# Patient Record
Sex: FEMALE | Race: BLACK OR AFRICAN AMERICAN | Employment: OTHER | URBAN - METROPOLITAN AREA
[De-identification: names, ages, dates, MRNs, and addresses within clinical notes are randomized per-mention and may not be internally consistent; named-entity substitution may affect disease eponyms.]

---

## 2017-02-28 RX ORDER — RIVAROXABAN 20 MG/1
TABLET, FILM COATED ORAL
Qty: 30 TAB | Refills: 4 | Status: SHIPPED | OUTPATIENT
Start: 2017-02-28 | End: 2017-03-30 | Stop reason: SDUPTHER

## 2017-03-30 ENCOUNTER — OFFICE VISIT (OUTPATIENT)
Dept: INTERNAL MEDICINE CLINIC | Age: 82
End: 2017-03-30

## 2017-03-30 VITALS
TEMPERATURE: 97.3 F | SYSTOLIC BLOOD PRESSURE: 115 MMHG | HEART RATE: 59 BPM | RESPIRATION RATE: 20 BRPM | DIASTOLIC BLOOD PRESSURE: 60 MMHG | HEIGHT: 64 IN | OXYGEN SATURATION: 99 %

## 2017-03-30 DIAGNOSIS — R41.3 MEMORY DEFICIT: ICD-10-CM

## 2017-03-30 DIAGNOSIS — H93.13 TINNITUS, BILATERAL: ICD-10-CM

## 2017-03-30 DIAGNOSIS — E03.9 HYPOTHYROIDISM, UNSPECIFIED TYPE: ICD-10-CM

## 2017-03-30 DIAGNOSIS — Z13.5 GLAUCOMA SCREENING: ICD-10-CM

## 2017-03-30 DIAGNOSIS — Z00.00 ROUTINE GENERAL MEDICAL EXAMINATION AT A HEALTH CARE FACILITY: ICD-10-CM

## 2017-03-30 DIAGNOSIS — I48.20 CHRONIC ATRIAL FIBRILLATION (HCC): ICD-10-CM

## 2017-03-30 DIAGNOSIS — Z00.00 MEDICARE ANNUAL WELLNESS VISIT, SUBSEQUENT: ICD-10-CM

## 2017-03-30 DIAGNOSIS — I10 ESSENTIAL HYPERTENSION: Primary | ICD-10-CM

## 2017-03-30 DIAGNOSIS — Z23 ENCOUNTER FOR IMMUNIZATION: ICD-10-CM

## 2017-03-30 DIAGNOSIS — R05.3 CHRONIC COUGH: ICD-10-CM

## 2017-03-30 PROBLEM — Z71.89 ACP (ADVANCE CARE PLANNING): Status: ACTIVE | Noted: 2017-03-30

## 2017-03-30 RX ORDER — PROMETHAZINE HYDROCHLORIDE AND CODEINE PHOSPHATE 6.25; 1 MG/5ML; MG/5ML
1 SOLUTION ORAL
Qty: 120 ML | Refills: 0 | Status: SHIPPED | OUTPATIENT
Start: 2017-03-30 | End: 2017-12-13

## 2017-03-30 NOTE — PROGRESS NOTES
This is a Subsequent Medicare Annual Wellness Visit providing Personalized Prevention Plan Services (PPPS) (Performed 12 months after initial AWV and PPPS )    I have reviewed the patient's medical history in detail and updated the computerized patient record. History     Past Medical History:   Diagnosis Date    Atrial fibrillation ([address] 2007    Hypercholesterolemia     Hypertension     Osteoarthritis 2010    Thromboembolus [facility])     Thyroid disease       Past Surgical History:   Procedure Laterality Date    HX HEENT      cataract sx    HX HIP FRACTURE [place]  2013    right hip 1989 left hip 2013    HX HIP REPLACEMENT  1989    HX PACEMAKER  2008     Current Outpatient Prescriptions   Medication Sig Dispense Refill    promethazine-codeine (PHENERGAN WITH CODEINE) 6.25-10 mg/5 mL syrup Take 5 mL by mouth nightly as needed for Cough. 120 mL 0    losartan (COZAAR) 50 mg tablet TAKE 1 TABLET BY MOUTH DAILY 90 Tab 2    pravastatin (PRAVACHOL) 20 mg tablet TAKE 1 TABLET BY MOUTH ONCE NIGHTLY 90 Tab 2    hydrochlorothiazide (HYDRODIURIL) 25 mg tablet TAKE 1/2 TABLET BY MOUTH DAILY 45 Tab 2    XARELTO 20 mg tab tablet TAKE 1 TABLET BY MOUTH ONCE DAILY WITH BREAKFAST 30 Tab 4    levothyroxine (SYNTHROID) 25 mcg tablet TAKE 1 TABLET BY MOUTH DAILY BEFORE BREAKFAST 90 Tab 3    magnesium oxide (MAG-OX) 400 mg tablet Take 1 Tab by mouth daily. 30 Tab 5    PARoxetine (PAXIL) 20 mg tablet Take 1 Tab by mouth daily. 90 Tab 4    multivitamin (ONE A DAY) tablet Take 1 Tab by mouth daily.  zinc oxide-cod liver oil (DESITIN) 40 % ointment Apply  to affected area as needed for Skin Irritation.  docusate sodium (COLACE) 100 mg capsule Take 100 mg by mouth two (2) times a day.  cyanocobalamin 1,000 mcg tablet Take 1,000 mcg by mouth daily. Indications: 5000mcg      alendronate (FOSAMAX) 35 mg tablet TAKE 1 TABLET BY MOUTH EVERY SEVEN (7) DAYS.  12 Tab 3    trospium (SANCTURA) 20 mg tablet Take 1 Tab by mouth Before breakfast, lunch, and dinner. 180 Tab 1    fluticasone (FLONASE) 50 mcg/actuation nasal spray 2 Sprays by Both Nostrils route daily. 3 Bottle 0    loratadine (CLARITIN) 10 mg tablet TAKE 1 TABLET BY MOUTH ONCE DAILY 30 Tab 0    calcium-cholecalciferol, D3, (CALTRATE 600+D) tablet Take 1 Tab by mouth daily.  sotalol (BETAPACE) 80 mg tablet Take 80 mg by mouth two (2) times a day. No Known Allergies  Family History   Problem Relation Age of Onset    Hypertension Mother      Social History   Substance Use Topics    Smoking status: Never Smoker    Smokeless tobacco: Never Used    Alcohol use No     Patient Active Problem List   Diagnosis Code    PE (pulmonary embolism) I26.99    DVT (deep venous thrombosis) ([facility]) I82.409    Mixed hyperlipidemia E78.2    Atrial fibrillation ([facility]) I48.91    HTN (hypertension) I10    DJD (degenerative joint disease) M19.90   [employer] Hypothyroid E03.9    TIA (transient ischemic attack) G45.9    ACP (advance care planning) Z71.89       Depression Risk Factor Screening:     PHQ 2 / 9, over the last two weeks 3/30/2017   Little interest or pleasure in doing things Not at all   Feeling down, depressed or hopeless Not at all   Total Score PHQ 2 0     Alcohol Risk Factor Screening: On any occasion during the past 3 months, have you had more than 3 drinks containing alcohol? No    Do you average more than 7 drinks per week? No      Functional Ability and Level of Safety:     Hearing Loss   mild    Activities of Daily Living   Partial care  Requires assistance with: bathing and hygiene and no ADLs    Fall Risk     Fall Risk Assessment, last 12 mths 3/30/2017   Able to walk? Yes   Fall in past 12 months? Yes   Fall with injury?  No   Number of falls in past 12 months 1   Fall Risk Score 1     Abuse Screen   Patient is not abused    Review of Systems   Not required  annual medicare wellness exam    Physical Examination     Evaluation of Cognitive Function:  Mood/affect:  happy  Appearance: age appropriate, casually dressed and well dressed  Family member/caregiver input: present and supportive    No exam performed today, annual medicare wellness exam.    Patient Care Team:  Karl Dukes MD as PCP - General (Internal Medicine)  Fly Hardy MD (Orthopedic Surgery)  Best Brown LPN as Nurse Navigator  Mo Mccurdy RN as Nurse Navigator (Internal Medicine)  Claudia Brooks MD (Cardiology)    Advice/Referrals/Counseling   Education and counseling provided:  Are appropriate based on today's review and evaluation  Influenza Vaccine  zoster      Assessment/Plan       ICD-10-CM ICD-9-CM    1. Essential hypertension I10 401.9    2. Hypothyroidism, unspecified type E03.9 244.9    3. Chronic atrial fibrillation (HCC) I48.2 427.31    4. Encounter for immunization Z23 V03.89 ZOSTER (SHINGLES) VACCINE, LIVE, SC INJECTION      INFLUENZA VIRUS VACCINE, FLUZONE IM VIAL   5. Glaucoma screening Z13.5 V80.1 REFERRAL TO OPHTHALMOLOGY   6. Medicare annual wellness visit, subsequent Z00.00 V70.0    7. Memory deficit R41.3 780.93    8. Chronic cough R05 786.2 promethazine-codeine (PHENERGAN WITH CODEINE) 6.25-10 mg/5 mL syrup   9. Routine general medical examination at a health care facility Z00.00 V70.0    .    1. Patient has AMD at home and will have daughter POA bring in to scan into her chart. 2. Discussed the following wellness screenings: Flu vaccination provided in office today, zoster vaccination ordered. Up to date on other wellness age appropriate screenings. 3. AVS and preventative screenings table printed, reviewed, and handed to patient. Patient verbalized understanding to all information.

## 2017-03-30 NOTE — PROGRESS NOTES
HISTORY OF PRESENT ILLNESS  Etienne Berry is a 80 y.o. female here for follow up.accompanied by her family member. Has memory issue. active,readign books and solving  puzzle  Has HTn,on med. Has A fib,will have battery change in pacemaker. Need eye check up. Need vaccine and cough syrup refill.still having chronic cough and post nasal drip keep her up at night. Follow-up   Pertinent negatives include no chest pain and no shortness of breath. Dementia    Her past medical history is significant for hypertension. Referral Request   Pertinent negatives include no chest pain and no shortness of breath. Ringing in Ear    Associated symptoms include hearing loss. Neurologic Problem   Primary symptoms include memory loss. Pertinent negatives include no shortness of breath and no chest pain. Hypertension    Associated symptoms include tinnitus. Pertinent negatives include no chest pain, no blurred vision and no shortness of breath. Palpitations   Pertinent negatives include no fever, no chest pain and no shortness of breath. Her past medical history is significant for hypertension. Review of Systems   Constitutional: Negative. Negative for chills and fever. HENT: Positive for hearing loss and tinnitus. Eyes: Negative. Negative for blurred vision and double vision. Respiratory: Negative. Negative for shortness of breath and wheezing. Cardiovascular: Negative. Negative for chest pain. Gastrointestinal: Negative. Genitourinary: Negative. Musculoskeletal: Negative. Negative for falls. Skin: Negative. Neurological: Negative. Psychiatric/Behavioral: Positive for memory loss. Physical Exam   Constitutional: She appears well-developed and well-nourished. No distress. HENT:   Head: Normocephalic and atraumatic. Right Ear: External ear normal.   Left Ear: External ear normal.   Nose: Nose normal.   Mouth/Throat: Oropharynx is clear and moist. No oropharyngeal exudate. Neck: Normal range of motion. Neck supple. No JVD present. No thyromegaly present. Cardiovascular: Normal rate, regular rhythm, normal heart sounds and intact distal pulses. Pulmonary/Chest: Effort normal and breath sounds normal. No respiratory distress. She has no wheezes. Musculoskeletal: She exhibits no edema or tenderness. Psychiatric: She has a normal mood and affect. Her behavior is normal.   MMSE 28.mild cognitive deficit       ASSESSMENT and Karen Bradshaw was seen today for follow-up, dementia, referral request, ringing in ear and annual wellness visit. Diagnoses and all orders for this visit:    Essential hypertension    Stable. on med. Hypothyroidism, unspecified type    On synthroid. Chronic atrial fibrillation ([address]    Has pacemaker. On xarelto and betapace. Encounter for immunization    Will do,  -     ZOSTER (SHINGLES) VACCINE, LIVE, SC INJECTION  -     INFLUENZA VIRUS VACCINE, FLUZONE IM VIAL    Glaucoma screening    Will refer,  -     REFERRAL TO OPHTHALMOLOGY      Memory deficit    not too bad. MMSE 28. Chronic cough  -     promethazine-codeine (PHENERGAN WITH CODEINE) 6.25-10 mg/5 mL syrup; Take 5 mL by mouth nightly as needed for Cough. Ringing    Will refer to ENT. Discussed expected course/resolution/complications of diagnosis in detail with patient. Medication risks/benefits/costs/interactions/alternatives discussed with patient. Pt was given an after visit summary which includes diagnoses, current medications & vitals. Pt expressed understanding with the diagnosis and plan.

## 2017-03-30 NOTE — PATIENT INSTRUCTIONS
Medicare Part B Preventive Services Limitations Recommendation Scheduled   Glaucoma Screening  Covered for patients with diagnosis of diabetes or family history of glaucoma; -Americans age 48 and older;  -Americans age 72 and older Completed  Recommended every 2 years Due anytime   Colorectal Cancer Screening    -Fecal occult blood test every year OR  -Flexible sigmoidoscopy every 5 yrs OR  -Colonoscopy every 10 years Covered age 48 and older Completed     Recommended every 10 years age 54-65 Please let your provider know if you have any concerns    Bone Mass Measurement (Dexascan)     Covered age 72 and older     Completed           Recommended every 2 years Due        Screening Mammography  Covered annually age 36 and older Completed      Recommended every 2 years age 54-69 Please let your provider know if you have any concerns    Cardiovascular Screening Blood Tests   (Cholesterol panel) Covered every 5 years for all ages Completed 6/17/2015      Recommended every 5 years Due ordered today   Diabetes Screening Tests    -Fasting blood sugar (FBS)  Hemoglobin A1C every 6 months for   pre-diabetic patients Completed 12/1/2016      Recommended every 3 years Due 6/1/2017   Seasonal Influenza Vaccination  Completed 3/4/2016    Recommended Annually Due fall 2017   Pneumococcal Vaccination  Completed 3/10/2013    Recommended once over age 72 complete   Prevnar   (pediatric Pneumococcal vaccine) Covered by Medicare Completed  3/10/2016  Recommended  complete   Tetanus Vaccine -Only covered by Medicare Part D through the pharmacy    -Requires a prescription from your primary care provider   Completed     Recommended every 10 years  Please take prescription to the pharmacy for administration   Zoster Vaccine (Shingles) -Only covered by Medicare Part D through the pharmacy     Completed     Recommended once age 61 and older    Family Practice Management 2011    Advanced Medical Directive  If you have completed an Advanced Medical Directive please bring a copy to the office at your convenience to be scanned into your record.

## 2017-03-30 NOTE — PROGRESS NOTES
Health Maintenance Due   Topic Date Due    DTaP/Tdap/Td series (1 - Tdap) 08/29/1945    ZOSTER VACCINE AGE 60>  08/29/1984    GLAUCOMA SCREENING Q2Y  08/29/1989    INFLUENZA AGE 9 TO ADULT  08/01/2016    MEDICARE YEARLY EXAM  03/11/2017       Chief Complaint   Patient presents with    Follow-up    Dementia     need eval    Referral Request     need referral for hearing       1. Have you been to the ER, urgent care clinic since your last visit? Hospitalized since your last visit? No    2. Have you seen or consulted any other health care providers outside of the [address] [place] [address] since your last visit? Include any pap smears or colon screening. No    3) Do you have an Advance Directive on file? no    4) Are you interested in receiving information on Advance Directives? NO      Patient is accompanied by self I have received verbal consent from Ivon Be to discuss any/all medical information while they are present in the room. Ivon Be is a 80 y.o. female  who presents for routine immunizations. She denies any symptoms , reactions or allergies that would exclude them from being immunized today. Risks and adverse reactions were discussed and the VIS was given to them. All questions were addressed. She was observed for 10 min post injection. There were no reactions observed.     Tiffani Franco LPN

## 2017-03-30 NOTE — MR AVS SNAPSHOT
Visit Information Date & Time Provider Department Dept. Phone Encounter #  
 3/30/2017 10:30 AM Sarah Quezada, [facility] Internal Medicine [phone] [id number] Upcoming Health Maintenance Date Due DTaP/Tdap/Td series (1 - Tdap) 8/29/1945 ZOSTER VACCINE AGE 60> 8/29/1984 GLAUCOMA SCREENING Q2Y 8/29/1989 INFLUENZA AGE 9 TO ADULT 8/1/2016 MEDICARE YEARLY EXAM 3/11/2017 Allergies as of 3/30/2017  Review Complete On: 3/30/2017 By: Sarah Quezada MD  
 No Known Allergies Current Immunizations  Reviewed on 3/30/2017 Name Date Influenza Vaccine  Incomplete, 10/10/2014 Pneumococcal Conjugate (PCV-13) 3/10/2016 Pneumococcal Polysaccharide (PPSV-23) 3/10/2013 Zoster Vaccine, Live  Incomplete Reviewed by Sarah Quezada MD on 3/30/2017 at 11:48 AM  
You Were Diagnosed With   
  
 Codes Comments Essential hypertension    -  Primary ICD-10-CM: I10 
ICD-9-CM: 401.9 Hypothyroidism, unspecified type     ICD-10-CM: E03.9 ICD-9-CM: 658. 9 Chronic atrial fibrillation (HCC)     ICD-10-CM: C66.2 ICD-9-CM: 427.31 Encounter for immunization     ICD-10-CM: W81 ICD-9-CM: V03.89 Glaucoma screening     ICD-10-CM: Z13.5 ICD-9-CM: V80.1 Medicare annual wellness visit, subsequent     ICD-10-CM: Z00.00 ICD-9-CM: V70.0 Memory deficit     ICD-10-CM: R41.3 ICD-9-CM: 780.93 Chronic cough     ICD-10-CM: R05 ICD-9-CM: 537. 2 Vitals BP Pulse Temp Resp Height(growth percentile) SpO2  
 115/60 (BP 1 Location: Left arm, BP Patient Position: Sitting) (!) 59 97.3 °F (36.3 °C) (Oral) 20 5' 4\" (1.626 m) 99% OB Status Smoking Status Menopause Never Smoker Vitals History Preferred Pharmacy Pharmacy Name Phone [id number], [address] [place] [address] [phone] Your Updated Medication List  
  
   
This list is accurate as of: 3/30/17 11:52 AM.  Always use your most recent med list.  
  
  
  
  
 alendronate 35 mg tablet Commonly known as:  FOSAMAX TAKE 1 TABLET BY MOUTH EVERY SEVEN (7) DAYS. calcium-cholecalciferol (D3) tablet Commonly known as:  CALTRATE 600+D Take 1 Tab by mouth daily. cyanocobalamin 1,000 mcg tablet Take 1,000 mcg by mouth daily. Indications: 5000mcg  
  
 docusate sodium 100 mg capsule Commonly known as:  Dorthey Matsu Take 100 mg by mouth two (2) times a day. fluticasone 50 mcg/actuation nasal spray Commonly known as:  Caffie Euler 2 Sprays by Both Nostrils route daily. hydroCHLOROthiazide 25 mg tablet Commonly known as:  HYDRODIURIL  
TAKE 1/2 TABLET BY MOUTH DAILY  
  
 levothyroxine 25 mcg tablet Commonly known as:  SYNTHROID  
TAKE 1 TABLET BY MOUTH DAILY BEFORE BREAKFAST  
  
 loratadine 10 mg tablet Commonly known as:  CLARITIN  
TAKE 1 TABLET BY MOUTH ONCE DAILY losartan 50 mg tablet Commonly known as:  COZAAR  
TAKE 1 TABLET BY MOUTH DAILY  
  
 magnesium oxide 400 mg tablet Commonly known as:  MAG-OX Take 1 Tab by mouth daily. multivitamin tablet Commonly known as:  ONE A DAY Take 1 Tab by mouth daily. PARoxetine 20 mg tablet Commonly known as:  PAXIL Take 1 Tab by mouth daily. pravastatin 20 mg tablet Commonly known as:  PRAVACHOL  
TAKE 1 TABLET BY MOUTH ONCE NIGHTLY  
  
 promethazine-codeine 6.25-10 mg/5 mL syrup Commonly known as:  PHENERGAN with CODEINE Take 5 mL by mouth nightly as needed for Cough. sotalol 80 mg tablet Commonly known as:  Dian Hail Take 80 mg by mouth two (2) times a day. trospium 20 mg tablet Commonly known as:  Bailon Turner Take 1 Tab by mouth Before breakfast, lunch, and dinner. XARELTO 20 mg Tab tablet Generic drug:  rivaroxaban TAKE 1 TABLET BY MOUTH ONCE DAILY WITH BREAKFAST  
  
 zinc oxide-cod liver oil 40 % ointment Commonly known as:  [place] Cornelio Apply  to affected area as needed for Skin Irritation. Prescriptions Printed Refills  
 promethazine-codeine (PHENERGAN WITH CODEINE) 6.25-10 mg/5 mL syrup 0 Sig: Take 5 mL by mouth nightly as needed for Cough. Class: Print Route: Oral  
  
We Performed the Following INFLUENZA VIRUS VACCINE, FLUZONE IM VIAL [id number] [facility]] REFERRAL TO OPHTHALMOLOGY [REF57 Custom] Comments:  
 Please evaluate patient for eye check up ZOSTER (SHINGLES) VACCINE, LIVE, SC INJECTION [id number] CPT(R)] Referral Information Referral ID Referred By Referred To  
  
 [id number] GARY [facility] [address] [place], [address] Visits Status Start Date End Date 1 New Request 3/30/17 3/30/18 If your referral has a status of pending review or denied, additional information will be sent to support the outcome of this decision. Patient Instructions Medicare Part B Preventive Services Limitations Recommendation Scheduled Glaucoma Screening  Covered for patients with diagnosis of diabetes or family history of glaucoma; -Americans age 48 and older; -Americans age 72 and older Completed Recommended every 2 years Due anytime Colorectal Cancer Screening 
 
-Fecal occult blood test every year OR 
-Flexible sigmoidoscopy every 5 yrs OR 
-Colonoscopy every 10 years Covered age 48 and older Completed Recommended every 10 years age 54-65 Please let your provider know if you have any concerns Bone Mass Measurement (Dexascan) Covered age 72 and older Completed Recommended every 2 years Due  
 
  
Screening Mammography  Covered annually age 36 and older Completed Recommended every 2 years age 54-69 Please let your provider know if you have any concerns Cardiovascular Screening Blood Tests  
(Cholesterol panel) Covered every 5 years for all ages Completed 6/17/2015 Recommended every 5 years Due ordered today Diabetes Screening Tests -Fasting blood sugar (FBS)  Hemoglobin A1C every 6 months for  
pre-diabetic patients Completed 12/1/2016 Recommended every 3 years Due 6/1/2017 Seasonal Influenza Vaccination  Completed 3/4/2016 Recommended Annually Due fall 2017 Pneumococcal Vaccination  Completed 3/10/2013 Recommended once over age 72 complete Prevnar  
(pediatric Pneumococcal vaccine) Covered by Medicare Completed 3/10/2016 Recommended  complete Tetanus Vaccine -Only covered by Medicare Part D through the pharmacy -Requires a prescription from your primary care provider Completed Recommended every 10 years  Please take prescription to the pharmacy for administration Zoster Vaccine (Shingles) -Only covered by Medicare Part D through the pharmacy Completed Recommended once age 61 and older Family Practice Management 2011 Advanced Medical Directive If you have completed an Advanced Medical Directive please bring a copy to the office at your convenience to be scanned into your record. Introducing \[facility]\"" & HEALTH SERVICES! Dear Sarita Nicholson: 
Thank you for requesting a [employer] account. Our records indicate that you have previously registered for a [employer] account but its currently inactive. Please call our [employer] support line at [phone]. Additional Information If you have questions, please visit the Frequently Asked Questions section of the [employer] website at [email or web address] Remember, EyesBot is NOT to be used for urgent needs. For medical emergencies, dial 911. Now available from your iPhone and Android! Please provide this summary of care documentation to your next provider. Your primary care clinician is listed as Apolinar Kurtz. If you have any questions after today's visit, please call [phone].

## 2017-04-05 DIAGNOSIS — E83.42 HYPOMAGNESEMIA: ICD-10-CM

## 2017-04-05 RX ORDER — LANOLIN ALCOHOL/MO/W.PET/CERES
CREAM (GRAM) TOPICAL
Qty: 30 TAB | Refills: 5 | Status: SHIPPED | OUTPATIENT
Start: 2017-04-05 | End: 2017-11-02 | Stop reason: SDUPTHER

## 2017-04-05 RX ORDER — ALENDRONATE SODIUM 35 MG/1
TABLET ORAL
Qty: 12 TAB | Refills: 3 | Status: SHIPPED | OUTPATIENT
Start: 2017-04-05 | End: 2017-11-02 | Stop reason: SDUPTHER

## 2017-05-20 DIAGNOSIS — F41.9 ANXIETY: ICD-10-CM

## 2017-05-22 RX ORDER — PAROXETINE HYDROCHLORIDE 20 MG/1
TABLET, FILM COATED ORAL
Qty: 90 TAB | Refills: 3 | Status: SHIPPED | OUTPATIENT
Start: 2017-05-22 | End: 2018-05-31 | Stop reason: SDUPTHER

## 2017-05-31 ENCOUNTER — OFFICE VISIT (OUTPATIENT)
Dept: INTERNAL MEDICINE CLINIC | Age: 82
End: 2017-05-31

## 2017-05-31 VITALS
HEIGHT: 64 IN | SYSTOLIC BLOOD PRESSURE: 123 MMHG | WEIGHT: 192.8 LBS | OXYGEN SATURATION: 99 % | TEMPERATURE: 99 F | DIASTOLIC BLOOD PRESSURE: 54 MMHG | RESPIRATION RATE: 20 BRPM | BODY MASS INDEX: 32.91 KG/M2 | HEART RATE: 65 BPM

## 2017-05-31 DIAGNOSIS — E03.9 HYPOTHYROIDISM, UNSPECIFIED TYPE: ICD-10-CM

## 2017-05-31 DIAGNOSIS — I10 ESSENTIAL HYPERTENSION: Primary | ICD-10-CM

## 2017-05-31 DIAGNOSIS — M81.0 OSTEOPOROSIS, UNSPECIFIED OSTEOPOROSIS TYPE, UNSPECIFIED PATHOLOGICAL FRACTURE PRESENCE: ICD-10-CM

## 2017-05-31 DIAGNOSIS — E78.2 MIXED HYPERLIPIDEMIA: ICD-10-CM

## 2017-05-31 DIAGNOSIS — I48.20 CHRONIC ATRIAL FIBRILLATION (HCC): ICD-10-CM

## 2017-05-31 NOTE — MR AVS SNAPSHOT
Visit Information Date & Time Provider Department Dept. Phone Encounter #  
 5/31/2017  1:45 PM Trisha Hicks, [address] [facility] Internal Medicine [phone] [id number] Upcoming Health Maintenance Date Due DTaP/Tdap/Td series (1 - Tdap) 8/29/1945 ZOSTER VACCINE AGE 60> 8/29/1984 GLAUCOMA SCREENING Q2Y 8/29/1989 INFLUENZA AGE 9 TO ADULT 8/1/2017 MEDICARE YEARLY EXAM 3/31/2018 Allergies as of 5/31/2017  Review Complete On: 5/31/2017 By: Trisha Hicks MD  
 No Known Allergies Current Immunizations  Reviewed on 3/30/2017 Name Date Influenza Vaccine 3/30/2017, 10/10/2014 Pneumococcal Conjugate (PCV-13) 3/10/2016 Pneumococcal Polysaccharide (PPSV-23) 3/10/2013 Not reviewed this visit You Were Diagnosed With   
  
 Codes Comments Essential hypertension    -  Primary ICD-10-CM: I10 
ICD-9-CM: 401.9 Chronic atrial fibrillation (HCC)     ICD-10-CM: C33.0 ICD-9-CM: 427.31 Hypothyroidism, unspecified type     ICD-10-CM: E03.9 ICD-9-CM: 244.9 Mixed hyperlipidemia     ICD-10-CM: E78.2 ICD-9-CM: 272.2 Vitals BP Pulse Temp Resp Height(growth percentile) Weight(growth percentile) 123/54 (BP 1 Location: Left arm, BP Patient Position: Sitting) 65 99 °F (37.2 °C) (Oral) 20 5' 4\" (1.626 m) 192 lb 12.8 oz (87.5 kg) SpO2 BMI OB Status Smoking Status 99% 33.09 kg/m2 Menopause Never Smoker Vitals History BMI and BSA Data Body Mass Index Body Surface Area 33.09 kg/m 2 1.99 m 2 Preferred Pharmacy Pharmacy Name Phone [id number] [place] [address] [place] [address] [address] [phone] Your Updated Medication List  
  
   
This list is accurate as of: 5/31/17  3:12 PM.  Always use your most recent med list.  
  
  
  
  
 alendronate 35 mg tablet Commonly known as:  FOSAMAX TAKE 1 TABLET BY MOUTH EVERY SEVEN (7) DAYS. calcium-cholecalciferol (D3) tablet Commonly known as:  CALTRATE 600+D Take 1 Tab by mouth daily. cyanocobalamin 1,000 mcg tablet Take 1,000 mcg by mouth daily. Indications: 5000mcg  
  
 docusate sodium 100 mg capsule Commonly known as:  Johnsie Rhys Take 100 mg by mouth two (2) times a day. fluticasone 50 mcg/actuation nasal spray Commonly known as:  Saran Bumpers 2 Sprays by Both Nostrils route daily. hydroCHLOROthiazide 25 mg tablet Commonly known as:  HYDRODIURIL  
TAKE 1/2 TABLET BY MOUTH DAILY  
  
 levothyroxine 25 mcg tablet Commonly known as:  SYNTHROID  
TAKE 1 TABLET BY MOUTH DAILY BEFORE BREAKFAST  
  
 loratadine 10 mg tablet Commonly known as:  CLARITIN  
TAKE 1 TABLET BY MOUTH ONCE DAILY losartan 50 mg tablet Commonly known as:  COZAAR  
TAKE 1 TABLET BY MOUTH DAILY  
  
 magnesium oxide 400 mg tablet Commonly known as:  MAG-OX  
TAKE 1 TAB BY MOUTH DAILY. multivitamin tablet Commonly known as:  ONE A DAY Take 1 Tab by mouth daily. PARoxetine 20 mg tablet Commonly known as:  PAXIL TAKE 1 TAB BY MOUTH DAILY. pravastatin 20 mg tablet Commonly known as:  PRAVACHOL  
TAKE 1 TABLET BY MOUTH ONCE NIGHTLY  
  
 promethazine-codeine 6.25-10 mg/5 mL syrup Commonly known as:  PHENERGAN with CODEINE Take 5 mL by mouth nightly as needed for Cough. sotalol 80 mg tablet Commonly known as:  Emil Burmese Take 80 mg by mouth two (2) times a day. trospium 20 mg tablet Commonly known as:  Bailon Camargo Take 1 Tab by mouth Before breakfast, lunch, and dinner. XARELTO 20 mg Tab tablet Generic drug:  rivaroxaban TAKE 1 TABLET BY MOUTH ONCE DAILY WITH BREAKFAST  
  
 zinc oxide-cod liver oil 40 % ointment Commonly known as:  Emogene [place] Apply  to affected area as needed for Skin Irritation. We Performed the Following CBC W/O DIFF [69242 CPT(R)] LIPID PANEL [81111 CPT(R)] METABOLIC PANEL, COMPREHENSIVE [03517 CPT(R)] TSH 3RD GENERATION [07001 CPT(R)] Introducing [facility] SERVICES! Dear Channing Rodriguez: 
Thank you for requesting a [employer] account. Our records indicate that you have previously registered for a [employer] account but its currently inactive. Please call our [employer] support line at [phone]. Additional Information If you have questions, please visit the Frequently Asked Questions section of the [employer] website at [email or web address] Remember, [employer] is NOT to be used for urgent needs. For medical emergencies, dial 911. Now available from your iPhone and Android! Please provide this summary of care documentation to your next provider. Your primary care clinician is listed as Paz Jara. If you have any questions after today's visit, please call [phone].

## 2017-05-31 NOTE — PROGRESS NOTES
HISTORY OF PRESENT ILLNESS  Victoria Echeverria is a 80 y.o. female here for follow up.accompanied by her family member. She has noticed to have mild gum bleeding while brushing. She is on xarelto. Wandering if she should stop xarelto. Has HTn,on med. doing well. Has A fib,will have battery change in pacemaker. Has OP.need dexa scan. Hypertension    Pertinent negatives include no chest pain, no blurred vision and no shortness of breath. Gum Problem        Follow-up   Pertinent negatives include no chest pain and no shortness of breath. Dementia    Her past medical history is significant for hypertension. Referral Request   Pertinent negatives include no chest pain and no shortness of breath. Ringing in Ear      Neurologic Problem   Primary symptoms include memory loss. Pertinent negatives include no shortness of breath and no chest pain. Palpitations   Pertinent negatives include no fever, no chest pain and no shortness of breath. Her past medical history is significant for hypertension. Review of Systems   Constitutional: Negative. Negative for chills and fever. HENT: Negative. Eyes: Negative. Negative for blurred vision and double vision. Respiratory: Negative. Negative for shortness of breath and wheezing. Cardiovascular: Negative. Negative for chest pain. Gastrointestinal: Negative. Genitourinary: Negative. Musculoskeletal: Negative. Negative for falls. Skin: Negative. Neurological: Negative. Psychiatric/Behavioral: Positive for memory loss. Physical Exam   Constitutional: She appears well-developed and well-nourished. No distress. HENT:        Neck: Normal range of motion. Neck supple. No JVD present. No thyromegaly present. Cardiovascular: Normal rate, regular rhythm, normal heart sounds and intact distal pulses. Pulmonary/Chest: Effort normal and breath sounds normal. No respiratory distress. She has no wheezes.    Musculoskeletal: She exhibits no edema or tenderness. Psychiatric: She has a normal mood and affect. Her behavior is normal.       ASSESSMENT and Isai Jordan was seen today for hypertension, blood clot, hypothyroidism and gum problem. Diagnoses and all orders for this visit:    Essential hypertension    Stable. on meds. Will do,  -     METABOLIC PANEL, COMPREHENSIVE  -     CBC W/O DIFF    Chronic atrial fibrillation (HCC)    On sotalol and xarelto. -     METABOLIC PANEL, COMPREHENSIVE    Hypothyroidism, unspecified type  -     TSH 3RD GENERATION    Mixed hyperlipidemia  -     LIPID PANEL    Osteoporosis, unspecified osteoporosis type, unspecified pathological fracture presence  -     DEXA BONE DENSITY STUDY AXIAL; Future    Gum bleeding  From xarelto. will check CBC. will continue for now. no bruise in skin. Discussed riska nd benefit of not havign xarelto. Discussed expected course/resolution/complications of diagnosis in detail with patient. Medication risks/benefits/costs/interactions/alternatives discussed with patient. Pt was given an after visit summary which includes diagnoses, current medications & vitals. Pt expressed understanding with the diagnosis and plan.

## 2017-05-31 NOTE — PROGRESS NOTES
Health Maintenance Due   Topic Date Due    DTaP/Tdap/Td series (1 - Tdap) [birth year]/29/1945    ZOSTER VACCINE AGE 60>  08/29/1984    GLAUCOMA SCREENING Q2Y  08/29/1989       Chief Complaint   Patient presents with    Hypertension    Blood Clot     hx of PE and DVT    Hypothyroidism    Gum Problem     bleeding when pt brushes her teeth and mwants to discuss prior to dentist       1. Have you been to the ER, urgent care clinic since your last visit? Hospitalized since your last visit? No    2. Have you seen or consulted any other health care providers outside of the [address] [place] [address] since your last visit? Include any pap smears or colon screening. No    3) Do you have an Advance Directive on file? no    4) Are you interested in receiving information on Advance Directives? NO      Patient is accompanied by daughter I have received verbal consent from Winnie Toussaint to discuss any/all medical information while they are present in the room.

## 2017-05-31 NOTE — LETTER
7/29/2017 10:42 AM 
 
Ms. Nury Phelps [address] [id number] Dear Nury Phelps: Please find your most recent results below. Resulted Orders METABOLIC PANEL, COMPREHENSIVE Result Value Ref Range Glucose 105 (H) 65 - 99 mg/dL BUN 29 10 - 36 mg/dL Creatinine 1.00 0.57 - 1.00 mg/dL GFR est non-AA 49 (L) >59 mL/min/1.73 GFR est AA 57 (L) >59 mL/min/1.73  
 BUN/Creatinine ratio 29 (H) 12 - 28 Sodium 146 (H) 134 - 144 mmol/L Potassium 3.9 3.5 - 5.2 mmol/L Chloride 105 96 - 106 mmol/L  
 CO2 27 18 - 29 mmol/L Calcium 8.6 (L) 8.7 - 10.3 mg/dL Protein, total 6.8 6.0 - 8.5 g/dL Albumin 3.7 3.2 - 4.6 g/dL GLOBULIN, TOTAL 3.1 1.5 - 4.5 g/dL A-G Ratio 1.2 1.2 - 2.2 Bilirubin, total 0.3 0.0 - 1.2 mg/dL Alk. phosphatase 76 39 - 117 IU/L  
 AST (SGOT) 20 0 - 40 IU/L  
 ALT (SGPT) 9 0 - 32 IU/L Narrative Performed at:  [address]  [id number] : Ольга Dumont MD, Phone:  [phone] TSH 3RD GENERATION Result Value Ref Range TSH 1.250 0.450 - 4.500 uIU/mL Narrative Performed at:  [address]  [id number] : Ольга Dumont MD, Phone:  [phone] CBC W/O DIFF Result Value Ref Range WBC 4.5 3.4 - 10.8 x10E3/uL  
 RBC 3.95 3.77 - 5.28 x10E6/uL HGB 11.2 11.1 - 15.9 g/dL HCT 34.6 34.0 - 46.6 % MCV 88 79 - 97 fL  
 MCH 28.4 26.6 - 33.0 pg  
 MCHC 32.4 31.5 - 35.7 g/dL  
 RDW 16.3 (H) 12.3 - 15.4 % PLATELET 184 808 - 463 x10E3/uL Narrative Performed at:  [address]  [id number] : Ольга Dumont MD, Phone:  [phone] LIPID PANEL Result Value Ref Range Cholesterol, total 190 100 - 199 mg/dL Triglyceride 86 0 - 149 mg/dL HDL Cholesterol 67 >39 mg/dL VLDL, calculated 17 5 - 40 mg/dL LDL, calculated 106 (H) 0 - 99 mg/dL Narrative Performed at:  [address]  [id number] : Alex Larios MD, Phone:  [phone] CVD REPORT Result Value Ref Range INTERPRETATION NTAP   
 PDF IMAGE Not applicable Narrative Performed at:  [address] [place] [address]  [id number] : Triston Cleaning PhD, Phone:  [phone] CKD REPORT Result Value Ref Range Interpretation Note Comment:  
   Supplement report is available. Narrative Performed at:  [address] [place] [address]  [id number] : Triston Cleaning PhD, Phone:  [phone] RECOMMENDATIONS: 
As my nurse mentioned over the phone: Low calcium. Have more calcium supplement. All other labs are stable. Please call me if you have any questions: [phone] Sincerely, Deric Melvin MD

## 2017-07-14 ENCOUNTER — HOSPITAL ENCOUNTER (OUTPATIENT)
Dept: LAB | Age: 82
Discharge: HOME | End: 2017-07-14
Payer: MEDICARE

## 2017-07-14 PROCEDURE — 80053 COMPREHEN METABOLIC PANEL: CPT

## 2017-07-14 PROCEDURE — 84443 ASSAY THYROID STIM HORMONE: CPT

## 2017-07-14 PROCEDURE — 80061 LIPID PANEL: CPT

## 2017-07-14 PROCEDURE — 36415 COLL VENOUS BLD VENIPUNCTURE: CPT

## 2017-07-14 PROCEDURE — 85027 COMPLETE CBC AUTOMATED: CPT

## 2017-07-16 LAB
ALBUMIN SERPL-MCNC: 3.7 G/DL (ref 3.2–4.6)
ALBUMIN/GLOB SERPL: 1.2 {RATIO} (ref 1.2–2.2)
ALP SERPL-CCNC: 76 IU/L (ref 39–117)
ALT SERPL-CCNC: 9 IU/L (ref 0–32)
AST SERPL-CCNC: 20 IU/L (ref 0–40)
BILIRUB SERPL-MCNC: 0.3 MG/DL (ref 0–1.2)
BUN SERPL-MCNC: 29 MG/DL (ref 10–36)
BUN/CREAT SERPL: 29 (ref 12–28)
CALCIUM SERPL-MCNC: 8.6 MG/DL (ref 8.7–10.3)
CHLORIDE SERPL-SCNC: 105 MMOL/L (ref 96–106)
CHOLEST SERPL-MCNC: 190 MG/DL (ref 100–199)
CO2 SERPL-SCNC: 27 MMOL/L (ref 18–29)
CREAT SERPL-MCNC: 1 MG/DL (ref 0.57–1)
ERYTHROCYTE [DISTWIDTH] IN BLOOD BY AUTOMATED COUNT: 16.3 % (ref 12.3–15.4)
GLOBULIN SER CALC-MCNC: 3.1 G/DL (ref 1.5–4.5)
GLUCOSE SERPL-MCNC: 105 MG/DL (ref 65–99)
HCT VFR BLD AUTO: 34.6 % (ref 34–46.6)
HDLC SERPL-MCNC: 67 MG/DL
HGB BLD-MCNC: 11.2 G/DL (ref 11.1–15.9)
INTERPRETATION, 910389: NORMAL
INTERPRETATION: NORMAL
LDLC SERPL CALC-MCNC: 106 MG/DL (ref 0–99)
MCH RBC QN AUTO: 28.4 PG (ref 26.6–33)
MCHC RBC AUTO-ENTMCNC: 32.4 G/DL (ref 31.5–35.7)
MCV RBC AUTO: 88 FL (ref 79–97)
PDF IMAGE, 910387: NORMAL
PLATELET # BLD AUTO: 230 X10E3/UL (ref 150–379)
POTASSIUM SERPL-SCNC: 3.9 MMOL/L (ref 3.5–5.2)
PROT SERPL-MCNC: 6.8 G/DL (ref 6–8.5)
RBC # BLD AUTO: 3.95 X10E6/UL (ref 3.77–5.28)
SODIUM SERPL-SCNC: 146 MMOL/L (ref 134–144)
TRIGL SERPL-MCNC: 86 MG/DL (ref 0–149)
TSH SERPL DL<=0.005 MIU/L-ACNC: 1.25 UIU/ML (ref 0.45–4.5)
VLDLC SERPL CALC-MCNC: 17 MG/DL (ref 5–40)
WBC # BLD AUTO: 4.5 X10E3/UL (ref 3.4–10.8)

## 2017-07-29 NOTE — PROGRESS NOTES
Spoke to patient's daughter, Regulo Mir (on Hippa form) notified of lab results and reviewed Dr. Frederick Simon recommendations. Letter mailed to patient with lab results and recommendations from provider.

## 2017-08-10 ENCOUNTER — PATIENT OUTREACH (OUTPATIENT)
Dept: INTERNAL MEDICINE CLINIC | Age: 82
End: 2017-08-10

## 2017-08-10 NOTE — PROGRESS NOTES
Received notification of patient's discharge from [address] 2017 dx Chest Pain via [employer] list. Per patient's EMR patient past medical history includes A-fib, HTN, hypothyroidism and pacemaker    Outgoing call placed spoke patient's daughter Danial Narayanan, patient identified utilizing name and  HIIPA verified per policy. Reintroduced self and reason for the call. Louisa verbalized understanding. [facility] reports patient is doing well no episodes of chest discomfort. [facility] state she will contact Dr. Zakiya Patel (cardiologist ) for follow up. Informed patient's daughter if unable to schedule an appointment with Dr. Zakiya Patel please contact this office to schedule a follow up appointment. Patient's daughter verbalized agreement.

## 2017-08-24 RX ORDER — HYDROCHLOROTHIAZIDE 25 MG/1
TABLET ORAL
Qty: 45 TAB | Refills: 2 | Status: SHIPPED | OUTPATIENT
Start: 2017-08-24 | End: 2018-04-06 | Stop reason: ALTCHOICE

## 2017-09-07 RX ORDER — LOSARTAN POTASSIUM 50 MG/1
TABLET ORAL
Qty: 90 TAB | Refills: 2 | Status: SHIPPED | OUTPATIENT
Start: 2017-09-07 | End: 2018-08-14 | Stop reason: SINTOL

## 2017-09-08 RX ORDER — LEVOTHYROXINE SODIUM 25 UG/1
TABLET ORAL
Qty: 90 TAB | Refills: 3 | Status: SHIPPED | OUTPATIENT
Start: 2017-09-08 | End: 2018-10-08 | Stop reason: SDUPTHER

## 2017-10-23 RX ORDER — PRAVASTATIN SODIUM 20 MG/1
TABLET ORAL
Qty: 90 TAB | Refills: 2 | Status: SHIPPED | OUTPATIENT
Start: 2017-10-23 | End: 2018-07-15 | Stop reason: SDUPTHER

## 2017-11-02 DIAGNOSIS — E83.42 HYPOMAGNESEMIA: ICD-10-CM

## 2017-11-03 RX ORDER — ALENDRONATE SODIUM 35 MG/1
TABLET ORAL
Qty: 12 TAB | Refills: 3 | Status: SHIPPED | OUTPATIENT
Start: 2017-11-03 | End: 2019-01-13 | Stop reason: SDUPTHER

## 2017-11-03 RX ORDER — LANOLIN ALCOHOL/MO/W.PET/CERES
CREAM (GRAM) TOPICAL
Qty: 30 TAB | Refills: 5 | Status: SHIPPED | OUTPATIENT
Start: 2017-11-03 | End: 2017-11-24 | Stop reason: SDUPTHER

## 2017-11-07 RX ORDER — TROSPIUM CHLORIDE 20 MG/1
TABLET, FILM COATED ORAL
Qty: 180 TAB | Refills: 2 | Status: SHIPPED | OUTPATIENT
Start: 2017-11-07 | End: 2018-08-05 | Stop reason: SDUPTHER

## 2017-11-24 DIAGNOSIS — E83.42 HYPOMAGNESEMIA: ICD-10-CM

## 2017-11-27 RX ORDER — LANOLIN ALCOHOL/MO/W.PET/CERES
CREAM (GRAM) TOPICAL
Qty: 90 TAB | Refills: 2 | Status: SHIPPED | OUTPATIENT
Start: 2017-11-27 | End: 2018-07-05 | Stop reason: SDUPTHER

## 2017-12-12 ENCOUNTER — HOSPITAL ENCOUNTER (OUTPATIENT)
Dept: LAB | Age: 82
Discharge: HOME | End: 2017-12-12
Payer: MEDICARE

## 2017-12-12 PROCEDURE — 84443 ASSAY THYROID STIM HORMONE: CPT

## 2017-12-12 PROCEDURE — 85027 COMPLETE CBC AUTOMATED: CPT

## 2017-12-12 PROCEDURE — 80053 COMPREHEN METABOLIC PANEL: CPT

## 2017-12-12 PROCEDURE — 82306 VITAMIN D 25 HYDROXY: CPT

## 2017-12-13 ENCOUNTER — HOSPITAL ENCOUNTER (OUTPATIENT)
Dept: GENERAL RADIOLOGY | Age: 82
Discharge: HOME | End: 2017-12-13
Payer: MEDICARE

## 2017-12-13 ENCOUNTER — OFFICE VISIT (OUTPATIENT)
Dept: INTERNAL MEDICINE CLINIC | Age: 82
End: 2017-12-13

## 2017-12-13 VITALS
BODY MASS INDEX: 32.78 KG/M2 | WEIGHT: 192 LBS | HEIGHT: 64 IN | DIASTOLIC BLOOD PRESSURE: 59 MMHG | SYSTOLIC BLOOD PRESSURE: 152 MMHG | OXYGEN SATURATION: 96 % | RESPIRATION RATE: 20 BRPM | HEART RATE: 65 BPM | TEMPERATURE: 97.7 F

## 2017-12-13 DIAGNOSIS — J30.1 ALLERGIC RHINITIS DUE TO POLLEN, UNSPECIFIED CHRONICITY, UNSPECIFIED SEASONALITY: ICD-10-CM

## 2017-12-13 DIAGNOSIS — G45.9 TRANSIENT CEREBRAL ISCHEMIA, UNSPECIFIED TYPE: ICD-10-CM

## 2017-12-13 DIAGNOSIS — R05.3 CHRONIC COUGH: ICD-10-CM

## 2017-12-13 DIAGNOSIS — M54.2 NECK PAIN: ICD-10-CM

## 2017-12-13 DIAGNOSIS — H91.90 HEARING LOSS, UNSPECIFIED HEARING LOSS TYPE, UNSPECIFIED LATERALITY: ICD-10-CM

## 2017-12-13 DIAGNOSIS — I10 ESSENTIAL HYPERTENSION: ICD-10-CM

## 2017-12-13 DIAGNOSIS — E03.9 HYPOTHYROIDISM, UNSPECIFIED TYPE: ICD-10-CM

## 2017-12-13 DIAGNOSIS — E55.9 VITAMIN D DEFICIENCY: ICD-10-CM

## 2017-12-13 DIAGNOSIS — R53.83 FATIGUE, UNSPECIFIED TYPE: Primary | ICD-10-CM

## 2017-12-13 PROCEDURE — 72050 X-RAY EXAM NECK SPINE 4/5VWS: CPT

## 2017-12-13 RX ORDER — PROMETHAZINE HYDROCHLORIDE AND CODEINE PHOSPHATE 6.25; 1 MG/5ML; MG/5ML
1 SOLUTION ORAL
Qty: 120 ML | Refills: 0 | Status: SHIPPED | OUTPATIENT
Start: 2017-12-13 | End: 2018-06-07 | Stop reason: ALTCHOICE

## 2017-12-13 NOTE — LETTER
12/20/2017 3:35 PM 
 
Ms. Jose Luis Aguayo [address] 
[address] [facility] Dear Jose Luis Aguayo: Please find your most recent results below. Resulted Orders XR SPINE CERV 4 OR 5 V Narrative INDICATION:  neck pain Exam: 5 views of the cervical spine. No comparisons. FINDINGS: Alignment is normal. Bone mineral density is decreased. There is disc 
height loss at C3-4 and C4-5. There is disc height loss at C6-7 and C7-T1 with 
multilevel facet degenerative change. There is bony foraminal narrowing on the 
right at C4-5, C6-C7 and C7-T1. There is bony foraminal narrowing on the left at C4-5 and C5-6 and C6-7. Left-sided oblique films are under rotated. . There is no 
prevertebral soft tissue swelling. No fracture or bone destruction. Despite 
multiple attempts open mouth odontoid view does not fully demonstrate the 
odontoid tip. Impression IMPRESSION: 
1. Significant multilevel degenerative change RECOMMENDATIONS: 
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 I have enclosed a list of physical therapy locations and their respective phone numbers, please call location of your choice and schedule an appointment Please call me if you have any questions: [phone] Sincerely, [employer] [facility]

## 2017-12-13 NOTE — PROGRESS NOTES
Chief Complaint   Patient presents with    Fatigue     1. Have you been to the ER, urgent care clinic since your last visit? Hospitalized since your last visit? No    2. Have you seen or consulted any other health care providers outside of the [address] [place] [address] since your last visit? Include any pap smears or colon screening.  No

## 2017-12-13 NOTE — LETTER
12/20/2017 3:34 PM 
 
Ms. Desire Reeves [address] 
[address] [place] [address] Dear Desire Reeves: Please find your most recent results below. Resulted Orders CBC W/O DIFF Result Value Ref Range WBC 5.4 3.4 - 10.8 x10E3/uL  
 RBC 3.87 3.77 - 5.28 x10E6/uL HGB 11.0 (L) 11.1 - 15.9 g/dL Comment: **Please note reference interval change** HCT 33.3 (L) 34.0 - 46.6 % MCV 86 79 - 97 fL  
 MCH 28.4 26.6 - 33.0 pg  
 MCHC 33.0 31.5 - 35.7 g/dL  
 RDW 16.3 (H) 12.3 - 15.4 % PLATELET 618 625 - 765 x10E3/uL Narrative Performed at:  [address]  [id number] : Neptali Valverde MD, Phone:  [phone] METABOLIC PANEL, COMPREHENSIVE Result Value Ref Range Glucose 91 65 - 99 mg/dL BUN 26 10 - 36 mg/dL Creatinine 1.15 (H) 0.57 - 1.00 mg/dL GFR est non-AA 41 (L) >59 mL/min/1.73 GFR est AA 47 (L) >59 mL/min/1.73  
 BUN/Creatinine ratio 23 12 - 28 Sodium 144 134 - 144 mmol/L Potassium 4.2 3.5 - 5.2 mmol/L Chloride 103 96 - 106 mmol/L  
 CO2 26 18 - 29 mmol/L Calcium 8.6 (L) 8.7 - 10.3 mg/dL Protein, total 6.5 6.0 - 8.5 g/dL Albumin 3.7 3.2 - 4.6 g/dL GLOBULIN, TOTAL 2.8 1.5 - 4.5 g/dL A-G Ratio 1.3 1.2 - 2.2 Bilirubin, total 0.4 0.0 - 1.2 mg/dL Alk. phosphatase 76 39 - 117 IU/L  
 AST (SGOT) 14 0 - 40 IU/L  
 ALT (SGPT) 6 0 - 32 IU/L Narrative Performed at:  [address]  [id number] : Neptali Valverde MD, Phone:  [phone] TSH 3RD GENERATION Result Value Ref Range TSH 2.360 0.450 - 4.500 uIU/mL Narrative Performed at:  [address]  [id number] : Neptali Valverde MD, Phone:  [phone] VITAMIN D, 25 HYDROXY Result Value Ref Range VITAMIN D, 25-HYDROXY 46.6 30.0 - 100.0 ng/mL Comment: Vitamin D deficiency has been defined by the [address] [facility] practice guideline as a 
level of serum 25-OH vitamin D less than 20 ng/mL (1,2). The Endocrine Society went on to further define vitamin D 
insufficiency as a level between 21 and 29 ng/mL (2). 1. IOM ([place] of [employer]). 2010. Dietary reference 
   intakes for calcium and D. [facility]: The 
   [employer]. 2. Caitlyn MF, Josephine NC, Nava ALMAGUER, et al. 
   Evaluation, treatment, and prevention of vitamin D 
   deficiency: an Endocrine Society clinical practice 
   guideline. JCEM. 2011 Jul; 96(7):1911-30. Narrative Performed at:  [address]  [id number] : Kady Lang MD, Phone:  [phone] CKD REPORT Result Value Ref Range Interpretation Note Comment:  
   Supplemental report is available. Narrative Performed at:  [address] [place] [address]  [id number] : Leon Brandt PhD, Phone:  [phone] RECOMMENDATIONS: 
Ivon Be your labs indicate that your kidney functions are slightly decreased, please increase your fluid intake. All other labs are stable Please call me if you have any questions: [phone] Sincerely, Rosie Parson MD

## 2017-12-13 NOTE — PROGRESS NOTES
HISTORY OF PRESENT ILLNESS  Natalia Villafana is a 80 y.o. female here for follow up.accompanied by her granddaughter. Reports fatigue nowadays. She is not taking water multivitamins. She has neck pain mostly on the right side radiating to nape. No weakness in the arms. Has chronic cough from allergy. Has moderate to severe postnasal drip. On allergy medicine. Has HTn,on med. doing well. Has A fib, on medicine. Doing well. Need lab. Has OP.need dexa scan. She is active, able to ambulate by herself. Fatigue   Pertinent negatives include no chest pain and no shortness of breath. Neck Pain   Pertinent negatives include no chest pain and no shortness of breath. Hypertension    Associated symptoms include malaise/fatigue and neck pain. Pertinent negatives include no chest pain, no blurred vision and no shortness of breath. Follow-up   Pertinent negatives include no chest pain and no shortness of breath. Dementia    Her past medical history is significant for hypertension. Review of Systems   Constitutional: Positive for fatigue and malaise/fatigue. Negative for chills and fever. HENT: Negative. Eyes: Negative. Negative for blurred vision and double vision. Respiratory: Negative. Negative for shortness of breath and wheezing. Cardiovascular: Negative. Negative for chest pain. Gastrointestinal: Negative. Genitourinary: Negative. Musculoskeletal: Positive for neck pain. Negative for falls. Skin: Negative. Neurological: Negative. Psychiatric/Behavioral: Positive for memory loss. Physical Exam   Constitutional: She appears well-developed and well-nourished. No distress. HENT:        Neck: Normal range of motion. Neck supple. No JVD present. No thyromegaly present. Cardiovascular: Normal rate, regular rhythm, normal heart sounds and intact distal pulses. Pulmonary/Chest: Effort normal and breath sounds normal. No respiratory distress. She has no wheezes. Musculoskeletal: She exhibits no edema or tenderness. Psychiatric: She has a normal mood and affect. Her behavior is normal.       ASSESSMENT and PLAN    Diagnoses and all orders for this visit:    1. Fatigue, unspecified type  Need to take multivitamin every day. Will check,    -     CBC W/O DIFF  -     METABOLIC PANEL, COMPREHENSIVE  -     TSH 3RD GENERATION    2. Essential hypertension    Stable with current regimen. Will check,  -     CBC W/O DIFF  -     METABOLIC PANEL, COMPREHENSIVE    3. Transient cerebral ischemia, unspecified type  On Xarelto. Stable. 4. Hypothyroidism, unspecified type  On Synthroid. Will check,    -     TSH 3RD GENERATION    5. Allergic rhinitis due to pollen, unspecified chronicity, unspecified seasonality  On allergy medicine. 6. Chronic cough  Probable DJD. Will check,    -     promethazine-codeine (PHENERGAN WITH CODEINE) 6.25-10 mg/5 mL syrup; Take 5 mL by mouth nightly as needed for Cough. 7. Vitamin D deficiency  -     VITAMIN D, 25 HYDROXY    8. Neck pain    Probable DJD. Will check,  -     XR SPINE CERV 4 OR 5 V; Future  -     REFERRAL TO PHYSICAL THERAPY    9. Hearing loss, unspecified hearing loss type, unspecified laterality  -     REFERRAL TO ENT-OTOLARYNGOLOGY          Discussed expected course/resolution/complications of diagnosis in detail with patient. Medication risks/benefits/costs/interactions/alternatives discussed with patient. Pt was given an after visit summary which includes diagnoses, current medications & vitals. Pt expressed understanding with the diagnosis and plan.

## 2017-12-14 LAB
25(OH)D3+25(OH)D2 SERPL-MCNC: 46.6 NG/ML (ref 30–100)
ALBUMIN SERPL-MCNC: 3.7 G/DL (ref 3.2–4.6)
ALBUMIN/GLOB SERPL: 1.3 {RATIO} (ref 1.2–2.2)
ALP SERPL-CCNC: 76 IU/L (ref 39–117)
ALT SERPL-CCNC: 6 IU/L (ref 0–32)
AST SERPL-CCNC: 14 IU/L (ref 0–40)
BILIRUB SERPL-MCNC: 0.4 MG/DL (ref 0–1.2)
BUN SERPL-MCNC: 26 MG/DL (ref 10–36)
BUN/CREAT SERPL: 23 (ref 12–28)
CALCIUM SERPL-MCNC: 8.6 MG/DL (ref 8.7–10.3)
CHLORIDE SERPL-SCNC: 103 MMOL/L (ref 96–106)
CO2 SERPL-SCNC: 26 MMOL/L (ref 18–29)
CREAT SERPL-MCNC: 1.15 MG/DL (ref 0.57–1)
ERYTHROCYTE [DISTWIDTH] IN BLOOD BY AUTOMATED COUNT: 16.3 % (ref 12.3–15.4)
GFR SERPLBLD CREATININE-BSD FMLA CKD-EPI: 41 ML/MIN/1.73
GFR SERPLBLD CREATININE-BSD FMLA CKD-EPI: 47 ML/MIN/1.73
GLOBULIN SER CALC-MCNC: 2.8 G/DL (ref 1.5–4.5)
GLUCOSE SERPL-MCNC: 91 MG/DL (ref 65–99)
HCT VFR BLD AUTO: 33.3 % (ref 34–46.6)
HGB BLD-MCNC: 11 G/DL (ref 11.1–15.9)
INTERPRETATION: NORMAL
MCH RBC QN AUTO: 28.4 PG (ref 26.6–33)
MCHC RBC AUTO-ENTMCNC: 33 G/DL (ref 31.5–35.7)
MCV RBC AUTO: 86 FL (ref 79–97)
PLATELET # BLD AUTO: 255 X10E3/UL (ref 150–379)
POTASSIUM SERPL-SCNC: 4.2 MMOL/L (ref 3.5–5.2)
PROT SERPL-MCNC: 6.5 G/DL (ref 6–8.5)
RBC # BLD AUTO: 3.87 X10E6/UL (ref 3.77–5.28)
SODIUM SERPL-SCNC: 144 MMOL/L (ref 134–144)
TSH SERPL DL<=0.005 MIU/L-ACNC: 2.36 UIU/ML (ref 0.45–4.5)
WBC # BLD AUTO: 5.4 X10E3/UL (ref 3.4–10.8)

## 2018-01-09 ENCOUNTER — APPOINTMENT (OUTPATIENT)
Dept: CT IMAGING | Age: 83
End: 2018-01-09
Attending: EMERGENCY MEDICINE
Payer: MEDICARE

## 2018-01-09 ENCOUNTER — HOSPITAL ENCOUNTER (EMERGENCY)
Age: 83
Discharge: HOME | End: 2018-01-09
Attending: EMERGENCY MEDICINE
Payer: MEDICARE

## 2018-01-09 VITALS
OXYGEN SATURATION: 94 % | RESPIRATION RATE: 18 BRPM | TEMPERATURE: 98.2 F | WEIGHT: 190 LBS | HEIGHT: 64 IN | SYSTOLIC BLOOD PRESSURE: 140 MMHG | HEART RATE: 67 BPM | DIASTOLIC BLOOD PRESSURE: 72 MMHG | BODY MASS INDEX: 32.44 KG/M2

## 2018-01-09 DIAGNOSIS — S16.1XXA STRAIN OF NECK MUSCLE, INITIAL ENCOUNTER: ICD-10-CM
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
## 2018-01-09 DIAGNOSIS — S09.90XA INJURY OF HEAD, INITIAL ENCOUNTER: Primary | ICD-10-CM

## 2018-01-09 LAB
ALBUMIN SERPL-MCNC: 3 G/DL (ref 3.5–5)
ALBUMIN/GLOB SERPL: 0.8 {RATIO} (ref 1.1–2.2)
ALP SERPL-CCNC: 75 U/L (ref 45–117)
ALT SERPL-CCNC: 9 U/L (ref 12–78)
ANION GAP SERPL CALC-SCNC: 7 MMOL/L (ref 5–15)
APPEARANCE UR: CLEAR
AST SERPL-CCNC: 16 U/L (ref 15–37)
ATRIAL RATE: 69 BPM
BACTERIA URNS QL MICRO: NEGATIVE /HPF
BASOPHILS # BLD: 0 K/UL (ref 0–0.1)
BASOPHILS NFR BLD: 0 % (ref 0–1)
BILIRUB SERPL-MCNC: 0.5 MG/DL (ref 0.2–1)
BILIRUB UR QL: NEGATIVE
BUN SERPL-MCNC: 23 MG/DL (ref 6–20)
BUN/CREAT SERPL: 20 (ref 12–20)
CALCIUM SERPL-MCNC: 8.7 MG/DL (ref 8.5–10.1)
CALCULATED P AXIS, ECG09: -161 DEGREES
CALCULATED R AXIS, ECG10: -11 DEGREES
CALCULATED T AXIS, ECG11: 13 DEGREES
CHLORIDE SERPL-SCNC: 104 MMOL/L (ref 97–108)
CO2 SERPL-SCNC: 29 MMOL/L (ref 21–32)
COLOR UR: NORMAL
CREAT SERPL-MCNC: 1.13 MG/DL (ref 0.55–1.02)
DIAGNOSIS, 93000: NORMAL
EOSINOPHIL # BLD: 0.1 K/UL (ref 0–0.4)
EOSINOPHIL NFR BLD: 2 % (ref 0–7)
EPITH CASTS URNS QL MICRO: NORMAL /LPF
ERYTHROCYTE [DISTWIDTH] IN BLOOD BY AUTOMATED COUNT: 15 % (ref 11.5–14.5)
GLOBULIN SER CALC-MCNC: 3.9 G/DL (ref 2–4)
GLUCOSE SERPL-MCNC: 110 MG/DL (ref 65–100)
GLUCOSE UR STRIP.AUTO-MCNC: NEGATIVE MG/DL
HCT VFR BLD AUTO: 32 % (ref 35–47)
HGB BLD-MCNC: 10.1 G/DL (ref 11.5–16)
HGB UR QL STRIP: NEGATIVE
HYALINE CASTS URNS QL MICRO: NORMAL /LPF (ref 0–5)
KETONES UR QL STRIP.AUTO: NEGATIVE MG/DL
LEUKOCYTE ESTERASE UR QL STRIP.AUTO: NEGATIVE
LYMPHOCYTES # BLD: 1 K/UL (ref 0.8–3.5)
LYMPHOCYTES NFR BLD: 21 % (ref 12–49)
MCH RBC QN AUTO: 27.3 PG (ref 26–34)
MCHC RBC AUTO-ENTMCNC: 31.6 G/DL (ref 30–36.5)
MCV RBC AUTO: 86.5 FL (ref 80–99)
MONOCYTES # BLD: 0.3 K/UL (ref 0–1)
MONOCYTES NFR BLD: 6 % (ref 5–13)
NEUTS SEG # BLD: 3.4 K/UL (ref 1.8–8)
NEUTS SEG NFR BLD: 71 % (ref 32–75)
NITRITE UR QL STRIP.AUTO: NEGATIVE
P-R INTERVAL, ECG05: 240 MS
PH UR STRIP: 6.5 [PH] (ref 5–8)
PLATELET # BLD AUTO: 202 K/UL (ref 150–400)
POTASSIUM SERPL-SCNC: 3.6 MMOL/L (ref 3.5–5.1)
PROT SERPL-MCNC: 6.9 G/DL (ref 6.4–8.2)
PROT UR STRIP-MCNC: NEGATIVE MG/DL
Q-T INTERVAL, ECG07: 424 MS
QRS DURATION, ECG06: 92 MS
QTC CALCULATION (BEZET), ECG08: 440 MS
RBC # BLD AUTO: 3.7 M/UL (ref 3.8–5.2)
RBC #/AREA URNS HPF: NORMAL /HPF (ref 0–5)
SODIUM SERPL-SCNC: 140 MMOL/L (ref 136–145)
SP GR UR REFRACTOMETRY: 1.02 (ref 1–1.03)
TROPONIN I SERPL-MCNC: <0.04 NG/ML
UA: UC IF INDICATED,UAUC: NORMAL
UROBILINOGEN UR QL STRIP.AUTO: 1 EU/DL (ref 0.2–1)
VENTRICULAR RATE, ECG03: 65 BPM
WBC # BLD AUTO: 4.9 K/UL (ref 3.6–11)
WBC URNS QL MICRO: NORMAL /HPF (ref 0–4)

## 2018-01-09 PROCEDURE — 84484 ASSAY OF TROPONIN QUANT: CPT | Performed by: EMERGENCY MEDICINE

## 2018-01-09 PROCEDURE — 70450 CT HEAD/BRAIN W/O DYE: CPT

## 2018-01-09 PROCEDURE — 97116 GAIT TRAINING THERAPY: CPT

## 2018-01-09 PROCEDURE — 97161 PT EVAL LOW COMPLEX 20 MIN: CPT

## 2018-01-09 PROCEDURE — G8979 MOBILITY GOAL STATUS: HCPCS

## 2018-01-09 PROCEDURE — 72125 CT NECK SPINE W/O DYE: CPT

## 2018-01-09 PROCEDURE — G8980 MOBILITY D/C STATUS: HCPCS

## 2018-01-09 PROCEDURE — 81001 URINALYSIS AUTO W/SCOPE: CPT | Performed by: EMERGENCY MEDICINE

## 2018-01-09 PROCEDURE — 85025 COMPLETE CBC W/AUTO DIFF WBC: CPT | Performed by: EMERGENCY MEDICINE

## 2018-01-09 PROCEDURE — 80053 COMPREHEN METABOLIC PANEL: CPT | Performed by: EMERGENCY MEDICINE

## 2018-01-09 PROCEDURE — 93005 ELECTROCARDIOGRAM TRACING: CPT

## 2018-01-09 PROCEDURE — 36415 COLL VENOUS BLD VENIPUNCTURE: CPT | Performed by: EMERGENCY MEDICINE

## 2018-01-09 PROCEDURE — G8978 MOBILITY CURRENT STATUS: HCPCS

## 2018-01-09 PROCEDURE — 99285 EMERGENCY DEPT VISIT HI MDM: CPT

## 2018-01-09 RX ORDER — SODIUM CHLORIDE 0.9 % (FLUSH) 0.9 %
5-10 SYRINGE (ML) INJECTION AS NEEDED
Status: DISCONTINUED | OUTPATIENT
Start: 2018-01-09 | End: 2018-01-09 | Stop reason: HOSPADM

## 2018-01-09 RX ORDER — SODIUM CHLORIDE 0.9 % (FLUSH) 0.9 %
5-10 SYRINGE (ML) INJECTION EVERY 8 HOURS
Status: DISCONTINUED | OUTPATIENT
Start: 2018-01-09 | End: 2018-01-09 | Stop reason: HOSPADM

## 2018-01-09 NOTE — PROGRESS NOTES
physical Therapy Emergency Department EVALUATION/DISCHARGE with CMS G codes  Patient: Milli Mei (10 y.o. female)  Date: 1/9/2018  Primary Diagnosis: There are no admission diagnoses documented for this encounter. Precautions:      ASSESSMENT :  Based on the objective data described below, the patient presents with neck pain and head injury s/p fall going the the bathroom this am. Asked by Dr. Mustapha Burdick for eval.  Pt lives with her son and dtr in law. She is usually able to dress herself and bath herself sitting on a shower chair. She uses a rollator or a walker for amb but did not use when going to the bathroom this am.  Pt currently c/o some min \"wooziness\". She is able to complete bed mobility on the ED stretcher with min A. She transfers with S. She amb with rollator with S with no new c/o. She does report minimal soreness of L knee but states that this is intermittently chronic. Pt returned to stretcher in care of nurse. Reviewed home safety issues and family and pt voiced understanding. Encouraged use of rollator or walker even when going short distance to bathroom. Dtr states pt has been given OPPT referral from PCP and plans to follow up; would concur this would be beneficial for pt for fall prevention. Pt should be safe with d/c home with family when medically stable. Further acute physical therapy in the ED is not indicated at this time.      PLAN :  Discharge Recommendations:     [x]   Home with family  []   Skilled nursing facility  []   Admission to hospital with rehab likely needed  []   Inpatient rehab referral  []   Outpatient physical therapy referral  [x]   Other:has script for OPPT    Further Equipment Recommendations for Discharge:   [x]   Rolling walker with 5\" wheels or rollator; has both  []   Crutches   []   Vijay Ditch   []   Wheelchair   []   Other:     COMMUNICATION/EDUCATION:   Communication/Collaboration:  [x]   Fall prevention education was provided and the patient/caregiver indicated understanding. [x]   Patient/family have participated as able and agree with findings and recommendations. []   Patient is unable to participate in plan of care at this time. Findings and recommendations were discussed with: MD physician and care manager       SUBJECTIVE:   Patient stated I just feel a little woozy.     OBJECTIVE DATA SUMMARY:     Past Medical History:   Diagnosis Date    Atrial fibrillation ([facility] [address] 2007    Hypercholesterolemia     Hypertension     Osteoarthritis 2010    Thromboembolus ([facility] [address]     Thyroid disease      Past Surgical History:   Procedure Laterality Date    HX HEENT      cataract sx    HX HIP FRACTURE [place]  2013    right hip 1989 left hip 2013    HX HIP REPLACEMENT  1989    HX PACEMAKER  2008     Prior Level of Function/Home Situation: Pt lives with her son and dtr in law. She is usually able to dress herself and bath herself sitting on a shower chair.  She uses a rollator or a walker for amb but did not use when going to the bathroom this am.    Home Situation  Home Environment: Private residence  # Steps to Enter: 1  Wheelchair Ramp: Yes  One/Two Story Residence: Other (Comment) (stays on second mostly; has stair lift chair)  Lift Chair Available: Yes  Living Alone: No  Support Systems: Child(joan), Family member(s)  Patient Expects to be Discharged to[de-identified] Private residence  Current DME Used/Available at Home: Bebe Blair, straight, Shower chair, Walker, rollator, Walker, rolling, Wheelchair  Critical Behavior:  Neurologic State: Alert  Orientation Level: Oriented X4  Cognition: Follows commands       Strength:    Strength: Generally decreased, functional                    Tone & Sensation:   Tone: Normal              Sensation: Intact               Range Of Motion:  AROM: Within functional limits           PROM: Within functional limits           Coordination:  Coordination: Within functional limits    Functional Mobility:  Bed Mobility:  Rolling: Independent  Supine to Sit: Minimum assistance  Sit to Supine: Minimum assistance     Transfers:  Sit to Stand: Supervision  Stand to Sit: Supervision                       Balance:   Sitting: Intact  Standing: Impaired  Standing - Static: Fair  Standing - Dynamic : Fair  Ambulation/Gait Training:  Distance (ft): 120 Feet (ft)  Assistive Device: Walker, rollator  Ambulation - Level of Assistance: Supervision        Gait Abnormalities: Decreased step clearance              Speed/Sheryl: Slow                Functional Measure:  Barthel Index:    Bathin  Bladder: 10  Bowels: 10  Groomin  Dressing: 10  Feeding: 10  Mobility: 10  Stairs: 5  Toilet Use: 10  Transfer (Bed to Chair and Back): 15  Total: 85       Barthel and G-code impairment scale:  Percentage of impairment CH  0% CI  1-19% CJ  20-39% CK  40-59% CL  60-79% CM  80-99% CN  100%   Barthel Score 0-100 100 99-80 79-60 59-40 20-39 1-19   0   Barthel Score 0-20 20 17-19 13-16 9-12 5-8 1-4 0      The Barthel ADL Index: Guidelines  1. The index should be used as a record of what a patient does, not as a record of what a patient could do. 2. The main aim is to establish degree of independence from any help, physical or verbal, however minor and for whatever reason. 3. The need for supervision renders the patient not independent. 4. A patient's performance should be established using the best available evidence. Asking the patient, friends/relatives and nurses are the usual sources, but direct observation and common sense are also important. However direct testing is not needed. 5. Usually the patient's performance over the preceding 24-48 hours is important, but occasionally longer periods will be relevant. 6. Middle categories imply that the patient supplies over 50 per cent of the effort. 7. Use of aids to be independent is allowed. Arnaldo Schilder., Barthel, D.W. (3521). Functional evaluation: the Barthel Index. [facility] (14)2.   RACHEL Akers, Robinson Ball., Gene Dong. (1999). Measuring the change indisability after inpatient rehabilitation; comparison of the responsiveness of the Barthel Index and Functional [place] Measure. Journal of Neurology, Neurosurgery, and Psychiatry, 66(4), 214-119. JULIETA Gottlieb, CYNTHIA Torres, & Mindi Castañeda M.A. (2004.) Assessment of post-stroke quality of life in cost-effectiveness studies: The usefulness of the Barthel Index and the EuroQoL-5D. Quality of Life Research, 13, 427-43       In compliance with CMSs Claims Based Outcome Reporting, the following G-code set was chosen for this patient based on their primary functional limitation being treated: The outcome measure chosen to determine the severity of the functional limitation was the barthel with a score of 85/100 which was correlated with the impairment scale. ? Mobility - Walking and Moving Around:     - CURRENT STATUS: CI - 1%-19% impaired, limited or restricted    - GOAL STATUS: CI - 1%-19% impaired, limited or restricted    - D/C STATUS:  CI - 1%-19% impaired, limited or restricted              Activity Tolerance:   Good for session; see above narrative    Please refer to the flowsheet for vital signs taken during this treatment.   After treatment:   []         Patient left in no apparent distress sitting up in chair  [x]         Patient left in no apparent distress in bed  [x]         Call bell left within reach  [x]         Nursing notified  [x]         Caregiver present  []         Bed alarm activated        Thank you for this referral.  Flora Jones, PT   Time Calculation: 22 mins

## 2018-01-09 NOTE — DISCHARGE INSTRUCTIONS
Neck Strain: Care Instructions  Your Care Instructions    You have strained the muscles and ligaments in your neck. A sudden, awkward movement can strain the neck. This often occurs with falls or car accidents or during certain sports. Everyday activities like working on a computer or sleeping can also cause neck strain if they force you to hold your neck in an awkward position for a long time. It is common for neck pain to get worse for a day or two after an injury, but it should start to feel better after that. You may have more pain and stiffness for several days before it gets better. This is expected. It may take a few weeks or longer for it to heal completely. Good home treatment can help you get better faster and avoid future neck problems. Follow-up care is a key part of your treatment and safety. Be sure to make and go to all appointments, and call your doctor if you are having problems. It's also a good idea to know your test results and keep a list of the medicines you take. How can you care for yourself at home? · If you were given a neck brace (cervical collar) to limit neck motion, wear it as instructed for as many days as your doctor tells you to. Do not wear it longer than you were told to. Wearing a brace for too long can make neck stiffness worse and weaken the neck muscles. · You can try using heat or ice to see if it helps. ¨ Try using a heating pad on a low or medium setting for 15 to 20 minutes every 2 to 3 hours. Try a warm shower in place of one session with the heating pad. You can also buy single-use heat wraps that last up to 8 hours. ¨ You can also try an ice pack for 10 to 15 minutes every 2 to 3 hours. · Take pain medicines exactly as directed. ¨ If the doctor gave you a prescription medicine for pain, take it as prescribed. ¨ If you are not taking a prescription pain medicine, ask your doctor if you can take an over-the-counter medicine.   · Gently rub the area to relieve pain and help with blood flow. Do not massage the area if it hurts to do so. · Do not do anything that makes the pain worse. Take it easy for a couple of days. You can do your usual activities if they do not hurt your neck or put it at risk for more stress or injury. · Try sleeping on a special neck pillow. Place it under your neck, not under your head. Placing a tightly rolled-up towel under your neck while you sleep will also work. If you use a neck pillow or rolled towel, do not use your regular pillow at the same time. · To prevent future neck pain, do exercises to stretch and strengthen your neck and back. Learn how to use good posture, safe lifting techniques, and proper body mechanics. When should you call for help? Call 911 anytime you think you may need emergency care. For example, call if:  ? · You are unable to move an arm or a leg at all. ?Call your doctor now or seek immediate medical care if:  ? · You have new or worse symptoms in your arms, legs, chest, belly, or buttocks. Symptoms may include:  ¨ Numbness or tingling. ¨ Weakness. ¨ Pain. ? · You lose bladder or bowel control. ? Watch closely for changes in your health, and be sure to contact your doctor if:  ? · You are not getting better as expected. Where can you learn more? Go to http://francisca-abigail.info/. Enter M253 in the search box to learn more about \"Neck Strain: Care Instructions. \"  Current as of: March 21, 2017  Content Version: 11.4  © 0412-9404 [employer]. Care instructions adapted under license by [employer] (which disclaims liability or warranty for this information). If you have questions about a medical condition or this instruction, always ask your healthcare professional. [place] [address] any warranty or liability for your use of this information.          Preventing Falls: Care Instructions  Your Care Instructions    Getting around your home safely can be a challenge if you have injuries or health problems that make it easy for you to fall. Loose rugs and furniture in walkways are among the dangers for many older people who have problems walking or who have poor eyesight. People who have conditions such as arthritis, osteoporosis, or dementia also have to be careful not to fall. You can make your home safer with a few simple measures. Follow-up care is a key part of your treatment and safety. Be sure to make and go to all appointments, and call your doctor if you are having problems. It's also a good idea to know your test results and keep a list of the medicines you take. How can you care for yourself at home? Taking care of yourself  · You may get dizzy if you do not drink enough water. To prevent dehydration, drink plenty of fluids, enough so that your urine is light yellow or clear like water. Choose water and other caffeine-free clear liquids. If you have kidney, heart, or liver disease and have to limit fluids, talk with your doctor before you increase the amount of fluids you drink. · Exercise regularly to improve your strength, muscle tone, and balance. Walk if you can. Swimming may be a good choice if you cannot walk easily. · Have your vision and hearing checked each year or any time you notice a change. If you have trouble seeing and hearing, you might not be able to avoid objects and could lose your balance. · Know the side effects of the medicines you take. Ask your doctor or pharmacist whether the medicines you take can affect your balance. Sleeping pills or sedatives can affect your balance. · Limit the amount of alcohol you drink. Alcohol can impair your balance and other senses. · Ask your doctor whether calluses or corns on your feet need to be removed. If you wear loose-fitting shoes because of calluses or corns, you can lose your balance and fall. · Talk to your doctor if you have numbness in your feet.   Preventing falls at home  · Remove raised doorway thresholds, throw rugs, and clutter. Repair loose carpet or raised areas in the floor. · Move furniture and electrical cords to keep them out of walking paths. · Use nonskid floor wax, and wipe up spills right away, especially on ceramic tile floors. · If you use a walker or cane, put rubber tips on it. If you use crutches, clean the bottoms of them regularly with an abrasive pad, such as steel wool. · Keep your house well lit, especially Elaine Jack, and outside walkways. Use night-lights in areas such as hallways and bathrooms. Add extra light switches or use remote switches (such as switches that go on or off when you clap your hands) to make it easier to turn lights on if you have to get up during the night. · Install sturdy handrails on stairways. · Move items in your cabinets so that the things you use a lot are on the lower shelves (about waist level). · Keep a cordless phone and a flashlight with new batteries by your bed. If possible, put a phone in each of the main rooms of your house, or carry a cell phone in case you fall and cannot reach a phone. Or, you can wear a device around your neck or wrist. You push a button that sends a signal for help. · Wear low-heeled shoes that fit well and give your feet good support. Use footwear with nonskid soles. Check the heels and soles of your shoes for wear. Repair or replace worn heels or soles. · Do not wear socks without shoes on wood floors. · Walk on the grass when the sidewalks are slippery. If you live in an area that gets snow and ice in the winter, sprinkle salt on slippery steps and sidewalks. Preventing falls in the bath  · Install grab bars and nonskid mats inside and outside your shower or tub and near the toilet and sinks. · Use shower chairs and bath benches. · Use a hand-held shower head that will allow you to sit while showering.   · Get into a tub or shower by putting the weaker leg in first. Get out of a tub or shower with your strong side first.  · Repair loose toilet seats and consider installing a raised toilet seat to make getting on and off the toilet easier. · Keep your bathroom door unlocked while you are in the shower. Where can you learn more? Go to http://francisca-abigail.info/. Enter 0476 [id number] in the search box to learn more about \"Preventing Falls: Care Instructions. \"  Current as of: May 12, 2017  Content Version: 11.4  © 9288-5525 [employer]. Care instructions adapted under license by [employer] (which disclaims liability or warranty for this information). If you have questions about a medical condition or this instruction, always ask your healthcare professional. [address] any warranty or liability for your use of this information. Learning About a Closed Head Injury  What is a closed head injury? A closed head injury happens when your head gets hit hard. The strong force of the blow causes your brain to shake in your skull. This movement can cause the brain to bruise, swell, or tear. Sometimes nerves or blood vessels also get damaged. This can cause bleeding in or around the brain. A concussion is a type of closed head injury. What are the symptoms? If you have a mild concussion, you may have a mild headache or feel \"not quite right. \" These symptoms are common. They usually go away over a few days to 4 weeks. But sometimes after a concussion, you feel like you can't function as well as before the injury. And you have new symptoms. This is called postconcussive syndrome. You may:  · Find it harder to solve problems, think, concentrate, or remember. · Have headaches. · Have changes in your sleep patterns, such as not being able to sleep or sleeping all the time. · Have changes in your personality. · Not be interested in your usual activities. · Feel angry or anxious without a clear reason.   · Lose your sense of taste or smell. · Be dizzy, lightheaded, or unsteady. It may be hard to stand or walk. How is a closed head injury treated? Any person who may have a concussion needs to see a doctor. Some people have to stay in the hospital to be watched. Others can go home safely. If you go home, follow your doctor's instructions. He or she will tell you if you need someone to watch you closely for the next 24 hours or longer. Rest is the best treatment. Get plenty of sleep at night. And try to rest during the day. · Avoid activities that are physically or mentally demanding. These include housework, exercise, and schoolwork. And don't play video games, send text messages, or use the computer. You may need to change your school or work schedule to be able to avoid these activities. · Ask your doctor when it's okay to drive, ride a bike, or operate machinery. · Take an over-the-counter pain medicine, such as acetaminophen (Tylenol), ibuprofen (Advil, Motrin), or naproxen (Aleve). Be safe with medicines. Read and follow all instructions on the label. · Check with your doctor before you use any other medicines for pain. · Do not drink alcohol or use illegal drugs. They can slow recovery. They can also increase your risk of getting a second head injury. Follow-up care is a key part of your treatment and safety. Be sure to make and go to all appointments, and call your doctor if you are having problems. It's also a good idea to know your test results and keep a list of the medicines you take. Where can you learn more? Go to http://francisca-abigail.info/. Enter E235 in the search box to learn more about \"Learning About a Closed Head Injury. \"  Current as of: October 14, 2016  Content Version: 11.4  © 5636-1016 [employer], [employer]. Care instructions adapted under license by [employer] (which disclaims liability or warranty for this information).  If you have questions about a medical condition or this instruction, always ask your healthcare professional. [place] [address] any warranty or liability for your use of this information. [employer] Activation    Thank you for requesting access to [facility]. Please follow the instructions below to securely access and download your online medical record. [facility] allows you to send messages to your doctor, view your test results, renew your prescriptions, schedule appointments, and more. How Do I Sign Up? 1. In your internet browser, go to [email or web address]  2. Click on the First Time User? Click Here link in the Sign In box. You will be redirect to the New Member Sign Up page. 3. Enter your [facility] Access Code exactly as it appears below. You will not need to use this code after youve completed the sign-up process. If you do not sign up before the expiration date, you must request a new code. [facility] Access Code: Activation code not generated  Current [facility] Status: Patient Declined (This is the date your [facility] access code will )    4. Enter the last four digits of your Social Security Number (xxxx) and Date of Birth (mm/dd/yyyy) as indicated and click Submit. You will be taken to the next sign-up page. 5. Create a [facility] ID. This will be your [facility] login ID and cannot be changed, so think of one that is secure and easy to remember. 6. Create a [facility] password. You can change your password at any time. 7. Enter your Password Reset Question and Answer. This can be used at a later time if you forget your password. 8. Enter your e-mail address. You will receive e-mail notification when new information is available in [address] 9. Click Sign Up. You can now view and download portions of your medical record. 10. Click the Download Summary menu link to download a portable copy of your medical information.     Additional Information    If you have questions, please visit the Frequently Asked Questions section of the [facility] website at [email or web address]. [email or web address]/. Remember, [facility] is NOT to be used for urgent needs. For medical emergencies, dial 911.

## 2018-01-09 NOTE — ED PROVIDER NOTES
EMERGENCY DEPARTMENT HISTORY AND PHYSICAL EXAM      Date: 1/9/2018  Patient Name: Nathalie Mclean    History of Presenting Illness     Chief Complaint   Patient presents with    Fall     patient states she got up in the middle of the night to use the bathroom, was not using her walker, tripped and fell. C/o neck pain    Neck Pain       History Provided By: Patient and Patient's Daughter    HPI: Nathalie Mclean, 80 y.o. female with PMHx significant for HTN , A-fib, PE on Xarelto presents via EMS to the ED with cc with head and neck pain s/p GLF in the middle of the night last night. Pt states she awakened in the middle of the night to go to bathroom, did not use walker, lost her balance, and fell, hitting her head. She denies LOC, but states she was dizzy afterwards. Pt's son came to her immediately and helped her off of the ground. Pt denies any preceding CP, SOB, ABD pain, or dizziness. PCP: Laura Green MD    There are no other complaints, changes, or physical findings at this time. Current Facility-Administered Medications   Medication Dose Route Frequency Provider Last Rate Last Dose    sodium chloride (NS) flush 5-10 mL  5-10 mL IntraVENous Q8H Evan Gonzalez MD        sodium chloride (NS) flush 5-10 mL  5-10 mL IntraVENous PRTENNILLE Louise MD         Current Outpatient Prescriptions   Medication Sig Dispense Refill    promethazine-codeine (PHENERGAN WITH CODEINE) 6.25-10 mg/5 mL syrup Take 5 mL by mouth nightly as needed for Cough. 120 mL 0    magnesium oxide (MAG-OX) 400 mg tablet TAKE 1 TAB BY MOUTH DAILY. 90 Tab 2    trospium (SANCTURA) 20 mg tablet TAKE 1 TAB BY MOUTH TWO (2) TIMES A DAY. 180 Tab 2    rivaroxaban (XARELTO) 20 mg tab tablet TAKE 1 TABLET BY MOUTH ONCE DAILY WITH BREAKFAST 30 Tab 3    alendronate (FOSAMAX) 35 mg tablet TAKE 1 TABLET BY MOUTH EVERY SEVEN (7) DAYS.  12 Tab 3    pravastatin (PRAVACHOL) 20 mg tablet TAKE 1 TABLET BY MOUTH ONCE NIGHTLY 90 Tab 2    levothyroxine (SYNTHROID) 25 mcg tablet TAKE 1 TABLET BY MOUTH DAILY BEFORE BREAKFAST 90 Tab 3    losartan (COZAAR) 50 mg tablet TAKE 1 TABLET BY MOUTH DAILY 90 Tab 2    hydroCHLOROthiazide (HYDRODIURIL) 25 mg tablet TAKE 1/2 TABLET BY MOUTH DAILY 45 Tab 2    PARoxetine (PAXIL) 20 mg tablet TAKE 1 TAB BY MOUTH DAILY. 90 Tab 3    multivitamin (ONE A DAY) tablet Take 1 Tab by mouth daily.  zinc oxide-cod liver oil (DESITIN) 40 % ointment Apply  to affected area as needed for Skin Irritation.  docusate sodium (COLACE) 100 mg capsule Take 100 mg by mouth two (2) times a day.  cyanocobalamin 1,000 mcg tablet Take 1,000 mcg by mouth daily. Indications: 5000mcg      fluticasone (FLONASE) 50 mcg/actuation nasal spray 2 Sprays by Both Nostrils route daily. 3 Bottle 0    loratadine (CLARITIN) 10 mg tablet TAKE 1 TABLET BY MOUTH ONCE DAILY 30 Tab 0    calcium-cholecalciferol, D3, (CALTRATE 600+D) tablet Take 1 Tab by mouth daily.  sotalol (BETAPACE) 80 mg tablet Take 80 mg by mouth two (2) times a day. Past History     Past Medical History:  Past Medical History:   Diagnosis Date    Atrial fibrillation ([facility] 2007    Hypercholesterolemia     Hypertension     Osteoarthritis 2010    Thromboembolus ([facility]     Thyroid disease        Past Surgical History:  Past Surgical History:   Procedure Laterality Date    HX HEENT      cataract sx    HX HIP FRACTURE TX  2013    right hip 1989 left hip 2013    HX HIP REPLACEMENT  1989    HX PACEMAKER  2008       Family History:  Family History   Problem Relation Age of Onset    Hypertension Mother        Social History:  Social History   Substance Use Topics    Smoking status: Never Smoker    Smokeless tobacco: Never Used    Alcohol use No       Allergies:  No Known Allergies      Review of Systems   Review of Systems   Constitutional: Negative. Negative for chills and fever. HENT: Negative. Negative for congestion and rhinorrhea. Respiratory: Negative. Negative for cough, chest tightness and wheezing. Cardiovascular: Negative. Negative for chest pain and palpitations. Gastrointestinal: Negative. Negative for abdominal pain, constipation, nausea and vomiting. Endocrine: Negative. Genitourinary: Negative. Negative for decreased urine volume, flank pain, hematuria and pelvic pain. Musculoskeletal: Positive for neck pain. Negative for back pain. +head pain   Skin: Negative. Negative for color change, pallor and rash. Neurological: Positive for dizziness. Negative for seizures, syncope, weakness, numbness and headaches. Hematological: Negative. Negative for adenopathy. Psychiatric/Behavioral: Negative. All other systems reviewed and are negative. Physical Exam   Physical Exam   Constitutional: She is oriented to person, place, and time. She appears well-developed and well-nourished. No distress. HENT:   Head: Normocephalic. Head is with contusion. Head is without raccoon's eyes and without Cavazos's sign. Right Ear: Tympanic membrane normal. No hemotympanum. Left Ear: Tympanic membrane normal. No hemotympanum. Mouth/Throat: No oropharyngeal exudate. Eyes: Conjunctivae are normal. Pupils are equal, round, and reactive to light. Right eye exhibits no discharge. Left eye exhibits no discharge. No scleral icterus. Neck: Normal range of motion. Neck supple. No JVD present. Muscular tenderness present. No spinous process tenderness present. Normal range of motion present. No Brudzinski's sign and no Kernig's sign noted. Cardiovascular: Normal rate, regular rhythm, normal heart sounds and intact distal pulses. Exam reveals no gallop and no friction rub. No murmur heard. Pulmonary/Chest: Effort normal and breath sounds normal. No stridor. No respiratory distress. She has no wheezes. She has no rales. She exhibits no tenderness. Abdominal: Soft.  Bowel sounds are normal. She exhibits no distension and no mass. There is no tenderness. There is no rebound and no guarding. Neurological: She is alert and oriented to person, place, and time. She has normal strength. She is not disoriented. She displays normal reflexes. No cranial nerve deficit or sensory deficit. She exhibits normal muscle tone. GCS eye subscore is 4. GCS verbal subscore is 5. GCS motor subscore is 6. Skin: Skin is warm. No rash noted. She is not diaphoretic. No pallor. Vitals reviewed. Diagnostic Study Results     Labs -     Recent Results (from the past 12 hour(s))   CBC WITH AUTOMATED DIFF    Collection Time: 01/09/18 10:53 AM   Result Value Ref Range    WBC 4.9 3.6 - 11.0 K/uL    RBC 3.70 (L) 3.80 - 5.20 M/uL    HGB 10.1 (L) 11.5 - 16.0 g/dL    HCT 32.0 (L) 35.0 - 47.0 %    MCV 86.5 80.0 - 99.0 FL    MCH 27.3 26.0 - 34.0 PG    MCHC 31.6 30.0 - 36.5 g/dL    RDW 15.0 (H) 11.5 - 14.5 %    PLATELET 989 021 - 598 K/uL    NEUTROPHILS 71 32 - 75 %    LYMPHOCYTES 21 12 - 49 %    MONOCYTES 6 5 - 13 %    EOSINOPHILS 2 0 - 7 %    BASOPHILS 0 0 - 1 %    ABS. NEUTROPHILS 3.4 1.8 - 8.0 K/UL    ABS. LYMPHOCYTES 1.0 0.8 - 3.5 K/UL    ABS. MONOCYTES 0.3 0.0 - 1.0 K/UL    ABS. EOSINOPHILS 0.1 0.0 - 0.4 K/UL    ABS. BASOPHILS 0.0 0.0 - 0.1 K/UL   METABOLIC PANEL, COMPREHENSIVE    Collection Time: 01/09/18 10:53 AM   Result Value Ref Range    Sodium 140 136 - 145 mmol/L    Potassium 3.6 3.5 - 5.1 mmol/L    Chloride 104 97 - 108 mmol/L    CO2 29 21 - 32 mmol/L    Anion gap 7 5 - 15 mmol/L    Glucose 110 (H) 65 - 100 mg/dL    BUN 23 (H) 6 - 20 MG/DL    Creatinine 1.13 (H) 0.55 - 1.02 MG/DL    BUN/Creatinine ratio 20 12 - 20      GFR est AA 54 (L) >60 ml/min/1.73m2    GFR est non-AA 45 (L) >60 ml/min/1.73m2    Calcium 8.7 8.5 - 10.1 MG/DL    Bilirubin, total 0.5 0.2 - 1.0 MG/DL    ALT (SGPT) 9 (L) 12 - 78 U/L    AST (SGOT) 16 15 - 37 U/L    Alk.  phosphatase 75 45 - 117 U/L    Protein, total 6.9 6.4 - 8.2 g/dL    Albumin 3.0 (L) 3.5 - 5.0 g/dL Globulin 3.9 2.0 - 4.0 g/dL    A-G Ratio 0.8 (L) 1.1 - 2.2     TROPONIN I    Collection Time: 01/09/18 10:53 AM   Result Value Ref Range    Troponin-I, Qt. <0.04 <0.05 ng/mL   EKG, 12 LEAD, INITIAL    Collection Time: 01/09/18 11:30 AM   Result Value Ref Range    Ventricular Rate 65 BPM    Atrial Rate 69 BPM    P-R Interval 240 ms    QRS Duration 92 ms    Q-T Interval 424 ms    QTC Calculation (Bezet) 440 ms    Calculated P Axis -161 degrees    Calculated R Axis -11 degrees    Calculated T Axis 13 degrees    Diagnosis       Atrial-paced rhythm with prolonged AV conduction  Minimal voltage criteria for LVH, may be normal variant  Cannot rule out Anterior infarct , age undetermined  Abnormal ECG  When compared with ECG of 12-SEP-2003 14:44,  Electronic atrial pacemaker has replaced Sinus rhythm  Nonspecific T wave abnormality no longer evident in Anterior leads     URINALYSIS W/ REFLEX CULTURE    Collection Time: 01/09/18 12:09 PM   Result Value Ref Range    Color YELLOW/STRAW      Appearance CLEAR CLEAR      Specific gravity 1.016 1.003 - 1.030      pH (UA) 6.5 5.0 - 8.0      Protein NEGATIVE  NEG mg/dL    Glucose NEGATIVE  NEG mg/dL    Ketone NEGATIVE  NEG mg/dL    Bilirubin NEGATIVE  NEG      Blood NEGATIVE  NEG      Urobilinogen 1.0 0.2 - 1.0 EU/dL    Nitrites NEGATIVE  NEG      Leukocyte Esterase NEGATIVE  NEG      WBC 0-4 0 - 4 /hpf    RBC 0-5 0 - 5 /hpf    Epithelial cells FEW FEW /lpf    Bacteria NEGATIVE  NEG /hpf    UA:UC IF INDICATED CULTURE NOT INDICATED BY UA RESULT CNI      Hyaline cast 0-2 0 - 5 /lpf       Radiologic Studies -   CT Results  (Last 48 hours)               01/09/18 1039  CT HEAD WO CONT Final result    Impression:  IMPRESSION: No acute intracranial disease. Narrative:  EXAM: Head CT without Contrast:    CT dose reduction was achieved through use of a standardized protocol tailored   for this examination and automatic exposure control for dose modulation.        INDICATION: head injury on Xarelto        Unenhanced Head CT shows no mass, bleed, shift, hydrocephalus or extra-axial   fluid collection. No acute infarct is apparent. Cerebral white matter   hypodensity is noted, favoring chronic microangiopathy. Bone windows are   unremarkable. 01/09/18 1039  CT SPINE CERV WO CONT Final result    Impression:  IMPRESSION: No fracture. Narrative:  INDICATION: fall neck pain        EXAM: Axial unenhanced CT of the cervical spine is performed with 2D coronal and   sagittal reformatted images provided. CT dose reduction was achieved through use   of a standardized protocol tailored for this examination and automatic exposure   control for dose modulation. There is no fracture or significant subluxation. There is multilevel   degenerative disc disease. There is no prevertebral soft tissue swelling. Medical Decision Making   I am the first provider for this patient. I reviewed the vital signs, available nursing notes, past medical history, past surgical history, family history and social history. Vital Signs-Reviewed the patient's vital signs. Patient Vitals for the past 12 hrs:   Temp Pulse Resp BP SpO2   01/09/18 0955 98.2 °F (36.8 °C) 65 16 165/76 97 %     Pulse Oximetry Analysis - 97% on RA    Cardiac Monitor:   Rate: 65 bpm  Rhythm: Paced     EKG interpretation: 11:30  Rhythm: atrial paced with prolonged AV conduction. Rate (approx.): 65; Axis: normal; HI interval: normal; QRS interval: normal ; ST/T wave: normal; Other findings: minimal voltage criteria for left ventricular hypertrophy. Records Reviewed: Nursing Notes and Old Medical Records    Provider Notes (Medical Decision Making):     DDx: mechanical fall, ICH, cervical fx, dehydration, electrolyte abnormality, UTI    Impression/Plan: 81 yo Female, very healthy and alert for her age, supposed to walk with an assistive device.  Last night, going to the bathroom, did not use her walker and had a mechanical fall. There was no LOC, but she did hit her head, and did have some dizziness afterwards. In ER, she is at baseline mental status, and ambulates well with a walker. Head CT, CT C-spine, labs all WNL. Follow up PCP as needed. ED Course:   Initial assessment performed. The patients presenting problems have been discussed, and they are in agreement with the care plan formulated and outlined with them. I have encouraged them to ask questions as they arise throughout their visit. Disposition:  DISCHARGE NOTE:  1:03 PM  The patient is ready for discharge. The patients signs, symptoms, diagnosis, and instructions for discharge have been discussed and the pt has conveyed their understanding. The patient is to follow up as recommended or return to the ER should their symptoms worsen. Plan has been discussed and patient has conveyed their agreement. PLAN:  1. Follow-up Information     Follow up With Details Comments Contact Info    Jose F Monreal MD Schedule an appointment as soon as possible for a visit in 1 day  [address]  [address] [address] [facility]  [phone]      [facility] EMERGENCY DEPT  If symptoms worsen [address] [place] [address]  [phone]        Return to ED if worse     Diagnosis     Clinical Impression:   1. Injury of head, initial encounter    2. Strain of neck muscle, initial encounter    3. Fall, initial encounter        Attestations: This note is prepared by Marii Conte, acting as Scribe for Scott Eden MD.    Scott Eden MD: The scribe's documentation has been prepared under my direction and personally reviewed by me in its entirety. I confirm that the note above accurately reflects all work, treatment, procedures, and medical decision making performed by me.

## 2018-01-09 NOTE — ED NOTES
Patient arrives via EMS. Patient had GLF over night while walking to the bathroom. Patient states she normally uses a walker, but did not use her walker and lost balance and fell. Patient denies LOC. Patient c/o neck pain x 1 month that has gotten worse.   Patient states she had an xray of her neck a month ago when the pain started

## 2018-01-09 NOTE — ED NOTES
Discharge instructions reviewed with the patient by the MD.  Patient wheeled out and discharged home with family.

## 2018-02-01 ENCOUNTER — TELEPHONE (OUTPATIENT)
Dept: INTERNAL MEDICINE CLINIC | Age: 83
End: 2018-02-01

## 2018-02-01 NOTE — TELEPHONE ENCOUNTER
Call placed to pt kirsten and she wanted to get in at an earlier time if possible regarding above concerns, also states she thinks her mother is grieving the lose of her  who she was  to for 72+ years, scheduled pt for 2/5/2018 at 10:45am with Dr Nazanin Escuderoite, kirsten voiced understanding and appreciation

## 2018-02-01 NOTE — TELEPHONE ENCOUNTER
Scheduled mother for appointment with [facility] on Feb 6. States mother is having headaches, weakness, unsteady. No numbness. Eloisa Be two weeks ago- ct doesn't show anything and Blood pressure has been fine. Please contact patient if you feel she needs a sooner appointment.

## 2018-04-03 RX ORDER — RIVAROXABAN 20 MG/1
TABLET, FILM COATED ORAL
Qty: 30 TAB | Refills: 3 | Status: SHIPPED | OUTPATIENT
Start: 2018-04-03 | End: 2018-08-05 | Stop reason: SDUPTHER

## 2018-04-06 ENCOUNTER — OFFICE VISIT (OUTPATIENT)
Dept: INTERNAL MEDICINE CLINIC | Age: 83
End: 2018-04-06

## 2018-04-06 VITALS
SYSTOLIC BLOOD PRESSURE: 160 MMHG | WEIGHT: 194 LBS | HEIGHT: 64 IN | DIASTOLIC BLOOD PRESSURE: 100 MMHG | TEMPERATURE: 97.4 F | BODY MASS INDEX: 33.12 KG/M2 | HEART RATE: 102 BPM | RESPIRATION RATE: 24 BRPM | OXYGEN SATURATION: 97 %

## 2018-04-06 DIAGNOSIS — I48.20 CHRONIC ATRIAL FIBRILLATION (HCC): ICD-10-CM

## 2018-04-06 DIAGNOSIS — R26.81 GAIT INSTABILITY: ICD-10-CM

## 2018-04-06 DIAGNOSIS — M19.90 OSTEOARTHRITIS, UNSPECIFIED OSTEOARTHRITIS TYPE, UNSPECIFIED SITE: ICD-10-CM

## 2018-04-06 DIAGNOSIS — E03.9 HYPOTHYROIDISM, UNSPECIFIED TYPE: ICD-10-CM

## 2018-04-06 DIAGNOSIS — I10 ESSENTIAL HYPERTENSION: Primary | ICD-10-CM

## 2018-04-06 RX ORDER — TRIAMTERENE AND HYDROCHLOROTHIAZIDE 37.5; 25 MG/1; MG/1
1 CAPSULE ORAL DAILY
Qty: 30 CAP | Refills: 1 | Status: SHIPPED | OUTPATIENT
Start: 2018-04-06 | End: 2018-04-23 | Stop reason: DRUGHIGH

## 2018-04-06 NOTE — MR AVS SNAPSHOT
[address] [facility] [address] [place] [address] 
[phone] Patient: Ryan Hurst MRN: [id number] Visit Information Date & Time Provider Department Dept. Phone Encounter #  
 2018  3:30 PM Feliz Nyhan [facility] Internal Medicine [phone] [id number] Follow-up Instructions Return in about 2 weeks (around 2018). Your Appointments 2018 11:30 AM  
ROUTINE CARE with Candace Shrestha MD  
[facility] Internal Medicine [address] [facility]) Appt Note: 4 mo f/u  
 [address] [facility] [address] [place] [address] [address]  
[phone]  
  
   
 [address] [place] [address] [address] Upcoming Health Maintenance Date Due DTaP/Tdap/Td series (1 - Tdap) 1945 ZOSTER VACCINE AGE 60> 1984 GLAUCOMA SCREENING Q2Y 1989 Influenza Age 5 to Adult 2017 MEDICARE YEARLY EXAM 3/31/2018 Allergies as of 2018  Review Complete On: 2018 By: Jesús Kuhn,  No Known Allergies Current Immunizations  Reviewed on 2017 Name Date Influenza Vaccine 3/30/2017, 10/10/2014 Pneumococcal Conjugate (PCV-13) 3/10/2016 Pneumococcal Polysaccharide (PPSV-23) 3/10/2013 Not reviewed this visit You Were Diagnosed With   
  
 Codes Comments Essential hypertension    -  Primary ICD-10-CM: I10 
ICD-9-CM: 401.9 Chronic atrial fibrillation (HCC)     ICD-10-CM: T40.4 ICD-9-CM: 427.31 Hypothyroidism, unspecified type     ICD-10-CM: E03.9 ICD-9-CM: 244.9 Osteoarthritis, unspecified osteoarthritis type, unspecified site     ICD-10-CM: M19.90 ICD-9-CM: 715.90 Gait instability     ICD-10-CM: R26.81 
ICD-9-CM: 491. 2 Vitals BP Pulse Temp Resp Height(growth percentile) Weight(growth percentile) (!) 160/100 (BP 1 Location: Left arm, BP Patient Position: Sitting) (!) 102 97.4 °F (36.3 °C) (Oral) 24 5' 4\" (1.626 m) 194 lb (88 kg) SpO2 BMI OB Status Smoking Status 97% 33.3 kg/m2 Menopause Never Smoker Vitals History BMI and BSA Data Body Mass Index Body Surface Area  
 33.3 kg/m 2 1.99 m 2 Preferred Pharmacy Pharmacy Name Phone [id number] [address] [address] [address] [phone] Your Updated Medication List  
  
   
This list is accurate as of 4/6/18  4:11 PM.  Always use your most recent med list.  
  
  
  
  
 alendronate 35 mg tablet Commonly known as:  FOSAMAX TAKE 1 TABLET BY MOUTH EVERY SEVEN (7) DAYS. calcium-cholecalciferol (D3) tablet Commonly known as:  CALTRATE 600+D Take 1 Tab by mouth daily. cyanocobalamin 1,000 mcg tablet Take 1,000 mcg by mouth daily. Indications: 5000mcg  
  
 docusate sodium 100 mg capsule Commonly known as:  Gwendel Laser Take 100 mg by mouth two (2) times a day. fluticasone 50 mcg/actuation nasal spray Commonly known as:  Anna Coffee 2 Sprays by Both Nostrils route daily. levothyroxine 25 mcg tablet Commonly known as:  SYNTHROID  
TAKE 1 TABLET BY MOUTH DAILY BEFORE BREAKFAST  
  
 loratadine 10 mg tablet Commonly known as:  CLARITIN  
TAKE 1 TABLET BY MOUTH ONCE DAILY losartan 50 mg tablet Commonly known as:  COZAAR  
TAKE 1 TABLET BY MOUTH DAILY  
  
 magnesium oxide 400 mg tablet Commonly known as:  MAG-OX  
TAKE 1 TAB BY MOUTH DAILY. multivitamin tablet Commonly known as:  ONE A DAY Take 1 Tab by mouth daily. PARoxetine 20 mg tablet Commonly known as:  PAXIL TAKE 1 TAB BY MOUTH DAILY. pravastatin 20 mg tablet Commonly known as:  PRAVACHOL  
TAKE 1 TABLET BY MOUTH ONCE NIGHTLY  
  
 promethazine-codeine 6.25-10 mg/5 mL syrup Commonly known as:  PHENERGAN with CODEINE Take 5 mL by mouth nightly as needed for Cough. sotalol 80 mg tablet Commonly known as:  Florentin Silas Take 80 mg by mouth two (2) times a day. triamterene-hydroCHLOROthiazide 37.5-25 mg per capsule Commonly known as:  Chilo Osborne Take 1 Cap by mouth daily. trospium 20 mg tablet Commonly known as:  Adamaris Gonzalez TAKE 1 TAB BY MOUTH TWO (2) TIMES A DAY. XARELTO 20 mg Tab tablet Generic drug:  rivaroxaban TAKE 1 TABLET BY MOUTH ONCE DAILY WITH BREAKFAST  
  
 zinc oxide-cod liver oil 40 % ointment Commonly known as:  Reeves [place] Apply  to affected area as needed for Skin Irritation. Prescriptions Sent to Pharmacy Refills  
 triamterene-hydroCHLOROthiazide (DYAZIDE) 37.5-25 mg per capsule 1 Sig: Take 1 Cap by mouth daily. Class: Normal  
 Pharmacy: [address] [place] [address] , [address] [place] [address] Ph #: [phone] Route: Oral  
  
Follow-up Instructions Return in about 2 weeks (around 4/20/2018). Please provide this summary of care documentation to your next provider. Your primary care clinician is listed as Dio Mckeon. If you have any questions after today's visit, please call [phone].

## 2018-04-06 NOTE — PROGRESS NOTES
HISTORY OF PRESENT ILLNESS  Robby Aguayo is a 80 y.o. female. Pt. comes in for f/u. Has multiple medical problems. Reports having dizziness. No other related symptoms. No falls. Her BP is high despite taking medications. Has chronic A. fib. She is on Xarelto. Mukund Ke for urinary incontinence. Reports compliance with medications and diet. Med list and most recent labs/studies reviewed with pt. Her most recent potassium level was borderline low. Reports no other new c/o. Hypertension    Associated symptoms include dizziness. Pertinent negatives include no chest pain, no headaches and no shortness of breath. Dizziness    Associated symptoms include dizziness. Pertinent negatives include no chest pain, no headaches and no focal weakness. Review of Systems   Constitutional: Negative. HENT: Negative. Eyes: Negative. Respiratory: Negative for shortness of breath. Cardiovascular: Negative. Negative for chest pain and leg swelling. Gastrointestinal: Negative. Genitourinary: Negative. Musculoskeletal: Positive for joint pain. Negative for falls. Skin: Negative. Neurological: Positive for dizziness. Negative for sensory change, focal weakness and headaches. Psychiatric/Behavioral: Positive for memory loss. Physical Exam   Constitutional: She is oriented to person, place, and time. She appears well-developed and well-nourished. No distress. Pleasant elderly lady in wheelchair   HENT:   Head: Normocephalic and atraumatic. Mouth/Throat: Oropharynx is clear and moist.   Eyes: Conjunctivae are normal.   Neck: Normal range of motion. Neck supple. No JVD present. No thyromegaly present. Cardiovascular: Normal rate, regular rhythm, normal heart sounds and intact distal pulses. No murmur heard. Pulmonary/Chest: Effort normal and breath sounds normal. No respiratory distress. She has no wheezes. She has no rales. Abdominal: Soft.  Bowel sounds are normal. She exhibits no distension. Musculoskeletal: She exhibits edema (Bilateral trace ankle). She exhibits no tenderness. Neurological: She is alert and oriented to person, place, and time. Coordination abnormal.   DJD   Skin: Skin is warm and dry. No rash noted. Psychiatric: She has a normal mood and affect. Her behavior is normal.   Nursing note and vitals reviewed. ASSESSMENT and PLAN  Diagnoses and all orders for this visit:    1. Essential hypertension    2. Chronic atrial fibrillation (HCC)    3. Hypothyroidism, unspecified type    4. Osteoarthritis, unspecified osteoarthritis type, unspecified site    5. Gait instability    Other orders  -     triamterene-hydroCHLOROthiazide (DYAZIDE) 37.5-25 mg per capsule; Take 1 Cap by mouth daily. Stop HCTZ      Follow-up Disposition:  Return in about 2 weeks (around 4/20/2018).    lab results and schedule of future lab studies reviewed with patient  reviewed diet, exercise and weight control  reviewed medications and side effects in detail  Advised patient to decrease salt in sodium intake  Advised patient to walk at least half an hour daily as tolerated  Advised patient to monitor BP at home with goal of 140-160/80-90

## 2018-04-06 NOTE — PROGRESS NOTES
Health Maintenance Due   Topic Date Due    DTaP/Tdap/Td series (1 - Tdap) 08/29/1945    ZOSTER VACCINE AGE 60>  06/29/1984    GLAUCOMA SCREENING Q2Y  08/29/1989    Influenza Age 5 to Adult  08/01/2017    MEDICARE YEARLY EXAM  03/31/2018       Chief Complaint   Patient presents with    Hypertension       1. Have you been to the ER, urgent care clinic since your last visit? Hospitalized since your last visit?yes. [facility] for [facility]    2. Have you seen or consulted any other health care providers outside of the [address] [place] [address] since your last visit? Include any pap smears or colon screening. Yes. 3) Do you have an Advance Directive on file? No     4) Are you interested in receiving information on Advance Directives? no      Patient is accompanied by son. I have received verbal consent from Jose Luis Garcia to discuss any/all medical information while they are present in the room.

## 2018-04-19 ENCOUNTER — OFFICE VISIT (OUTPATIENT)
Dept: INTERNAL MEDICINE CLINIC | Age: 83
End: 2018-04-19

## 2018-04-19 ENCOUNTER — HOSPITAL ENCOUNTER (OUTPATIENT)
Dept: LAB | Age: 83
Discharge: HOME | End: 2018-04-19
Payer: MEDICARE

## 2018-04-19 VITALS
HEIGHT: 64 IN | DIASTOLIC BLOOD PRESSURE: 58 MMHG | HEART RATE: 65 BPM | RESPIRATION RATE: 18 BRPM | BODY MASS INDEX: 32.37 KG/M2 | WEIGHT: 189.6 LBS | OXYGEN SATURATION: 97 % | SYSTOLIC BLOOD PRESSURE: 146 MMHG | TEMPERATURE: 97.9 F

## 2018-04-19 DIAGNOSIS — G45.9 TRANSIENT CEREBRAL ISCHEMIA, UNSPECIFIED TYPE: ICD-10-CM

## 2018-04-19 DIAGNOSIS — K59.00 CONSTIPATION, UNSPECIFIED CONSTIPATION TYPE: ICD-10-CM

## 2018-04-19 DIAGNOSIS — E78.2 MIXED HYPERLIPIDEMIA: ICD-10-CM

## 2018-04-19 DIAGNOSIS — J30.1 ALLERGIC RHINITIS DUE TO POLLEN, UNSPECIFIED SEASONALITY: ICD-10-CM

## 2018-04-19 DIAGNOSIS — I10 ESSENTIAL HYPERTENSION: Primary | ICD-10-CM

## 2018-04-19 DIAGNOSIS — I48.20 CHRONIC ATRIAL FIBRILLATION (HCC): ICD-10-CM

## 2018-04-19 DIAGNOSIS — E03.9 ACQUIRED HYPOTHYROIDISM: ICD-10-CM

## 2018-04-19 PROCEDURE — 36415 COLL VENOUS BLD VENIPUNCTURE: CPT

## 2018-04-19 PROCEDURE — 80048 BASIC METABOLIC PNL TOTAL CA: CPT

## 2018-04-19 PROCEDURE — 84443 ASSAY THYROID STIM HORMONE: CPT

## 2018-04-19 RX ORDER — POLYETHYLENE GLYCOL 3350 17 G/17G
17 POWDER, FOR SOLUTION ORAL DAILY
Qty: 510 G | Refills: 4 | Status: SHIPPED | OUTPATIENT
Start: 2018-04-19 | End: 2019-03-19 | Stop reason: SDUPTHER

## 2018-04-19 RX ORDER — FLUTICASONE PROPIONATE 50 MCG
2 SPRAY, SUSPENSION (ML) NASAL DAILY
Qty: 3 BOTTLE | Refills: 0 | Status: SHIPPED | OUTPATIENT
Start: 2018-04-19 | End: 2018-07-15 | Stop reason: SDUPTHER

## 2018-04-19 NOTE — PROGRESS NOTES
HISTORY OF PRESENT ILLNESS  Ángel Bonilla is a 80 y.o. female here for follow up. She is accompanied by her son. She came back here a few weeks back and see my Associate for elevated blood pressure. Sotalol was discontinued, losartan was restarted. Her hydrochlorothiazide was discontinued and she was placed on triamterene hydrochlorothiazide. Her blood pressure pulse seems okay today. Needs refill on Flonase. Has chronic cough from allergy. Has moderate to severe postnasal drip. On allergy medicine. Has A fib, on medicine. Doing well. On Xarelto. Has OP.need dexa scan. She is active, able to ambulate by herself. Report constipation on and off. Not drinking enough fluid. Hypertension    Pertinent negatives include no chest pain, no blurred vision and no shortness of breath. Allergic Rhinitis   Pertinent negatives include no chest pain and no shortness of breath. Follow-up   Pertinent negatives include no chest pain and no shortness of breath. Review of Systems   Constitutional: Positive for fatigue. Negative for chills and fever. HENT: Negative. Eyes: Negative. Negative for blurred vision and double vision. Respiratory: Negative. Negative for shortness of breath and wheezing. Cardiovascular: Negative. Negative for chest pain. Gastrointestinal: Negative. Genitourinary: Negative. Musculoskeletal: Negative. Negative for falls. Skin: Negative. Neurological: Negative. Psychiatric/Behavioral: Negative. Physical Exam   Constitutional: She appears well-developed and well-nourished. No distress. HENT:   Head: Normocephalic and atraumatic. Right Ear: External ear normal.   Left Ear: External ear normal.   Mouth/Throat: Oropharynx is clear and moist. No oropharyngeal exudate. Nasal turbinates:red inflamed,NT    Cobble stoning present. Neck: Normal range of motion. Neck supple. No JVD present. No thyromegaly present.    Cardiovascular: Normal rate, regular rhythm, normal heart sounds and intact distal pulses. Pulmonary/Chest: Effort normal and breath sounds normal. No respiratory distress. She has no wheezes. Musculoskeletal: She exhibits no edema or tenderness. Psychiatric: She has a normal mood and affect. Her behavior is normal.       ASSESSMENT and PLAN    Diagnoses and all orders for this visit:    1. Essential hypertension    Stable for now. On losartan and Dyazide. Will check,      -     METABOLIC PANEL, BASIC    2. Transient cerebral ischemia, unspecified type    On Xarelto stable    3. Mixed hyperlipidemia    Lipid panel is stable. 4. Chronic atrial fibrillation (HCC)    Heart rate was too low. Sotalol was discontinued. Will monitor heart rate. On Xarelto only. 5. Allergic rhinitis due to pollen, unspecified seasonality    On allergy medicine. Will order,  -     fluticasone (FLONASE) 50 mcg/actuation nasal spray; 2 Sprays by Both Nostrils route daily. 6. Constipation, unspecified constipation type    7. Acquired hypothyroidism  -     TSH 3RD GENERATION    8. Constipation     high-fiber diet. Will call in,    -     polyethylene glycol (MIRALAX) 17 gram/dose powder; Take 17 g by mouth daily. Discussed expected course/resolution/complications of diagnosis in detail with patient. Medication risks/benefits/costs/interactions/alternatives discussed with patient. Pt was given an after visit summary which includes diagnoses, current medications & vitals. Pt expressed understanding with the diagnosis and plan.

## 2018-04-19 NOTE — PROGRESS NOTES
Chief Complaint   Patient presents with    Hypertension     1. Have you been to the ER, urgent care clinic since your last visit? Hospitalized since your last visit? No    2. Have you seen or consulted any other health care providers outside of the [address] [place] [address] since your last visit? Include any pap smears or colon screening.  No   Visit Vitals    /58 (BP 1 Location: Left arm, BP Patient Position: Sitting)    Pulse 65    Temp 97.9 °F (36.6 °C) (Oral)    Resp 18    Ht 5' 4\" (1.626 m)    Wt 189 lb 9.6 oz (86 kg)    SpO2 97%    BMI 32.54 kg/m2

## 2018-04-19 NOTE — MR AVS SNAPSHOT
[address] [facility] [address] 1400 [address] [place] [address] 
[phone] Patient: Virginie Feliciano MRN: [id number] Visit Information Date & Time Provider Department Dept. Phone Encounter #  
 2018  3:15 PM Aniyah Fisher, [address] [facility] Internal Medicine [id number] Upcoming Health Maintenance Date Due DTaP/Tdap/Td series (1 - Tdap) 1945 ZOSTER VACCINE AGE 60> 1984 GLAUCOMA SCREENING Q2Y 1989 Influenza Age 5 to Adult 2017 MEDICARE YEARLY EXAM 3/31/2018 Allergies as of 2018  Review Complete On: 2018 By: Aniyah Fisher MD  
 No Known Allergies Current Immunizations  Reviewed on 2017 Name Date Influenza Vaccine 3/30/2017, 10/10/2014 Pneumococcal Conjugate (PCV-13) 3/10/2016 Pneumococcal Polysaccharide (PPSV-23) 3/10/2013 Not reviewed this visit You Were Diagnosed With   
  
 Codes Comments Essential hypertension    -  Primary ICD-10-CM: I10 
ICD-9-CM: 401.9 Transient cerebral ischemia, unspecified type     ICD-10-CM: G45.9 ICD-9-CM: 435.9 Mixed hyperlipidemia     ICD-10-CM: E78.2 ICD-9-CM: 272.2 Chronic atrial fibrillation (HCC)     ICD-10-CM: P71.0 ICD-9-CM: 427.31 Allergic rhinitis due to pollen, unspecified seasonality     ICD-10-CM: J30.1 ICD-9-CM: 477.0 Constipation, unspecified constipation type     ICD-10-CM: K59.00 ICD-9-CM: 564.00 Acquired hypothyroidism     ICD-10-CM: E03.9 ICD-9-CM: 724. 9 Vitals BP Pulse Temp Resp Height(growth percentile) Weight(growth percentile) 146/58 (BP 1 Location: Left arm, BP Patient Position: Sitting) 65 97.9 °F (36.6 °C) (Oral) 18 5' 4\" (1.626 m) 189 lb 9.6 oz (86 kg) SpO2 BMI OB Status Smoking Status 97% 32.54 kg/m2 Menopause Never Smoker Vitals History BMI and BSA Data Body Mass Index Body Surface Area  32.54 kg/m 2 1.97 m 2  
  
  
 Preferred Pharmacy Pharmacy Name Phone [facility]/PHARMACY #[id number] [address] [place] [address] [address] [address] [phone] Your Updated Medication List  
  
   
This list is accurate as of 4/19/18  3:58 PM.  Always use your most recent med list.  
  
  
  
  
 alendronate 35 mg tablet Commonly known as:  FOSAMAX TAKE 1 TABLET BY MOUTH EVERY SEVEN (7) DAYS. calcium-cholecalciferol (D3) tablet Commonly known as:  CALTRATE 600+D Take 1 Tab by mouth daily. cyanocobalamin 1,000 mcg tablet Take 1,000 mcg by mouth daily. Indications: 5000mcg  
  
 docusate sodium 100 mg capsule Commonly known as:  Devon Sammieach Take 100 mg by mouth two (2) times a day. fluticasone 50 mcg/actuation nasal spray Commonly known as:  Castleberry Galla 2 Sprays by Both Nostrils route daily. levothyroxine 25 mcg tablet Commonly known as:  SYNTHROID  
TAKE 1 TABLET BY MOUTH DAILY BEFORE BREAKFAST  
  
 loratadine 10 mg tablet Commonly known as:  CLARITIN  
TAKE 1 TABLET BY MOUTH ONCE DAILY losartan 50 mg tablet Commonly known as:  COZAAR  
TAKE 1 TABLET BY MOUTH DAILY  
  
 magnesium oxide 400 mg tablet Commonly known as:  MAG-OX  
TAKE 1 TAB BY MOUTH DAILY. multivitamin tablet Commonly known as:  ONE A DAY Take 1 Tab by mouth daily. PARoxetine 20 mg tablet Commonly known as:  PAXIL TAKE 1 TAB BY MOUTH DAILY. polyethylene glycol 17 gram/dose powder Commonly known as:  Loren Mule Take 17 g by mouth daily. pravastatin 20 mg tablet Commonly known as:  PRAVACHOL  
TAKE 1 TABLET BY MOUTH ONCE NIGHTLY  
  
 promethazine-codeine 6.25-10 mg/5 mL syrup Commonly known as:  PHENERGAN with CODEINE Take 5 mL by mouth nightly as needed for Cough. triamterene-hydroCHLOROthiazide 37.5-25 mg per capsule Commonly known as:  Barbara Bateseling Take 1 Cap by mouth daily. trospium 20 mg tablet Commonly known as:  Adamaris Gonzalez TAKE 1 TAB BY MOUTH TWO (2) TIMES A DAY. XARELTO 20 mg Tab tablet Generic drug:  rivaroxaban TAKE 1 TABLET BY MOUTH ONCE DAILY WITH BREAKFAST  
  
 zinc oxide-cod liver oil 40 % ointment Commonly known as:  Euell Siren Apply  to affected area as needed for Skin Irritation. Prescriptions Sent to Pharmacy Refills  
 fluticasone (FLONASE) 50 mcg/actuation nasal spray 0 Si Sprays by Both Nostrils route daily. Class: Normal  
 Pharmacy: [address] [place] [address] , [address] [place] [address] Ph #: [phone] Route: Both Nostrils  
 polyethylene glycol (MIRALAX) 17 gram/dose powder 4 Sig: Take 17 g by mouth daily. Class: Normal  
 Pharmacy: [address] [place] [address] , [address] [place] [address] Ph #: [phone] Route: Oral  
  
We Performed the Following METABOLIC PANEL, BASIC [65785 CPT(R)] TSH 3RD GENERATION [91191 CPT(R)] Please provide this summary of care documentation to your next provider. Your primary care clinician is listed as Lisa Jean. If you have any questions after today's visit, please call [phone].

## 2018-04-19 NOTE — LETTER
4/25/2018 10:57 AM 
 
Ms. Hina Whitmore [address] 
[address] [facility] Dear Hina Whitmore: Please find your most recent results below. Resulted Orders METABOLIC PANEL, BASIC Result Value Ref Range Glucose 106 (H) 65 - 99 mg/dL BUN 46 (H) 10 - 36 mg/dL Creatinine 1.43 (H) 0.57 - 1.00 mg/dL GFR est non-AA 32 (L) >59 mL/min/1.73 GFR est AA 36 (L) >59 mL/min/1.73  
 BUN/Creatinine ratio 32 (H) 12 - 28 Sodium 141 134 - 144 mmol/L Potassium 4.3 3.5 - 5.2 mmol/L Chloride 104 96 - 106 mmol/L  
 CO2 23 18 - 29 mmol/L Calcium 8.3 (L) 8.7 - 10.3 mg/dL Narrative Performed at:  [address]  [id number] : Alireza Nguyễn MD, Phone:  [phone] TSH 3RD GENERATION Result Value Ref Range TSH 1.350 0.450 - 4.500 uIU/mL Narrative Performed at:  [address]  [id number] : Alireza Nguyễn MD, Phone:  [phone] CKD REPORT Result Value Ref Range Interpretation Note Comment:  
   Supplemental report is available. Narrative Performed at:  [address] [place] [address]  [id number] : Kenisha Galdamez PhD, Phone:  [phone] RECOMMENDATIONS: 
Hina Whitmore your labs indicate that your kidneys are slightly worse, please stop Dyazide (triamterene-HCTZ) and I have called in a new prescription for hydrochlorothiazide 25mg take 1 tablet every day. Please increase your fluid intake and we will recheck that lab work in 1 month. I have enclosed that lab slip for your convenience Please call me if you have any questions: [phone] Sincerely, Eileen Laughlin MD

## 2018-04-20 LAB
BUN SERPL-MCNC: 46 MG/DL (ref 10–36)
BUN/CREAT SERPL: 32 (ref 12–28)
CALCIUM SERPL-MCNC: 8.3 MG/DL (ref 8.7–10.3)
CHLORIDE SERPL-SCNC: 104 MMOL/L (ref 96–106)
CO2 SERPL-SCNC: 23 MMOL/L (ref 18–29)
CREAT SERPL-MCNC: 1.43 MG/DL (ref 0.57–1)
GFR SERPLBLD CREATININE-BSD FMLA CKD-EPI: 32 ML/MIN/1.73
GFR SERPLBLD CREATININE-BSD FMLA CKD-EPI: 36 ML/MIN/1.73
GLUCOSE SERPL-MCNC: 106 MG/DL (ref 65–99)
INTERPRETATION: NORMAL
POTASSIUM SERPL-SCNC: 4.3 MMOL/L (ref 3.5–5.2)
SODIUM SERPL-SCNC: 141 MMOL/L (ref 134–144)
TSH SERPL DL<=0.005 MIU/L-ACNC: 1.35 UIU/ML (ref 0.45–4.5)

## 2018-04-23 DIAGNOSIS — N28.9 RENAL INSUFFICIENCY: ICD-10-CM

## 2018-04-23 DIAGNOSIS — I10 ESSENTIAL HYPERTENSION: Primary | ICD-10-CM

## 2018-04-23 RX ORDER — HYDROCHLOROTHIAZIDE 25 MG/1
25 TABLET ORAL DAILY
Qty: 30 TAB | Refills: 2 | Status: SHIPPED | OUTPATIENT
Start: 2018-04-23 | End: 2018-07-29 | Stop reason: SDUPTHER

## 2018-04-23 NOTE — PROGRESS NOTES
Kidney function test is worse since patient is on Dyazide. Will DC Dyazide. Will start hydrochlorothiazide 25 mg once a day. Repeat BMP in 1 month. Advised her to drink more fluid.

## 2018-05-23 DIAGNOSIS — N28.9 RENAL INSUFFICIENCY: ICD-10-CM

## 2018-05-23 DIAGNOSIS — I10 ESSENTIAL HYPERTENSION: ICD-10-CM

## 2018-05-24 DIAGNOSIS — R05.3 CHRONIC COUGH: ICD-10-CM

## 2018-05-24 RX ORDER — PROMETHAZINE HYDROCHLORIDE AND CODEINE PHOSPHATE 6.25; 1 MG/5ML; MG/5ML
1 SOLUTION ORAL
Qty: 120 ML | Refills: 0 | Status: CANCELLED | OUTPATIENT
Start: 2018-05-24

## 2018-05-24 NOTE — TELEPHONE ENCOUNTER
----- Message from Hamlet Tan sent at 5/24/2018  3:19 PM EDT -----  Regarding: FW: Dr. Charlie Sheridan / refill      ----- Message -----     [facility]      Sent: 5/24/2018   3:13 PM       To: [facility] [employer]  Subject: Dr. Charlie Sheridan / refill                              Citlaly Hoffman (daughter) called on behalf of patient to request refill Rx\"cough medicine for allergies\". Stated patient is completely out of medication. Best contact [phone]    Per Lise Tamez LPN, for Dr. Roz Rodas -- Dr. Roz Rodas requires patient make appt if requesting the cough syrup.

## 2018-05-24 NOTE — TELEPHONE ENCOUNTER
----- Message from Hamlet Tan sent at 5/24/2018  3:19 PM EDT -----  Regarding: FW: Dr. Charlie Sheridan / refill      ----- Message -----     From: Berna Lau     Sent: 5/24/2018   3:13 PM       To: [facility] [employer]  Subject: Dr. Charlie Sheridan / refill                              Citlaly Hoffman (daughter) called on behalf of patient to request refill Rx\"cough medicine for allergies\". Stated patient is completely out of medication. Best contact [phone]. Spoke to patient's daughter, Derek Vallejo, she reports patient is coughing at night and has rurnny nose. She is requesting \"the codeine cough syrup. \"

## 2018-05-31 DIAGNOSIS — F41.9 ANXIETY: ICD-10-CM

## 2018-05-31 RX ORDER — PAROXETINE HYDROCHLORIDE 20 MG/1
TABLET, FILM COATED ORAL
Qty: 90 TAB | Refills: 3 | Status: SHIPPED | OUTPATIENT
Start: 2018-05-31 | End: 2019-03-19 | Stop reason: SDUPTHER

## 2018-06-07 ENCOUNTER — OFFICE VISIT (OUTPATIENT)
Dept: INTERNAL MEDICINE CLINIC | Age: 83
End: 2018-06-07

## 2018-06-07 VITALS
HEART RATE: 84 BPM | RESPIRATION RATE: 20 BRPM | SYSTOLIC BLOOD PRESSURE: 121 MMHG | DIASTOLIC BLOOD PRESSURE: 83 MMHG | BODY MASS INDEX: 33.29 KG/M2 | HEIGHT: 64 IN | TEMPERATURE: 95.6 F | WEIGHT: 195 LBS

## 2018-06-07 DIAGNOSIS — G45.9 TRANSIENT CEREBRAL ISCHEMIA, UNSPECIFIED TYPE: ICD-10-CM

## 2018-06-07 DIAGNOSIS — J30.1 ALLERGIC RHINITIS DUE TO POLLEN, UNSPECIFIED SEASONALITY: Primary | ICD-10-CM

## 2018-06-07 DIAGNOSIS — N28.9 RENAL INSUFFICIENCY: ICD-10-CM

## 2018-06-07 DIAGNOSIS — Z71.89 ADVANCE CARE PLANNING: ICD-10-CM

## 2018-06-07 DIAGNOSIS — E78.2 MIXED HYPERLIPIDEMIA: ICD-10-CM

## 2018-06-07 DIAGNOSIS — I10 ESSENTIAL HYPERTENSION: ICD-10-CM

## 2018-06-07 DIAGNOSIS — I48.20 CHRONIC ATRIAL FIBRILLATION (HCC): ICD-10-CM

## 2018-06-07 DIAGNOSIS — M81.0 OSTEOPOROSIS, UNSPECIFIED OSTEOPOROSIS TYPE, UNSPECIFIED PATHOLOGICAL FRACTURE PRESENCE: ICD-10-CM

## 2018-06-07 DIAGNOSIS — Z00.00 MEDICARE ANNUAL WELLNESS VISIT, SUBSEQUENT: ICD-10-CM

## 2018-06-07 RX ORDER — PREDNISONE 5 MG/1
5 TABLET ORAL DAILY
Qty: 10 TAB | Refills: 0 | Status: SHIPPED | OUTPATIENT
Start: 2018-06-07 | End: 2018-08-14 | Stop reason: SDUPTHER

## 2018-06-07 RX ORDER — METOPROLOL TARTRATE 50 MG/1
TABLET ORAL 2 TIMES DAILY
COMMUNITY
End: 2019-01-23 | Stop reason: SDUPTHER

## 2018-06-07 NOTE — PATIENT INSTRUCTIONS
Schedule of Personalized Health Plan  (Provide Copy to Patient)  The best way to stay healthy is to live a healthy lifestyle. A healthy lifestyle includes regular exercise, eating a well-balanced diet, keeping a healthy weight and not smoking. Regular physical exams and screening tests are another important way to take care of yourself. Preventive exams provided by health care providers can find health problems early when treatment works best and can keep you from getting certain diseases or illnesses. Preventive services include exams, lab tests, screenings, shots, monitoring and information to help you take care of your own health. All people over 65 should have a pneumonia shot. Pneumonia shots are usually only needed once in a lifetime unless your doctor decides differently. Up to date    All people over 72 should have a yearly flu shot. People over 65 are at medium to high risk for Hepatitis B. Three shots are needed for complete protection. In addition to your physical exam, some screening tests are recommended:    Bone mass measurement (dexa scan) is recommended every two years. ordered. Diabetes Mellitus screening is recommended every year. up to date    Glaucoma is an eye disease caused by high pressure in the eye. An eye exam is recommended every year. Up to date    Cardiovascular screening tests that check your cholesterol and other blood fat (lipid) levels are recommended every five years. Up to date    Colorectal Cancer screening tests help to find pre-cancerous polyps (growths in the colon) so they can be removed before they turn into cancer. Tests ordered for screening depend on your personal and family history risk factors. N/A    Screening for Breast Cancer is recommended yearly with a mammogram.N/A    Screening for Cervical Cancer is recommended every two years (annually for certain risk factors, such as previous history of STD or abnormal PAP in past 7 years), with a Pelvic Exam with PAP.N/A    Here is a list of your current Health Maintenance items with a due date:  Health Maintenance   Topic Date Due    DTaP/Tdap/Td series (1 - Tdap) 08/29/1945    ZOSTER VACCINE AGE 60>  06/29/1984    GLAUCOMA SCREENING Q2Y  08/29/1989    Influenza Age 9 to Adult  08/01/2018    MEDICARE YEARLY EXAM  06/08/2019    Bone Densitometry (Dexa) Screening  Completed    Pneumococcal 65+ Low/Medium Risk  Completed

## 2018-06-07 NOTE — PROGRESS NOTES
HISTORY OF PRESENT ILLNESS  Montez Gagnon is a 80 y.o. female here for follow up. She is accompanied by her granddaughter. She has been coughing for last several days. [facility], chest x-ray was done was negative for pneumonia. She was placed on Tessalon Perles and albuterol inhaler. Still coughing, has postnasal drip. On allergy medicine. Had renal insufficiency from Dyazide. I have restarted back her on hydrochlorothiazide. Blood pressure seems okay. Need lab work. Has A fib, sotalol was discontinued, she is placed on metoprolol by cardiologist.  Doing well. On Xarelto. Has OP.need dexa scan. She is active, able to ambulate by herself. Here for Medicare wellness visit. Has a living will. Hypertension    Pertinent negatives include no chest pain, no blurred vision and no shortness of breath. Irregular Heart Beat    Associated symptoms include cough. Pertinent negatives include no fever, no chest pain and no shortness of breath. Her past medical history is significant for hypertension. Cold Symptoms   Pertinent negatives include no chest pain, no chills, no shortness of breath and no wheezing. Allergic Rhinitis   Pertinent negatives include no chest pain and no shortness of breath. Follow-up   Pertinent negatives include no chest pain and no shortness of breath. Review of Systems   Constitutional: Negative for chills and fever. HENT: Positive for congestion. Eyes: Negative. Negative for blurred vision and double vision. Respiratory: Positive for cough. Negative for shortness of breath and wheezing. Cardiovascular: Negative for chest pain. Gastrointestinal: Negative. Genitourinary: Negative. Musculoskeletal: Negative. Negative for falls. Skin: Negative. Neurological: Negative. Psychiatric/Behavioral: Negative. Physical Exam   Constitutional: She appears well-developed and well-nourished. No distress.    HENT:   Head: Normocephalic and atraumatic. Right Ear: External ear normal.   Left Ear: External ear normal.   Mouth/Throat: Oropharynx is clear and moist. No oropharyngeal exudate. Nasal turbinates:red inflamed,NT    Cobble stoning present. Neck: Normal range of motion. Neck supple. No JVD present. No thyromegaly present. Cardiovascular: Normal rate, regular rhythm, normal heart sounds and intact distal pulses. Pulmonary/Chest: Effort normal and breath sounds normal. No respiratory distress. She has no wheezes. Musculoskeletal: She exhibits no edema or tenderness. Psychiatric: She has a normal mood and affect. Her behavior is normal.       ASSESSMENT and PLAN    Diagnoses and all orders for this visit:    1. Allergic rhinitis due to pollen, unspecified seasonality    On flonase and zyrtec. Will add,  -     predniSONE (DELTASONE) 5 mg tablet; Take 1 Tab by mouth daily. 2. Essential hypertension    On HCTZ. off dyazide. BP stable. Will check,    -     METABOLIC PANEL, BASIC    3. Chronic atrial fibrillation (HCC)    Rate and rhythm controlled. Has seen cardiologist Dr. Ryan Randall, sotalol was discontinued. Patient is placed on metoprolol 50 mg twice a day along with Xarelto. 4. Transient cerebral ischemia, unspecified type    Stable. On Xarelto. 5. Mixed hyperlipidemia  On Pravachol. Lipid panel stable. 6. Renal insufficiency    Creatinine was elevated. Dyazide was discontinued. We will repeat,  -     METABOLIC PANEL, BASIC    7. Osteoporosis, unspecified osteoporosis type, unspecified pathological fracture presence    Has been taking Fosamax 35 mg for long time. Did not have a bone density for last 5 years. We will repeat,  -     DEXA BONE DENSITY STUDY AXIAL; Future    8. Advance care planning  Has living will, she will bring copy. 9. Medicare annual wellness visit, subsequent            Discussed expected course/resolution/complications of diagnosis in detail with patient.    Medication risks/benefits/costs/interactions/alternatives discussed with patient. Pt was given an after visit summary which includes diagnoses, current medications & vitals. Pt expressed understanding with the diagnosis and plan.

## 2018-06-07 NOTE — PROGRESS NOTES
Kathleen Snider is a 80 y.o. female and presents for annual Medicare Wellness Visit. Problem List: Reviewed with patient and discussed risk factors. Patient Active Problem List   Diagnosis Code    PE (pulmonary embolism) I26.99    DVT (deep venous thrombosis) (HCC) I82.409    Mixed hyperlipidemia E78.2    Atrial fibrillation (HCC) I48.91    HTN (hypertension) I10    DJD (degenerative joint disease) M19.90   Jackie Imam Hypothyroid E03.9    TIA (transient ischemic attack) G45.9    ACP (advance care planning) Z71.89    Gait instability R26.81       Current medical providers:  Patient Care Team:  Juanjose Amaya MD as PCP - General (Internal Medicine)  Roselia Covarrubias MD (Orthopedic Surgery)  Glenn Breaux LPN as Ambulatory Care Navigator (Internal Medicine)  Pari Wong RN as Nurse Navigator (Internal Medicine)  Susan Calabrese MD (Cardiology)    [facility] [place]: Reviewed with patient  Past Surgical History:   Procedure Laterality Date    HX HEENT      cataract sx    HX HIP FRACTURE TX  2013    right hip 1989 left hip 2013    HX HIP REPLACEMENT  1989    HX PACEMAKER  2008        SH: Reviewed with patient  Social History   Substance Use Topics    Smoking status: Never Smoker    Smokeless tobacco: Never Used    Alcohol use No       FH: Reviewed with patient  Family History   Problem Relation Age of Onset    Hypertension Mother        Medications/Allergies: Reviewed with patient  Current Outpatient Prescriptions on File Prior to Visit   Medication Sig Dispense Refill    PARoxetine (PAXIL) 20 mg tablet TAKE 1 TAB BY MOUTH DAILY. 90 Tab 3    hydroCHLOROthiazide (HYDRODIURIL) 25 mg tablet Take 1 Tab by mouth daily. D/C dyazide 30 Tab 2    fluticasone (FLONASE) 50 mcg/actuation nasal spray 2 Sprays by Both Nostrils route daily. 3 Bottle 0    polyethylene glycol (MIRALAX) 17 gram/dose powder Take 17 g by mouth daily.  510 g 4    XARELTO 20 mg tab tablet TAKE 1 TABLET BY MOUTH ONCE DAILY WITH BREAKFAST 30 Tab 3  magnesium oxide (MAG-OX) 400 mg tablet TAKE 1 TAB BY MOUTH DAILY. 90 Tab 2    alendronate (FOSAMAX) 35 mg tablet TAKE 1 TABLET BY MOUTH EVERY SEVEN (7) DAYS. 12 Tab 3    pravastatin (PRAVACHOL) 20 mg tablet TAKE 1 TABLET BY MOUTH ONCE NIGHTLY 90 Tab 2    levothyroxine (SYNTHROID) 25 mcg tablet TAKE 1 TABLET BY MOUTH DAILY BEFORE BREAKFAST 90 Tab 3    losartan (COZAAR) 50 mg tablet TAKE 1 TABLET BY MOUTH DAILY 90 Tab 2    multivitamin (ONE A DAY) tablet Take 1 Tab by mouth daily.  zinc oxide-cod liver oil (DESITIN) 40 % ointment Apply  to affected area as needed for Skin Irritation.  docusate sodium (COLACE) 100 mg capsule Take 100 mg by mouth two (2) times a day.  cyanocobalamin 1,000 mcg tablet Take 1,000 mcg by mouth daily. Indications: 5000mcg      loratadine (CLARITIN) 10 mg tablet TAKE 1 TABLET BY MOUTH ONCE DAILY 30 Tab 0    calcium-cholecalciferol, D3, (CALTRATE 600+D) tablet Take 1 Tab by mouth daily.  trospium (SANCTURA) 20 mg tablet TAKE 1 TAB BY MOUTH TWO (2) TIMES A DAY. 180 Tab 2     No current facility-administered medications on file prior to visit. No Known Allergies    Objective:  Visit Vitals    /83    Pulse 84    Temp 95.6 °F (35.3 °C) (Oral)    Resp 20    Ht 5' 4\" (1.626 m)    Wt 195 lb (88.5 kg)    BMI 33.47 kg/m2    Body mass index is 33.47 kg/(m^2). Assessment of cognitive impairment: Alert and oriented x 3    Depression Screen:   PHQ over the last two weeks 6/7/2018   PHQ Not Done -   Little interest or pleasure in doing things Not at all   Feeling down, depressed or hopeless Not at all   Total Score PHQ 2 0       Fall Risk Assessment:    Fall Risk Assessment, last 12 mths 6/7/2018   Able to walk? Yes   Fall in past 12 months? No   Fall with injury? -   Number of falls in past 12 months -   Fall Risk Score -       Functional Ability:   Does the patient exhibit a steady gait?   yes   How long did it take the patient to get up and walk from a sitting position? 15 sec   Is the patient self reliant?  (ie can do own laundry, meals, household chores)  no     Does the patient handle his/her own medications?  no     Does the patient handle his/her own money? no     Is the patients home safe (ie good lighting, handrails on stairs and bath, etc.)? yes     Did you notice or did patient express any hearing difficulties? no     Did you notice or did patient express any vision difficulties?   no     Were distance and reading eye charts used? no       Advance Care Planning:   Patient was offered the opportunity to discuss advance care planning:  yes     Does patient have an Advance Directive:  yes   If no, did you provide information on Caring Connections? yes       Plan:      Orders Placed This Encounter    DEXA BONE DENSITY STUDY AXIAL    metoprolol tartrate (LOPRESSOR) 50 mg tablet    predniSONE (DELTASONE) 5 mg tablet       Health Maintenance   Topic Date Due    DTaP/Tdap/Td series (1 - Tdap) 08/29/1945    ZOSTER VACCINE AGE 60>  06/29/1984    GLAUCOMA SCREENING Q2Y  08/29/1989    Influenza Age 9 to Adult  08/01/2018    MEDICARE YEARLY EXAM  06/08/2019    Bone Densitometry (Dexa) Screening  Completed    Pneumococcal 65+ Low/Medium Risk  Completed       *Patient verbalized understanding and agreement with the plan. A copy of the After Visit Summary with personalized health plan was given to the patient today.

## 2018-06-07 NOTE — LETTER
6/14/2018 3:36 PM 
 
Ms. Neal Brought [address] 
2005 [facility] Dear Ángel Brought: Please find your most recent results below. Resulted Orders METABOLIC PANEL, BASIC Result Value Ref Range Glucose 112 (H) 65 - 99 mg/dL BUN 27 10 - 36 mg/dL Creatinine 1.21 (H) 0.57 - 1.00 mg/dL GFR est non-AA 39 (L) >59 mL/min/1.73 GFR est AA 45 (L) >59 mL/min/1.73  
 BUN/Creatinine ratio 22 12 - 28 Sodium 144 134 - 144 mmol/L Potassium 3.8 3.5 - 5.2 mmol/L Chloride 103 96 - 106 mmol/L  
 CO2 27 18 - 29 mmol/L Comment: **Effective June 11, 2018 Carbon Dioxide, Total** 
  reference interval will be changing to: Age                  Male          Female 
     0 days   - 30 days         16 - 34        16 - 34 
    31 days   -  1 year         15 - 22        15 - 25 
     2 years  -  5 years        16 - 34        14 - 32 
     6 years  - 15 years        23 - 32        22 - 32 
               >12 years        21 - 32        18 - 34 Calcium 8.9 8.7 - 10.3 mg/dL Narrative Performed at:  [address]  [id number] : Arthur Diane MD, Phone:  [phone] CKD REPORT Result Value Ref Range Interpretation Note Comment:  
   Supplemental report is available. Narrative Performed at:  [address] [place] [address]  [id number] : Hayden Chamberlain MD, Phone:  [phone] RECOMMENDATIONS: 
None. Keep up the good work! Please call me if you have any questions: [phone] Sincerely, Shon Burns MD

## 2018-06-07 NOTE — MR AVS SNAPSHOT
[address] [facility] [address] [facility] 
[phone] Patient: Aden Bonds MRN: [id number] Visit Information Date & Time Provider Department Dept. Phone Encounter #  
 2018 10:45 AM Avinash Kaplan MD [facility] Internal Medicine [phone] Your Appointments 2018  3:15 PM  
ROUTINE CARE with Avinash Kaplan MD  
[facility] Internal Medicine [facility] Appt Note: 4 month f/u  
 [address] [place] [address] [facility] [address] [address]  
[phone]  
  
   
 [address] [facility] [address] Upcoming Health Maintenance Date Due DTaP/Tdap/Td series (1 - Tdap) 1945 ZOSTER VACCINE AGE 60> 1984 GLAUCOMA SCREENING Q2Y 1989 Influenza Age 5 to Adult 2018 MEDICARE YEARLY EXAM 2019 Allergies as of 2018  Review Complete On: 2018 By: Avinash Kaplan MD  
 No Known Allergies Current Immunizations  Reviewed on 2018 Name Date Influenza Vaccine 3/30/2017, 10/10/2014 Pneumococcal Conjugate (PCV-13) 3/10/2016 Pneumococcal Polysaccharide (PPSV-23) 3/10/2013 Reviewed by Avinash Kaplan MD on 2018 at 11:23 AM  
You Were Diagnosed With   
  
 Codes Comments Allergic rhinitis due to pollen, unspecified seasonality    -  Primary ICD-10-CM: J30.1 ICD-9-CM: 477.0 Essential hypertension     ICD-10-CM: I10 
ICD-9-CM: 401.9 Chronic atrial fibrillation (HCC)     ICD-10-CM: N29.4 ICD-9-CM: 427.31 Transient cerebral ischemia, unspecified type     ICD-10-CM: G45.9 ICD-9-CM: 435.9 Mixed hyperlipidemia     ICD-10-CM: E78.2 ICD-9-CM: 272.2 Renal insufficiency     ICD-10-CM: N28.9 ICD-9-CM: 593.9 Osteoporosis, unspecified osteoporosis type, unspecified pathological fracture presence     ICD-10-CM: M81.0 ICD-9-CM: 733.00 Advance care planning     ICD-10-CM: Z71.89 ICD-9-CM: V65.49 Medicare annual wellness visit, subsequent     ICD-10-CM: Z00.00 ICD-9-CM: V70.0 Vitals BP Pulse Temp Resp Height(growth percentile) Weight(growth percentile) 121/83 84 95.6 °F (35.3 °C) (Oral) 20 5' 4\" (1.626 m) 195 lb (88.5 kg) BMI OB Status Smoking Status 33.47 kg/m2 Menopause Never Smoker Vitals History BMI and BSA Data Body Mass Index Body Surface Area  
 33.47 kg/m 2 2 m 2 Preferred Pharmacy Pharmacy Name Phone [id number] [place] [address] [place] [address] [phone] Your Updated Medication List  
  
   
This list is accurate as of 6/7/18 11:27 AM.  Always use your most recent med list.  
  
  
  
  
 alendronate 35 mg tablet Commonly known as:  FOSAMAX TAKE 1 TABLET BY MOUTH EVERY SEVEN (7) DAYS. calcium-cholecalciferol (D3) tablet Commonly known as:  CALTRATE 600+D Take 1 Tab by mouth daily. cyanocobalamin 1,000 mcg tablet Take 1,000 mcg by mouth daily. Indications: 5000mcg  
  
 docusate sodium 100 mg capsule Commonly known as:  Laruth Preston Take 100 mg by mouth two (2) times a day. fluticasone 50 mcg/actuation nasal spray Commonly known as:  Angel Friedheim 2 Sprays by Both Nostrils route daily. hydroCHLOROthiazide 25 mg tablet Commonly known as:  HYDRODIURIL Take 1 Tab by mouth daily. D/C dyazide  
  
 levothyroxine 25 mcg tablet Commonly known as:  SYNTHROID  
TAKE 1 TABLET BY MOUTH DAILY BEFORE BREAKFAST  
  
 loratadine 10 mg tablet Commonly known as:  CLARITIN  
TAKE 1 TABLET BY MOUTH ONCE DAILY losartan 50 mg tablet Commonly known as:  COZAAR  
TAKE 1 TABLET BY MOUTH DAILY  
  
 magnesium oxide 400 mg tablet Commonly known as:  MAG-OX  
TAKE 1 TAB BY MOUTH DAILY. metoprolol tartrate 50 mg tablet Commonly known as:  LOPRESSOR Take  by mouth two (2) times a day. multivitamin tablet Commonly known as:  ONE A DAY  
 Take 1 Tab by mouth daily. PARoxetine 20 mg tablet Commonly known as:  PAXIL TAKE 1 TAB BY MOUTH DAILY. polyethylene glycol 17 gram/dose powder Commonly known as:  Vintontressa Pricee Take 17 g by mouth daily. pravastatin 20 mg tablet Commonly known as:  PRAVACHOL  
TAKE 1 TABLET BY MOUTH ONCE NIGHTLY  
  
 predniSONE 5 mg tablet Commonly known as:  Alisia Reels Take 1 Tab by mouth daily. trospium 20 mg tablet Commonly known as:  Adamaris [place] TAKE 1 TAB BY MOUTH TWO (2) TIMES A DAY. XARELTO 20 mg Tab tablet Generic drug:  rivaroxaban TAKE 1 TABLET BY MOUTH ONCE DAILY WITH BREAKFAST  
  
 zinc oxide-cod liver oil 40 % ointment Commonly known as:  Eston Husky Apply  to affected area as needed for Skin Irritation. Prescriptions Sent to Pharmacy Refills  
 predniSONE (DELTASONE) 5 mg tablet 0 Sig: Take 1 Tab by mouth daily. Class: Normal  
 Pharmacy: [address] [place] [address] , [address] [place] [address] Ph #: [phone] Route: Oral  
  
We Performed the Following METABOLIC PANEL, BASIC [65143 CPT(R)] To-Do List   
 09/07/2018 Imaging:  DEXA BONE DENSITY STUDY AXIAL Patient Instructions Schedule of Personalized Health Plan (Provide Copy to Patient) The best way to stay healthy is to live a healthy lifestyle. A healthy lifestyle includes regular exercise, eating a well-balanced diet, keeping a healthy weight and not smoking. Regular physical exams and screening tests are another important way to take care of yourself. Preventive exams provided by health care providers can find health problems early when treatment works best and can keep you from getting certain diseases or illnesses. Preventive services include exams, lab tests, screenings, shots, monitoring and information to help you take care of your own health. All people over 65 should have a pneumonia shot.  Pneumonia shots are usually only needed once in a lifetime unless your doctor decides differently. Up to date All people over 65 should have a yearly flu shot. People over 65 are at medium to high risk for Hepatitis B. Three shots are needed for complete protection. In addition to your physical exam, some screening tests are recommended: 
 
Bone mass measurement (dexa scan) is recommended every two years. ordered. Diabetes Mellitus screening is recommended every year. up to date Glaucoma is an eye disease caused by high pressure in the eye. An eye exam is recommended every year. Up to date Cardiovascular screening tests that check your cholesterol and other blood fat (lipid) levels are recommended every five years. Up to date Colorectal Cancer screening tests help to find pre-cancerous polyps (growths in the colon) so they can be removed before they turn into cancer. Tests ordered for screening depend on your personal and family history risk factors. N/A Screening for Breast Cancer is recommended yearly with a mammogram.N/A Screening for Cervical Cancer is recommended every two years (annually for certain risk factors, such as previous history of STD or abnormal PAP in past 7 years), with a Pelvic Exam with PAP. N/A Here is a list of your current Health Maintenance items with a due date: 
Health Maintenance Topic Date Due  
 DTaP/Tdap/Td series (1 - Tdap) 08/29/1945  ZOSTER VACCINE AGE 60>  06/29/1984  GLAUCOMA SCREENING Q2Y  08/29/1989  Influenza Age 5 to Adult  08/01/2018  MEDICARE YEARLY EXAM  06/08/2019  Bone Densitometry (Dexa) Screening  Completed  Pneumococcal 65+ Low/Medium Risk  Completed Please provide this summary of care documentation to your next provider. Your primary care clinician is listed as Mya Padilla. If you have any questions after today's visit, please call [phone].

## 2018-06-08 LAB
BUN SERPL-MCNC: 27 MG/DL (ref 10–36)
BUN/CREAT SERPL: 22 (ref 12–28)
CALCIUM SERPL-MCNC: 8.9 MG/DL (ref 8.7–10.3)
CHLORIDE SERPL-SCNC: 103 MMOL/L (ref 96–106)
CO2 SERPL-SCNC: 27 MMOL/L (ref 18–29)
CREAT SERPL-MCNC: 1.21 MG/DL (ref 0.57–1)
GFR SERPLBLD CREATININE-BSD FMLA CKD-EPI: 39 ML/MIN/1.73
GFR SERPLBLD CREATININE-BSD FMLA CKD-EPI: 45 ML/MIN/1.73
GLUCOSE SERPL-MCNC: 112 MG/DL (ref 65–99)
INTERPRETATION: NORMAL
POTASSIUM SERPL-SCNC: 3.8 MMOL/L (ref 3.5–5.2)
SODIUM SERPL-SCNC: 144 MMOL/L (ref 134–144)

## 2018-06-11 ENCOUNTER — TELEPHONE (OUTPATIENT)
Dept: INTERNAL MEDICINE CLINIC | Age: 83
End: 2018-06-11

## 2018-06-12 NOTE — TELEPHONE ENCOUNTER
Call placed to dgter and advised that labs were in MD box to review and that call was probably for scheduling bone scan and she stated it was but appreciated a call back

## 2018-06-20 ENCOUNTER — HOSPITAL ENCOUNTER (OUTPATIENT)
Dept: MAMMOGRAPHY | Age: 83
Discharge: HOME | End: 2018-06-20
Attending: INTERNAL MEDICINE
Payer: MEDICARE

## 2018-06-20 DIAGNOSIS — M81.0 OSTEOPOROSIS, UNSPECIFIED OSTEOPOROSIS TYPE, UNSPECIFIED PATHOLOGICAL FRACTURE PRESENCE: ICD-10-CM

## 2018-06-20 PROCEDURE — 77080 DXA BONE DENSITY AXIAL: CPT

## 2018-06-20 NOTE — LETTER
6/21/2018 11:47 AM 
 
Ms. Do Notice [address] 
2005 [facility] Dear Ernestina Notice: Please find your most recent results below. Resulted Orders DEXA BONE DENSITY STUDY AXIAL Narrative Bone Mineral Density Indication:  Screening for osteoporosis Age: 80 Sex: Female. Menopause status: postmenopausal. 
Hormone replacement therapy: None Number of falls in the past year:   1 Risk factors for osteoporosis:  Diuretic use Current medication for osteoporosis: Calcium and vitamin D. Fosamax Comparison: None. Technique: Imaging was performed on the [employer] Excluded sites: Hips for replacement Findings: 
  
Fractures identified on Lateral scanogram:  0 Lumbar Spine:  L1-4 Bone mineral density (gm/cm2):  1.567 
% of peak bone mass:  131 
% for age matched controls:  140 T-score:  3 
Z-score:  3.6 
 
33% Radius Left: 
Bone mineral density (gm/cm2):  0.805 
% of peak bone mass:  92 
% for age matched controls:  92 
T-score:  -0.8 Z-score:  2.4 
 
33% Radius Right: 
Bone mineral density (gm/cm2):  0.767 
% of peak bone mass:  87 
% for age matched controls:  130 T-score:  -1.3 Z-score:  2 Impression Impression: This patient is osteopenic using the World Health Organization criteria Recommendations: 
Therapy recommendations need to be tailored to each individual patient. Please reconsider testing based on risk factors. Currently, Medicare will only 
reimburse for a central DXA examination every two years, unless the patient is 
on chronic glucocorticoid therapy. Note: Please note that reliable, valid comparisons cannot be made between 
studies which have been performed on machines from different manufacturers. If 
clinically warranted, a follow up study performed at this site, on the same 
unit, would allow the most sensitive assessment of change in bone mineral 
density.   
 
 
RECOMMENDATIONS: 
 Joann Cisneros  your recent Dexa Bone Density test showed Osteopenia (thinning of the bones). I recommend for you to take Caltrate 600 mg one tablet twice per day. Caltrate is sold over the counter and you may ask the pharmacist if they have a generic of the Caltrate. Please call me if you have any questions: [phone] Sincerely, [facility] DEXA 1

## 2018-07-03 ENCOUNTER — TELEPHONE (OUTPATIENT)
Dept: INTERNAL MEDICINE CLINIC | Age: 83
End: 2018-07-03

## 2018-07-03 NOTE — TELEPHONE ENCOUNTER
Patient's daughter, Zackery Kyle called back after I left her 2 messages. Reviewed recommendations per Jose F Navarro NP, see note-- for her to use Muccinex, allergy med, and Tessalon pearls. Daughter called the on-call provider last night R/T patient's cough. Today asking for her Mother to get Codeine cough syrup --after I reviewed recommendations by the NP. Patient will have to be seen.

## 2018-07-05 DIAGNOSIS — E83.42 HYPOMAGNESEMIA: ICD-10-CM

## 2018-07-05 RX ORDER — LANOLIN ALCOHOL/MO/W.PET/CERES
CREAM (GRAM) TOPICAL
Qty: 90 TAB | Refills: 2 | Status: SHIPPED | OUTPATIENT
Start: 2018-07-05 | End: 2018-07-06 | Stop reason: SDUPTHER

## 2018-07-06 DIAGNOSIS — E83.42 HYPOMAGNESEMIA: ICD-10-CM

## 2018-07-09 RX ORDER — LANOLIN ALCOHOL/MO/W.PET/CERES
CREAM (GRAM) TOPICAL
Qty: 90 TAB | Refills: 2 | Status: SHIPPED | OUTPATIENT
Start: 2018-07-09 | End: 2019-03-19 | Stop reason: SDUPTHER

## 2018-07-15 DIAGNOSIS — J30.1 ALLERGIC RHINITIS DUE TO POLLEN, UNSPECIFIED SEASONALITY: ICD-10-CM

## 2018-07-16 RX ORDER — PRAVASTATIN SODIUM 20 MG/1
TABLET ORAL
Qty: 90 TAB | Refills: 2 | Status: SHIPPED | OUTPATIENT
Start: 2018-07-16 | End: 2019-03-19 | Stop reason: SDUPTHER

## 2018-07-16 RX ORDER — FLUTICASONE PROPIONATE 50 MCG
SPRAY, SUSPENSION (ML) NASAL
Qty: 1 BOTTLE | Refills: 0 | Status: SHIPPED | OUTPATIENT
Start: 2018-07-16 | End: 2018-08-22 | Stop reason: SDUPTHER

## 2018-07-29 DIAGNOSIS — I10 ESSENTIAL HYPERTENSION: ICD-10-CM

## 2018-07-30 RX ORDER — HYDROCHLOROTHIAZIDE 25 MG/1
TABLET ORAL
Qty: 30 TAB | Refills: 2 | Status: SHIPPED | OUTPATIENT
Start: 2018-07-30 | End: 2018-11-03 | Stop reason: SDUPTHER

## 2018-08-06 RX ORDER — RIVAROXABAN 20 MG/1
TABLET, FILM COATED ORAL
Qty: 30 TAB | Refills: 3 | Status: SHIPPED | OUTPATIENT
Start: 2018-08-06 | End: 2018-11-08 | Stop reason: SDUPTHER

## 2018-08-08 RX ORDER — TROSPIUM CHLORIDE 20 MG/1
TABLET, FILM COATED ORAL
Qty: 180 TAB | Refills: 2 | Status: SHIPPED | OUTPATIENT
Start: 2018-08-08 | End: 2019-03-19 | Stop reason: SDUPTHER

## 2018-08-14 ENCOUNTER — OFFICE VISIT (OUTPATIENT)
Dept: INTERNAL MEDICINE CLINIC | Age: 83
End: 2018-08-14

## 2018-08-14 VITALS
BODY MASS INDEX: 32.78 KG/M2 | DIASTOLIC BLOOD PRESSURE: 69 MMHG | SYSTOLIC BLOOD PRESSURE: 139 MMHG | TEMPERATURE: 97.2 F | HEIGHT: 64 IN | WEIGHT: 192 LBS | RESPIRATION RATE: 16 BRPM | HEART RATE: 85 BPM | OXYGEN SATURATION: 98 %

## 2018-08-14 DIAGNOSIS — I48.20 CHRONIC ATRIAL FIBRILLATION (HCC): ICD-10-CM

## 2018-08-14 DIAGNOSIS — I10 ESSENTIAL HYPERTENSION: ICD-10-CM

## 2018-08-14 DIAGNOSIS — R05.3 CHRONIC COUGH: Primary | ICD-10-CM

## 2018-08-14 DIAGNOSIS — J30.1 ALLERGIC RHINITIS DUE TO POLLEN, UNSPECIFIED SEASONALITY: ICD-10-CM

## 2018-08-14 DIAGNOSIS — Z86.711 PERSONAL HISTORY OF PULMONARY EMBOLISM: ICD-10-CM

## 2018-08-14 RX ORDER — CODEINE PHOSPHATE AND GUAIFENESIN 10; 100 MG/5ML; MG/5ML
5 SOLUTION ORAL
Qty: 118 ML | Refills: 0 | Status: SHIPPED | OUTPATIENT
Start: 2018-08-14 | End: 2019-01-03 | Stop reason: ALTCHOICE

## 2018-08-14 RX ORDER — PREDNISONE 10 MG/1
5 TABLET ORAL DAILY
Qty: 7 TAB | Refills: 0 | Status: SHIPPED | OUTPATIENT
Start: 2018-08-14 | End: 2018-08-21

## 2018-08-14 RX ORDER — ALBUTEROL SULFATE 90 UG/1
AEROSOL, METERED RESPIRATORY (INHALATION)
COMMUNITY
End: 2018-08-14 | Stop reason: SDUPTHER

## 2018-08-14 RX ORDER — AMLODIPINE BESYLATE 5 MG/1
5 TABLET ORAL DAILY
Qty: 30 TAB | Refills: 5 | Status: SHIPPED | OUTPATIENT
Start: 2018-08-14 | End: 2018-11-08 | Stop reason: SDUPTHER

## 2018-08-14 RX ORDER — ALBUTEROL SULFATE 90 UG/1
2 AEROSOL, METERED RESPIRATORY (INHALATION)
Qty: 1 INHALER | Refills: 2 | Status: SHIPPED | OUTPATIENT
Start: 2018-08-14

## 2018-08-14 NOTE — PROGRESS NOTES
Patient identified with 2 ID's, Name and Damien Lucero is a 80 y.o. female  Chief Complaint   Patient presents with    Cough    Medication Refill     patient would like refill prednisone, mucinex and pro air inhaler       1. Have you been to the ER, urgent care clinic since your last visit? Hospitalized since your last visit? No     2. Have you seen or consulted any other health care providers outside of the [address] [place] [address] since your last visit? Include any pap smears or colon screening. Xray of chest done by home health about a month       In the event something were to happen to you and you were unable to speak on your behalf, do you have an Advance Directive/ Living Will in place stating your wishes? yes     If yes, do we have a copy on file? No patient will bring in for scanning to chart            Visit Vitals    /69 (BP 1 Location: Left arm, BP Patient Position: Sitting)    Pulse 85    Temp 97.2 °F (36.2 °C) (Oral)    Resp 16    Ht 5' 4\" (1.626 m)    Wt 192 lb (87.1 kg)    SpO2 98%    BMI 32.96 kg/m2         Medication Reconciliation reviewed with patient on this date      Fall Risk Assessment, last 12 mths 6/7/2018   Able to walk? Yes   Fall in past 12 months? No   Fall with injury? -   Number of falls in past 12 months -   Fall Risk Score -       PHQ over the last two weeks 6/7/2018   PHQ Not Done -   Little interest or pleasure in doing things Not at all   Feeling down, depressed, irritable, or hopeless Not at all   Total Score PHQ 2 0       Learning Assessment 6/18/2014   PRIMARY LEARNER Patient   PRIMARY LANGUAGE ENGLISH   LEARNER PREFERENCE PRIMARY READING   ANSWERED BY patient   RELATIONSHIP SELF       Abuse Screening Questionnaire 6/7/2018   Do you ever feel afraid of your partner? N   Are you in a relationship with someone who physically or mentally threatens you? N   Is it safe for you to go home?  Carmen Nielsen

## 2018-08-14 NOTE — MR AVS SNAPSHOT
[address] [facility] [address] 
[phone] Patient: Ivon Be MRN: [id number] Visit Information Date & Time Provider Department Dept. Phone Encounter #  
 2018  1:00 PM Alirio Burdick, Janae [facility] Internal Medicine [phone] [id number] Follow-up Instructions Return in about 2 weeks (around 2018). Your Appointments 2018  3:15 PM  
ROUTINE CARE with Rosie Parson MD  
[facility] Internal Medicine [facility]) Appt Note: 4 month f/u  
 [address] [facility] [address] [facility] [address] [address]  
[phone]  
  
   
 [address] [facility] [address] Upcoming Health Maintenance Date Due DTaP/Tdap/Td series (1 - Tdap) 1945 ZOSTER VACCINE AGE 60> 1984 GLAUCOMA SCREENING Q2Y 1989 Influenza Age 5 to Adult 2018 MEDICARE YEARLY EXAM 2019 Allergies as of 2018  Review Complete On: 2018 By: Alirio Burdick, DO No Known Allergies Current Immunizations  Reviewed on 2018 Name Date Influenza Vaccine 3/30/2017, 10/10/2014 Pneumococcal Conjugate (PCV-13) 3/10/2016 Pneumococcal Polysaccharide (PPSV-23) 3/10/2013 Not reviewed this visit You Were Diagnosed With   
  
 Codes Comments Chronic cough    -  Primary ICD-10-CM: J23 ICD-9-CM: 786.2 Allergic rhinitis due to pollen, unspecified seasonality     ICD-10-CM: J30.1 ICD-9-CM: 477.0 Essential hypertension     ICD-10-CM: I10 
ICD-9-CM: 401.9 Chronic atrial fibrillation (HCC)     ICD-10-CM: O80.8 ICD-9-CM: 427.31 Personal history of pulmonary embolism     ICD-10-CM: V62.853 ICD-9-CM: V12.55 Vitals BP Pulse Temp Resp Height(growth percentile) Weight(growth percentile) 139/69 (BP 1 Location: Left arm, BP Patient Position: Sitting) 85 97.2 °F (36.2 °C) (Oral) 16 5' 4\" (1.626 m) 192 lb (87.1 kg) SpO2 BMI OB Status Smoking Status 98% 32.96 kg/m2 Menopause Never Smoker Vitals History BMI and BSA Data Body Mass Index Body Surface Area  
 32.96 kg/m 2 1.98 m 2 Preferred Pharmacy Pharmacy Name Phone [id number] Owusu [address] [address] [phone] Your Updated Medication List  
  
   
This list is accurate as of 8/14/18  1:46 PM.  Always use your most recent med list.  
  
  
  
  
 albuterol 90 mcg/actuation inhaler Commonly known as:  PROAIR HFA Take 2 Puffs by inhalation every six (6) hours as needed for Wheezing. alendronate 35 mg tablet Commonly known as:  FOSAMAX TAKE 1 TABLET BY MOUTH EVERY SEVEN (7) DAYS. amLODIPine 5 mg tablet Commonly known as:  Delmy Spruce Take 1 Tab by mouth daily. calcium-cholecalciferol (D3) tablet Commonly known as:  CALTRATE 600+D Take 1 Tab by mouth daily. cyanocobalamin 1,000 mcg tablet Take 1,000 mcg by mouth daily. Indications: 5000mcg  
  
 docusate sodium 100 mg capsule Commonly known as:  Hildegarde Berth Take 100 mg by mouth two (2) times a day. fluticasone 50 mcg/actuation nasal spray Commonly known as:  Zachary Redo INSTILL 2 SPRAYS BY BOTH NOSTRILS ROUTE DAILY. guaiFENesin-codeine 100-10 mg/5 mL solution Commonly known as:  ROBITUSSIN AC Take 5 mL by mouth three (3) times daily as needed for Cough. Max Daily Amount: 15 mL.  
  
 hydroCHLOROthiazide 25 mg tablet Commonly known as:  HYDRODIURIL  
TAKE 1 TABLET BY MOUTH DAILY. D/C DYAZIDE  
  
 levothyroxine 25 mcg tablet Commonly known as:  SYNTHROID  
TAKE 1 TABLET BY MOUTH DAILY BEFORE BREAKFAST  
  
 loratadine 10 mg tablet Commonly known as:  CLARITIN  
TAKE 1 TABLET BY MOUTH ONCE DAILY  
  
 magnesium oxide 400 mg tablet Commonly known as:  MAG-OX  
TAKE 1 TAB BY MOUTH DAILY. metoprolol tartrate 50 mg tablet Commonly known as:  LOPRESSOR  
 Take  by mouth two (2) times a day. multivitamin tablet Commonly known as:  ONE A DAY Take 1 Tab by mouth daily. PARoxetine 20 mg tablet Commonly known as:  PAXIL TAKE 1 TAB BY MOUTH DAILY. polyethylene glycol 17 gram/dose powder Commonly known as:  Elvi Floor Take 17 g by mouth daily. pravastatin 20 mg tablet Commonly known as:  PRAVACHOL  
TAKE 1 TABLET BY MOUTH ONCE NIGHTLY  
  
 predniSONE 10 mg tablet Commonly known as:  Veronica Beam Take 0.5 Tabs by mouth daily for 7 days. trospium 20 mg tablet Commonly known as:  Bailon [place] TAKE 1 TAB BY MOUTH TWO (2) TIMES A DAY. XARELTO 20 mg Tab tablet Generic drug:  rivaroxaban TAKE 1 TABLET BY MOUTH ONCE DAILY WITH BREAKFAST  
  
 zinc oxide-cod liver oil 40 % ointment Commonly known as:  Kian Morgan Apply  to affected area as needed for Skin Irritation. Prescriptions Printed Refills  
 guaiFENesin-codeine (ROBITUSSIN AC) 100-10 mg/5 mL solution 0 Sig: Take 5 mL by mouth three (3) times daily as needed for Cough. Max Daily Amount: 15 mL. Class: Print Route: Oral  
  
Prescriptions Sent to Pharmacy Refills  
 predniSONE (DELTASONE) 10 mg tablet 0 Sig: Take 0.5 Tabs by mouth daily for 7 days. Class: Normal  
 Pharmacy: [address] [place] [address] [address] [place] [address] Ph #: [phone] Route: Oral  
 albuterol (PROAIR HFA) 90 mcg/actuation inhaler 2 Sig: Take 2 Puffs by inhalation every six (6) hours as needed for Wheezing. Class: Normal  
 Pharmacy: [address] [place] [address] Dr [address] [place] [address] Ph #: [phone] Route: Inhalation  
 amLODIPine (NORVASC) 5 mg tablet 5 Sig: Take 1 Tab by mouth daily. Class: Normal  
 Pharmacy: [address] [place] [address] Dr [address] [place] [address] Ph #: [phone] Route: Oral  
  
Follow-up Instructions Return in about 2 weeks (around 8/28/2018). Please provide this summary of care documentation to your next provider. Your primary care clinician is listed as Carmine Meneses. If you have any questions after today's visit, please call [phone].

## 2018-08-22 ENCOUNTER — OFFICE VISIT (OUTPATIENT)
Dept: INTERNAL MEDICINE CLINIC | Age: 83
End: 2018-08-22

## 2018-08-22 VITALS
DIASTOLIC BLOOD PRESSURE: 69 MMHG | WEIGHT: 191 LBS | HEART RATE: 78 BPM | SYSTOLIC BLOOD PRESSURE: 109 MMHG | HEIGHT: 64 IN | TEMPERATURE: 97.9 F | RESPIRATION RATE: 18 BRPM | BODY MASS INDEX: 32.61 KG/M2 | OXYGEN SATURATION: 98 %

## 2018-08-22 DIAGNOSIS — J30.1 ALLERGIC RHINITIS DUE TO POLLEN, UNSPECIFIED SEASONALITY: ICD-10-CM

## 2018-08-22 DIAGNOSIS — E03.9 HYPOTHYROIDISM, UNSPECIFIED TYPE: ICD-10-CM

## 2018-08-22 DIAGNOSIS — I48.20 CHRONIC ATRIAL FIBRILLATION (HCC): Primary | ICD-10-CM

## 2018-08-22 DIAGNOSIS — G45.9 TRANSIENT CEREBRAL ISCHEMIA, UNSPECIFIED TYPE: ICD-10-CM

## 2018-08-22 DIAGNOSIS — I10 ESSENTIAL HYPERTENSION: ICD-10-CM

## 2018-08-22 RX ORDER — FLUTICASONE PROPIONATE 50 MCG
SPRAY, SUSPENSION (ML) NASAL
Qty: 1 BOTTLE | Refills: 1 | Status: SHIPPED | OUTPATIENT
Start: 2018-08-22 | End: 2018-10-22 | Stop reason: SDUPTHER

## 2018-08-22 RX ORDER — LORATADINE 10 MG/1
TABLET ORAL
Qty: 30 TAB | Refills: 1 | Status: SHIPPED | OUTPATIENT
Start: 2018-08-22 | End: 2019-01-23 | Stop reason: SDUPTHER

## 2018-08-22 NOTE — PROGRESS NOTES
Patient identified with 2 ID's, Name and Anne Cobb is a 80 y.o. female  Chief Complaint   Patient presents with    Hypertension     follow up appointment       1. Have you been to the ER, urgent care clinic since your last visit? Hospitalized since your last visit? No     2. Have you seen or consulted any other health care providers outside of the [address] [place] [address] since your last visit? Include any pap smears or colon screening. No        In the event something were to happen to you and you were unable to speak on your behalf, do you have an Advance Directive/ Living Will in place stating your wishes? No      If no, would you like information? Yes printed info provided       Visit Vitals    /69 (BP 1 Location: Left arm, BP Patient Position: Sitting)    Pulse 78    Temp 97.9 °F (36.6 °C) (Oral)    Resp 18    Ht 5' 4\" (1.626 m)    Wt 191 lb (86.6 kg)    SpO2 98%    BMI 32.79 kg/m2         Medication Reconciliation reviewed with patient on this date      Fall Risk Assessment, last 12 mths 6/7/2018   Able to walk? Yes   Fall in past 12 months? No   Fall with injury? -   Number of falls in past 12 months -   Fall Risk Score -       PHQ over the last two weeks 6/7/2018   PHQ Not Done -   Little interest or pleasure in doing things Not at all   Feeling down, depressed, irritable, or hopeless Not at all   Total Score PHQ 2 0       Learning Assessment 6/18/2014   PRIMARY LEARNER Patient   PRIMARY LANGUAGE ENGLISH   LEARNER PREFERENCE PRIMARY READING   ANSWERED BY patient   RELATIONSHIP SELF       Abuse Screening Questionnaire 6/7/2018   Do you ever feel afraid of your partner? N   Are you in a relationship with someone who physically or mentally threatens you? N   Is it safe for you to go home?  Estela Yoon

## 2018-08-22 NOTE — MR AVS SNAPSHOT
[address] [facility] [address] [place] [address] 
[phone] Patient: Tahir Rodriguez MRN: [id number] Visit Information Date & Time Provider Department Dept. Phone Encounter #  
 2018  3:15 PM Lacey Hinojosa, [address] [place] [address] Internal Medicine [phone] [id number] Upcoming Health Maintenance Date Due DTaP/Tdap/Td series (1 - Tdap) 1945 ZOSTER VACCINE AGE 60> 1984 GLAUCOMA SCREENING Q2Y 1989 Influenza Age 5 to Adult 2018 MEDICARE YEARLY EXAM 2019 Allergies as of 2018  Review Complete On: 2018 By: Lacey Hinojosa MD  
 No Known Allergies Current Immunizations  Reviewed on 2018 Name Date Influenza Vaccine 3/30/2017, 10/10/2014 Pneumococcal Conjugate (PCV-13) 3/10/2016 Pneumococcal Polysaccharide (PPSV-23) 3/10/2013 Not reviewed this visit You Were Diagnosed With   
  
 Codes Comments Chronic atrial fibrillation (HCC)    -  Primary ICD-10-CM: A87.3 ICD-9-CM: 427.31 Essential hypertension     ICD-10-CM: I10 
ICD-9-CM: 401.9 Hypothyroidism, unspecified type     ICD-10-CM: E03.9 ICD-9-CM: 244.9 Transient cerebral ischemia, unspecified type     ICD-10-CM: G45.9 ICD-9-CM: 435.9 Allergic rhinitis due to pollen, unspecified seasonality     ICD-10-CM: J30.1 ICD-9-CM: 477.0 Vitals BP Pulse Temp Resp Height(growth percentile) Weight(growth percentile) 109/69 (BP 1 Location: Left arm, BP Patient Position: Sitting) 78 97.9 °F (36.6 °C) (Oral) 18 5' 4\" (1.626 m) 191 lb (86.6 kg) SpO2 BMI OB Status Smoking Status 98% 32.79 kg/m2 Menopause Never Smoker Vitals History BMI and BSA Data Body Mass Index Body Surface Area 32.79 kg/m 2 1.98 m 2 Preferred Pharmacy Pharmacy Name Phone [id number], [address] [address] [place] [phone] Your Updated Medication List  
  
   
This list is accurate as of 8/22/18  4:12 PM.  Always use your most recent med list.  
  
  
  
  
 albuterol 90 mcg/actuation inhaler Commonly known as:  PROAIR HFA Take 2 Puffs by inhalation every six (6) hours as needed for Wheezing. alendronate 35 mg tablet Commonly known as:  FOSAMAX TAKE 1 TABLET BY MOUTH EVERY SEVEN (7) DAYS. amLODIPine 5 mg tablet Commonly known as:  Nasreen Royce Take 1 Tab by mouth daily. calcium-cholecalciferol (D3) tablet Commonly known as:  CALTRATE 600+D Take 1 Tab by mouth daily. cyanocobalamin 1,000 mcg tablet Take 1,000 mcg by mouth daily. Indications: 5000mcg  
  
 docusate sodium 100 mg capsule Commonly known as:  Helen Eisenmenger Take 100 mg by mouth two (2) times a day. fluticasone 50 mcg/actuation nasal spray Commonly known as:  FLONASE  
2 spray each nostril QD  
  
 guaiFENesin-codeine 100-10 mg/5 mL solution Commonly known as:  ROBITUSSIN AC Take 5 mL by mouth three (3) times daily as needed for Cough. Max Daily Amount: 15 mL.  
  
 hydroCHLOROthiazide 25 mg tablet Commonly known as:  HYDRODIURIL  
TAKE 1 TABLET BY MOUTH DAILY. D/C DYAZIDE  
  
 levothyroxine 25 mcg tablet Commonly known as:  SYNTHROID  
TAKE 1 TABLET BY MOUTH DAILY BEFORE BREAKFAST  
  
 loratadine 10 mg tablet Commonly known as:  CLARITIN  
TAKE 1 TABLET BY MOUTH ONCE DAILY  
  
 magnesium oxide 400 mg tablet Commonly known as:  MAG-OX  
TAKE 1 TAB BY MOUTH DAILY. metoprolol tartrate 50 mg tablet Commonly known as:  LOPRESSOR Take  by mouth two (2) times a day. multivitamin tablet Commonly known as:  ONE A DAY Take 1 Tab by mouth daily. PARoxetine 20 mg tablet Commonly known as:  PAXIL TAKE 1 TAB BY MOUTH DAILY. polyethylene glycol 17 gram/dose powder Commonly known as:  Sanya Yann Take 17 g by mouth daily. pravastatin 20 mg tablet Commonly known as:  PRAVACHOL  
 TAKE 1 TABLET BY MOUTH ONCE NIGHTLY  
  
 trospium 20 mg tablet Commonly known as:  Adamaris Gonzalez TAKE 1 TAB BY MOUTH TWO (2) TIMES A DAY. XARELTO 20 mg Tab tablet Generic drug:  rivaroxaban TAKE 1 TABLET BY MOUTH ONCE DAILY WITH BREAKFAST  
  
 zinc oxide-cod liver oil 40 % ointment Commonly known as:  Pepe Simmer Apply  to affected area as needed for Skin Irritation. Prescriptions Sent to Pharmacy Refills  
 fluticasone (FLONASE) 50 mcg/actuation nasal spray 1 Si spray each nostril QD Class: Normal  
 Pharmacy: [id number], [address] [place] [address] Ph #: [phone]  
 loratadine (CLARITIN) 10 mg tablet 1 Sig: TAKE 1 TABLET BY MOUTH ONCE DAILY Class: Normal  
 Pharmacy: [address] [place] [address] , [address] [place] [address] Ph #: [phone] Please provide this summary of care documentation to your next provider. Your primary care clinician is listed as Neil Marc. If you have any questions after today's visit, please call [phone].

## 2018-08-22 NOTE — PROGRESS NOTES
HISTORY OF PRESENT ILLNESS  Jayjay Grimm is a 80 y.o. female here for follow up. She is accompanied by her granddaughter. She has chronic cough, was seen by Dr. Alisha Rich, prednisone helped her cough. Right now she is not coughing. Has allergy, has a lot of postnasal drip. Kidney function test has improved. I have restarted back her on hydrochlorothiazide. Blood pressure seems okay. Has A fib,  on metoprolol and Xarelto, no palpitations. She is active, able to ambulate by herself. Hypertension    Pertinent negatives include no chest pain, no blurred vision and no shortness of breath. Cough   Pertinent negatives include no chest pain and no shortness of breath. Palpitations    Associated symptoms include cough. Pertinent negatives include no fever, no chest pain and no shortness of breath. Her past medical history is significant for hypertension. Allergic Rhinitis   Pertinent negatives include no chest pain and no shortness of breath. Follow-up   Pertinent negatives include no chest pain and no shortness of breath. Review of Systems   Constitutional: Negative for chills and fever. HENT: Positive for congestion. Eyes: Negative. Negative for blurred vision and double vision. Respiratory: Positive for cough. Negative for shortness of breath and wheezing. Cardiovascular: Negative for chest pain. Gastrointestinal: Negative. Genitourinary: Negative. Musculoskeletal: Negative. Negative for falls. Skin: Negative. Neurological: Negative. Psychiatric/Behavioral: Negative. Physical Exam   Constitutional: She appears well-developed and well-nourished. No distress. HENT:   Head: Normocephalic and atraumatic. Right Ear: External ear normal.   Left Ear: External ear normal.   Mouth/Throat: Oropharynx is clear and moist. No oropharyngeal exudate. Nasal turbinates:red inflamed,NT    Cobble stoning present. Neck: Normal range of motion. Neck supple.  No JVD present. No thyromegaly present. Cardiovascular: Normal rate, regular rhythm, normal heart sounds and intact distal pulses. Pulmonary/Chest: Effort normal and breath sounds normal. No respiratory distress. She has no wheezes. Musculoskeletal: She exhibits no edema or tenderness. Psychiatric: She has a normal mood and affect. Her behavior is normal.       ASSESSMENT and PLAN  Diagnoses and all orders for this visit:    1. Chronic atrial fibrillation (HCC)    Rate and rhythm controlled. On metoprolol and Xarelto. 2. Essential hypertension  Stable. On hydrochlorothiazide and metoprolol. 3. Hypothyroidism, unspecified type  On Synthroid. TSH is normal.  4. Transient cerebral ischemia, unspecified type  Stable. On Xarelto. 5. Allergic rhinitis due to pollen, unspecified seasonality       Cough is improved since patient received prednisone. Will call in,      -     fluticasone (FLONASE) 50 mcg/actuation nasal spray; 2 spray each nostril QD  -     loratadine (CLARITIN) 10 mg tablet; TAKE 1 TABLET BY MOUTH ONCE DAILY          Discussed expected course/resolution/complications of diagnosis in detail with patient. Medication risks/benefits/costs/interactions/alternatives discussed with patient. Pt was given an after visit summary which includes diagnoses, current medications & vitals. Pt expressed understanding with the diagnosis and plan.

## 2018-10-08 RX ORDER — LEVOTHYROXINE SODIUM 25 UG/1
TABLET ORAL
Qty: 90 TAB | Refills: 2 | Status: SHIPPED | OUTPATIENT
Start: 2018-10-08 | End: 2018-11-09 | Stop reason: SDUPTHER

## 2018-10-22 DIAGNOSIS — J30.1 ALLERGIC RHINITIS DUE TO POLLEN, UNSPECIFIED SEASONALITY: ICD-10-CM

## 2018-10-22 RX ORDER — FLUTICASONE PROPIONATE 50 MCG
SPRAY, SUSPENSION (ML) NASAL
Qty: 1 BOTTLE | Refills: 1 | Status: SHIPPED | OUTPATIENT
Start: 2018-10-22 | End: 2018-10-23 | Stop reason: SDUPTHER

## 2018-10-23 DIAGNOSIS — J30.1 ALLERGIC RHINITIS DUE TO POLLEN, UNSPECIFIED SEASONALITY: ICD-10-CM

## 2018-10-24 RX ORDER — FLUTICASONE PROPIONATE 50 MCG
SPRAY, SUSPENSION (ML) NASAL
Qty: 1 BOTTLE | Refills: 5 | Status: SHIPPED | OUTPATIENT
Start: 2018-10-24 | End: 2019-01-23 | Stop reason: SDUPTHER

## 2018-11-03 DIAGNOSIS — I10 ESSENTIAL HYPERTENSION: ICD-10-CM

## 2018-11-05 RX ORDER — HYDROCHLOROTHIAZIDE 25 MG/1
TABLET ORAL
Qty: 30 TAB | Refills: 2 | Status: SHIPPED | OUTPATIENT
Start: 2018-11-05 | End: 2019-05-06

## 2018-11-09 RX ORDER — AMLODIPINE BESYLATE 5 MG/1
5 TABLET ORAL DAILY
Qty: 90 TAB | Refills: 1 | Status: SHIPPED | OUTPATIENT
Start: 2018-11-09 | End: 2019-03-19 | Stop reason: SDUPTHER

## 2018-11-09 RX ORDER — LEVOTHYROXINE SODIUM 25 UG/1
TABLET ORAL
Qty: 90 TAB | Refills: 2 | Status: SHIPPED | OUTPATIENT
Start: 2018-11-09 | End: 2019-03-19 | Stop reason: SDUPTHER

## 2019-01-03 ENCOUNTER — OFFICE VISIT (OUTPATIENT)
Dept: INTERNAL MEDICINE CLINIC | Age: 84
End: 2019-01-03

## 2019-01-03 VITALS
BODY MASS INDEX: 32.37 KG/M2 | WEIGHT: 189.6 LBS | HEIGHT: 64 IN | HEART RATE: 87 BPM | SYSTOLIC BLOOD PRESSURE: 130 MMHG | RESPIRATION RATE: 18 BRPM | TEMPERATURE: 99.2 F | OXYGEN SATURATION: 97 % | DIASTOLIC BLOOD PRESSURE: 60 MMHG

## 2019-01-03 DIAGNOSIS — E66.09 CLASS 1 OBESITY DUE TO EXCESS CALORIES WITHOUT SERIOUS COMORBIDITY WITH BODY MASS INDEX (BMI) OF 32.0 TO 32.9 IN ADULT: ICD-10-CM

## 2019-01-03 DIAGNOSIS — Z23 ENCOUNTER FOR IMMUNIZATION: ICD-10-CM

## 2019-01-03 DIAGNOSIS — R05.3 CHRONIC COUGH: Primary | ICD-10-CM

## 2019-01-03 DIAGNOSIS — R26.81 GAIT INSTABILITY: ICD-10-CM

## 2019-01-03 RX ORDER — PREDNISONE 5 MG/1
TABLET ORAL
Qty: 21 TAB | Refills: 0 | Status: SHIPPED | OUTPATIENT
Start: 2019-01-03 | End: 2019-01-23 | Stop reason: ALTCHOICE

## 2019-01-03 NOTE — PROGRESS NOTES
Health Maintenance Due   Topic Date Due    DTaP/Tdap/Td series (1 - Tdap) 08/29/1945    Shingrix Vaccine Age 50> (1 of 2) 08/29/1974    GLAUCOMA SCREENING Q2Y  08/29/1989    Influenza Age 9 to Adult  08/01/2018       Chief Complaint   Patient presents with    Cough     Yellow/ white sputum, worse at nite    Headache    Nasal Congestion     hear little something in chest at nite    Fatigue       1. Have you been to the ER, urgent care clinic since your last visit? Hospitalized since your last visit? No    2. Have you seen or consulted any other health care providers outside of the [address] [place] [address] since your last visit? Include any pap smears or colon screening. No    3) Do you have an Advance Directive on file? no    4) Are you interested in receiving information on Advance Directives? NO      Patient is accompanied by daughter I have received verbal consent from Glenora Closs to discuss any/all medical information while they are present in the room. Glenora Closs is a 80 y.o. female  who presents for routine immunizations. She denies any symptoms , reactions or allergies that would exclude them from being immunized today. Risks and adverse reactions were discussed and the VIS was given to them. All questions were addressed. She was observed for 10 min post injection. There were no reactions observed.     Saleem Yang LPN

## 2019-01-03 NOTE — PATIENT INSTRUCTIONS
Vaccine Information Statement    Influenza (Flu) Vaccine (Inactivated or Recombinant): What you need to know    Many Vaccine Information Statements are available in [place] and other languages. See www.immunize.org/vis  Hojas de Información Sobre Vacunas están disponibles en Español y en muchos otros idiomas. Visite www.immunize.org/vis    1. Why get vaccinated? Influenza (flu) is a contagious disease that spreads around the [place] every year, usually between October and May. Flu is caused by influenza viruses, and is spread mainly by coughing, sneezing, and close contact. Anyone can get flu. Flu strikes suddenly and can last several days. Symptoms vary by age, but can include:   fever/chills   sore throat   muscle aches   fatigue   cough   headache    runny or stuffy nose    Flu can also lead to pneumonia and blood infections, and cause diarrhea and seizures in children. If you have a medical condition, such as heart or lung disease, flu can make it worse. Flu is more dangerous for some people. Infants and young children, people 72years of age and older, pregnant women, and people with certain health conditions or a weakened immune system are at greatest risk. Each year thousands of people in the [facility] die from flu, and many more are hospitalized. Flu vaccine can:   keep you from getting flu,   make flu less severe if you do get it, and   keep you from spreading flu to your family and other people. 2. Inactivated and recombinant flu vaccines    A dose of flu vaccine is recommended every flu season. Children 6 months through 6years of age may need two doses during the same flu season. Everyone else needs only one dose each flu season.        Some inactivated flu vaccines contain a very small amount of a mercury-based preservative called thimerosal. Studies have not shown thimerosal in vaccines to be harmful, but flu vaccines that do not contain thimerosal are available. There is no live flu virus in flu shots. They cannot cause the flu. There are many flu viruses, and they are always changing. Each year a new flu vaccine is made to protect against three or four viruses that are likely to cause disease in the upcoming flu season. But even when the vaccine doesnt exactly match these viruses, it may still provide some protection    Flu vaccine cannot prevent:   flu that is caused by a virus not covered by the vaccine, or   illnesses that look like flu but are not. It takes about 2 weeks for protection to develop after vaccination, and protection lasts through the flu season. 3. Some people should not get this vaccine    Tell the person who is giving you the vaccine:     If you have any severe, life-threatening allergies. If you ever had a life-threatening allergic reaction after a dose of flu vaccine, or have a severe allergy to any part of this vaccine, you may be advised not to get vaccinated. Most, but not all, types of flu vaccine contain a small amount of egg protein.  If you ever had Guillain-Barré Syndrome (also called GBS). Some people with a history of GBS should not get this vaccine. This should be discussed with your doctor.  If you are not feeling well. It is usually okay to get flu vaccine when you have a mild illness, but you might be asked to come back when you feel better. 4. Risks of a vaccine reaction    With any medicine, including vaccines, there is a chance of reactions. These are usually mild and go away on their own, but serious reactions are also possible. Most people who get a flu shot do not have any problems with it.      Minor problems following a flu shot include:    soreness, redness, or swelling where the shot was given     hoarseness   sore, red or itchy eyes   cough   fever   aches   headache   itching   fatigue  If these problems occur, they usually begin soon after the shot and last 1 or 2 days. More serious problems following a flu shot can include the following:     There may be a small increased risk of Guillain-Barré Syndrome (GBS) after inactivated flu vaccine. This risk has been estimated at 1 or 2 additional cases per million people vaccinated. This is much lower than the risk of severe complications from flu, which can be prevented by flu vaccine.  Young children who get the flu shot along with pneumococcal vaccine (PCV13) and/or DTaP vaccine at the same time might be slightly more likely to have a seizure caused by fever. Ask your doctor for more information. Tell your doctor if a child who is getting flu vaccine has ever had a seizure. Problems that could happen after any injected vaccine:      People sometimes faint after a medical procedure, including vaccination. Sitting or lying down for about 15 minutes can help prevent fainting, and injuries caused by a fall. Tell your doctor if you feel dizzy, or have vision changes or ringing in the ears.  Some people get severe pain in the shoulder and have difficulty moving the arm where a shot was given. This happens very rarely.  Any medication can cause a severe allergic reaction. Such reactions from a vaccine are very rare, estimated at about 1 in a million doses, and would happen within a few minutes to a few hours after the vaccination. As with any medicine, there is a very remote chance of a vaccine causing a serious injury or death. The safety of vaccines is always being monitored. For more information, visit: www.cdc.gov/vaccinesafety/    5. What if there is a serious reaction? What should I look for?  Look for anything that concerns you, such as signs of a severe allergic reaction, very high fever, or unusual behavior.     Signs of a severe allergic reaction can include hives, swelling of the face and throat, difficulty breathing, a fast heartbeat, dizziness, and weakness - usually within a few minutes to a few hours after the vaccination. What should I do?  If you think it is a severe allergic reaction or other emergency that cant wait, call 9-1-1 and get the person to the nearest hospital. Otherwise, call your doctor.  Reactions should be reported to the Vaccine Adverse Event Reporting System (VAERS). Your doctor should file this report, or you can do it yourself through  the VAERS web site at www.vaers. [facility].gov, or by calling 5-954.673.8705. VAERS does not give medical advice. 6. The National Vaccine Injury Compensation Program    The [facility] Vaccine Injury Compensation Program (VICP) is a federal program that was created to compensate people who may have been injured by certain vaccines. Persons who believe they may have been injured by a vaccine can learn about the program and about filing a claim by calling 5-908.513.6065 or visiting the [place] [employer] website at www.[facility].gov/vaccinecompensation. There is a time limit to file a claim for compensation. 7. How can I learn more?  Ask your healthcare provider. He or she can give you the vaccine package insert or suggest other sources of information.  Call your local or state health department.  Contact the Centers for Disease Control and Prevention (CDC):  - Call 8-324.891.1344 (1-800-CDC-INFO) or  - Visit CDCs website at www.cdc.gov/flu    Vaccine Information Statement   Inactivated Influenza Vaccine   8/7/2015  42 GABBY [place] [id number]    Department of Health and Human Services  Centers for Disease Control and Prevention    Office Use Only

## 2019-01-04 NOTE — PROGRESS NOTES
Subjective:     Chief Complaint   Patient presents with    Cough     Yellow/ white sputum, worse at nite    Headache    Nasal Congestion     hear little something in chest at nite    Fatigue       History of Present Illness    Bambi Rivera is a 80y.o. year old female who is a patient of Dr. Juan Diego Mosher that presents today with her granddaughter for complaints of cough. She reports a hx of the same. During her last visit she was given a cough suppressant and prednisone with relief. Her granddaughter states the cough suppressant did make the patient sleepy. She is asking for something without codeine. She has a hx of unsteady gait and is a fall risk. Her granddaughter states that she is fairly sedentary. Family is concerned about falls. She is in wheelchair today. She states she will ambulate to the bathroom and then go back to her room and sit. No other new complaints at this time. NAD. No CP, SOB, GI, or  symptoms. Reviewed medications, recent lab work and imaging with patient. Pt reports compliance with medications. Current Outpatient Medications on File Prior to Visit   Medication Sig Dispense Refill    amLODIPine (NORVASC) 5 mg tablet Take 1 Tab by mouth daily. 90 Tab 1    rivaroxaban (XARELTO) 20 mg tab tablet Take 1 Tab by mouth daily (with breakfast). 90 Tab 1    levothyroxine (SYNTHROID) 25 mcg tablet TAKE 1 TABLET BY MOUTH DAILY BEFORE BREAKFAST 90 Tab 2    hydroCHLOROthiazide (HYDRODIURIL) 25 mg tablet TAKE 1 TABLET BY MOUTH DAILY. D/C DYAZIDE 30 Tab 2    fluticasone (FLONASE) 50 mcg/actuation nasal spray 2 spray each nostril QD 1 Bottle 5    loratadine (CLARITIN) 10 mg tablet TAKE 1 TABLET BY MOUTH ONCE DAILY 30 Tab 1    albuterol (PROAIR HFA) 90 mcg/actuation inhaler Take 2 Puffs by inhalation every six (6) hours as needed for Wheezing. 1 Inhaler 2    trospium (SANCTURA) 20 mg tablet TAKE 1 TAB BY MOUTH TWO (2) TIMES A DAY.  180 Tab 2    pravastatin (PRAVACHOL) 20 mg tablet TAKE 1 TABLET BY MOUTH ONCE NIGHTLY 90 Tab 2    magnesium oxide (MAG-OX) 400 mg tablet TAKE 1 TAB BY MOUTH DAILY. 90 Tab 2    metoprolol tartrate (LOPRESSOR) 50 mg tablet Take  by mouth two (2) times a day.  PARoxetine (PAXIL) 20 mg tablet TAKE 1 TAB BY MOUTH DAILY. 90 Tab 3    polyethylene glycol (MIRALAX) 17 gram/dose powder Take 17 g by mouth daily. 510 g 4    alendronate (FOSAMAX) 35 mg tablet TAKE 1 TABLET BY MOUTH EVERY SEVEN (7) DAYS. 12 Tab 3    multivitamin (ONE A DAY) tablet Take 1 Tab by mouth daily.  zinc oxide-cod liver oil (DESITIN) 40 % ointment Apply  to affected area as needed for Skin Irritation.  docusate sodium (COLACE) 100 mg capsule Take 100 mg by mouth two (2) times a day.  cyanocobalamin 1,000 mcg tablet Take 1,000 mcg by mouth daily. Indications: 5000mcg      calcium-cholecalciferol, D3, (CALTRATE 600+D) tablet Take 1 Tab by mouth daily. No current facility-administered medications on file prior to visit. No Known Allergies   Past Medical History:   Diagnosis Date    Atrial fibrillation ([facility] 2007    Hypercholesterolemia     Hypertension     Osteoarthritis 2010    Thromboembolus ([facility]     Thyroid disease       Past Surgical History:   Procedure Laterality Date    HX HEENT      cataract sx    HX HIP FRACTURE TX  2013    right hip 1989 left hip 2013    HX HIP REPLACEMENT  1989    HX PACEMAKER  2008      Social History     Tobacco Use    Smoking status: Never Smoker    Smokeless tobacco: Never Used   Substance Use Topics    Alcohol use: No     Alcohol/week: 0.0 oz    Drug use: No      Family History   Problem Relation Age of Onset    Hypertension Mother         Objective:     Vitals:    01/03/19 1032   BP: 130/60   Pulse: 87   Resp: 18   Temp: 99.2 °F (37.3 °C)   TempSrc: Oral   SpO2: 97%   Weight: 189 lb 9.6 oz (86 kg)   Height: 5' 4\" (1.626 m)       Review of Systems   Constitutional: Negative for chills and fever.    HENT: Positive for congestion. Eyes: Negative. Negative for blurred vision and double vision. Respiratory: Positive for cough. Negative for shortness of breath and wheezing. Cardiovascular: Negative for chest pain. Gastrointestinal: Negative. Genitourinary: Negative. Musculoskeletal: Negative. Negative for falls. Skin: Negative. Neurological: Negative. Psychiatric/Behavioral: Negative. Physical Exam   Constitutional: She is oriented to person, place, and time. She appears well-developed and well-nourished. No distress. Pleasant elderly lady in wheelchair   HENT:   Head: Normocephalic and atraumatic. Nose: Nose normal.   Mouth/Throat: Oropharynx is clear and moist. No oropharyngeal exudate. PND without erythema   Eyes: Conjunctivae are normal.   Neck: Normal range of motion. Neck supple. No JVD present. No thyromegaly present. Cardiovascular: Normal rate, regular rhythm, normal heart sounds and intact distal pulses. No murmur heard. Pulmonary/Chest: Effort normal and breath sounds normal. No respiratory distress. She has no wheezes. She has no rales. Abdominal: Soft. Bowel sounds are normal. She exhibits no distension. Musculoskeletal: She exhibits no edema or tenderness. Neurological: She is alert and oriented to person, place, and time. Coordination abnormal.   DJD   Skin: Skin is warm and dry. No rash noted. Psychiatric: She has a normal mood and affect. Her behavior is normal.   Nursing note and vitals reviewed. Assessment/ Plan:   Diagnoses and all orders for this visit:    1. Chronic cough   Will order   -     PSEUDOEPHEDRINE-dextromethorphan-guaiFENesin (ROBITUSSIN-CE) 30- mg/5 mL solution; Take 5 mL by mouth every four (4) hours as needed for Cough for up to 7 days. -     predniSONE (STERAPRED) 5 mg dose pack; See administration instruction per 5mg dose pack    2.  Encounter for immunization   Will order   -     INFLUENZA VACCINE INACTIVATED (IIV), SUBUNIT, ADJUVANTED, IM  -     NM IMMUNIZ ADMIN,1 SINGLE/COMB VAC/TOXOID    3. Class 1 obesity due to excess calories without serious comorbidity with body mass index (BMI) of 32.0 to 32.9 in adult   Addressed weight, diet and exercise with patient. Decrease carbohydrates (white foods, sweet foods, sweet drinks and alcohol), increase green leafy vegetables and protein (lean meats and beans) with each meal. Avoid fried foods. Eat 3-5 small meals daily. Do not skip meals. Increase water intake. Increase physical activity to 30 minutes daily for health benefit or 60 minutes daily to prevent weight regain, as tolerated. Get 7-8 hours uninterrupted sleep nightly    4. Gait instability    Placed referral for [facility] therapy in home PT/OT    Patient's plan of care has been reviewed with them. Patient and/or family have verbally conveyed their understanding and agreement of the patient's signs, symptoms, diagnosis, treatment and prognosis and additionally agree to follow up as recommended or return to [facility] should their condition change prior to follow-up. Discharge instructions have also been provided to the patient with some educational information regarding their diagnosis as well a list of reasons why they would want to return to the office prior to their follow-up appointment should their condition change. Follow-up with Dr. Juan Diego Mosher in 3 months.

## 2019-01-14 RX ORDER — ALENDRONATE SODIUM 35 MG/1
TABLET ORAL
Qty: 12 TAB | Refills: 3 | Status: SHIPPED | OUTPATIENT
Start: 2019-01-14 | End: 2019-03-19 | Stop reason: SDUPTHER

## 2019-01-23 ENCOUNTER — OFFICE VISIT (OUTPATIENT)
Dept: INTERNAL MEDICINE CLINIC | Age: 84
End: 2019-01-23

## 2019-01-23 ENCOUNTER — HOSPITAL ENCOUNTER (OUTPATIENT)
Dept: LAB | Age: 84
Discharge: HOME | End: 2019-01-23
Payer: MEDICARE

## 2019-01-23 VITALS
RESPIRATION RATE: 14 BRPM | WEIGHT: 187.7 LBS | HEIGHT: 64 IN | SYSTOLIC BLOOD PRESSURE: 127 MMHG | HEART RATE: 76 BPM | TEMPERATURE: 96.7 F | BODY MASS INDEX: 32.04 KG/M2 | OXYGEN SATURATION: 97 % | DIASTOLIC BLOOD PRESSURE: 72 MMHG

## 2019-01-23 DIAGNOSIS — J30.1 ALLERGIC RHINITIS DUE TO POLLEN, UNSPECIFIED SEASONALITY: ICD-10-CM

## 2019-01-23 DIAGNOSIS — E55.9 VITAMIN D DEFICIENCY: ICD-10-CM

## 2019-01-23 DIAGNOSIS — G45.9 TIA (TRANSIENT ISCHEMIC ATTACK): ICD-10-CM

## 2019-01-23 DIAGNOSIS — R41.0 CONFUSION: Primary | ICD-10-CM

## 2019-01-23 DIAGNOSIS — E78.2 MIXED HYPERLIPIDEMIA: ICD-10-CM

## 2019-01-23 DIAGNOSIS — I10 ESSENTIAL HYPERTENSION: ICD-10-CM

## 2019-01-23 DIAGNOSIS — Z86.711 PERSONAL HISTORY OF PULMONARY EMBOLISM: ICD-10-CM

## 2019-01-23 DIAGNOSIS — R41.3 MEMORY CHANGE: ICD-10-CM

## 2019-01-23 DIAGNOSIS — I48.20 CHRONIC ATRIAL FIBRILLATION (HCC): ICD-10-CM

## 2019-01-23 DIAGNOSIS — E03.9 HYPOTHYROIDISM, UNSPECIFIED TYPE: ICD-10-CM

## 2019-01-23 LAB
BILIRUB UR QL STRIP: NEGATIVE
GLUCOSE UR-MCNC: NEGATIVE MG/DL
KETONES P FAST UR STRIP-MCNC: NEGATIVE MG/DL
PH UR STRIP: 5.5 [PH] (ref 4.6–8)
PROT UR QL STRIP: NORMAL
SP GR UR STRIP: 1.02 (ref 1–1.03)
UA UROBILINOGEN AMB POC: NORMAL (ref 0.2–1)
URINALYSIS CLARITY POC: CLEAR
URINALYSIS COLOR POC: YELLOW
URINE BLOOD POC: NORMAL
URINE LEUKOCYTES POC: NEGATIVE
URINE NITRITES POC: NEGATIVE

## 2019-01-23 PROCEDURE — 85025 COMPLETE CBC W/AUTO DIFF WBC: CPT

## 2019-01-23 PROCEDURE — 80053 COMPREHEN METABOLIC PANEL: CPT

## 2019-01-23 PROCEDURE — 82607 VITAMIN B-12: CPT

## 2019-01-23 PROCEDURE — 82306 VITAMIN D 25 HYDROXY: CPT

## 2019-01-23 PROCEDURE — 83721 ASSAY OF BLOOD LIPOPROTEIN: CPT

## 2019-01-23 PROCEDURE — 84443 ASSAY THYROID STIM HORMONE: CPT

## 2019-01-23 PROCEDURE — 36415 COLL VENOUS BLD VENIPUNCTURE: CPT

## 2019-01-23 RX ORDER — LORATADINE 10 MG/1
TABLET ORAL
Qty: 30 TAB | Refills: 1 | Status: SHIPPED | OUTPATIENT
Start: 2019-01-23 | End: 2019-03-19 | Stop reason: SDUPTHER

## 2019-01-23 RX ORDER — METOPROLOL TARTRATE 50 MG/1
50 TABLET ORAL 2 TIMES DAILY
Qty: 180 TAB | Refills: 1 | Status: SHIPPED | OUTPATIENT
Start: 2019-01-23 | End: 2019-03-19 | Stop reason: SDUPTHER

## 2019-01-23 RX ORDER — FLUTICASONE PROPIONATE 50 MCG
SPRAY, SUSPENSION (ML) NASAL
Qty: 1 BOTTLE | Refills: 5 | Status: SHIPPED | OUTPATIENT
Start: 2019-01-23 | End: 2019-03-19 | Stop reason: SDUPTHER

## 2019-01-23 NOTE — PROGRESS NOTES
HISTORY OF PRESENT ILLNESS  Naomy Rice is a 80 y.o. female here for follow up. She is accompanied by her granddaughter. She has been ill lately confused. Hallucinating a lot. Her daughter thought that she might have UTI. Noticed to have more memory loss lately. More short-term memory loss. She gets confused easily. Report cough with postnasal drip. Cough is getting worse at night. Not able to tolerate prednisone, make her hallucinate more. Prednisone is discontinued. Has A fib,  on metoprolol and Xarelto, no palpitations. She is active, able to ambulate by herself. Has low thyroid, Synthroid. Need lab work. Bone density done, showed osteopenia, she is taking calcium with vitamin D. Hypertension    Pertinent negatives include no chest pain, no blurred vision and no shortness of breath. Cough   Pertinent negatives include no chest pain and no shortness of breath. Allergic Rhinitis   Pertinent negatives include no chest pain and no shortness of breath. Follow-up   Pertinent negatives include no chest pain and no shortness of breath. Altered mental status    Her past medical history is significant for hypertension. Memory Loss    Her past medical history is significant for hypertension. Review of Systems   Constitutional: Negative for chills and fever. HENT: Positive for congestion. Eyes: Negative. Negative for blurred vision and double vision. Respiratory: Positive for cough. Negative for shortness of breath and wheezing. Cardiovascular: Negative. Negative for chest pain. Gastrointestinal: Negative. Genitourinary: Negative. Musculoskeletal: Negative. Negative for falls. Skin: Negative. Neurological: Negative. Psychiatric/Behavioral: Positive for memory loss. Physical Exam   Constitutional: She appears well-developed and well-nourished. No distress. HENT:   Head: Normocephalic and atraumatic.    Right Ear: External ear normal.   Left Ear: External ear normal.   Mouth/Throat: Oropharynx is clear and moist. No oropharyngeal exudate. Nasal turbinates:red inflamed,NT    Cobble stoning present. Neck: Normal range of motion. Neck supple. No JVD present. No thyromegaly present. Cardiovascular: Normal rate, regular rhythm, normal heart sounds and intact distal pulses. Pulmonary/Chest: Effort normal and breath sounds normal. No respiratory distress. She has no wheezes. Musculoskeletal: She exhibits no edema or tenderness. Psychiatric: She has a normal mood and affect. Her behavior is normal.   Memory loss         ASSESSMENT and PLAN  Diagnoses and all orders for this visit:    1. Chronic atrial fibrillation (HCC)    Rate and rhythm controlled. On metoprolol and Xarelto. 2. Essential hypertension  Stable. On hydrochlorothiazide and metoprolol. 3. Hypothyroidism, unspecified type  On Synthroid. TSH is normal.  4. Transient cerebral ischemia, unspecified type  Stable. On Xarelto. 5. Allergic rhinitis due to pollen, unspecified seasonality       Cough is improved since patient received prednisone. Will call in,      -     fluticasone (FLONASE) 50 mcg/actuation nasal spray; 2 spray each nostril QD  -     loratadine (CLARITIN) 10 mg tablet; TAKE 1 TABLET BY MOUTH ONCE DAILY          Discussed expected course/resolution/complications of diagnosis in detail with patient. Medication risks/benefits/costs/interactions/alternatives discussed with patient. Pt was given an after visit summary which includes diagnoses, current medications & vitals. Pt expressed understanding with the diagnosis and plan.

## 2019-01-23 NOTE — PROGRESS NOTES
Glenora Closs is a 80 y.o. female     Chief Complaint   Patient presents with    Altered mental status     c/o feeling weak and shortness of breath. Visit Vitals  /72 (BP 1 Location: Left arm, BP Patient Position: Sitting)   Pulse 76   Temp 96.7 °F (35.9 °C) (Oral)   Resp 14   Ht 5' 4\" (1.626 m)   Wt 187 lb 11.2 oz (85.1 kg)   SpO2 97%   BMI 32.22 kg/m²       Health Maintenance Due   Topic Date Due    DTaP/Tdap/Td series (1 - Tdap) 08/29/1945    Shingrix Vaccine Age 50> (1 of 2) 08/29/1974    GLAUCOMA SCREENING Q2Y  08/29/1989       1. Have you been to the ER, urgent care clinic since your last visit? Hospitalized since your last visit? No    2. Have you seen or consulted any other health care providers outside of the [address] [place] [address] since your last visit? Include any pap smears or colon screening.  No

## 2019-01-24 LAB
25(OH)D3+25(OH)D2 SERPL-MCNC: 51.2 NG/ML (ref 30–100)
ALBUMIN SERPL-MCNC: 3.4 G/DL (ref 3.2–4.6)
ALBUMIN/GLOB SERPL: 1.1 {RATIO} (ref 1.2–2.2)
ALP SERPL-CCNC: 68 IU/L (ref 39–117)
ALT SERPL-CCNC: 8 IU/L (ref 0–32)
AST SERPL-CCNC: 17 IU/L (ref 0–40)
BASOPHILS # BLD AUTO: 0 X10E3/UL (ref 0–0.2)
BASOPHILS NFR BLD AUTO: 0 %
BILIRUB SERPL-MCNC: 0.4 MG/DL (ref 0–1.2)
BUN SERPL-MCNC: 18 MG/DL (ref 10–36)
BUN/CREAT SERPL: 16 (ref 12–28)
CALCIUM SERPL-MCNC: 8.7 MG/DL (ref 8.7–10.3)
CHLORIDE SERPL-SCNC: 108 MMOL/L (ref 96–106)
CO2 SERPL-SCNC: 24 MMOL/L (ref 20–29)
CREAT SERPL-MCNC: 1.1 MG/DL (ref 0.57–1)
EOSINOPHIL # BLD AUTO: 0.1 X10E3/UL (ref 0–0.4)
EOSINOPHIL NFR BLD AUTO: 1 %
ERYTHROCYTE [DISTWIDTH] IN BLOOD BY AUTOMATED COUNT: 17.5 % (ref 12.3–15.4)
GLOBULIN SER CALC-MCNC: 3.2 G/DL (ref 1.5–4.5)
GLUCOSE SERPL-MCNC: 100 MG/DL (ref 65–99)
HCT VFR BLD AUTO: 29.8 % (ref 34–46.6)
HGB BLD-MCNC: 9.1 G/DL (ref 11.1–15.9)
IMM GRANULOCYTES # BLD AUTO: 0 X10E3/UL (ref 0–0.1)
IMM GRANULOCYTES NFR BLD AUTO: 0 %
INTERPRETATION: NORMAL
LDLC SERPL DIRECT ASSAY-MCNC: 85 MG/DL (ref 0–99)
LYMPHOCYTES # BLD AUTO: 1.1 X10E3/UL (ref 0.7–3.1)
LYMPHOCYTES NFR BLD AUTO: 25 %
MCH RBC QN AUTO: 25.4 PG (ref 26.6–33)
MCHC RBC AUTO-ENTMCNC: 30.5 G/DL (ref 31.5–35.7)
MCV RBC AUTO: 83 FL (ref 79–97)
MONOCYTES # BLD AUTO: 0.3 X10E3/UL (ref 0.1–0.9)
MONOCYTES NFR BLD AUTO: 6 %
NEUTROPHILS # BLD AUTO: 3 X10E3/UL (ref 1.4–7)
NEUTROPHILS NFR BLD AUTO: 68 %
PLATELET # BLD AUTO: 297 X10E3/UL (ref 150–379)
POTASSIUM SERPL-SCNC: 4.3 MMOL/L (ref 3.5–5.2)
PROT SERPL-MCNC: 6.6 G/DL (ref 6–8.5)
RBC # BLD AUTO: 3.58 X10E6/UL (ref 3.77–5.28)
SODIUM SERPL-SCNC: 145 MMOL/L (ref 134–144)
TSH SERPL DL<=0.005 MIU/L-ACNC: 2.67 UIU/ML (ref 0.45–4.5)
VIT B12 SERPL-MCNC: 487 PG/ML (ref 232–1245)
WBC # BLD AUTO: 4.5 X10E3/UL (ref 3.4–10.8)

## 2019-01-29 NOTE — PROGRESS NOTES
Please add iron and TIBC and ferritin. Significant drop of hemoglobin. Be on iron rich diet. Advised her to find out if he is having any active bleeding. Lipid is normal.  Kidney function test has improved.

## 2019-02-22 NOTE — PROGRESS NOTES
Called the pt and reviewed her lab results. The pt denies any bleeding, dark/tarry, or bloody stools. She denies epistaxis or other abnormal bleeding. Discussed importance of iron rich diet and explained iron rich foods to the pt. The pt requested a copy of the labs be mailed to her.

## 2019-03-19 DIAGNOSIS — E83.42 HYPOMAGNESEMIA: ICD-10-CM

## 2019-03-19 DIAGNOSIS — F41.9 ANXIETY: ICD-10-CM

## 2019-03-19 DIAGNOSIS — I48.20 CHRONIC ATRIAL FIBRILLATION (HCC): ICD-10-CM

## 2019-03-19 DIAGNOSIS — J30.1 ALLERGIC RHINITIS DUE TO POLLEN, UNSPECIFIED SEASONALITY: ICD-10-CM

## 2019-03-19 RX ORDER — AMLODIPINE BESYLATE 5 MG/1
5 TABLET ORAL DAILY
Qty: 90 TAB | Refills: 1 | Status: SHIPPED | OUTPATIENT
Start: 2019-03-19 | End: 2019-05-10

## 2019-03-19 RX ORDER — FLUTICASONE PROPIONATE 50 MCG
SPRAY, SUSPENSION (ML) NASAL
Qty: 1 BOTTLE | Refills: 5 | Status: SHIPPED | OUTPATIENT
Start: 2019-03-19 | End: 2019-07-06

## 2019-03-19 RX ORDER — LEVOTHYROXINE SODIUM 25 UG/1
TABLET ORAL
Qty: 90 TAB | Refills: 2 | Status: SHIPPED | OUTPATIENT
Start: 2019-03-19 | End: 2019-11-19 | Stop reason: SDUPTHER

## 2019-03-19 RX ORDER — PAROXETINE HYDROCHLORIDE 20 MG/1
TABLET, FILM COATED ORAL
Qty: 90 TAB | Refills: 3 | Status: SHIPPED | OUTPATIENT
Start: 2019-03-19 | End: 2020-02-19

## 2019-03-19 RX ORDER — BISMUTH SUBSALICYLATE 262 MG
1 TABLET,CHEWABLE ORAL DAILY
Qty: 30 TAB | Refills: 12 | Status: SHIPPED | OUTPATIENT
Start: 2019-03-19 | End: 2020-02-25 | Stop reason: SDUPTHER

## 2019-03-19 RX ORDER — POLYETHYLENE GLYCOL 3350 17 G/17G
17 POWDER, FOR SOLUTION ORAL DAILY
Qty: 510 G | Refills: 4 | Status: SHIPPED | OUTPATIENT
Start: 2019-03-19 | End: 2019-07-06

## 2019-03-19 RX ORDER — MULTIVITAMIN
1 TABLET ORAL DAILY
Qty: 90 TAB | Refills: 3 | Status: SHIPPED | OUTPATIENT
Start: 2019-03-19 | End: 2020-02-19

## 2019-03-19 RX ORDER — LORATADINE 10 MG/1
TABLET ORAL
Qty: 30 TAB | Refills: 1 | Status: SHIPPED | OUTPATIENT
Start: 2019-03-19 | End: 2019-04-25 | Stop reason: SDUPTHER

## 2019-03-19 RX ORDER — LANOLIN ALCOHOL/MO/W.PET/CERES
CREAM (GRAM) TOPICAL
Qty: 90 TAB | Refills: 2 | Status: SHIPPED | OUTPATIENT
Start: 2019-03-19 | End: 2019-11-19 | Stop reason: SDUPTHER

## 2019-03-19 RX ORDER — ALENDRONATE SODIUM 35 MG/1
TABLET ORAL
Qty: 12 TAB | Refills: 3 | Status: SHIPPED | OUTPATIENT
Start: 2019-03-19 | End: 2019-05-06 | Stop reason: DRUGHIGH

## 2019-03-19 RX ORDER — PRAVASTATIN SODIUM 20 MG/1
TABLET ORAL
Qty: 90 TAB | Refills: 2 | Status: SHIPPED | OUTPATIENT
Start: 2019-03-19 | End: 2019-04-11 | Stop reason: SDUPTHER

## 2019-03-19 RX ORDER — METOPROLOL TARTRATE 50 MG/1
50 TABLET ORAL 2 TIMES DAILY
Qty: 180 TAB | Refills: 1 | Status: SHIPPED | OUTPATIENT
Start: 2019-03-19 | End: 2019-05-10

## 2019-03-19 RX ORDER — LANOLIN ALCOHOL/MO/W.PET/CERES
1000 CREAM (GRAM) TOPICAL DAILY
Qty: 30 TAB | Refills: 3 | Status: SHIPPED | OUTPATIENT
Start: 2019-03-19 | End: 2019-06-24 | Stop reason: SDUPTHER

## 2019-03-19 RX ORDER — TROSPIUM CHLORIDE 20 MG/1
TABLET, FILM COATED ORAL
Qty: 180 TAB | Refills: 2 | Status: SHIPPED | OUTPATIENT
Start: 2019-03-19 | End: 2019-05-13

## 2019-03-29 ENCOUNTER — TELEPHONE (OUTPATIENT)
Dept: INTERNAL MEDICINE CLINIC | Age: 84
End: 2019-03-29

## 2019-03-29 NOTE — TELEPHONE ENCOUNTER
----- Message from Jayson Dykes sent at 3/29/2019  1:23 PM EDT -----  Regarding: Dr. Heidi Burris: [phone]  Edd Marquez, the pt power of  is requesting a return call concerning the pt having some hallucinations. The medication Rx Melatonin is not working for the pt. She wakes up throughout the night. She does not want to leave the house or out of her room.

## 2019-04-01 NOTE — TELEPHONE ENCOUNTER
Called pt daughter, Luca Olea, after verifying she is on the permission to release form in the pts chart. Tess Walker stated her mother has been hallucinating to the point of being scared and not wanting to leave her home. She stated that her mother is \"see\" her   who is getting in the bed with her but won't speak to her, leave her bedroom so she is covering \"him\" up with blankets and pillows to not see \"him\". She then stated her mother has been taking Melatonin 5 mg for about 3-4 months and is not working at all. Tess Walker stated pt is not sleeping at all. Suggested that she schedule an appt for her mother to be seen given the rapid decline being described to me. Pts daughter agreed and pt is scheduled for Thursday, 2019 @ 1330 with Dr. Alejandro Mcmullen. No further questions at this time and encouraged to call with any further questions or concerns.

## 2019-04-11 ENCOUNTER — OFFICE VISIT (OUTPATIENT)
Dept: INTERNAL MEDICINE CLINIC | Age: 84
End: 2019-04-11

## 2019-04-11 VITALS
SYSTOLIC BLOOD PRESSURE: 106 MMHG | OXYGEN SATURATION: 99 % | TEMPERATURE: 97.5 F | HEART RATE: 78 BPM | BODY MASS INDEX: 30.01 KG/M2 | WEIGHT: 175.8 LBS | DIASTOLIC BLOOD PRESSURE: 53 MMHG | RESPIRATION RATE: 28 BRPM | HEIGHT: 64 IN

## 2019-04-11 DIAGNOSIS — G47.00 INSOMNIA, UNSPECIFIED TYPE: Primary | ICD-10-CM

## 2019-04-11 DIAGNOSIS — G45.9 TIA (TRANSIENT ISCHEMIC ATTACK): ICD-10-CM

## 2019-04-11 DIAGNOSIS — I10 HYPERTENSION, UNSPECIFIED TYPE: ICD-10-CM

## 2019-04-11 DIAGNOSIS — R41.3 MEMORY LOSS: ICD-10-CM

## 2019-04-11 DIAGNOSIS — E03.8 OTHER SPECIFIED HYPOTHYROIDISM: ICD-10-CM

## 2019-04-11 RX ORDER — QUETIAPINE FUMARATE 25 MG/1
25 TABLET, FILM COATED ORAL
Qty: 30 TAB | Refills: 2 | Status: SHIPPED | OUTPATIENT
Start: 2019-04-11 | End: 2019-05-26 | Stop reason: SDUPTHER

## 2019-04-11 RX ORDER — PRAVASTATIN SODIUM 20 MG/1
TABLET ORAL
Qty: 90 TAB | Refills: 2 | Status: SHIPPED | OUTPATIENT
Start: 2019-04-11 | End: 2019-05-10

## 2019-04-11 NOTE — PROGRESS NOTES
Identified pt with two pt identifiers(name and ). Reviewed record in preparation for visit and have obtained necessary documentation. All patient medications has been reviewed. Chief Complaint   Patient presents with    Hallucinations     pt states, not seeing anything anymore.  Sleep Problem     pt states, is very fatigue    Altered mental status     having a hard time going places    Breathing Problem     when walking haves trouble and cannot walk far, needs assistance.  Medication Evaluation     potassium       Health Maintenance Due   Topic    DTaP/Tdap/Td series (1 - Tdap)    Shingrix Vaccine Age 50> (1 of 2)    GLAUCOMA SCREENING Q2Y        Vitals:    19 1400   BP: 106/53   Pulse: 78   Resp: 28   Temp: 97.5 °F (36.4 °C)   TempSrc: Oral   SpO2: 99%   Weight: 175 lb 12.8 oz (79.7 kg)   Height: 5' 4\" (1.626 m)   PainSc:   0 - No pain       Coordination of Care Questionnaire:  :   1) Have you been to an emergency room, urgent care, or hospitalized since your last visit?   no       2. Have seen or consulted any other health care provider since your last visit? NO    3) Do you have an Advanced Directive/ Living Will in place? NO  If yes, do we have a copy on file NO  If no, would you like information NO    Patient is accompanied by daughter I have received verbal consent from Tristen King to discuss any/all medical information while they are present in the room.

## 2019-04-11 NOTE — PROGRESS NOTES
HISTORY OF PRESENT ILLNESS  Robyn Geiger is a 80 y.o. female here for follow up. She is accompanied by her granddaughter. Has memory loss, dementia is getting worse. Scheduled to see neuropsychology soon. She is staying with her grandson and daughter-in-law. Change of places makes her more agitated. Lately noticed to have more sundowning and agitation's at night. Not able to sleep well at night. She has been ill lately confused. Hallucinating a lot. Has A fib,  on metoprolol and Xarelto, no palpitations. She is active, able to ambulate by herself. Has hypothyroid, on Synthroid. TSH is normal.  Has hypertension, on medicine. She is not able to take care of her  medicine. Altered mental status    Associated symptoms include hallucinations. Her past medical history is significant for hypertension. Memory Loss    Associated symptoms include hallucinations. Her past medical history is significant for hypertension. Hypertension    Pertinent negatives include no chest pain, no blurred vision and no shortness of breath. Follow-up   Pertinent negatives include no chest pain and no shortness of breath. Hallucinations   Pertinent negatives include no chest pain and no shortness of breath. Sleep Problem   Pertinent negatives include no chest pain and no shortness of breath. Review of Systems   Constitutional: Positive for weight loss. Negative for chills and fever. HENT: Negative. Eyes: Negative. Negative for blurred vision and double vision. Respiratory: Negative. Negative for shortness of breath and wheezing. Cardiovascular: Negative. Negative for chest pain. Gastrointestinal: Negative. Genitourinary: Negative. Musculoskeletal: Negative. Negative for falls. Skin: Negative. Neurological: Negative. Psychiatric/Behavioral: Positive for hallucinations and memory loss. The patient is nervous/anxious.         Physical Exam   Constitutional: She appears well-developed and well-nourished. No distress. HENT:        Neck: Normal range of motion. Neck supple. No JVD present. No thyromegaly present. Cardiovascular: Normal rate, regular rhythm, normal heart sounds and intact distal pulses. Pulmonary/Chest: Effort normal and breath sounds normal. No respiratory distress. She has no wheezes. Musculoskeletal: She exhibits no edema or tenderness. Psychiatric: She has a normal mood and affect. Her behavior is normal.   Memory loss. Getting sundowning at night. Some agitation and hallucination present. All due to dementia. ASSESSMENT and PLAN  Diagnoses and all orders for this visit:    1. Insomnia, unspecified type    She is not sleeping well at night. Has some agitation and hallucination present which is incorporated with her memory loss. Probable sundowning contributing to it. Will start,  -     QUEtiapine (SEROQUEL) 25 mg tablet; Take 1 Tab by mouth nightly. The risks and benefits of treatment were discussed as well as the potential side effects. The patient verbalized understanding and agrees to the current treatment plan. The patient is instructed to call the office with any side effects  Follow-up if not better. 2. Memory loss  She is having mild to moderate dementia. Is scheduled to see neuropsychology soon. Not on any memory medications. She is living with her grandson and daughter-in-law. Med management is an issue, advised her granddaughter to put in pillbox separately a.m. and p.m. pills and have her daughter-in-law to dispense it. 3. Hypertension, unspecified type  Stable, on amlodipine. 4. Other specified hypothyroidism  On Synthroid, TSH is stable. 5. TIA (transient ischemic attack)  Taking Xarelto and statin. Doing well. Discussed expected course/resolution/complications of diagnosis in detail with patient. Medication risks/benefits/costs/interactions/alternatives discussed with patient.    Pt was given an after visit summary which includes diagnoses, current medications & vitals. Pt expressed understanding with the diagnosis and plan.

## 2019-04-12 ENCOUNTER — TELEPHONE (OUTPATIENT)
Dept: INTERNAL MEDICINE CLINIC | Age: 84
End: 2019-04-12

## 2019-04-12 NOTE — TELEPHONE ENCOUNTER
Returned Zohreh's (she is on the HIPAA release) and after verifying HIPAA discussed the pt's script. Forylan Rivera that the script went to [place] [facility] [id number]. She voiced full understanding and asked if a 30 days supply can be sent to [facility] as she will not get her new pack of pills from [place] [id number] until next month. Advised that this will be done for her. Krissy Bullock voiced thanks and understanding.

## 2019-04-12 NOTE — TELEPHONE ENCOUNTER
----- Message from Missy Castaneda sent at 4/12/2019  8:59 AM EDT -----  Regarding: Dr. Tylor Mendez, Pt's daughter calling requesting a call to follow up on a rx that was supposed to be sent to the [facility] pharmacy on [address]. Best contact [phone].

## 2019-04-25 DIAGNOSIS — J30.1 ALLERGIC RHINITIS DUE TO POLLEN, UNSPECIFIED SEASONALITY: ICD-10-CM

## 2019-04-25 RX ORDER — LORATADINE 10 MG/1
TABLET ORAL
Qty: 30 TAB | Refills: 0 | Status: SHIPPED | OUTPATIENT
Start: 2019-04-25 | End: 2019-05-26 | Stop reason: SDUPTHER

## 2019-05-06 ENCOUNTER — APPOINTMENT (OUTPATIENT)
Dept: NON INVASIVE DIAGNOSTICS | Age: 84
DRG: 064 | End: 2019-05-06
Attending: INTERNAL MEDICINE
Payer: MEDICARE

## 2019-05-06 ENCOUNTER — HOSPITAL ENCOUNTER (INPATIENT)
Age: 84
LOS: 4 days | Discharge: HOME HEALTH | DRG: 064 | End: 2019-05-10
Attending: EMERGENCY MEDICINE | Admitting: INTERNAL MEDICINE
Payer: MEDICARE

## 2019-05-06 ENCOUNTER — APPOINTMENT (OUTPATIENT)
Dept: CT IMAGING | Age: 84
DRG: 064 | End: 2019-05-06
Attending: EMERGENCY MEDICINE
Payer: MEDICARE

## 2019-05-06 ENCOUNTER — APPOINTMENT (OUTPATIENT)
Dept: GENERAL RADIOLOGY | Age: 84
DRG: 064 | End: 2019-05-06
Attending: EMERGENCY MEDICINE
Payer: MEDICARE

## 2019-05-06 DIAGNOSIS — I63.9 ACUTE CVA (CEREBROVASCULAR ACCIDENT) (HCC): ICD-10-CM

## 2019-05-06 DIAGNOSIS — D64.9 ANEMIA, UNSPECIFIED TYPE: ICD-10-CM

## 2019-05-06 DIAGNOSIS — I63.9 CEREBROVASCULAR ACCIDENT (CVA), UNSPECIFIED MECHANISM (HCC): Primary | ICD-10-CM

## 2019-05-06 DIAGNOSIS — S01.01XA LACERATION OF SCALP WITHOUT FOREIGN BODY, INITIAL ENCOUNTER: ICD-10-CM
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
## 2019-05-06 LAB
ALBUMIN SERPL-MCNC: 2.6 G/DL (ref 3.5–5)
ALBUMIN/GLOB SERPL: 0.7 {RATIO} (ref 1.1–2.2)
ALP SERPL-CCNC: 63 U/L (ref 45–117)
ALT SERPL-CCNC: 8 U/L (ref 12–78)
ANION GAP SERPL CALC-SCNC: 6 MMOL/L (ref 5–15)
APTT PPP: 26.3 SEC (ref 22.1–32)
AST SERPL-CCNC: 14 U/L (ref 15–37)
BASOPHILS # BLD: 0 K/UL (ref 0–0.1)
BASOPHILS NFR BLD: 1 % (ref 0–1)
BILIRUB SERPL-MCNC: 0.5 MG/DL (ref 0.2–1)
BUN SERPL-MCNC: 19 MG/DL (ref 6–20)
BUN/CREAT SERPL: 17 (ref 12–20)
CALCIUM SERPL-MCNC: 8.1 MG/DL (ref 8.5–10.1)
CHLORIDE SERPL-SCNC: 106 MMOL/L (ref 97–108)
CO2 SERPL-SCNC: 28 MMOL/L (ref 21–32)
COMMENT, HOLDF: NORMAL
CREAT SERPL-MCNC: 1.13 MG/DL (ref 0.55–1.02)
DIFFERENTIAL METHOD BLD: ABNORMAL
EOSINOPHIL # BLD: 0.1 K/UL (ref 0–0.4)
EOSINOPHIL NFR BLD: 3 % (ref 0–7)
ERYTHROCYTE [DISTWIDTH] IN BLOOD BY AUTOMATED COUNT: 16.3 % (ref 11.5–14.5)
GLOBULIN SER CALC-MCNC: 3.5 G/DL (ref 2–4)
GLUCOSE SERPL-MCNC: 101 MG/DL (ref 65–100)
HCT VFR BLD AUTO: 27 % (ref 35–47)
HGB BLD-MCNC: 7.8 G/DL (ref 11.5–16)
IMM GRANULOCYTES # BLD AUTO: 0 K/UL (ref 0–0.04)
IMM GRANULOCYTES NFR BLD AUTO: 0 % (ref 0–0.5)
INR PPP: 1.4 (ref 0.9–1.1)
LYMPHOCYTES # BLD: 1.1 K/UL (ref 0.8–3.5)
LYMPHOCYTES NFR BLD: 24 % (ref 12–49)
MCH RBC QN AUTO: 25 PG (ref 26–34)
MCHC RBC AUTO-ENTMCNC: 28.9 G/DL (ref 30–36.5)
MCV RBC AUTO: 86.5 FL (ref 80–99)
MONOCYTES # BLD: 0.4 K/UL (ref 0–1)
MONOCYTES NFR BLD: 10 % (ref 5–13)
NEUTS SEG # BLD: 2.8 K/UL (ref 1.8–8)
NEUTS SEG NFR BLD: 62 % (ref 32–75)
NRBC # BLD: 0 K/UL (ref 0–0.01)
NRBC BLD-RTO: 0 PER 100 WBC
PLATELET # BLD AUTO: 233 K/UL (ref 150–400)
PMV BLD AUTO: 11.3 FL (ref 8.9–12.9)
POTASSIUM SERPL-SCNC: 4 MMOL/L (ref 3.5–5.1)
PROT SERPL-MCNC: 6.1 G/DL (ref 6.4–8.2)
PROTHROMBIN TIME: 13.5 SEC (ref 9–11.1)
RBC # BLD AUTO: 3.12 M/UL (ref 3.8–5.2)
RBC MORPH BLD: ABNORMAL
SAMPLES BEING HELD,HOLD: NORMAL
SODIUM SERPL-SCNC: 140 MMOL/L (ref 136–145)
THERAPEUTIC RANGE,PTTT: NORMAL SECS (ref 58–77)
TROPONIN I SERPL-MCNC: <0.05 NG/ML
WBC # BLD AUTO: 4.4 K/UL (ref 3.6–11)

## 2019-05-06 PROCEDURE — 0042T CT CODE NEURO PERF W CBF: CPT

## 2019-05-06 PROCEDURE — 84484 ASSAY OF TROPONIN QUANT: CPT

## 2019-05-06 PROCEDURE — 93306 TTE W/DOPPLER COMPLETE: CPT

## 2019-05-06 PROCEDURE — 74011250637 HC RX REV CODE- 250/637: Performed by: NURSE PRACTITIONER

## 2019-05-06 PROCEDURE — 74011000258 HC RX REV CODE- 258: Performed by: RADIOLOGY

## 2019-05-06 PROCEDURE — 93005 ELECTROCARDIOGRAM TRACING: CPT

## 2019-05-06 PROCEDURE — 80053 COMPREHEN METABOLIC PANEL: CPT

## 2019-05-06 PROCEDURE — 74011250637 HC RX REV CODE- 250/637: Performed by: INTERNAL MEDICINE

## 2019-05-06 PROCEDURE — 70450 CT HEAD/BRAIN W/O DYE: CPT

## 2019-05-06 PROCEDURE — 82962 GLUCOSE BLOOD TEST: CPT

## 2019-05-06 PROCEDURE — 36415 COLL VENOUS BLD VENIPUNCTURE: CPT

## 2019-05-06 PROCEDURE — 71045 X-RAY EXAM CHEST 1 VIEW: CPT

## 2019-05-06 PROCEDURE — 74011250636 HC RX REV CODE- 250/636: Performed by: INTERNAL MEDICINE

## 2019-05-06 PROCEDURE — 97116 GAIT TRAINING THERAPY: CPT

## 2019-05-06 PROCEDURE — 85730 THROMBOPLASTIN TIME PARTIAL: CPT

## 2019-05-06 PROCEDURE — 92610 EVALUATE SWALLOWING FUNCTION: CPT | Performed by: SPEECH-LANGUAGE PATHOLOGIST

## 2019-05-06 PROCEDURE — 85025 COMPLETE CBC W/AUTO DIFF WBC: CPT

## 2019-05-06 PROCEDURE — 94761 N-INVAS EAR/PLS OXIMETRY MLT: CPT

## 2019-05-06 PROCEDURE — 85610 PROTHROMBIN TIME: CPT

## 2019-05-06 PROCEDURE — 99285 EMERGENCY DEPT VISIT HI MDM: CPT

## 2019-05-06 PROCEDURE — 65610000006 HC RM INTENSIVE CARE

## 2019-05-06 PROCEDURE — 74011250637 HC RX REV CODE- 250/637: Performed by: PSYCHIATRY & NEUROLOGY

## 2019-05-06 PROCEDURE — 97161 PT EVAL LOW COMPLEX 20 MIN: CPT

## 2019-05-06 PROCEDURE — 74011636320 HC RX REV CODE- 636/320: Performed by: RADIOLOGY

## 2019-05-06 PROCEDURE — 70496 CT ANGIOGRAPHY HEAD: CPT

## 2019-05-06 RX ORDER — QUETIAPINE FUMARATE 25 MG/1
25 TABLET, FILM COATED ORAL
Status: DISCONTINUED | OUTPATIENT
Start: 2019-05-06 | End: 2019-05-10 | Stop reason: HOSPADM

## 2019-05-06 RX ORDER — SODIUM CHLORIDE 0.9 % (FLUSH) 0.9 %
10 SYRINGE (ML) INJECTION
Status: COMPLETED | OUTPATIENT
Start: 2019-05-06 | End: 2019-05-06

## 2019-05-06 RX ORDER — ATORVASTATIN CALCIUM 20 MG/1
80 TABLET, FILM COATED ORAL
Status: DISCONTINUED | OUTPATIENT
Start: 2019-05-06 | End: 2019-05-10 | Stop reason: HOSPADM

## 2019-05-06 RX ORDER — SODIUM CHLORIDE 0.9 % (FLUSH) 0.9 %
5-40 SYRINGE (ML) INJECTION EVERY 8 HOURS
Status: DISCONTINUED | OUTPATIENT
Start: 2019-05-06 | End: 2019-05-10 | Stop reason: HOSPADM

## 2019-05-06 RX ORDER — SODIUM CHLORIDE 9 MG/ML
75 INJECTION, SOLUTION INTRAVENOUS CONTINUOUS
Status: DISPENSED | OUTPATIENT
Start: 2019-05-06 | End: 2019-05-07

## 2019-05-06 RX ORDER — ACETAMINOPHEN 325 MG/1
650 TABLET ORAL
Status: DISCONTINUED | OUTPATIENT
Start: 2019-05-06 | End: 2019-05-10 | Stop reason: HOSPADM

## 2019-05-06 RX ORDER — ASPIRIN 325 MG
325 TABLET ORAL DAILY
Status: DISCONTINUED | OUTPATIENT
Start: 2019-05-07 | End: 2019-05-06

## 2019-05-06 RX ORDER — DOCUSATE SODIUM 100 MG/1
100 CAPSULE, LIQUID FILLED ORAL 2 TIMES DAILY
Status: DISCONTINUED | OUTPATIENT
Start: 2019-05-06 | End: 2019-05-10 | Stop reason: HOSPADM

## 2019-05-06 RX ORDER — ALENDRONATE SODIUM 35 MG/1
35 TABLET ORAL
COMMUNITY
End: 2019-05-18 | Stop reason: SDUPTHER

## 2019-05-06 RX ORDER — SODIUM CHLORIDE 0.9 % (FLUSH) 0.9 %
5-40 SYRINGE (ML) INJECTION AS NEEDED
Status: DISCONTINUED | OUTPATIENT
Start: 2019-05-06 | End: 2019-05-10 | Stop reason: HOSPADM

## 2019-05-06 RX ORDER — FUROSEMIDE 40 MG/1
40 TABLET ORAL 2 TIMES DAILY
COMMUNITY
End: 2022-02-16 | Stop reason: SDUPTHER

## 2019-05-06 RX ORDER — GUAIFENESIN 100 MG/5ML
81 LIQUID (ML) ORAL DAILY
Status: DISCONTINUED | OUTPATIENT
Start: 2019-05-06 | End: 2019-05-10 | Stop reason: HOSPADM

## 2019-05-06 RX ORDER — ACETAMINOPHEN 650 MG/1
650 SUPPOSITORY RECTAL
Status: DISCONTINUED | OUTPATIENT
Start: 2019-05-06 | End: 2019-05-10 | Stop reason: HOSPADM

## 2019-05-06 RX ORDER — LEVOTHYROXINE SODIUM 25 UG/1
25 TABLET ORAL
Status: DISCONTINUED | OUTPATIENT
Start: 2019-05-07 | End: 2019-05-10 | Stop reason: HOSPADM

## 2019-05-06 RX ORDER — PANTOPRAZOLE SODIUM 40 MG/1
40 TABLET, DELAYED RELEASE ORAL DAILY
Status: DISCONTINUED | OUTPATIENT
Start: 2019-05-07 | End: 2019-05-06

## 2019-05-06 RX ORDER — GUAIFENESIN 100 MG/5ML
81 LIQUID (ML) ORAL DAILY
Status: DISCONTINUED | OUTPATIENT
Start: 2019-05-07 | End: 2019-05-06

## 2019-05-06 RX ORDER — FLUTICASONE PROPIONATE 50 MCG
2 SPRAY, SUSPENSION (ML) NASAL DAILY
Status: DISCONTINUED | OUTPATIENT
Start: 2019-05-07 | End: 2019-05-10 | Stop reason: HOSPADM

## 2019-05-06 RX ORDER — ONDANSETRON 2 MG/ML
4 INJECTION INTRAMUSCULAR; INTRAVENOUS
Status: DISCONTINUED | OUTPATIENT
Start: 2019-05-06 | End: 2019-05-10 | Stop reason: HOSPADM

## 2019-05-06 RX ORDER — POTASSIUM CHLORIDE 750 MG/1
10 TABLET, FILM COATED, EXTENDED RELEASE ORAL DAILY
Status: ON HOLD | COMMUNITY
End: 2019-07-08 | Stop reason: SDUPTHER

## 2019-05-06 RX ADMIN — SODIUM CHLORIDE 100 ML: 900 INJECTION, SOLUTION INTRAVENOUS at 10:14

## 2019-05-06 RX ADMIN — IOPAMIDOL 120 ML: 755 INJECTION, SOLUTION INTRAVENOUS at 10:29

## 2019-05-06 RX ADMIN — DOCUSATE SODIUM 100 MG: 100 CAPSULE, LIQUID FILLED ORAL at 14:52

## 2019-05-06 RX ADMIN — Medication 10 ML: at 14:00

## 2019-05-06 RX ADMIN — Medication 10 ML: at 10:14

## 2019-05-06 RX ADMIN — RIVAROXABAN 15 MG: 15 TABLET, FILM COATED ORAL at 17:31

## 2019-05-06 RX ADMIN — Medication 10 ML: at 21:01

## 2019-05-06 RX ADMIN — SODIUM CHLORIDE 75 ML/HR: 900 INJECTION, SOLUTION INTRAVENOUS at 12:52

## 2019-05-06 RX ADMIN — ASPIRIN 81 MG CHEWABLE TABLET 81 MG: 81 TABLET CHEWABLE at 17:31

## 2019-05-06 RX ADMIN — QUETIAPINE FUMARATE 25 MG: 25 TABLET ORAL at 21:01

## 2019-05-06 RX ADMIN — ACETAMINOPHEN 650 MG: 325 TABLET ORAL at 15:49

## 2019-05-06 RX ADMIN — ATORVASTATIN CALCIUM 80 MG: 40 TABLET, FILM COATED ORAL at 21:00

## 2019-05-06 NOTE — ED PROVIDER NOTES
80 y.o. female with past medical history significant for hypertension, atrial fibrillation, and hypercholesterolemia who presents from home via EMS with chief complaint of an unwitnessed GLF fall. EMS reports daughter-in-law heard the pt fall at 0900 when the pt was coming out of the bathroom. EMS reports daughter-in-law found the pt on the floor and had reportedly hit her head. EMS reports finding the pt sitting upright with slurred speech and a left sided facial droop. Per daughter-in-law on the scene, pt is normally able to speak clearly. EMS reports pt also had left upper and left lower extremity weakness upon their assessment. EMS reports pt speech improved en route, but worsened again as they arrived here. EMS reports vital signs were stable and blood glucose was 103. EMS reports pt was complaining of a cough this morning. Pt is on xarelto. Full history, physical exam, and ROS unable to be obtained due to:  AMS. Social hx - Tobacco use: none, Alcohol Use: none    PCP: Aftab Luque MD    Note written by Zulema Haney, as dictated by Marcello Gilliland MD 10:08 AM.        The history is provided by the EMS personnel and a relative. The history is limited by the condition of the patient. No  was used.             Past Medical History:   Diagnosis Date    Atrial fibrillation ([address] 2007    Hypercholesterolemia     Hypertension     Osteoarthritis 2010    Thromboembolus [facility])     Thyroid disease        Past Surgical History:   Procedure Laterality Date    HX HEENT      cataract sx    HX HIP FRACTURE [place]  2013    right hip 1989 left hip 2013    HX HIP REPLACEMENT  1989    HX PACEMAKER  2008         Family History:   Problem Relation Age of Onset    Hypertension Mother        Social History     Socioeconomic History    Marital status:      Spouse name: Not on file    Number of children: Not on file    Years of education: Not on file    Highest education level: Not on file   Occupational History    Not on file   Social Needs    Financial resource strain: Not on file    Food insecurity:     Worry: Not on file     Inability: Not on file    Transportation needs:     Medical: Not on file     Non-medical: Not on file   Tobacco Use    Smoking status: Never Smoker    Smokeless tobacco: Never Used   Substance and Sexual Activity    Alcohol use: No     Alcohol/week: 0.0 oz    Drug use: No    Sexual activity: Never     Birth control/protection: None     Comment: retired,living in independant living. Lifestyle    Physical activity:     Days per week: Not on file     Minutes per session: Not on file    Stress: Not on file   Relationships    Social connections:     Talks on phone: Not on file     Gets together: Not on file     Attends [employer] service: Not on file     Active member of club or organization: Not on file     Attends meetings of clubs or organizations: Not on file     Relationship status: Not on file    Intimate partner violence:     Fear of current or ex partner: Not on file     Emotionally abused: Not on file     Physically abused: Not on file     Forced sexual activity: Not on file   Other Topics Concern    Not on file   Social History Narrative    11/4/16 Pt lives with son Che Lopez # [phone]. Dtr [address] [place] [address] #[phone]. Pt has both a rollator and a rolling walker in the home. ALLERGIES: Patient has no known allergies. Review of Systems   Unable to perform ROS: Mental status change       Vitals:    05/06/19 1042   BP: 154/75   Pulse: 78   Resp: 19   Temp: 98.3 °F (36.8 °C)   SpO2: 98%   Weight: 84 kg (185 lb 3 oz)   Height: 5' 4\" (1.626 m)            Physical Exam     Nursing note and vitals reviewed. Constitutional: appears well-developed and well-nourished. Slurred speech  HENT:   Head: Normocephalic and atraumatic.  Sclera anicteric  Nose: No rhinorrhea  Mouth/Throat: Oropharynx is clear and moist. Pharynx normal  Eyes: Conjunctivae are normal. Pupils are equal, round, and reactive to light. Right eye exhibits no discharge. Left eye exhibits no discharge. No scleral icterus. Neck: Painless normal range of motion. Supple  Cardiovascular: Normal rate, regular rhythm, normal heart sounds and intact distal pulses. Exam reveals no gallop and no friction rub. No murmur heard. Pulmonary/Chest: Effort normal and breath sounds normal. No respiratory distress. no wheezes. no rales. Abdominal: Soft. Bowel sounds are normal. Exhibits no distension and no mass. No tenderness. No guarding. Musculoskeletal: Normal range of motion. no tenderness. No edema  Lymphadenopathy:   No cervical adenopathy. Neurological:  Alert and oriented to person, place, and time. LEFT ARM DRIFT. ABLE TO HOLD LEFT LEG IN AIR FOR 1 SECOND. IDENTIFIED PEN AND WATCH BUT COULD NOT IDENTIFY FLASHLIGHT. SLURRED SPEECH. SOME LEFT FACIAL DROOP. Skin: Skin is warm and dry. No rash noted. No pallor. MDM  Number of Diagnoses or Management Options  Critical Care  Total time providing critical care: 30-74 minutes (40 MINUTES)        ON XARELTO SO NOT CANDIDATE FOR TPA. LEFT SIDED ARM AND LEG WEAKNESS AND LEFT FACIAL DROOP. CHECK LABS, CT HEAD, CTA HEAD AND NECK NOW SINCE CANNOT RECEIVE TPA. EKG, CXR. Procedures  CONSULT NOTE:  10:20 AM Jake Taylor MD spoke with Dr. Keren Anglin, Consult for Teleneurology. Discussed available diagnostic tests and clinical findings. Dr. Keren Anglin says he is evaluating another tPA candidate and will attend to the pt after that evaluation is done. CONSULT NOTE:  10:24 AM   Have paged the neurointerventionalist NP as they were not present in the ED following the Code S overhead page. PROGRESS NOTE:  10:35 AM  Neurointervention NP is at bedside. Gave her my initial assessment.  Pt with observed much improved speech, able to lift up her left leg and resist well, which is improved compared to earlier. PROGRESS NOTE:  10:55 AM  Neuro IR, Dr. Mati Navas, is evaluating the pt. Discussed with radiology and confirmed M2 CVA. Made Dr. Alex Fraser while he was on the teleprompter. ED EKG interpretation:  Rhythm: sinus rhythm with occasional PVCs; and regular . Rate (approx.): 78 bpm; Axis: left axis deviation; ST/T wave: T wave inversions laterally, artifact  Note written by Barbara Sharma, as dictated by Marylou Combs MD 11:00 AM.    CONSULT NOTE:  11:12 AM Marylou Combs MD spoke with Dr. Eloina Fam, Consult for Hospitalist.  Discussed available diagnostic tests and clinical findings. Dr. Eloina Fam will see and admit. PROGRESS NOTE:  11:48 AM  Irrigated the laceration, 1.5 cm superficial laceration with bleeding controlled. RN and then I irrigated and I cannot get a significant laceration to staple at this point. 11:13 AM  Patient is being admitted to the hospital.  The results of their tests and reasons for their admission have been discussed with them and/or available family. They convey agreement and understanding for the need to be admitted and for their admission diagnosis. Recent Results (from the past 24 hour(s))   SAMPLES BEING HELD    Collection Time: 05/06/19  2:15 AM   Result Value Ref Range    SAMPLES BEING HELD 1RD,1UC,1UA     COMMENT        Add-on orders for these samples will be processed based on acceptable specimen integrity and analyte stability, which may vary by analyte.    CBC WITH AUTOMATED DIFF    Collection Time: 05/06/19 10:33 AM   Result Value Ref Range    WBC 4.4 3.6 - 11.0 K/uL    RBC 3.12 (L) 3.80 - 5.20 M/uL    HGB 7.8 (L) 11.5 - 16.0 g/dL    HCT 27.0 (L) 35.0 - 47.0 %    MCV 86.5 80.0 - 99.0 FL    MCH 25.0 (L) 26.0 - 34.0 PG    MCHC 28.9 (L) 30.0 - 36.5 g/dL    RDW 16.3 (H) 11.5 - 14.5 %    PLATELET 116 579 - 249 K/uL    MPV 11.3 8.9 - 12.9 FL    NRBC 0.0 0  WBC    ABSOLUTE NRBC 0.00 0.00 - 0.01 K/uL    NEUTROPHILS 62 32 - 75 % LYMPHOCYTES 24 12 - 49 %    MONOCYTES 10 5 - 13 %    EOSINOPHILS 3 0 - 7 %    BASOPHILS 1 0 - 1 %    IMMATURE GRANULOCYTES 0 0.0 - 0.5 %    ABS. NEUTROPHILS 2.8 1.8 - 8.0 K/UL    ABS. LYMPHOCYTES 1.1 0.8 - 3.5 K/UL    ABS. MONOCYTES 0.4 0.0 - 1.0 K/UL    ABS. EOSINOPHILS 0.1 0.0 - 0.4 K/UL    ABS. BASOPHILS 0.0 0.0 - 0.1 K/UL    ABS. IMM. GRANS. 0.0 0.00 - 0.04 K/UL    DF AUTOMATED      RBC COMMENTS HYPOCHROMIA  1+        RBC COMMENTS ANISOCYTOSIS  1+        RBC COMMENTS SCHISTOCYTES  1+       METABOLIC PANEL, COMPREHENSIVE    Collection Time: 05/06/19 10:33 AM   Result Value Ref Range    Sodium 140 136 - 145 mmol/L    Potassium 4.0 3.5 - 5.1 mmol/L    Chloride 106 97 - 108 mmol/L    CO2 28 21 - 32 mmol/L    Anion gap 6 5 - 15 mmol/L    Glucose 101 (H) 65 - 100 mg/dL    BUN 19 6 - 20 MG/DL    Creatinine 1.13 (H) 0.55 - 1.02 MG/DL    BUN/Creatinine ratio 17 12 - 20      GFR est AA 54 (L) >60 ml/min/1.73m2    GFR est non-AA 45 (L) >60 ml/min/1.73m2    Calcium 8.1 (L) 8.5 - 10.1 MG/DL    Bilirubin, total 0.5 0.2 - 1.0 MG/DL    ALT (SGPT) 8 (L) 12 - 78 U/L    AST (SGOT) 14 (L) 15 - 37 U/L    Alk.  phosphatase 63 45 - 117 U/L    Protein, total 6.1 (L) 6.4 - 8.2 g/dL    Albumin 2.6 (L) 3.5 - 5.0 g/dL    Globulin 3.5 2.0 - 4.0 g/dL    A-G Ratio 0.7 (L) 1.1 - 2.2     PTT    Collection Time: 05/06/19 10:33 AM   Result Value Ref Range    aPTT 26.3 22.1 - 32.0 sec    aPTT, therapeutic range     58.0 - 77.0 SECS   PROTHROMBIN TIME + INR    Collection Time: 05/06/19 10:33 AM   Result Value Ref Range    INR 1.4 (H) 0.9 - 1.1      Prothrombin time 13.5 (H) 9.0 - 11.1 sec   TROPONIN I    Collection Time: 05/06/19 10:33 AM   Result Value Ref Range    Troponin-I, Qt. <0.05 <0.05 ng/mL   EKG, 12 LEAD, INITIAL    Collection Time: 05/06/19 10:47 AM   Result Value Ref Range    Ventricular Rate 78 BPM    Atrial Rate 78 BPM    P-R Interval 80 ms    QRS Duration 60 ms    Q-T Interval 350 ms    QTC Calculation (Bezet) 399 ms    Calculated P Axis 3 degrees    Calculated R Axis 4 degrees    Calculated T Axis -85 degrees    Diagnosis       * Suspect unspecified pacemaker failure  Sinus rhythm with short LA with frequent ventricular-paced complexes and   premature supraventricular complexes  Anterior infarct , age undetermined  ST & T wave abnormality, consider inferolateral ischemia  When compared with ECG of 09-JAN-2018 11:30,  Electronic ventricular pacemaker has replaced Electronic atrial pacemaker         Xr Chest Port    Result Date: 5/6/2019  EXAM: XR CHEST PORT INDICATION: CVA, cerebrovascular accident COMPARISON: February 3, 2016 FINDINGS: A portable AP radiograph of the chest was obtained at 1154 hours. The patient is on a cardiac monitor. Pacemaker remains in place. Heart size is mildly enlarged. The lungs demonstrate low lung volumes with hypoaerated bases. Mild opacities each base right greater than left are consistent with atelectasis. There is mild pulmonary vascular congestion no definite pulmonary edema. IMPRESSION: 1. Low lung volumes and mild bibasilar atelectasis right greater than left. There is mild pulmonary vascular congestion    Cta Code Neuro Head And Neck W Cont    Result Date: 5/6/2019  EXAM:  CTA CODE NEURO HEAD AND NECK W CONT INDICATION:  LEFT FACIAL DROOP AND LEFT SIDED WEAKNESS TECHNIQUE: Axial spiral acquired CT angiography was performed from the aortic arch up through the intracranial vessels. This was performed during intravenous bolus infusion 100 mL of Isovue 370. Post-processing with  MIP reconstructions from aortic arch to the skull base. CT dose reduction was achieved through use of a standardized protocol tailored for this examination and automatic exposure control for dose modulation. COMPARISON: CT, CTP. FINDINGS: Atherosclerotic calcification aortic arch. No hemodynamically significant stenosis at origins of brachiocephalic arteries. Mild stenosis origin of right vertebral artery.  Vertebral arteries are otherwise unremarkable throughout their cervical extent. Atherosclerosis in the proximal right V4 segment with mild stenosis. Both vertebral arteries contribute to the basilar artery which is normal and supplies both posterior cerebral arteries. Common carotid arteries are unremarkable up to the bifurcations. Minimal atherosclerotic calcification left carotid bifurcation. No significant stenosis in internal or external carotid arteries. 0% stenosis by NASCET criteria. Atherosclerotic calcification carotid siphons without significant stenosis. Symmetric opacification anterior cerebral arteries. Symmetric opacification of both middle cerebral arteries. Abrupt occlusion posterior division right middle cerebral artery. This corresponds to area of diminished perfusion. No other occlusions or stenoses. Incidentally noted mild chronic cervical spondylosis. Multiple subcentimeter thyroid nodules likely related to goiter. Note of small to moderate right greater than left pleural effusions. IMPRESSION: 1. 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 2. Small to moderate right greater than left pleural effusions. Findings called to ED by me at 1047 hours. Ct Code Neuro Head Wo Contrast    Result Date: 5/6/2019  EXAM:  CT CODE NEURO HEAD WO CONTRAST INDICATION:  Left sided deficits, patient last known well 0 900 a.m. now with aphasia, left-sided weakness and left facial droop. Also ground level fall and brought to the ED by EMS. TECHNIQUE: Thin axial images were obtained through the calvarium and saved with standard and bone window algorithm. Coronal and sagittal reconstructions were generated. CT dose reduction was achieved through use of a standardized protocol tailored for this examination and automatic exposure control for dose modulation. COMPARISON: CT head 1/19/18 FINDINGS: Ventricular size and configuration are within normal limits for age.  Multifocal and confluent decreased attenuation cerebral white matter to lesser extent basal ganglia and brainstem suggesting chronic small vessel ischemic change. No evidence of intracranial hemorrhage, acute infarct, mass or abnormal extra-axial fluid collections. Atherosclerotic calcifications. Visualized osseous structures of the calvarium and skull base including paranasal sinuses are unremarkable. IMPRESSION: 1. Chronic white matter disease similar to prior exam. 2. No acute intracranial abnormality demonstrated. Ct Code Neuro Perf W Cbf    Result Date: 5/6/2019  EXAM:  CT CODE NEURO PERF W CBF INDICATION:  LEFT FACIAL DROOP AND LEFT SIDED WEAKNESS TECHNIQUE: During rapid bolus infusion 40 mL Isovue-370 CT perfusion imaging of the head was performed with color coded mapping reconstructions of time to peak, blood volume, blood flow, mean transit time and T-Max. CT dose reduction was achieved through use of a standardized protocol tailored for this examination and automatic exposure control for dose modulation. COMPARISON: CT head FINDINGS: There is asymmetric abnormal decreased blood flow and perfusion in the right posterior superior temporal lobe including posterior frontal and parietal lobes. IMPRESSION: Abnormal decreased perfusion right posterior frontal/parietal lobes.

## 2019-05-06 NOTE — PROGRESS NOTES
Responded to Code Stroke. Last known well at 9 AM today. She reportedly fell this morning and takes Xarelto for atrial fibrillation. Patient initially came in with left facial droop, speech difficulty, and left-sided weakness, but deficits are improving. She now has no facial droop or aphasia. Left arm pronator drift present and some weakness on LUE, LLE, but has improved per ED physician. Patient had CT/CTA/CTP. Dr. Mati Navas with [facility] notified. Imaging was independently reviewed by Dr. Mati Navas. CT of Head shows no acute abnormality. CTA reveals occlusion of the distal M2 posterior division in the right MCA. CT Perfusion shows abnormal decreased perfusion in the right posterior frontal/parietal lobes. Patient was evaluated by teleneurology. She is not a tPA candidate. Initial NIHSS on admission was 5 and after evaluation with teleneurology score has improved to a 2. Recommend admitting to the Intensive care unit and allowing permissive HTN with SBP goal 120-200. Further management per Neurology.      Mehrdad Lucas, [facility]  Neurointerventional Surgery  Neurocritical care NP

## 2019-05-06 NOTE — H&P
Hospitalist Admission Note    NAME:  Preethi Saleh   :   1924   MRN:   [id number]     Date/Time:  2019 12:02 PM    Subjective:     CHIEF COMPLAINT:    Chief Complaint   Patient presents with   [facility] Aphasia    Fall    Extremity Weakness       HISTORY OF PRESENT ILLNESS:     Willa Hilario is a 80 y.o.  female with h/o afib,hypercholesterolemia,hypertension,PE,DVT,brought to ED by EMS because of GLF fall. Patient who is able to talk,say that she thinks that she went to used the bathroom,then does not know how she ended on the floor. She says that she was found on the floor by her daughter in law who told her that she has fallen and had bruises left side of head. According to the report,patient was found to have left sided facial droop,slurred speech,left sided weakness. Normally patient does not have problem with speech. In ED patient was able to talk without difficulty as her symptoms improved en route to ED. CT head showed no acute process. 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 Patient is on Xarelto for Afib and h/o PE. Neurointervention was consulted and recommended to admit for close monitoring. He also recommended permissive hypertension and continue Xarelto. Past Medical History:   Diagnosis Date    Atrial fibrillation ([address]     Hypercholesterolemia     Hypertension     Osteoarthritis 2010    [facility]     Thyroid disease         Social History     Tobacco Use    Smoking status: Never Smoker    Smokeless tobacco: Never Used   Substance Use Topics    Alcohol use: No     Alcohol/week: 0.0 oz        Family History   Problem Relation Age of Onset    Hypertension Mother         No Known Allergies     Prior to Admission medications    Medication Sig Start Date End Date Taking?  Authorizing Provider   loratadine (CLARITIN) 10 mg tablet TAKE 1 TABLET BY MOUTH ONCE DAILY 19   Wily Cisneros MD   pravastatin (PRAVACHOL) 20 mg tablet TAKE 1 TABLET BY MOUTH ONCE NIGHTLY 4/11/19   Dena Mireles MD   QUEtiapine (SEROQUEL) 25 mg tablet Take 1 Tab by mouth nightly. 4/11/19   Dena Mireles MD   fluticasone propionate [facility]) 50 mcg/actuation nasal spray 2 spray each nostril QD 3/19/19   Dena Mireles MD   magnesium oxide (MAG-OX) 400 mg tablet TAKE 1 TAB BY MOUTH DAILY. 3/19/19   Dena Mireles MD   PARoxetine (PAXIL) 20 mg tablet TAKE 1 TAB BY MOUTH DAILY. 3/19/19   Dena Mireles MD   metoprolol tartrate (LOPRESSOR) 50 mg tablet Take 1 Tab by mouth two (2) times a day. 3/19/19   Dena Mireles MD   cyanocobalamin 1,000 mcg tablet Take 1 Tab by mouth daily. 3/19/19   Dena Mireles MD   amLODIPine (NORVASC) 5 mg tablet Take 1 Tab by mouth daily. 3/19/19   Dena Mireles MD   levothyroxine (SYNTHROID) 25 mcg tablet TAKE 1 TABLET BY MOUTH DAILY BEFORE BREAKFAST 3/19/19   Dena Mireles MD   multivitamin (ONE A DAY) tablet Take 1 Tab by mouth daily. 3/19/19   Dena Mireles MD   polyethylene glycol [facility]) 17 gram/dose powder Take 17 g by mouth daily. 3/19/19   Dena Mireles MD   trospium (SANCTURA) 20 mg tablet TAKE 1 TAB BY MOUTH TWO (2) TIMES A DAY. 3/19/19   Dena Mireles MD   calcium-cholecalciferol, D3, (CALTRATE 600+D) tablet Take 1 Tab by mouth daily. 3/19/19   Dena Mireles MD   rivaroxaban (XARELTO) 20 mg tab tablet Take 1 Tab by mouth daily (with breakfast). 3/19/19   Dena Mireles MD   hydroCHLOROthiazide (HYDRODIURIL) 25 mg tablet TAKE 1 TABLET BY MOUTH DAILY. D/C DYAZIDE 11/5/18   Jayda Tena NP   albuterol (PROAIR HFA) 90 mcg/actuation inhaler Take 2 Puffs by inhalation every six (6) hours as needed for Wheezing. 8/14/18   Sukumar Gonzales,    zinc oxide-cod liver oil (DESITIN) 40 % ointment Apply  to affected area as needed for Skin Irritation. Provider, Historical   docusate sodium (COLACE) 100 mg capsule Take 100 mg by mouth two (2) times a day.     Provider, Historical       REVIEW OF SYSTEMS:    Constitutional:  No fever or weight loss  HEENT: No headache or visual changes  Cardiovascular:  No chest pain, no palpitations. Respiratory:  No coughing, wheezing, or shortness of breath. GI:  No abdominal pain. No nausea or vomiting. No diarrhea  :  No hematuria or dysuria. No frequency, retention, urinary incontinence. Skin:  No rashes or moles  Neuro:  Syncope. Slurred speech,left sided weakness. Hematological:  No bruising or bleeding  Endocrine:  No diabetes or thyroid disease  Objective:   VITALS:       Visit Vitals  /61   Pulse 80   Temp 98.3 °F (36.8 °C)   Resp 21   Ht 5' 4\" (1.626 m)   Wt 84 kg (185 lb 3 oz)   SpO2 95%   BMI 31.79 kg/m²       O2 Device: Room air    PHYSICAL EXAM:   General:    Lying in bed in no acute distress. HEENT:  Pupils equal.  Sclera anicteric. Conjunctiva pink. Mucous membranes                           Moist.There is a bruise left side of head with superficial laceration. Neck:  Supple. Trachea midline. No accessory muscle use. No thyromegaly. No jugular venous distention  CV:                  Regular rate and rhythm. Lungs:   Clear to auscultation bilaterally. No Wheezing or Rhonchi. No rales. Normal to percussion  Abdomen:   Soft, non-tender. Not distended. Bowel sounds normal. No organomegaly  Extremities: No cyanosis. No edema. No clubbing  Neurologic: Alert and oriented X 3. Moving all extremities. No focal deficit. Skin:                Warm and dry. No rashes.        LAB DATA REVIEWED:    Recent Results (from the past 24 hour(s))   CBC WITH AUTOMATED DIFF    Collection Time: 05/06/19 10:33 AM   Result Value Ref Range    WBC 4.4 3.6 - 11.0 K/uL    RBC 3.12 (L) 3.80 - 5.20 M/uL    HGB 7.8 (L) 11.5 - 16.0 g/dL    HCT 27.0 (L) 35.0 - 47.0 %    MCV 86.5 80.0 - 99.0 FL    MCH 25.0 (L) 26.0 - 34.0 PG    MCHC 28.9 (L) 30.0 - 36.5 g/dL    RDW 16.3 (H) 11.5 - 14.5 %    PLATELET 822 937 - 221 K/uL    MPV 11.3 8.9 - 12.9 FL    NRBC 0.0 0  WBC ABSOLUTE NRBC 0.00 0.00 - 0.01 K/uL    NEUTROPHILS 62 32 - 75 %    LYMPHOCYTES 24 12 - 49 %    MONOCYTES 10 5 - 13 %    EOSINOPHILS 3 0 - 7 %    BASOPHILS 1 0 - 1 %    IMMATURE GRANULOCYTES 0 0.0 - 0.5 %    ABS. NEUTROPHILS 2.8 1.8 - 8.0 K/UL    ABS. LYMPHOCYTES 1.1 0.8 - 3.5 K/UL    ABS. MONOCYTES 0.4 0.0 - 1.0 K/UL    ABS. EOSINOPHILS 0.1 0.0 - 0.4 K/UL    ABS. BASOPHILS 0.0 0.0 - 0.1 K/UL    ABS. IMM. GRANS. 0.0 0.00 - 0.04 K/UL    DF AUTOMATED      RBC COMMENTS HYPOCHROMIA  1+        RBC COMMENTS ANISOCYTOSIS  1+        RBC COMMENTS SCHISTOCYTES  1+       METABOLIC PANEL, COMPREHENSIVE    Collection Time: 05/06/19 10:33 AM   Result Value Ref Range    Sodium 140 136 - 145 mmol/L    Potassium 4.0 3.5 - 5.1 mmol/L    Chloride 106 97 - 108 mmol/L    CO2 28 21 - 32 mmol/L    Anion gap 6 5 - 15 mmol/L    Glucose 101 (H) 65 - 100 mg/dL    BUN 19 6 - 20 MG/DL    Creatinine 1.13 (H) 0.55 - 1.02 MG/DL    BUN/Creatinine ratio 17 12 - 20      GFR est AA 54 (L) >60 ml/min/1.73m2    GFR est non-AA 45 (L) >60 ml/min/1.73m2    Calcium 8.1 (L) 8.5 - 10.1 MG/DL    Bilirubin, total 0.5 0.2 - 1.0 MG/DL    ALT (SGPT) 8 (L) 12 - 78 U/L    AST (SGOT) 14 (L) 15 - 37 U/L    Alk.  phosphatase 63 45 - 117 U/L    Protein, total 6.1 (L) 6.4 - 8.2 g/dL    Albumin 2.6 (L) 3.5 - 5.0 g/dL    Globulin 3.5 2.0 - 4.0 g/dL    A-G Ratio 0.7 (L) 1.1 - 2.2     PTT    Collection Time: 05/06/19 10:33 AM   Result Value Ref Range    aPTT 26.3 22.1 - 32.0 sec    aPTT, therapeutic range     58.0 - 77.0 SECS   PROTHROMBIN TIME + INR    Collection Time: 05/06/19 10:33 AM   Result Value Ref Range    INR 1.4 (H) 0.9 - 1.1      Prothrombin time 13.5 (H) 9.0 - 11.1 sec   TROPONIN I    Collection Time: 05/06/19 10:33 AM   Result Value Ref Range    Troponin-I, Qt. <0.05 <0.05 ng/mL   SAMPLES BEING HELD    Collection Time: 05/06/19 10:33 AM   Result Value Ref Range    SAMPLES BEING HELD 1RD     COMMENT        Add-on orders for these samples will be processed based on acceptable specimen integrity and analyte stability, which may vary by analyte. EKG, 12 LEAD, INITIAL    Collection Time: 05/06/19 10:47 AM   Result Value Ref Range    Ventricular Rate 78 BPM    Atrial Rate 78 BPM    P-R Interval 80 ms    QRS Duration 60 ms    Q-T Interval 350 ms    QTC Calculation (Bezet) 399 ms    Calculated P Axis 3 degrees    Calculated R Axis 4 degrees    Calculated T Axis -85 degrees    Diagnosis       Suspect unspecified pacemaker failure  Sinus rhythm with short NC with frequent ventricular-paced complexes and   premature supraventricular complexes  Anterior infarct , age undetermined  ST & T wave abnormality, consider inferolateral ischemia  When compared with ECG of 09-JAN-2018 11:30,  Electronic ventricular pacemaker has replaced Electronic atrial pacemaker         Assessment/Plan:      Principal Problem:    Acute CVA (cerebrovascular accident) ([address] (5/6/2019)    Active Problems:    Pulmonary embolism ([address] (6/18/2014)      DVT (deep venous thrombosis) ([address] (6/18/2014)      Mixed hyperlipidemia (6/18/2014)      Atrial fibrillation ([address] (6/18/2014)      Hypothyroid (6/18/2014)      Gait instability (4/6/2018)      Fall (5/6/2019)      CVA (cerebral vascular accident) ([facility] (5/6/2019)      Scalp laceration (5/6/2019)       ___________________________________________________  PLAN:    1. Acute CVA (cerebrovascular accident) resulting in fall  -CTA c/w Abrupt occlusion distal M2 posterior division right middle cerebral artery.  -Neurointervention consulted  -Will continue Xarelto  -Permissive hypertension.  -Lipitor  -ECHO  -Further recommendation as per neuro    2. Pulmonary embolism/DVT  -On xarelto    3. Mixed hyperlipidemia   -On statin    4. Atrial fibrillation    -On xarelto   -Metoprolol on hold. Tele-monitoring.   -Consider lopressor if HR uncontrolled. 5.Hypothyroid    -Resume synthroid    6. Gait instability/Fall   -PT/OT   -Fall precaution    7. Scalp laceration   -Wound care    DVT prophylaxis:on Xarelto  Full code:I discussed with patient's family,the daughter saying that for now patient is full code  Surrogate:daughter  Disposition:tbd  Baseline:Uses a walker.     ___________________________________________________  Admitting Physician: Charlee Hartman MD

## 2019-05-06 NOTE — ED TRIAGE NOTES
Pt comes in via EMS from home for GLF and L sided weakness, slurred speech. Per pt daughter, pt had unwitnessed fall and hit head. Afterwards pt had L sided facial droop, LUE and LLE weakness. Code S level 1 called PTA.  via ems.  LKWT 9am.

## 2019-05-06 NOTE — PROGRESS NOTES
Admission Medication Reconciliation:    Information obtained from:  Patient and patient's daughter interview/RxQuery    Comments/Recommendations: Updated PTA meds/reviewed patient's allergies. 1)  patient's daughter is helping patient with medication management at home    2)  Medication changes (since last review): Added  - Furosemide  - Potassium chloride    Adjusted  - none    Removed  - HCTZ       Allergies:  Patient has no known allergies. Significant PMH/Disease States:   Past Medical History:   Diagnosis Date    Atrial fibrillation ([address] 2007    Hypercholesterolemia     Hypertension     Osteoarthritis 2010    Thromboembolus [facility])     Thyroid disease        Chief Complaint for this Admission:    Chief Complaint   Patient presents with    Aphasia    Fall    Extremity Weakness       Prior to Admission Medications:   Prior to Admission Medications   Prescriptions Last Dose Informant Patient Reported? Taking? PARoxetine (PAXIL) 20 mg tablet 2019  No Yes   Sig: TAKE 1 TAB BY MOUTH DAILY. QUEtiapine (SEROQUEL) 25 mg tablet 2019  No Yes   Sig: Take 1 Tab by mouth nightly. albuterol (PROAIR HFA) 90 mcg/actuation inhaler   No Yes   Sig: Take 2 Puffs by inhalation every six (6) hours as needed for Wheezing. alendronate (FOSAMAX) 35 mg tablet 2019  Yes Yes   Sig: Take 35 mg by mouth every Wednesday. amLODIPine (NORVASC) 5 mg tablet 2019 at am  No Yes   Sig: Take 1 Tab by mouth daily. calcium-cholecalciferol, D3, (CALTRATE 600+D) tablet 2019  No Yes   Sig: Take 1 Tab by mouth daily. cyanocobalamin 1,000 mcg tablet 2019  No Yes   Sig: Take 1 Tab by mouth daily. docusate sodium (COLACE) 100 mg capsule 2019  Yes Yes   Sig: Take 100 mg by mouth two (2) times a day. fluticasone propionate (FLONASE) 50 mcg/actuation nasal spray 2019  No Yes   Si spray each nostril QD   furosemide (LASIX) 40 mg tablet 2019 at am  Yes Yes   Sig: Take 40 mg by mouth daily. levothyroxine (SYNTHROID) 25 mcg tablet 5/5/2019  No Yes   Sig: TAKE 1 TABLET BY MOUTH DAILY BEFORE BREAKFAST   loratadine (CLARITIN) 10 mg tablet 5/5/2019  No Yes   Sig: TAKE 1 TABLET BY MOUTH ONCE DAILY   magnesium oxide (MAG-OX) 400 mg tablet 5/5/2019  No Yes   Sig: TAKE 1 TAB BY MOUTH DAILY. metoprolol tartrate (LOPRESSOR) 50 mg tablet 5/5/2019  No Yes   Sig: Take 1 Tab by mouth two (2) times a day. multivitamin (ONE A DAY) tablet 5/5/2019  No Yes   Sig: Take 1 Tab by mouth daily. polyethylene glycol (MIRALAX) 17 gram/dose powder 5/5/2019  No Yes   Sig: Take 17 g by mouth daily. potassium chloride SR (KLOR-CON 10) 10 mEq tablet 5/5/2019 at am  Yes Yes   Sig: Take 10 mEq by mouth daily. pravastatin (PRAVACHOL) 20 mg tablet 5/5/2019  No Yes   Sig: TAKE 1 TABLET BY MOUTH ONCE NIGHTLY   rivaroxaban (XARELTO) 20 mg tab tablet 5/5/2019  No Yes   Sig: Take 1 Tab by mouth daily (with breakfast). trospium (SANCTURA) 20 mg tablet 5/5/2019  No Yes   Sig: TAKE 1 TAB BY MOUTH TWO (2) TIMES A DAY. zinc oxide-cod liver oil (DESITIN) 40 % ointment   Yes Yes   Sig: Apply  to affected area as needed for Skin Irritation. Facility-Administered Medications: None     Thank you for allowing me to participate in the care of this patient. If there are any further questions, please contact the pharmacy at  or the medication reconciliation pharmacist at . Sheldon Alexander D., [facility]

## 2019-05-06 NOTE — PROGRESS NOTES
Problem: Mobility Impaired (Adult and Pediatric)  Goal: *Acute Goals and Plan of Care (Insert Text)  Description  Physical Therapy Goals  Initiated 5/6/2019  1. Patient will move from supine to sit and sit to supine , scoot up and down and roll side to side in bed with modified independence within 7 day(s). 2.  Patient will transfer from bed to chair and chair to bed with supervision/set-up using the least restrictive device within 7 day(s). 3.  Patient will perform sit to stand with supervision/set-up within 7 day(s). 4.  Patient will ambulate with supervision/set-up for 150 feet with the least restrictive device within 7 day(s). 5/6/2019 1701 by Ugo Simms PT  Outcome: Not Met    PHYSICAL THERAPY EVALUATION: Neuro  Patient: Jose Luis Aguayo (31 y.o. female)  Date: 5/6/2019  Primary Diagnosis: CVA (cerebral vascular accident) [facility]) [I63.9]        Precautions: Fall      ASSESSMENT :   Based on the objective data described below, patient presents with Minimum assistance and Assist x1 overall for functional mobility. Gait training completed at Minimum assistance, 30 feet and using a rolling walker. Daughter notes improvement in patient's status at the time of this assessment as compared with her arrival into the ED. Also notes mild memory impairment at baseline.   The following are barriers to independence while in acute care:   -Cognitive and/or behavioral: attention to task, command following, processing, initiation, sequencing and forgetful   -Medical condition: functional reach, functional endurance, sitting balance, standing balance, hypertension, vision and medical history    -Other:       The patient will benefit from skilled acute intervention to address the above impairments and their rehabilitation potential is considered to be Good    Discharge recommendations: Home health (to increase independence and safety) and  24 supervision  If above is not an option then recommend: None    Patient's barriers to discharging home, in addition to above impairments: none. Equipment recommendations for successful discharge (if) home: none        PLAN :  Recommendations and Planned Interventions: bed mobility training, transfer training, gait training, neuromuscular re-education and patient and family training/education      Frequency/Duration: Patient will be followed by physical therapy  5 times a week to address goals. SUBJECTIVE:   Patient stated ? I fell.?    OBJECTIVE DATA SUMMARY:   HISTORY:    Past Medical History:   Diagnosis Date    Atrial fibrillation ([address] 2007    Hypercholesterolemia     Hypertension     Osteoarthritis 2010    Thromboembolus [facility])     Thyroid disease      Past Surgical History:   Procedure Laterality Date    HX HEENT      cataract sx    HX HIP FRACTURE [place]  2013    right hip 1989 left hip 2013    [facility]  [facility]    HX PACEMAKER  2008     Prior Level of Function/Home Situation: Supervision with mobility throughout the home (ambulation with rolling walker upstairs, rollator walker on the main level of the home and in the community; transfers; power stair lift to get to her bedroom upstairs; ramp to enter the home; bathing). Lives with her son and daughter in law. Caregivers prepare meals, manage medications, and assist with appointments. Daughter in the ED with her today reports patient has mild memory impairment and is being referred by her PCP to a neurologist for this.       Home Situation  Home Environment: Private residence  # Steps to Enter: 1  Wheelchair Ramp: Yes  One/Two Story Residence: Two story  # of Interior Steps: 14  Lift Chair Available: Yes  Living Alone: No  Support Systems: Family member(s)  Patient Expects to be Discharged to[de-identified] Private residence  Current DME Used/Available at Home: Grab bars, Lift chair, Shower chair, Rollator walker, Rolling walker, Wheelchair  Tub or Shower Type: Tub/Shower combination w/ grab bar to enter and shower seat    EXAMINATION/PRESENTATION/DECISION MAKING:   Critical Behavior:  Neurologic State: Alert  Orientation Level: Oriented X4  Cognition: Follows commands, Appropriate for age attention/concentration  Safety/Judgement: Awareness of environment  Hearing:    Range Of Motion:  AROM: Generally decreased, functional           Strength:    Strength: Generally decreased, functional; Equal between right and left.  strong                    Tone & Sensation:   Tone: Normal              Sensation: Intact(light touch BLE)               Coordination:  Coordination: Within functional limits\  No Pronator drift  Finger to nose intact right and left  Appears to have LUE neglect: Did not correct for IV line being pulled or left arm tangled in clothing; Runs walker into objects on the left. Vision:   Tracking: Able to track stimulus in all quadrants w/o difficulty (initially had difficulty with left - able to track on the second and third attempt)  Diplopia: No  Corrective Lenses: Glasses  Functional Mobility and Neuro Re-education:  Bed Mobility:  Rolling: Modified independent(relied on bed rails)  Supine to Sit: Minimum assistance;Assist x1;Additional time  Sit to Supine: Minimum assistance;Assist x1(for LE management)  Scooting: Contact guard assistance    Transfers:  Sit to Stand: Minimum assistance;Assist x1(Assist to stabilize walker; Pulls up on walker - did not correct with instruction provided)  Stand to Sit: Contact guard assistance;Assist x1(hands on walker - did not correct with instruction provided)                       Balance:   Sitting: Intact; Without support(with posterior lean though no LOB - corrects with cues - does not maintain)  Standing: Intact; With support(of walker)  Ambulation/Gait Training:  Distance (ft): 30 Feet (ft)  Assistive Device: Walker, rolling;Gait belt  Ambulation - Level of Assistance: Contact guard assistance;Assist x2;Minimal assistance (Runs into objects on the left - cues and assistance to prevent. Assistance of another person to manage IV)     Gait Description (WDL): Exceptions to WDL  Gait Abnormalities: Other;Decreased step clearance(left LE crosses over right)        Base of Support: Narrowed     Speed/Sheryl: Slow; Other (comment)(improved with time up)           Interventions: Verbal cues; Visual/Demos; Safety awareness training - Runs walker into objects on the left - corrects after with Min A. Functional Measure:  Dueñas Balance Test:    Sitting to Standin  Standing Unsupported: 0  Sitting with Back Unsupported: 3  Standing to Sitting: 3  Transfers: 1  Standing Unsupported with Eyes Closed: 0  Standing Unsupported with Feet Together: 0  Reach Forward with Outstretched Arm: 1   Object: 0  Turn to Look Over Shoulders: 1  Turn 360 Degrees: 0  Alternate Foot on Step/Stool: 0  Standing Unsupported One Foot in Front: 0  Stand on One Le  Total: 10       56=Maximum possible score;   0-20=High fall risk  21-40=Moderate fall risk   41-56=Low fall risk        Physical Therapy Evaluation Charge Determination   History Examination Presentation Decision-Making   MEDIUM  Complexity : 1-2 comorbidities / personal factors will impact the outcome/ POC  LOW Complexity : 1-2 Standardized tests and measures addressing body structure, function, activity limitation and / or participation in recreation  MEDIUM Complexity : Evolving with changing characteristics  LOW Complexity : FOTO score of       Based on the above components, the patient evaluation is determined to be of the following complexity level: LOW     Pain:  Patient denies pain at the time of this assessment. She reports pain behind her right eye earlier today. Activity Tolerance:   Fair - fatigued with activity with this assessment    Please refer to the flowsheet for vital signs taken during this treatment.     After treatment patient left:   Supine in bed  Bed in low position  Caregiver at bedside  Call light within reach  RN notified  Side rails x 2     COMMUNICATION/EDUCATION:   The patient?s plan of care was discussed with: Registered Nurse. Patient was educated regarding her deficit(s) of impaired gait as this relates to her diagnosis of CVA. She demonstrated Fair understanding as evidenced by family reinforcing. Patient and/or family was verbally educated on the BE FAST acronym for signs/symptoms of CVA and TIA. BE FAST literature was given to the patient and her caregivers. Patient was seen in the ED and in the process of being transferred to ICU. All questions answered with patient caregiver indicating understanding. Per caregiver report, patient has mild memory impairment at baseline. Fall prevention education was provided and the patient/caregiver indicated understanding., Patient/family have participated as able in goal setting and plan of care. and Patient/family agree to work toward stated goals and plan of care.     Thank you for this referral.  Petr Reyes, PT   Time Calculation: 34 mins

## 2019-05-06 NOTE — PROGRESS NOTES
PCCM    Pt admitted to ICU for management of CVA    79 yo with h/o afib on xarelto s/p fall this am with L sided weakness and L facial droop.   Initial head CT without acute process  Not a tpa candidate (on xarelto)  CTA - Occlusion of distal M2 division of R MCA   NIS aware and plan for medical management with permissive Htn  Facial droop has reportedly improved    To be seen by neurology  Not currently on any vasoactive drips  In ICU for frequent neuro checks    Will see formally if needed    Fabian Bach MD

## 2019-05-06 NOTE — PROGRESS NOTES
Speech Pathology bedside swallow evaluation/discharge  Patient: Germán Sanchez (11 y.o. female)  Date: 5/6/2019  Primary Diagnosis: CVA (cerebral vascular accident) [facility]) [I63.9]       Precautions:   Fall    ASSESSMENT :  Based on the objective data described below, the patient presents with no oral or pharyngeal dysphagia. Timely and complete mastication, timely swallow initiation and functional hyolaryngeal elevation/excursion via palpation. Per family report, mental status is at baseline with some memory loss PTA. Skilled acute therapy provided by a speech-language pathologist is not indicated at this time. PLAN :  Recommendations:  --regular diet. No further SLP needs. Discharge Recommendations: None     SUBJECTIVE:   Patient stated I need to get to the dentist and fix these teeth.     OBJECTIVE:     Past Medical History:   Diagnosis Date    Atrial fibrillation ([facility] [address] 2007    Hypercholesterolemia     Hypertension     Osteoarthritis 2010    Thromboembolus ([facility] [address]     Thyroid disease      Past Surgical History:   Procedure Laterality Date    HX HEENT      cataract sx    HX HIP FRACTURE TX  2013    right hip 1989 left hip 2013    HX HIP REPLACEMENT  1989    HX PACEMAKER  2008     Prior Level of Function/Home Situation:   Home Situation  Home Environment: Private residence  # Steps to Enter: 1  Wheelchair Ramp: Yes  One/Two Story Residence: Two story  # of Interior Steps: 14  Lift Chair Available: Yes  Living Alone: No  Support Systems: Family member(s)  Patient Expects to be Discharged to[de-identified] Private residence  Current DME Used/Available at Home: Grab bars, Lift chair, Shower chair, Walker, rollator, Walker, rolling, Wheelchair  Tub or Shower Type: Tub/Shower combination  Diet prior to admission: regular  Current Diet:  NPO   Cognitive and Communication Status:  Neurologic State: Alert  Orientation Level: Oriented X4  Cognition: Follows commands, Appropriate for age attention/concentration  Perception: Appears intact  Perseveration: No perseveration noted  Safety/Judgement: Awareness of environment  Oral Assessment:  Oral Assessment  Labial: No impairment  Dentition: Natural;Limited; Extractions  Oral Hygiene: moist mucosa   Lingual: No impairment  Mandible: No impairment  P.O. Trials:  Patient Position: upright in bed   Vocal quality prior to P.O.: No impairment  Consistency Presented: Thin liquid;Puree; Solid  How Presented: Self-fed/presented;SLP-fed/presented;Spoon;Straw;Successive swallows     Bolus Acceptance: No impairment  Bolus Formation/Control: No impairment     Propulsion: No impairment  Oral Residue: None  Initiation of Swallow: No impairment  Laryngeal Elevation: Functional  Aspiration Signs/Symptoms: None  Pharyngeal Phase Characteristics: No impairment, issues, or problems              Oral Phase Severity: No impairment  Pharyngeal Phase Severity : No impairment  NOMS:   The NOMS functional outcome measure was used to quantify this patient's level of swallowing impairment. Based on the NOMS, the patient was determined to be at level 7 for swallow function     NOMS Swallowing Levels:  Level 1 (CN): NPO  Level 2 (CM): NPO but takes consistency in therapy  Level 3 (CL): Takes less than 50% of nutrition p.o. and continues with nonoral feedings; and/or safe with mod cues; and/or max diet restriction  Level 4 (CK): Safe swallow but needs mod cues; and/or mod diet restriction; and/or still requires some nonoral feeding/supplements  Level 5 (CJ): Safe swallow with min diet restriction; and/or needs min cues  Level 6 (CI): Independent with p.o.; rare cues; usually self cues; may need to avoid some foods or needs extra time  Level 7 ([address] [place] [address]): Independent for all p.o.  KEN. (2003). National Outcomes Measurement System (NOMS): Adult Speech-Language Pathology User's Guide.        Pain:  Pain Scale 1: Numeric (0 - 10)  Pain Intensity 1: 0     After treatment:   [] Patient left in no apparent distress sitting up in chair  [x] Patient left in no apparent distress in bed  [x] Call bell left within reach  [x] Nursing notified  [x] Caregiver present  [] Bed alarm activated    COMMUNICATION/EDUCATION:   The patients plan of care including findings, recommendations, and recommended diet changes were discussed with: Registered Nurse and Physician. Patient was educated regarding Her functional swallow as this relates to Her diagnosis of CVA. She demonstrated Good understanding as evidenced by verbalization of understanding. [x] Patient/family have participated as able and agree with findings and recommendations. [] Patient is unable to participate in plan of care at this time. Thank you for this referral.  Fiordaliza Geller M.CD.  CCC-SLP   Time Calculation: 16 mins

## 2019-05-06 NOTE — PROGRESS NOTES
Orders received, chart reviewed and patient evaluated by physical therapy. Recommend patient to discharge to home with home health pending progress with skilled acute care physical therapy. Recommend with nursing patient to complete as able in order to maintain strength, endurance and independence: OOB to chair 3x/day with Min Assist, assistance x1 and ambulating to the bathroom with rolling walker and Contact Guard/ Min Assistance. Thank you for your assistance. Full evaluation to follow.

## 2019-05-06 NOTE — PROGRESS NOTES
1430: Pt A&OX4, room air, resting, SBP goal 120-200.     1500: Speech at bedside- pt passed screening- regular diet ordered. 1515: Dr. Mahamed Peterson at bedside- orders received for Q2 hr neurochecks and daily 81mg aspirin. 1550: Dr. Mahamed Peterson made aware pt cannot have MRI due to pacemaker. Bedside and Verbal shift change report given to  RN (oncoming nurse) by Richard Dyson RN (offgoing nurse). Report included the following information SBAR.

## 2019-05-06 NOTE — PROGRESS NOTES
Spiritual Care Assessment/Progress Note  [address]. [address]      NAME: Mary العلي      MRN: [id number]  AGE: 80 y.o. SEX: female  Anglican Affiliation: Non Amish   Language: English     5/6/2019           Spiritual Assessment begun in [address] [facility] DEP through conversation with:         []Patient        [] Family    [] Friend(s)        Reason for Consult: Crisis     Spiritual beliefs: (Please include comment if needed)     [] Identifies with a mil tradition:         [] Supported by a mil community:            [] Claims no spiritual orientation:           [] Seeking spiritual identity:                [] Adheres to an individual form of spirituality:           [x] Not able to assess:                           Identified resources for coping:      [] Prayer                               [] Music                  [] Guided Imagery     [] Family/friends                 [] Pet visits     [] Devotional reading                         [] Unknown     [] Other:                                               Interventions offered during this visit: (See comments for more details)                Plan of Care:     [] Support spiritual and/or cultural needs    [] Support AMD and/or advance care planning process      [] Support grieving process   [] Coordinate Rites and/or Rituals    [] Coordination with community clergy   [] No spiritual needs identified at this time   [] Detailed Plan of Care below (See Comments)  [] Make referral to Music Therapy  [] Make referral to Pet Therapy     [] Make referral to Addiction services  [] Make referral to [facility]  [] Make referral to Spiritual Care Partner  [] No future visits requested        [] Follow up visits as needed     Comments: Responded to Code Stroke called for Ms Leila Peterson in ED. Patient was in [address] when  arrived and no family was available at that time. Please notify chaplains if support desired. : Rev. Roxie Hernandez.  Pema Corey; [facility], to contact Spiritual Care Services call: [phone]

## 2019-05-06 NOTE — PROGRESS NOTES
Rivaroxaban Monitoring  Day of therapy: 1  Indication: Afib  Dose:20mg po daily (home dose)  Significant DIs:None   (use not recommended with carbamazepine, phenytoin, rifampin, Chao's Wort, ketoconazole, itraconazole, lopinavir/ritonavir, ritonavir, conivaptan, phenobarbital)    Recent Labs     05/06/19  1033   HGB 7.8*   INR 1.4*   CREA 1.13*        Est CrCl: 31.6 ml/min  Plan: Change to 15mg po daily with dinner for CrCl 15-50 ml/min  Rivaroxaban Tip Sheet     (CLOSE I-vent after review and verification)

## 2019-05-06 NOTE — CONSULTS
Consult dictated. 35-year-old female with atrial fibrillation on Xarelto who was admitted after a fall and left-sided weakness. Clinically her weakness is resolved but she has left-sided neglect. Suspect a right MCA infarct. Maintain systolic blood pressure between 120 and 180. Every 2 hours neurochecks. Start baby aspirin along with Xarelto and statin. MRI brain.    Zarina Foreman MD

## 2019-05-06 NOTE — PROGRESS NOTES
Clinical Pharmacy Note: Stress Ulcer Prophylaxis Protocol (Non-ICU patients)    Please note that stress ulcer prophylaxis is not indicated for patients outside of the ICU. Ivon Be has an order for pantoprazole that has been ordered with an indication of ?stress ulcer prophylaxis. ? No other indication for pantoprazole has been noted in the patient?s chart and therefore it has been discontinued per the [facility] Stress Ulcer Prophylaxis Protocol for eligible patients. Please call with any questions.     Signed By: Narayan Peralta, PHARMD     May 6, 2019

## 2019-05-06 NOTE — ROUTINE PROCESS
TRANSFER - IN REPORT:    Verbal report received from [facility]) on Tristen King  being received from [facility] (unit) for routine progression of care      Report consisted of patients Situation, Background, Assessment and   Recommendations(SBAR). Information from the following report(s) SBAR was reviewed with the receiving nurse. Opportunity for questions and clarification was provided. Assessment completed upon patients arrival to unit and care assumed.

## 2019-05-07 ENCOUNTER — TELEPHONE (OUTPATIENT)
Dept: INTERNAL MEDICINE CLINIC | Age: 84
End: 2019-05-07

## 2019-05-07 LAB
ATRIAL RATE: 78 BPM
CALCULATED P AXIS, ECG09: 3 DEGREES
CALCULATED R AXIS, ECG10: 4 DEGREES
CALCULATED T AXIS, ECG11: -85 DEGREES
CHOLEST SERPL-MCNC: 136 MG/DL
DIAGNOSIS, 93000: NORMAL
ECHO AO ROOT DIAM: 3.13 CM
ECHO AV AREA PEAK VELOCITY: 1.6 CM2
ECHO AV PEAK GRADIENT: 10.1 MMHG
ECHO AV PEAK VELOCITY: 158.78 CM/S
ECHO LA MAJOR AXIS: 5.08 CM
ECHO LA TO AORTIC ROOT RATIO: 1.62
ECHO LV E' LATERAL VELOCITY: 7.1 CM/S
ECHO LV E' SEPTAL VELOCITY: 5.17 CM/S
ECHO LV INTERNAL DIMENSION DIASTOLIC: 4.59 CM (ref 3.9–5.3)
ECHO LV INTERNAL DIMENSION SYSTOLIC: 4.02 CM
ECHO LV IVSD: 1.11 CM (ref 0.6–0.9)
ECHO LV MASS 2D: 195.5 G (ref 67–162)
ECHO LV MASS INDEX 2D: 104.3 G/M2 (ref 43–95)
ECHO LV POSTERIOR WALL DIASTOLIC: 0.98 CM (ref 0.6–0.9)
ECHO LVOT DIAM: 1.97 CM
ECHO LVOT PEAK GRADIENT: 2.9 MMHG
ECHO LVOT PEAK VELOCITY: 84.41 CM/S
ECHO MV A VELOCITY: 39.5 CM/S
ECHO MV AREA PHT: 6.3 CM2
ECHO MV E DECELERATION TIME (DT): 119.6 MS
ECHO MV E VELOCITY: 104.87 CM/S
ECHO MV E/A RATIO: 2.65
ECHO MV E/E' LATERAL: 14.77
ECHO MV E/E' RATIO (AVERAGED): 17.53
ECHO MV E/E' SEPTAL: 20.28
ECHO MV PRESSURE HALF TIME (PHT): 34.7 MS
ECHO PV MAX VELOCITY: 73.92 CM/S
ECHO PV PEAK GRADIENT: 2.2 MMHG
ECHO RV INTERNAL DIMENSION: 2.83 CM
ECHO RV TAPSE: 1.03 CM (ref 1.5–2)
ECHO TV REGURGITANT MAX VELOCITY: 361.03 CM/S
ECHO TV REGURGITANT PEAK GRADIENT: 52.1 MMHG
EST. AVERAGE GLUCOSE BLD GHB EST-MCNC: 114 MG/DL
GLUCOSE BLD STRIP.AUTO-MCNC: 115 MG/DL (ref 65–100)
HBA1C MFR BLD: 5.6 % (ref 4.2–6.3)
HDLC SERPL-MCNC: 55 MG/DL
HDLC SERPL: 2.5 {RATIO} (ref 0–5)
LDLC SERPL CALC-MCNC: 64.4 MG/DL (ref 0–100)
LIPID PROFILE,FLP: NORMAL
P-R INTERVAL, ECG05: 80 MS
Q-T INTERVAL, ECG07: 350 MS
QRS DURATION, ECG06: 60 MS
QTC CALCULATION (BEZET), ECG08: 399 MS
SERVICE CMNT-IMP: ABNORMAL
TRIGL SERPL-MCNC: 83 MG/DL (ref ?–150)
VENTRICULAR RATE, ECG03: 78 BPM
VLDLC SERPL CALC-MCNC: 16.6 MG/DL

## 2019-05-07 PROCEDURE — 74011250636 HC RX REV CODE- 250/636: Performed by: HOSPITALIST

## 2019-05-07 PROCEDURE — 97535 SELF CARE MNGMENT TRAINING: CPT

## 2019-05-07 PROCEDURE — 83036 HEMOGLOBIN GLYCOSYLATED A1C: CPT

## 2019-05-07 PROCEDURE — 36415 COLL VENOUS BLD VENIPUNCTURE: CPT

## 2019-05-07 PROCEDURE — 74011250637 HC RX REV CODE- 250/637: Performed by: HOSPITALIST

## 2019-05-07 PROCEDURE — 74011250636 HC RX REV CODE- 250/636: Performed by: INTERNAL MEDICINE

## 2019-05-07 PROCEDURE — 97165 OT EVAL LOW COMPLEX 30 MIN: CPT

## 2019-05-07 PROCEDURE — 74011250637 HC RX REV CODE- 250/637: Performed by: INTERNAL MEDICINE

## 2019-05-07 PROCEDURE — 74011250637 HC RX REV CODE- 250/637: Performed by: PSYCHIATRY & NEUROLOGY

## 2019-05-07 PROCEDURE — 97116 GAIT TRAINING THERAPY: CPT

## 2019-05-07 PROCEDURE — 74011250637 HC RX REV CODE- 250/637: Performed by: NURSE PRACTITIONER

## 2019-05-07 PROCEDURE — 80061 LIPID PANEL: CPT

## 2019-05-07 PROCEDURE — 65660000000 HC RM CCU STEPDOWN

## 2019-05-07 PROCEDURE — 97530 THERAPEUTIC ACTIVITIES: CPT

## 2019-05-07 RX ORDER — PAROXETINE HYDROCHLORIDE 20 MG/1
20 TABLET, FILM COATED ORAL DAILY
Status: DISCONTINUED | OUTPATIENT
Start: 2019-05-07 | End: 2019-05-10 | Stop reason: HOSPADM

## 2019-05-07 RX ORDER — SODIUM CHLORIDE 9 MG/ML
75 INJECTION, SOLUTION INTRAVENOUS CONTINUOUS
Status: DISPENSED | OUTPATIENT
Start: 2019-05-07 | End: 2019-05-08

## 2019-05-07 RX ORDER — GUAIFENESIN 100 MG/5ML
100 SOLUTION ORAL
Status: DISCONTINUED | OUTPATIENT
Start: 2019-05-07 | End: 2019-05-10 | Stop reason: HOSPADM

## 2019-05-07 RX ADMIN — DOCUSATE SODIUM 100 MG: 100 CAPSULE, LIQUID FILLED ORAL at 08:17

## 2019-05-07 RX ADMIN — Medication 5 ML: at 14:00

## 2019-05-07 RX ADMIN — LEVOTHYROXINE SODIUM 25 MCG: 25 TABLET ORAL at 06:46

## 2019-05-07 RX ADMIN — QUETIAPINE FUMARATE 25 MG: 25 TABLET ORAL at 21:03

## 2019-05-07 RX ADMIN — FLUTICASONE PROPIONATE 2 SPRAY: 50 SPRAY, METERED NASAL at 08:17

## 2019-05-07 RX ADMIN — SODIUM CHLORIDE 75 ML/HR: 900 INJECTION, SOLUTION INTRAVENOUS at 18:00

## 2019-05-07 RX ADMIN — Medication 10 ML: at 06:00

## 2019-05-07 RX ADMIN — RIVAROXABAN 15 MG: 15 TABLET, FILM COATED ORAL at 17:44

## 2019-05-07 RX ADMIN — DOCUSATE SODIUM 100 MG: 100 CAPSULE, LIQUID FILLED ORAL at 17:44

## 2019-05-07 RX ADMIN — SODIUM CHLORIDE 75 ML/HR: 900 INJECTION, SOLUTION INTRAVENOUS at 06:14

## 2019-05-07 RX ADMIN — Medication 10 ML: at 22:00

## 2019-05-07 RX ADMIN — ACETAMINOPHEN 650 MG: 325 TABLET ORAL at 17:44

## 2019-05-07 RX ADMIN — ACETAMINOPHEN 650 MG: 325 TABLET ORAL at 11:34

## 2019-05-07 RX ADMIN — GUAIFENESIN 100 MG: 200 SOLUTION ORAL at 17:44

## 2019-05-07 RX ADMIN — PAROXETINE HYDROCHLORIDE 20 MG: 20 TABLET, FILM COATED ORAL at 11:34

## 2019-05-07 RX ADMIN — ATORVASTATIN CALCIUM 80 MG: 40 TABLET, FILM COATED ORAL at 21:03

## 2019-05-07 RX ADMIN — ASPIRIN 81 MG CHEWABLE TABLET 81 MG: 81 TABLET CHEWABLE at 17:44

## 2019-05-07 NOTE — PROGRESS NOTES
[address] is a 80 y.o. female with atrial fibrillation, who is on anticoagulation with Xarelto. She is admitted with left-sided weakness/neglect suspected to be related to right MCA stroke. CTA showed an abrupt occlusion of the posterior division of the right middle cerebral artery, which could have been cardioembolic. Cannot have MRI as she has a pacer    Clinically remained stable. Has been restarted on Xarelto and aspirin. There is a concern that she may not have been compliant with her medications. EXAM  Exam:  Visit Vitals  /77   Pulse 93   Temp 97.8 °F (36.6 °C)   Resp 28   Ht 5' 4\" (1.626 m)   Wt 84.8 kg (186 lb 15.2 oz)   SpO2 99%   BMI 32.09 kg/m²     Gen:  CV: RRR  Lungs: non labored breathing  Abd: non distending  Neuro: A&O x 3, no dysarthria or aphasia  CN II-XII: PERRL, EOMI, face symmetric, tongue/palate midline  Motor: strength 5/5 all four ext  Sensory: intact to LT, left-sided neglect  Gait: Deferred    LABS/ IMAGING  Lab Results   Component Value Date/Time    Cholesterol, total 136 05/07/2019 02:07 AM    HDL Cholesterol 55 05/07/2019 02:07 AM    LDL,Direct 85 01/23/2019 12:48 PM    LDL, calculated 64.4 05/07/2019 02:07 AM    VLDL, calculated 16.6 05/07/2019 02:07 AM    Triglyceride 83 05/07/2019 02:07 AM    CHOL/HDL Ratio 2.5 05/07/2019 02:07 AM     CT Results (most recent):  Results from Hospital Encounter encounter on 05/06/19   CT CODE NEURO PERF W CBF    Narrative EXAM:  CT CODE NEURO PERF W CBF  INDICATION:  LEFT FACIAL DROOP AND LEFT SIDED WEAKNESS  TECHNIQUE:  During rapid bolus infusion 40 mL Isovue-370 CT perfusion imaging of the head  was performed with color coded mapping reconstructions of time to peak, blood  volume, blood flow, mean transit time and T-Max. CT dose reduction was achieved  through use of a standardized protocol tailored for this examination and  automatic exposure control for dose modulation.    COMPARISON: CT head  FINDINGS:  There is asymmetric abnormal decreased blood flow and perfusion in the right  posterior superior temporal lobe including posterior frontal and parietal lobes. Impression IMPRESSION: Abnormal decreased perfusion right posterior frontal/parietal lobes. 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   There is a concern that she may not have been compliant with her medications including Xarelto  Continue with Xarelto and baby aspirin  Continue PT/OT  May transfer out of ICU and initiate discharge Aurelia Hilliard MD

## 2019-05-07 NOTE — PROGRESS NOTES
Reason for Admission: Presented to ED s/p a GLF resulting in distal M2 posterior division in the right MCA. CT Perfusion shows abnormal decreased perfusion in the right posterior frontal/parietal lobes. Patient with left sided weakness and left facial droop. Patient takes Xarelto for A fib. RRAT Score:   14               Do you (patient/family) have any concerns for transition/discharge? Not at this time                  Plan for utilizing home health:   Yes, family prefers [facility] which she has used previously    Current Advanced Directive/Advance Care Plan:  Per family Son Teri Ruiz and Sorin Elizabeth she MPOA, requested they bring in the paperwork to be scanned into the EMR    Likelihood of readmission? Moderate             Transition of Care Plan:    Home with home health    Care manager met with patient and her children. Patient lives with her son Deena Barraza and his wife in a 2 level home. There is a stair lift and patient uses a walker. Patient's  Patient has used [facility] in the past and prefers to use them again. Family confirmed her PCP is Dr Dianna Aviles and she sees her every 3-4 months and uses the [facility] as her pharmacy. Confirmed patient's insurance to be Medicare A,B with a [employer]. Will [facility] once home health orders received. Care manager continuing to follow.    Hernesto Sanchez RN,CRM    Care Management Interventions  PCP Verified by CM: Yes(Dr Dianna Aviles)  Palliative Care Criteria Met (RRAT>21 & CHF Dx)?: No  Transition of Care Consult (CM Consult): [address]: No  Physical Therapy Consult: Yes  Occupational Therapy Consult: Yes  Speech Therapy Consult: Yes  Current Support Network: Relative's Home(Williams Schmitz [phone], Sorin Elizabeth [phone] Share MPOA)  Confirm Follow Up Transport: Family  Discharge Location  Discharge Placement: Home with home health

## 2019-05-07 NOTE — PROGRESS NOTES
Hospitalist Progress Note  Tabitha Hitchcock MD  Answering service: [phone] OR [phone] from in house phone      Date of Service:  2019  NAME:  Nathaniel Peterson                                                         :  1924                                               MRN:  [id number]    Brief admission summary    Coy Aaron is a 80 y.o.  female with h/o afib,hypercholesterolemia,hypertension,PE,DVT,brought to ED by EMS because of GLF fall. Subjective/interval history    -Seen up in chair in ICU. Family in the room. -Patient has cough. No fever or chills. Assessment and plan        Acute CVA (cerebrovascular accident) resulting in fall  -CTA c/w Abrupt occlusion distal M2 posterior division right middle cerebral artery.  -continue Xarelto  -Permissive hypertension.  -Lipitor  -Neurology and NIS on board  -Echo pending     Pulmonary embolism/DVT  -On xarelto     . Mixed hyperlipidemia   -On statin     Atrial fibrillation    -On xarelto   -Metoprolol on hold for permissive hypertension. Hypothyroid    -Resume synthroid     Scalp laceration   -Wound care    Cough w/o accompanying sxs: treat symptomatically and          Patient stable for 24 hours. BP stable. Transfer to [facility].changes neuro checks to q4hrly. DVT prophylaxis:scd  Disposition:TBD,anticipate home  Plan of care discussed with:Patient and family. Current facility administered and prior to admit medications reviewed. x         Review of Systems:  A comprehensive review of systems was negative except for that written in the HPI. PHYSICAL EXAM:  O:  Visit Vitals  /77   Pulse 93   Temp 97.8 °F (36.6 °C)   Resp 28   Ht 5' 4\" (1.626 m)   Wt 84.8 kg (186 lb 15.2 oz)   SpO2 99%   BMI 32.09 kg/m²       General Appears comfortable,not in distress. HEENT Head atraumatic. Unicteric sclera. Moist buccal mucosa. PERRLA     CVS RRR, no MRG, no JVD, no peripheral edema       Chest No deformity  No accessory muscle use  Vesicular air entry symmetrically  No wheezing,ronchi or crepitations       Abdomen &Pelvis Not distended. Normoactive. Soft. Non tender. No hepatomegally       Genitourinary  No CVA or suprapubic tenderness       Musculoskeletal No edema, deformity of extremities,back       Skin No erythema,rash,depigmentation       Neurology Mental status: alert and oriented x4  Power LUE 4+/5  Sensation : diminished light touch and cold on the left     Psychiatry            Intake/Output Summary (Last 24 hours) at 5/7/2019 1846  Last data filed at 5/7/2019 1200  Gross per 24 hour   Intake 1750 ml   Output 750 ml   Net 1000 ml          Recent labs & imaging reviewed:    Problem List as of 5/7/2019 Date Reviewed: 5/6/2019          Codes Class Noted - Resolved    * (Principal) Acute CVA (cerebrovascular accident) [facility]) ICD-10-CM: I63.9  ICD-9-CM: 434.91  5/6/2019 - Present        Fall ICD-10-CM: W19. Daune Koyanagi  ICD-9-CM: S182.7  5/6/2019 - Present        CVA (cerebral vascular accident) [facility]) ICD-10-CM: I63.9  ICD-9-CM: 434.91  5/6/2019 - Present        Scalp laceration ICD-10-CM: S01. 01XA  ICD-9-CM: 873.0  5/6/2019 - Present        Chronic cough ICD-10-CM: R05  ICD-9-CM: 786.2  8/14/2018 - Present        Personal history of pulmonary embolism ICD-10-CM: N06.547  ICD-9-CM: V12.55  8/14/2018 - Present        Gait instability ICD-10-CM: R26.81  ICD-9-CM: 781.2  4/6/2018 - Present        ACP (advance care planning) ICD-10-CM: Z71.89  ICD-9-CM: V65.49  3/30/2017 - Present    Overview Signed 3/30/2017 12:05 PM by Darnell Canales RN     Patient has form completed at home and will have POA bring in office to scan into her chart             TIA (transient ischemic attack) ICD-10-CM: G45.9  ICD-9-CM: 435.9  11/2/2016 - Present    Overview Signed 11/4/2016  5:16 PM by Briana Ding RN     Pt seen and diagnosed at [facility]             Pulmonary embolism [facility]) ICD-10-CM: I26.99  ICD-9-CM: 415.19  6/18/2014 - Present        DVT (deep venous thrombosis) ([facility] [address] ICD-10-CM: I82.409  ICD-9-CM: 453.40  6/18/2014 - Present        Mixed hyperlipidemia ICD-10-CM: E78.2  ICD-9-CM: 272.2  6/18/2014 - Present        Atrial fibrillation (H[facility]) ICD-10-CM: I48.91  ICD-9-CM: 427.31  6/18/2014 - Present        HTN (hypertension) ICD-10-CM: I10  ICD-9-CM: 401.9  6/18/2014 - Present        DJD (degenerative joint disease) ICD-10-CM: M19.90  ICD-9-CM: 715.90  6/18/2014 - Present        Hypothyroid ICD-10-CM: E03.9  ICD-9-CM: 244.9  6/18/2014 - Present                Latonia Mckeon MD  Internal Medicine  Date of Service: 5/7/2019

## 2019-05-07 NOTE — PROGRESS NOTES
1930: Bedside and Verbal shift change report given to [address] (oncoming nurse) by Kim Jasso (offgoing nurse). Report included the following information SBAR, Kardex, ED Summary, Intake/Output, MAR, Recent Results, Cardiac Rhythm paced and Alarm Parameters . 2000: Neuro check performed. Pt oriented x 4, pupils equal and reactive, follows commands, strong  with both hands but needs prompting on left side d/t neglect, intermittent sensory deficit noted on left side upper and lower extremities. 0030: Pt up to bedside commode, more unsteady than earlier, daughter noticed she was leaning more to left side on commode w/ difficulty sitting straight. Neuro PA notified; no new orders received. Shift summary: Pt oriented x 4, follows commands consistently on right and with prompting on left, 1 PIV in place with maintenance fluids infusing, up to bedside commode w/ assistance, pure wick in place, family at bedside. 0730: Bedside and Verbal shift change report given to [facility] (oncoming nurse) by [address] (offgoing nurse). Report included the following information SBAR, Kardex, ED Summary, Intake/Output, MAR, Recent Results, Cardiac Rhythm paced and Alarm Parameters .

## 2019-05-07 NOTE — PROGRESS NOTES
Problem: Falls - Risk of  Goal: *Absence of Falls  Description  Document Clide Failing Fall Risk and appropriate interventions in the flowsheet.   Outcome: Progressing Towards Goal

## 2019-05-07 NOTE — PROGRESS NOTES
Problem: Self Care Deficits Care Plan (Adult)  Goal: *Acute Goals and Plan of Care (Insert Text)  Description  Occupational Therapy Goals  Initiated 5/7/2019  1. Patient will perform grooming standing at sink with supervision/set-up within 7 day(s). 2.  Patient will perform lower body dressing with supervision/set-up within 7 day(s). 3.  Patient will perform bathing with supervision/set-up within 7 day(s). 4.  Patient will perform toilet transfers with supervision/set-up within 7 day(s). 5.  Patient will perform all aspects of toileting with supervision/set-up within 7 day(s). 6.  Patient will participate in upper extremity therapeutic exercise/activities with supervision/set-up for 10 minutes within 7 day(s). 7.  Patient will utilize energy conservation techniques during functional activities with verbal cues within 7 day(s). Outcome: Progressing Towards Goal    OCCUPATIONAL THERAPY EVALUATION  Patient: Allison Caruso (14 y.o. female)  Date: 5/7/2019  Primary Diagnosis: CVA (cerebral vascular accident) [facility]) [I63.9]        Precautions:  Fall    ASSESSMENT :  Based on the objective data described below, patient presents with Setup upper body ADLs, Contact guard assistance lower body ADLs, Stand-by assistance for supine-sit, and Contact guard assistance for sit-stand/ pivot bed-chair s/p admission for R MCA occulusion/ suspected infarct. Patient reports intact bilateral sensation and was able to detect LUE touch with vision occluded but demonstrated difficulty discriminating LUE hand vs elbow vs shoulder. BUE AROM and strength equal.  Scored 64/66 on Fugl Ramirez UE motor assessment, only points deducted for inability to perform L tripod grasp. Patient and daughter receptive to [facility] education on signs/ symptoms of CVA and provided with handout.     The following are barriers to ADL independence while in acute care:   - Cognitive and/or behavioral: alert and Ox4 but periodic confusion, impaired STM at baseline  - Medical condition: ROM, strength, functional endurance, standing balance and coordination      Patient will benefit from skilled acute intervention to address the above impairments. Patient?s rehabilitation potential is considered to be Good    Prior Level of Function/Home Situation:  Performs ADLs with SPV to mod-I, only assistance for washing back. Uses a tub transfer bench. Supervision with mobility throughout the home (ambulation with rolling walker upstairs, rollator walker on the main level of the home and in the community; transfers; power stair lift to get to her bedroom upstairs; ramp to enter the home). Performs  Lives with her son and daughter in law. Caregivers prepare meals, manage medications, and assist with appointments. Daughter in the ED with her today reports patient has mild memory impairment and is being referred by her PCP to a neurologist for this. PLAN :  Recommendations and Planned Interventions: self care training, functional mobility training, therapeutic exercise, balance training, therapeutic activities, cognitive retraining, endurance activities, patient education, home safety training and family training/education    Frequency/Duration: Patient will be followed by occupational therapy 5 times a week to address goals. Discharge recommendations: Anticipate d/c home with [facility] and family support pending progress. SUBJECTIVE:   Patient stated ? I'm feeling okay. ?    OBJECTIVE DATA SUMMARY:   HISTORY:   Past Medical History:   Diagnosis Date    Atrial fibrillation ([facility] [place] [address] 2007    Hypercholesterolemia     Hypertension     Osteoarthritis 2010    Thromboembolus [facility])     Thyroid disease      Past Surgical History:   Procedure Laterality Date    HX HEENT      cataract sx    HX HIP FRACTURE TX  2013    right hip 1989 left hip 2013    HX HIP REPLACEMENT  1989    HX PACEMAKER  2008     Expanded or extensive additional review of patient history:     Home Situation  Home Environment: Private residence  # Steps to Enter: 1  Wheelchair Ramp: Yes  One/Two Story Residence: Two story, live on 1st floor  # of Interior Steps: 14  Lift Chair Available: Yes  Living Alone: No  Support Systems: Family member(s)  Patient Expects to be Discharged to[de-identified] Private residence  Current DME Used/Available at Home: Walker, rolling  Tub or Shower Type: Tub/Shower combination    Hand dominance: Right    EXAMINATION OF PERFORMANCE DEFICITS:  Cognitive/Behavioral Status:  Neurologic State: Alert  Orientation Level: Oriented X4  Cognition: Follows commands  Perception: Appears intact  Perseveration: No perseveration noted  Safety/Judgement: Decreased insight into deficits    Skin: visible skin appears intact    Edema: none noted    Hearing: Auditory  Auditory Impairment: None    Vision/Perceptual:    Tracking: Able to track stimulus in all quadrants w/o difficulty              Visual Fields: (able to detect stimuli in all fields)  Diplopia: No    Acuity: Within Defined Limits;Able to read employee name badge without difficulty; Able to read clock/calendar on wall without difficulty    Corrective Lenses: Glasses    Range of Motion:    AROM: Within functional limits(BUE)                         Strength:    Strength: Within functional limits                Coordination:  Coordination: Generally decreased, functional  Fine Motor Skills-Upper: Right Intact; Left Impaired    Gross Motor Skills-Upper: Right Intact; Left Intact    Tone & Sensation:    Tone: Normal  Sensation: Intact                      Balance:  Sitting: Intact  Standing: Impaired  Standing - Static: Good  Standing - Dynamic : Fair    Functional Mobility and Transfers for ADLs:  Bed Mobility:  Supine to Sit: Stand-by assistance    Transfers:  Sit to Stand: Contact guard assistance  Stand to Sit: Contact guard assistance  Bed to Chair: Contact guard assistance    ADL Assessment:  Feeding: Setup(inferred)    Oral Facial Hygiene/Grooming: Setup(inferred seated)    Bathing: Contact guard assistance(inferred)    Upper Body Dressing: Setup(inferred)    Lower Body Dressing: Contact guard assistance(able to reach to feet on floor for LB access)    Toileting: Contact guard assistance(inferred)                ADL Intervention and task modifications:            Cognitive Retraining  Safety/Judgement: Decreased insight into deficits    Functional Measure:  Fugl-Ramirez Assessment of Motor Recovery after Stroke:     Reflex Activity  Flexors/Biceps/Fingers: Can be elicited  Extensors/Triceps: Can be elicited  Reflex Subtotal: 4    Volitional Movement Within Synergies  Shoulder Retraction: Full  Shoulder Elevation: Full  Shoulder Abduction (90 degrees): Full  Shoulder External Rotation: Full  Elbow Flexion: Full  Forearm Supination: Full  Shoulder Adduction/Internal Rotation: Full  Elbow Extension: Full  Forearm Pronation: Full  Subtotal: 18    Volitional Movement Mixing Synergies  Hand to Lumbar Spine: Full  Shoulder Flexion (0-90 degrees): Full  Pronation-Supination: Full  Subtotal: 6    Volitional Movement With Little or No Synergy  Shoulder Abduction (0-90 degrees): Full  Shoulder Flexion ( degrees): Full  Pronation/Supination: Full  Subtotal : 6    Normal Reflex Activity  Biceps, Triceps, Finger Flexors: Full  Subtotal : 2    Upper Extremity Total   Upper Extremity Total: 36    Wrist  Stability at 15 Degree Dorsiflexion: Full  Repeated Dorsiflexion/ Volar Flexion: Full  Stability at 15 Degree Dorsiflexion: Full  Repeated Dorsiflexion/ Volar Flexion: Full  Circumduction: Full  Wrist Total: 10    Hand  Mass Flexion: Full  Mass Extension: Full  Grasp A: Full  Grasp B: Full  Grasp C: None  Grasp D: Full  Grasp E: Full  Hand Total: 12    Coordination/Speed  Tremor: None  Dysmetria: None  Time: <1s  Coordination/Speed Total : 6    Total A-D  Total A-D (Motor Function): 64/66       This is a reliable/valid measure of arm function after a neurological event.  It has established value to characterize functional status and for measuring spontaneous and therapy-induced recovery; tests proximal and distal motor functions. Fugl-Ramirez Assessment - UE scores recorded between five and 30 days post neurologic event can be used to predict UE recovery at six months post neurologic event. Severe = 0-21 points   Moderately Severe = 22-33 points   Moderate = 34-47 points   Mild = 48-66 points  MESHA Sandoval, DEVONTE Gomez, & JOSUE Mccann (1992). Measurement of motor recovery after stroke: Outcome assessment and sample size requirements. Stroke, 23, pp. 0478-5549.   ------------------------------------------------------------------------------------------------------------------------------------------------------------------  MCID:  Stroke:   Maria Del Rosario cMkay et al, 2001; n = 171; mean age 79 (6) years; assessed within 16 (12) days of stroke, Acute Stroke)  FMA Motor Scores from Admission to Discharge   10 point increase in FMA Upper Extremity = 1.5 change in discharge FIM   10 point increase in FMA Lower Extremity = 1.9 change in discharge FIM  MDC:   Stroke:   Carlene Duckworth et al, 2008, n = 14, mean age = 59.9 (14.6) years, assessed on average 14 (6.5) months post stroke, Chronic Stroke)   FMA = 5.2 points for the Upper Extremity portion of the assessment       Occupational Therapy Evaluation Charge Determination   History Examination Decision-Making   LOW Complexity : Brief history review  LOW Complexity : 1-3 performance deficits relating to physical, cognitive , or psychosocial skils that result in activity limitations and / or participation restrictions  MEDIUM Complexity : Patient may present with comorbidities that affect occupational performnce.  Miniml to moderate modification of tasks or assistance (eg, physical or verbal ) with assesment(s) is necessary to enable patient to complete evaluation       Based on the above components, the patient evaluation is determined to be of the following complexity level: LOW   Pain:  Patient reports mild headache throughout session    Activity Tolerance:   Seated in chair: /81, HR 87    After treatment patient left:   Up in chair  Call light within reach  RN notified  Family at bedside     COMMUNICATION/EDUCATION:   The patient?s plan of care was discussed with: Physical Therapist and Registered Nurse. Patient and/or family was verbally educated on the BE FAST acronym for signs/symptoms of CVA and TIA. Provided with BEFAST handout. All questions answered with patient indicating good understanding. Home safety education was provided and the patient/caregiver indicated understanding., Patient/family have participated as able in goal setting and plan of care. and Patient/family agree to work toward stated goals and plan of care. This patient?s plan of care is appropriate for delegation to [facility].     Thank you for this referral.  Michelle Escamilla, OT  Time Calculation: 40 mins

## 2019-05-07 NOTE — PROGRESS NOTES
Problem: Mobility Impaired (Adult and Pediatric)  Goal: *Acute Goals and Plan of Care (Insert Text)  Description  Physical Therapy Goals  Initiated 5/6/2019  1. Patient will move from supine to sit and sit to supine , scoot up and down and roll side to side in bed with modified independence within 7 day(s). 2.  Patient will transfer from bed to chair and chair to bed with supervision/set-up using the least restrictive device within 7 day(s). 3.  Patient will perform sit to stand with supervision/set-up within 7 day(s). 4.  Patient will ambulate with supervision/set-up for 150 feet with the least restrictive device within 7 day(s). Outcome: Progressing Towards Goal     PHYSICAL THERAPY TREATMENT  Patient: Jose Luis Aguayo (88 y.o. female)  Date: 5/7/2019  Diagnosis: CVA (cerebral vascular accident) [facility]) [I63.9] Acute CVA (cerebrovascular accident) [facility])       Precautions: Fall  Chart, physical therapy assessment, plan of care and goals were reviewed. ASSESSMENT:  Based on the objective data described below, the patient presents with Contact guard assistance level overall for transfers. Gait training completed at Minimum assistance, 150 feet and using a rolling walker. PATIENT NOTED TO DEVELOP AFIB AND HAD SIGNIFICANT TACHYCARDIA INTO 144 BPM WITH WALKING. Please monitor with portable cardiac monitor as patient is mostly asymptomatic. Performed standing rest break until HR decreased, approx 1 min. Pt performed walk with decreased attention to task and had not noted any symptoms.  P  The following are barriers to independence while in acute care:   -Cognitive and/or behavioral: attention to task, command following, processing, initiation, sequencing, safety awareness, insight into deficits, insight into abilities and fear of falling  -Medical condition: strength, functional reach, functional endurance, standing balance, precautions and medical history    -Other:       Prior level of function: MOD I PLAN:  Patient continues to benefit from skilled intervention to address the above impairments. Continue treatment per established plan of care. Recommend with staff: up ad raissa to chair with assistance  Recommend next PT session: further gait training  Discharge recommendations: Home health (to increase independence and safety)  If above is not an option then recommend: Rehab at skilled nursing facility (SNF) (to regain functional baseline patient requires rehab)    Patient's barriers to discharging home, in addition to above impairments: family availability to assist  history of falls  entry and exit into the home  family availability for education/training to then follow up at home  level of physical assist required to maintain patient safety. Equipment recommendations for successful discharge (if) home: rolling walker       SUBJECTIVE:   Patient stated ? I need to use the bathroom, but I cant seem to. ? notified RN    OBJECTIVE DATA SUMMARY:   Critical Behavior:  Neurologic State: Alert  Orientation Level: Oriented X4  Cognition: Follows commands  Safety/Judgement: Decreased insight into deficits  Functional Mobility Training:  Bed Mobility:     Supine to Sit: Stand-by assistance              Transfers:  Sit to Stand: Contact guard assistance  Stand to Sit: Contact guard assistance        Bed to Chair: Contact guard assistance                    Balance:  Sitting: Intact  Standing: Impaired; With support  Standing - Static: Fair;Constant support  Standing - Dynamic : Fair;Constant support  Ambulation/Gait Training:  Distance (ft): 150 Feet (ft)     Ambulation - Level of Assistance: Contact guard assistance;Assist x2;Minimal assistance        Gait Abnormalities: Other;Decreased step clearance        Base of Support: Widened     Speed/Sheryl: Shuffled;Pace decreased (<100 feet/min); Fluctuations                 Pain:  Pre treatment: 0 /10   During treatment: 0/10  Post treatment:  0/10   Location: Description:   Aggravating factors: Activity Tolerance:   Good and requires rest breaks  Please refer to the flowsheet for vital signs taken during this treatment.     After treatment patient left:   Up in chair  Caregiver at bedside  Call light within reach  RN notified     COMMUNICATION/COLLABORATION:   The patient?s plan of care was discussed with: Registered Nurse    Melvin Canas, PT   Time Calculation: 40 mins

## 2019-05-07 NOTE — CONSULTS
[address]    Name:  David Anthony  MR#:  [id number]  :  1924  ACCOUNT #:  [de-identified]  DATE OF SERVICE:  2019      REQUESTING PHYSICIAN:  Dr. Jarrell Xiong EVALUATION:  Stroke. HISTORY OF PRESENT ILLNESS:  The patient is a 66-year-old female with a history of atrial fibrillation, on Xarelto, hypercholesteremia, hypertension, PE, DVT, who was admitted after a fall. After the fall, she was noted to have left-sided facial droop and left-sided weakness. Paramedics were called and she was brought into the emergency department. She had emergent CTA done for code stroke, which showed occlusion of the distal M2 branch of the right middle cerebral artery. Due to improving symptoms, no intervention was done and she was admitted to the ICU for close monitoring. She continues to have some sensory deficit on the left side, but is otherwise awake, coherent, and moves all extremities. Denies any headache, changes in vision or speech. She has passed her swallow study. She does have memory issues at baseline. PAST MEDICAL HISTORY:  As mentioned above. SOCIAL HISTORY:  No history of smoking or alcohol use. Lives with her son. HOME MEDICATIONS:  Pravachol, Seroquel, Flonase, Paxil, Lopressor, Norvasc, Fentora. REVIEW OF SYSTEMS:  As mentioned above. FAMILY HISTORY:  Noncontributory. ALLERGIES:  NO KNOWN ALLERGIES. PHYSICAL EXAMINATION:  GENERAL:  The patient is sitting in bed, in no acute distress. VITAL SIGNS:  Blood pressure 180/86, temperature 97.7, pulse is 83. NEUROLOGIC:  She knows she is in the hospital and recognizes family members. She was able to tell me her date of birth. Follows all commands. Pupils are equal and reactive. Extraocular movements are full. Face is symmetric. Hearing is baseline. Muscle tone and bulk normal.  Strength normal in both upper and lower extremities. She has left-sided neglect.   Deep tendon reflexes are 2/2, symmetric. Toes downgoing. Sensory exam was unreliable. Gait exam is deferred. HEART:  Regular rate and rhythm. CHEST:  Clear. ABDOMEN:  Soft, nontender. Positive bowel sounds. EXTREMITIES:  No edema. LABORATORY DATA:  CBC with WBC 4.4, hemoglobin 7.8, hematocrit 27.0, platelets 299. Chemistry:  Sodium 140, potassium 4.0, BUN 19, creatinine 1.13, AST 14, ALT 8. CTA of the head and neck as discussed above. There was no significant stenosis seen in the internal carotid arteries. CT brain shows chronic white matter disease which is unchanged. ASSESSMENT AND PLAN:  A 59-year-old female with history of atrial fibrillation, who is on anticoagulation. She is admitted with what I suspect is a right middle cerebral artery distribution infarct causing left-sided neglect. She does have an abrupt occlusion of the posterior division of the right middle cerebral artery, which could have been cardioembolic. This would raise the question if Xarelto is adequate in preventing strokes from cardioembolism. She has been restarted on it and we will also add aspirin 81 mg daily. We will check MRI scan of the brain. Continue close monitoring, neuro checks q.2 h., and systolic blood pressure goal of 120-180. We will continue to follow along. Thank you for this consultation.       Alyse Greene MD      [id number]  D:  05/06/2019 15:56  T:  05/06/2019 16:00  JOB #:  [id number]

## 2019-05-07 NOTE — TELEPHONE ENCOUNTER
----- Message from Kimberly Andrea sent at 5/7/2019  3:34 PM EDT -----  Regarding: Dr. Olimpia Ball / telephone   Fiona Monterroso(daughter) called to notify Dr. Mercedes Antonio patient is in [address] [facility] Dx-stroke. Would like to have the nurse return call and also would like to know the name of the [address] [place] [address] used previously.           Best contact [phone]

## 2019-05-07 NOTE — PROGRESS NOTES
0730 - Bedside shift change report given to [facility], [address] (oncoming nurse) by Kallie Lovett, RN (offgoing nurse). Report included the following information SBAR, ED Summary, Intake/Output, Recent Results and Cardiac Rhythm a.fib and v.paced. 1216 - Hospitalist service used to page  To discuss the addition of a cough medication for the pt. Because the pt. Says her head hurts when she coughs. Waiting a call back. 1230 - OT helped move Pt. Into the chair. 1430 - PT at bedside and allowed pt. To walk the halls with a walker. During the walk, the Pt. HR increased to 140s. Pt. Yanira Bustamante returned to room and sitting in the bed. HR back to 80s-90s. 1530 - Bedside shift change report given to Desire Bermudez RN (oncoming nurse) by [facility], RN (offgoing nurse). Report included the following information SBAR.

## 2019-05-08 LAB
ANION GAP SERPL CALC-SCNC: 6 MMOL/L (ref 5–15)
BUN SERPL-MCNC: 13 MG/DL (ref 6–20)
BUN/CREAT SERPL: 12 (ref 12–20)
CALCIUM SERPL-MCNC: 8.4 MG/DL (ref 8.5–10.1)
CHLORIDE SERPL-SCNC: 110 MMOL/L (ref 97–108)
CO2 SERPL-SCNC: 26 MMOL/L (ref 21–32)
CREAT SERPL-MCNC: 1.08 MG/DL (ref 0.55–1.02)
GLUCOSE SERPL-MCNC: 107 MG/DL (ref 65–100)
POTASSIUM SERPL-SCNC: 3.3 MMOL/L (ref 3.5–5.1)
SODIUM SERPL-SCNC: 142 MMOL/L (ref 136–145)

## 2019-05-08 PROCEDURE — 74011250637 HC RX REV CODE- 250/637: Performed by: PSYCHIATRY & NEUROLOGY

## 2019-05-08 PROCEDURE — 74011250637 HC RX REV CODE- 250/637: Performed by: INTERNAL MEDICINE

## 2019-05-08 PROCEDURE — 97535 SELF CARE MNGMENT TRAINING: CPT

## 2019-05-08 PROCEDURE — 65660000000 HC RM CCU STEPDOWN

## 2019-05-08 PROCEDURE — 74011250636 HC RX REV CODE- 250/636: Performed by: HOSPITALIST

## 2019-05-08 PROCEDURE — 74011250637 HC RX REV CODE- 250/637: Performed by: NURSE PRACTITIONER

## 2019-05-08 PROCEDURE — 97116 GAIT TRAINING THERAPY: CPT

## 2019-05-08 PROCEDURE — 74011250637 HC RX REV CODE- 250/637: Performed by: HOSPITALIST

## 2019-05-08 PROCEDURE — 80048 BASIC METABOLIC PNL TOTAL CA: CPT

## 2019-05-08 PROCEDURE — 36415 COLL VENOUS BLD VENIPUNCTURE: CPT

## 2019-05-08 RX ORDER — METOPROLOL TARTRATE 50 MG/1
50 TABLET ORAL 2 TIMES DAILY
Status: DISCONTINUED | OUTPATIENT
Start: 2019-05-08 | End: 2019-05-09

## 2019-05-08 RX ADMIN — LEVOTHYROXINE SODIUM 25 MCG: 25 TABLET ORAL at 07:05

## 2019-05-08 RX ADMIN — Medication 10 ML: at 23:20

## 2019-05-08 RX ADMIN — DOCUSATE SODIUM 100 MG: 100 CAPSULE, LIQUID FILLED ORAL at 17:16

## 2019-05-08 RX ADMIN — GUAIFENESIN 100 MG: 200 SOLUTION ORAL at 18:35

## 2019-05-08 RX ADMIN — GUAIFENESIN 100 MG: 200 SOLUTION ORAL at 23:17

## 2019-05-08 RX ADMIN — Medication 10 ML: at 06:00

## 2019-05-08 RX ADMIN — SODIUM CHLORIDE 75 ML/HR: 900 INJECTION, SOLUTION INTRAVENOUS at 16:01

## 2019-05-08 RX ADMIN — GUAIFENESIN 100 MG: 200 SOLUTION ORAL at 03:44

## 2019-05-08 RX ADMIN — METOPROLOL TARTRATE 50 MG: 50 TABLET ORAL at 18:31

## 2019-05-08 RX ADMIN — ATORVASTATIN CALCIUM 80 MG: 40 TABLET, FILM COATED ORAL at 23:02

## 2019-05-08 RX ADMIN — Medication 10 ML: at 16:02

## 2019-05-08 RX ADMIN — DOCUSATE SODIUM 100 MG: 100 CAPSULE, LIQUID FILLED ORAL at 08:25

## 2019-05-08 RX ADMIN — FLUTICASONE PROPIONATE 2 SPRAY: 50 SPRAY, METERED NASAL at 08:25

## 2019-05-08 RX ADMIN — PAROXETINE HYDROCHLORIDE 20 MG: 20 TABLET, FILM COATED ORAL at 08:25

## 2019-05-08 RX ADMIN — QUETIAPINE FUMARATE 25 MG: 25 TABLET ORAL at 23:03

## 2019-05-08 RX ADMIN — ASPIRIN 81 MG CHEWABLE TABLET 81 MG: 81 TABLET CHEWABLE at 16:07

## 2019-05-08 RX ADMIN — RIVAROXABAN 15 MG: 15 TABLET, FILM COATED ORAL at 17:16

## 2019-05-08 NOTE — PROGRESS NOTES
participated in [facility] rounds this am.  It is noted that patient has left sided weakness and neglect. Prior to admission patient had been staying at her son's home. Per Suly Parks she will return there if possible. Patient will need orders from MD for Skilled nursing, Physical therapy, Occupational therapy and home aide.

## 2019-05-08 NOTE — PROGRESS NOTES
Problem: Self Care Deficits Care Plan (Adult)  Goal: *Acute Goals and Plan of Care (Insert Text)  Description  Occupational Therapy Goals  Initiated 5/7/2019  1. Patient will perform grooming standing at sink with supervision/set-up within 7 day(s). 2.  Patient will perform lower body dressing with supervision/set-up within 7 day(s). 3.  Patient will perform bathing with supervision/set-up within 7 day(s). 4.  Patient will perform toilet transfers with supervision/set-up within 7 day(s). 5.  Patient will perform all aspects of toileting with supervision/set-up within 7 day(s). 6.  Patient will participate in upper extremity therapeutic exercise/activities with supervision/set-up for 10 minutes within 7 day(s). 7.  Patient will utilize energy conservation techniques during functional activities with verbal cues within 7 day(s). Outcome: Progressing Towards Goal    OCCUPATIONAL THERAPY TREATMENT  Patient: Allison Caruso (83 y.o. female)  Date: 5/8/2019  Diagnosis: CVA (cerebral vascular accident) [facility]) [I63.9] Acute CVA (cerebrovascular accident) [facility])       Precautions: Fall  Chart, occupational therapy assessment, plan of care, and goals were reviewed. ASSESSMENT:   Based on the objective data described below, patient continues to present with Setup upper body ADLs, Contact guard assistance lower body ADLs, and Contact guard assistance for functional mobility s/p admission for GLF and R MCA occulusion/ suspected infarct. The following are barriers to ADL independence while in acute care:   - Cognitive and/or behavioral: alert and Ox4, decreased confusion  - Medical condition: ROM, strength, functional endurance, standing balance and coordination. Tachycardia with activity. HR elevated to 150s-160s standing to brush teeth at sink. Decreased to 90s-100s seated (/84) then increased to 120s-140s while seated and brushing teeth.   HR then decreased to 90s-100s with seated rest (BP 134/74). Maintained SPO2 >97% on RA. Patient will benefit from skilled acute intervention to address the above impairments. Patient?s rehabilitation potential is considered to be Good     Prior Level of Function/Home Situation:  Performs ADLs with SPV to mod-I, only assistance for washing back. Uses a tub transfer bench. Supervision with mobility throughout the home (ambulation with rolling walker upstairs, rollator walker on the main level of the home and in the community; transfers; power stair lift to get to her bedroom upstairs; ramp to enter the home). Performs  Lives with her son and daughter in law. Caregivers prepare meals, manage medications, and assist with appointments. Daughter in the ED with her today reports patient has mild memory impairment and is being referred by her PCP to a neurologist for this. PLAN:  Patient continues to benefit from skilled intervention to address the above impairments. Continue treatment per established plan of care. Discharge recommendations: Anticipate d/c home with [facility] and family support pending progress. SUBJECTIVE:   Patient stated ? I feel fine. ?    OBJECTIVE DATA SUMMARY:   Cognitive/Behavioral Status:  Neurologic State: Alert  Orientation Level: Oriented X4  Cognition: Follows commands; Appropriate for age attention/concentration  Perception: Appears intact  Perseveration: No perseveration noted  Safety/Judgement: Awareness of environment; Insight into deficits    Functional Mobility and Transfers for ADLs:  Bed Mobility:  Rolling: Supervision  Supine to Sit: Supervision  Sit to Supine: Supervision  Scooting: Supervision    Transfers:  Sit to Stand: Contact guard assistance     Bed to Chair: Contact guard assistance    Balance:  Sitting: Intact  Standing: Impaired; With support  Standing - Static: Fair;Constant support;Good  Standing - Dynamic : Fair;Constant support    ADL Intervention:       Grooming  Brushing Teeth: Set-up(seated) Lower Body Dressing Assistance  Socks: Supervision(adjusted socks reaching to feet on floor)         Cognitive Retraining  Safety/Judgement: Awareness of environment; Insight into deficits      Pain:  Patient reports no pain    Activity Tolerance: Tachycardia with activity. HR elevated to 150s-160s standing to brush teeth at sink. Decreased to 90s-100s seated (/84) then increased to 120s-140s while seated and brushing teeth. HR then decreased 90s-100s with seated rest (/74). Maintained SPO2 >97% on RA. Please refer to the flowsheet for vital signs taken during this treatment.     After treatment patient left:   Up in chair  Call light within reach  RN notified  Family at bedside     COMMUNICATION/COLLABORATION:   The patient?s plan of care was discussed with: Physical Therapist and Registered Nurse    Shukri Santos OT  Time Calculation: 27 mins

## 2019-05-08 NOTE — TELEPHONE ENCOUNTER
Call placed to pt dgter and she states that her mother is doing better and that they are going to move her from ICU today    [facility] in the past, voiced understanding and appreciation

## 2019-05-08 NOTE — PROGRESS NOTES
Hospitalist Progress Note  Marianna Hannah MD  Answering service: [phone] OR [phone] from in house phone      Date of Service:  2019  NAME:  Tatianna Almanza                                                         :  1924                                               MRN:  [id number]    Brief admission summary    Beti is a 80 y.o.  female with h/o afib,hypercholesterolemia,hypertension,PE,DVT,brought to ED by EMS because of GLF fall. Subjective/interval history    -Seen up in chair in ICU. Family in the room. -She was tachycardiac with OT today. Her lopressor was held on admission for permissive hypertension. Assessment and plan        Acute CVA (cerebrovascular accident) resulting in fall  -CTA c/w Abrupt occlusion distal M2 posterior division right middle cerebral artery.  -continue Xarelto  -Lipitor  -Neurology and NIS on board  -Echo: Moderately to severely reduced systolic function. Severe systolic dysfunction. Estimated left ventricular ejection fraction is 26 - 30%. Systolic and diastolic heart failure,compensated. Pt denied h/o. -Resumed lopressor  -Consult cardiology    Pulmonary embolism/DVT  -On xarelto     . Mixed hyperlipidemia   -On statin     Atrial fibrillation  -Has PPM  -On xarelto  -resume metoprolol    Hypothyroid    -Resume synthroid     Scalp laceration   -Wound care    Cough w/o accompanying sxs: treat symptomatically and          Patient stable for 24 hours. BP stable. Transfer to [facility].changes neuro checks to q4hrly. DVT prophylaxis:scd  Disposition:TBD,anticipate home  Plan of care discussed with:Patient and family. Current facility administered and prior to admit medications reviewed. x         Review of Systems:  A comprehensive review of systems was negative except for that written in the HPI.         PHYSICAL EXAM:  O:  Visit Vitals  BP (!) 141/117 (BP 1 Location: Right arm, BP Patient Position: At rest)   Pulse (!) 101 Temp 98.5 °F (36.9 °C)   Resp 27   Ht 5' 4\" (1.626 m)   Wt 86.6 kg (190 lb 14.7 oz)   SpO2 100%   BMI 32.77 kg/m²       General Appears comfortable,not in distress. HEENT Head atraumatic. Unicteric sclera. Moist buccal mucosa. PERRLA     CVS RRR, no MRG, no JVD, no peripheral edema       Chest No deformity  No accessory muscle use  Vesicular air entry symmetrically  No wheezing,ronchi or crepitations       Abdomen &Pelvis Not distended. Normoactive. Soft. Non tender. No hepatomegally       Genitourinary  No CVA or suprapubic tenderness       Musculoskeletal No edema, deformity of extremities,back       Skin No erythema,rash,depigmentation       Neurology Mental status: alert and oriented x4  Power LUE 4+/5  Sensation : diminished light touch and cold on the left     Psychiatry            Intake/Output Summary (Last 24 hours) at 5/8/2019 1729  Last data filed at 5/8/2019 1600  Gross per 24 hour   Intake 1725 ml   Output 100 ml   Net 1625 ml          Recent labs & imaging reviewed:    Problem List as of 5/8/2019 Date Reviewed: 5/6/2019          Codes Class Noted - Resolved    * (Principal) Acute CVA (cerebrovascular accident) [facility]) ICD-10-CM: I63.9  ICD-9-CM: 434.91  5/6/2019 - Present        Fall ICD-10-CM: W19. [address]  ICD-9-CM: R691.8  5/6/2019 - Present        CVA (cerebral vascular accident) [facility]) ICD-10-CM: I63.9  ICD-9-CM: 434.91  5/6/2019 - Present        Scalp laceration ICD-10-CM: S01. 01XA  ICD-9-CM: 873.0  5/6/2019 - Present        Chronic cough ICD-10-CM: R05  ICD-9-CM: 786.2  8/14/2018 - Present        Personal history of pulmonary embolism ICD-10-CM: X31.911  ICD-9-CM: V12.55  8/14/2018 - Present        Gait instability ICD-10-CM: R26.81  ICD-9-CM: 781.2  4/6/2018 - Present        ACP (advance care planning) ICD-10-CM: Z71.89  ICD-9-CM: V65.49  3/30/2017 - Present    Overview Signed 3/30/2017 12:05 PM by Sharad Go RN     Patient has form completed at home and will have POA bring in office to scan into her chart             TIA (transient ischemic attack) ICD-10-CM: G45.9  ICD-9-CM: 435.9  11/2/2016 - Present    Overview Signed 11/4/2016  5:16 PM by Shira Smith RN     Pt seen and diagnosed at [facility]             Pulmonary embolism [facility]) ICD-10-CM: I26.99  ICD-9-CM: 415.19  6/18/2014 - Present        DVT (deep venous thrombosis) ([facility] ICD-10-CM: I82.409  ICD-9-CM: 453.40  6/18/2014 - Present        Mixed hyperlipidemia ICD-10-CM: E78.2  ICD-9-CM: 272.2  6/18/2014 - Present        Atrial fibrillation ([facility] ICD-10-CM: I48.91  ICD-9-CM: 427.31  6/18/2014 - Present        HTN (hypertension) ICD-10-CM: I10  ICD-9-CM: 401.9  6/18/2014 - Present        DJD (degenerative joint disease) ICD-10-CM: M19.90  ICD-9-CM: 715.90  6/18/2014 - Present        Hypothyroid ICD-10-CM: E03.9  ICD-9-CM: 244.9  6/18/2014 - Present                Jasper Bryson MD  Internal Medicine  Date of Service: 5/8/2019

## 2019-05-08 NOTE — PROGRESS NOTES
0730-Bedside and Verbal shift change report given to [address] (oncoming nurse) by Tosha Rodríguez RN (offgoing nurse). Report included the following information SBAR, Kardex, ED Summary, Intake/Output, MAR, Recent Results and Cardiac Rhythm paced/afib.

## 2019-05-08 NOTE — ROUTINE PROCESS
Bedside shift change report given to [address] [place] [address] (oncoming nurse) by Shannon Bangura (offgoing nurse). Report included the following information SBAR, Kardex, ED Summary, Intake/Output, MAR, Recent Results and Cardiac Rhythm A-fib, paced.

## 2019-05-08 NOTE — PROGRESS NOTES
1930-Bedside and Verbal shift change report given to [address] [place] [address] (oncoming nurse) by Quin Lagunas (offgoing nurse). Report included the following information SBAR, Kardex, ED Summary, Intake/Output, MAR, Recent Results and Cardiac Rhythm paced/afib.

## 2019-05-08 NOTE — PROGRESS NOTES
Problem: Mobility Impaired (Adult and Pediatric)  Goal: *Acute Goals and Plan of Care (Insert Text)  Description  Physical Therapy Goals  Initiated 5/6/2019  1. Patient will move from supine to sit and sit to supine , scoot up and down and roll side to side in bed with modified independence within 7 day(s). 2.  Patient will transfer from bed to chair and chair to bed with supervision/set-up using the least restrictive device within 7 day(s). 3.  Patient will perform sit to stand with supervision/set-up within 7 day(s). 4.  Patient will ambulate with supervision/set-up for 150 feet with the least restrictive device within 7 day(s). Outcome: Progressing Towards Goal     PHYSICAL THERAPY TREATMENT  Patient: Bianca Bolivar (60 y.o. female)  Date: 5/8/2019  Diagnosis: CVA (cerebral vascular accident) [facility]) [I63.9] Acute CVA (cerebrovascular accident) [facility])       Precautions: Fall  Chart, physical therapy assessment, plan of care and goals were reviewed. ASSESSMENT:  Based on the objective data described below, the patient presents with Supervision and Contact guard assistance level overall for transfers. Gait training completed at Contact guard assistance, 50ft +50 feet and using a rolling walker. Pt cued for slow movmeent and for stopping when HR reached >125 (typical max HR for her age) and required standing rest break to get HR <110. Pt continues to be asymptomatic until HR becomes very tachy. Pt dtr educated. The following are barriers to independence while in acute care:   -Cognitive and/or behavioral: attention to task, command following, processing, sequencing, safety awareness, insight into deficits, insight into abilities and fear of falling  -Medical condition: strength, functional reach, functional endurance, standing balance and medical history    -Other:       Prior level of function: I      PLAN:  Patient continues to benefit from skilled intervention to address the above impairments. Continue treatment per established plan of care. Recommend with staff: up ad raissa in chair  Recommend next PT session: continue education and mobility training  Discharge recommendations: Home health (to increase independence and safety)  If above is not an option then recommend: None    Patient's barriers to discharging home, in addition to above impairments: family availability to assist  history of falls  entry and exit into the home  family availability for education/training to then follow up at home  level of physical assist required to maintain patient safety. Equipment recommendations for successful discharge (if) home: rolling walker       SUBJECTIVE:   Patient stated ? I am much better, did you see I had visitors??    OBJECTIVE DATA SUMMARY:   Critical Behavior:  Neurologic State: Alert, Eyes open spontaneously  Orientation Level: Oriented X4  Cognition: Follows commands  Safety/Judgement: Decreased insight into deficits  Functional Mobility Training:  Bed Mobility:  Rolling: Supervision  Supine to Sit: Supervision  Sit to Supine: Supervision  Scooting: Supervision        Transfers:  Sit to Stand: Contact guard assistance  Stand to Sit: Setup        Bed to Chair: Contact guard assistance                    Balance:  Sitting: Intact  Standing: Impaired; With support  Standing - Static: Fair;Constant support;Good  Standing - Dynamic : Fair;Constant support  Ambulation/Gait Training:  Distance (ft): 50 Feet (ft)(+50 after rest break)  Assistive Device: Walker, rolling;Gait belt  Ambulation - Level of Assistance: Contact guard assistance;Assist x2;Minimal assistance        Gait Abnormalities: Other;Decreased step clearance        Base of Support: Widened     Speed/Sheryl: Pace decreased (<100 feet/min); Fluctuations; Shuffled           Interventions: Verbal cues; Visual/Demos; Safety awareness training        Stairs:            Pain:  Pt denies pain      Activity Tolerance:   Good, SpO2 stable on RA and requires rest breaks  Please refer to the flowsheet for vital signs taken during this treatment.     After treatment patient left:   Up in chair  Call light within reach  RN notified  Family at bedside     COMMUNICATION/COLLABORATION:   The patient?s plan of care was discussed with: Occupational Therapist    Lakeshia Canas, PT   Time Calculation: 30 mins

## 2019-05-09 ENCOUNTER — APPOINTMENT (OUTPATIENT)
Dept: NON INVASIVE DIAGNOSTICS | Age: 84
DRG: 064 | End: 2019-05-09
Attending: INTERNAL MEDICINE
Payer: MEDICARE

## 2019-05-09 ENCOUNTER — HOSPITAL ENCOUNTER (INPATIENT)
Dept: NUCLEAR MEDICINE | Age: 84
Discharge: HOME | DRG: 064 | End: 2019-05-09
Attending: INTERNAL MEDICINE
Payer: MEDICARE

## 2019-05-09 ENCOUNTER — TELEPHONE (OUTPATIENT)
Dept: INTERNAL MEDICINE CLINIC | Age: 84
End: 2019-05-09

## 2019-05-09 PROCEDURE — 74011250636 HC RX REV CODE- 250/636: Performed by: INTERNAL MEDICINE

## 2019-05-09 PROCEDURE — A9500 TC99M SESTAMIBI: HCPCS

## 2019-05-09 PROCEDURE — 78452 HT MUSCLE IMAGE SPECT MULT: CPT

## 2019-05-09 PROCEDURE — 74011250637 HC RX REV CODE- 250/637: Performed by: INTERNAL MEDICINE

## 2019-05-09 PROCEDURE — 65660000000 HC RM CCU STEPDOWN

## 2019-05-09 PROCEDURE — 74011250637 HC RX REV CODE- 250/637: Performed by: NURSE PRACTITIONER

## 2019-05-09 PROCEDURE — 97116 GAIT TRAINING THERAPY: CPT

## 2019-05-09 PROCEDURE — 74011250637 HC RX REV CODE- 250/637: Performed by: HOSPITALIST

## 2019-05-09 PROCEDURE — 74011250637 HC RX REV CODE- 250/637: Performed by: PSYCHIATRY & NEUROLOGY

## 2019-05-09 RX ORDER — LISINOPRIL 5 MG/1
2.5 TABLET ORAL DAILY
Status: DISCONTINUED | OUTPATIENT
Start: 2019-05-09 | End: 2019-05-10 | Stop reason: HOSPADM

## 2019-05-09 RX ORDER — POTASSIUM CHLORIDE 750 MG/1
20 TABLET, FILM COATED, EXTENDED RELEASE ORAL 2 TIMES DAILY
Status: COMPLETED | OUTPATIENT
Start: 2019-05-09 | End: 2019-05-10

## 2019-05-09 RX ORDER — CAFFEINE CITRATE 20 MG/ML
60 SOLUTION INTRAVENOUS
Status: COMPLETED | OUTPATIENT
Start: 2019-05-09 | End: 2019-05-09

## 2019-05-09 RX ORDER — FUROSEMIDE 40 MG/1
40 TABLET ORAL DAILY
Status: DISCONTINUED | OUTPATIENT
Start: 2019-05-09 | End: 2019-05-10 | Stop reason: HOSPADM

## 2019-05-09 RX ORDER — CARVEDILOL 3.12 MG/1
3.12 TABLET ORAL 2 TIMES DAILY WITH MEALS
Status: DISCONTINUED | OUTPATIENT
Start: 2019-05-09 | End: 2019-05-10 | Stop reason: HOSPADM

## 2019-05-09 RX ORDER — SODIUM CHLORIDE 0.9 % (FLUSH) 0.9 %
20 SYRINGE (ML) INJECTION
Status: COMPLETED | OUTPATIENT
Start: 2019-05-09 | End: 2019-05-09

## 2019-05-09 RX ADMIN — LISINOPRIL 2.5 MG: 5 TABLET ORAL at 08:38

## 2019-05-09 RX ADMIN — Medication 20 ML: at 11:07

## 2019-05-09 RX ADMIN — GUAIFENESIN 100 MG: 200 SOLUTION ORAL at 03:31

## 2019-05-09 RX ADMIN — LEVOTHYROXINE SODIUM 25 MCG: 25 TABLET ORAL at 06:56

## 2019-05-09 RX ADMIN — CARVEDILOL 3.12 MG: 3.12 TABLET, FILM COATED ORAL at 08:37

## 2019-05-09 RX ADMIN — POTASSIUM CHLORIDE 20 MEQ: 750 TABLET, EXTENDED RELEASE ORAL at 17:30

## 2019-05-09 RX ADMIN — CARVEDILOL 3.12 MG: 3.12 TABLET, FILM COATED ORAL at 17:30

## 2019-05-09 RX ADMIN — POTASSIUM CHLORIDE 20 MEQ: 750 TABLET, EXTENDED RELEASE ORAL at 08:37

## 2019-05-09 RX ADMIN — DOCUSATE SODIUM 100 MG: 100 CAPSULE, LIQUID FILLED ORAL at 17:30

## 2019-05-09 RX ADMIN — DOCUSATE SODIUM 100 MG: 100 CAPSULE, LIQUID FILLED ORAL at 08:37

## 2019-05-09 RX ADMIN — CAFFEINE CITRATE 60 MG: 20 INJECTION, SOLUTION INTRAVENOUS at 11:38

## 2019-05-09 RX ADMIN — METOPROLOL TARTRATE 50 MG: 50 TABLET ORAL at 08:38

## 2019-05-09 RX ADMIN — ASPIRIN 81 MG CHEWABLE TABLET 81 MG: 81 TABLET CHEWABLE at 17:30

## 2019-05-09 RX ADMIN — ATORVASTATIN CALCIUM 80 MG: 40 TABLET, FILM COATED ORAL at 21:57

## 2019-05-09 RX ADMIN — FLUTICASONE PROPIONATE 2 SPRAY: 50 SPRAY, METERED NASAL at 08:39

## 2019-05-09 RX ADMIN — GUAIFENESIN 100 MG: 200 SOLUTION ORAL at 21:57

## 2019-05-09 RX ADMIN — Medication 10 ML: at 06:57

## 2019-05-09 RX ADMIN — FUROSEMIDE 40 MG: 40 TABLET ORAL at 14:11

## 2019-05-09 RX ADMIN — PAROXETINE HYDROCHLORIDE 20 MG: 20 TABLET, FILM COATED ORAL at 08:38

## 2019-05-09 RX ADMIN — QUETIAPINE FUMARATE 25 MG: 25 TABLET ORAL at 21:57

## 2019-05-09 RX ADMIN — REGADENOSON 0.4 MG: 0.08 INJECTION, SOLUTION INTRAVENOUS at 11:07

## 2019-05-09 RX ADMIN — RIVAROXABAN 15 MG: 15 TABLET, FILM COATED ORAL at 17:30

## 2019-05-09 RX ADMIN — Medication 10 ML: at 22:00

## 2019-05-09 NOTE — PROGRESS NOTES
Participated in [facility] rounds this time. Patient has planned stress test today. [facility] Family prefers [facility] which she has used previously. Sent referral via [employer]. Patient accepted. Added AVS. Family will provide transportation. No further case management needs.    LOLI Miller,CRM

## 2019-05-09 NOTE — CONSULTS
Cardiology Consult Note    CC: Afib  Reason for consult:  Syncope/abnormal echo  Requesting MD:  Dr. Arian Pulido     Subjective:      Date of  Admission: 5/6/2019 10:06 AM     Admission type:Emergency    Ky Waldron is a 80 y.o. female admitted for CVA (cerebral vascular accident) ([facility] [I63.9]. Patient complains of cough since she has been admitted with CVA. Her echo came back yesterday showing EF 25% with global hypokinesis. She has been taken care of by Dr. Arian Pulido mainly for  chronic Afib and SSS/s/p pacer three years ago. She is not on any regimen for CHF and never diagnosed with CAD or any PCI in past. Her MARY is worse and she has wheezes with cough, non-productive. Her weight is up by 10 pounds since admitted.     Patient Active Problem List    Diagnosis Date Noted    Acute CVA (cerebrovascular accident) ([facility] 05/06/2019    Fall 05/06/2019    [facility]06/2019    Scalp laceration 05/06/2019    Chronic cough 08/14/2018    Personal history of pulmonary embolism 08/14/2018    Gait instability 04/06/2018    ACP (advance care planning) 03/30/2017    TIA (transient ischemic attack) 11/02/2016    Pulmonary embolism ([facility] 06/18/2014    DVT (deep venous thrombosis) ([facility] 06/18/2014    Mixed hyperlipidemia 06/18/2014    Atrial fibrillation ([facility] 06/18/2014    HTN (hypertension) 06/18/2014    DJD (degenerative joint disease) 06/18/2014    Hypothyroid 06/18/2014      Kyrie Espinosa MD  Past Medical History:   Diagnosis Date    Atrial fibrillation [facility]) 2007    Hypercholesterolemia     Hypertension     Osteoarthritis 2010    Thromboembolus ([facility]     Thyroid disease       Past Surgical History:   Procedure Laterality Date    HX HEENT      cataract sx    HX HIP FRACTURE TX  2013    right hip 1989 left hip 2013    HX HIP REPLACEMENT  1989    HX PACEMAKER  2008     No Known Allergies   Family History   Problem Relation Age of Onset    Hypertension Mother       Current Facility-Administered Medications   Medication Dose Route Frequency    furosemide (LASIX) tablet 40 mg  40 mg Oral DAILY    potassium chloride SR (KLOR-CON 10) tablet 20 mEq  20 mEq Oral BID    lisinopril (PRINIVIL, ZESTRIL) tablet 2.5 mg  2.5 mg Oral DAILY    carvedilol (COREG) tablet 3.125 mg  3.125 mg Oral BID WITH MEALS    metoprolol tartrate (LOPRESSOR) tablet 50 mg  50 mg Oral BID    PARoxetine (PAXIL) tablet 20 mg  20 mg Oral DAILY    guaiFENesin (ROBITUSSIN) 100 mg/5 mL oral liquid 100 mg  100 mg Oral Q4H PRN    fluticasone propionate (FLONASE) 50 mcg/actuation nasal spray 2 Spray  2 Spray Both Nostrils DAILY    levothyroxine (SYNTHROID) tablet 25 mcg  25 mcg Oral ACB    rivaroxaban (XARELTO) tablet 15 mg  15 mg Oral DAILY WITH DINNER    zinc oxide-cod liver oil (DESITIN) 40 % paste   Topical PRN    sodium chloride (NS) flush 5-40 mL  5-40 mL IntraVENous Q8H    sodium chloride (NS) flush 5-40 mL  5-40 mL IntraVENous PRN    acetaminophen (TYLENOL) tablet 650 mg  650 mg Oral Q4H PRN    Or    acetaminophen (TYLENOL) solution 650 mg  650 mg Per NG tube Q4H PRN    Or    acetaminophen (TYLENOL) suppository 650 mg  650 mg Rectal Q4H PRN    ondansetron (ZOFRAN) injection 4 mg  4 mg IntraVENous Q6H PRN    docusate sodium (COLACE) capsule 100 mg  100 mg Oral BID    atorvastatin (LIPITOR) tablet 80 mg  80 mg Oral QHS    aspirin chewable tablet 81 mg  81 mg Oral DAILY    QUEtiapine (SEROquel) tablet 25 mg  25 mg Oral QHS        Prior to Admission Medications:  Prior to Admission medications    Medication Sig Start Date End Date Taking? Authorizing Provider   furosemide (LASIX) 40 mg tablet Take 40 mg by mouth daily. Yes Provider, Historical   potassium chloride SR (KLOR-CON 10) 10 mEq tablet Take 10 mEq by mouth daily. Yes Provider, Historical   alendronate (FOSAMAX) 35 mg tablet Take 35 mg by mouth every Wednesday.    Yes Provider, Historical   loratadine (CLARITIN) 10 mg tablet TAKE 1 TABLET BY MOUTH ONCE DAILY 4/25/19  Yes Meenu Hendrickson MD   pravastatin (PRAVACHOL) 20 mg tablet TAKE 1 TABLET BY MOUTH ONCE NIGHTLY 4/11/19  Yes Meenu Hendrickson MD   QUEtiapine (SEROQUEL) 25 mg tablet Take 1 Tab by mouth nightly. 4/11/19  Yes Meenu Hendrickson MD   fluticasone propionate [facility]) 50 mcg/actuation nasal spray 2 spray each nostril QD 3/19/19  Yes Meenu Hendrickson MD   magnesium oxide (MAG-OX) 400 mg tablet TAKE 1 TAB BY MOUTH DAILY. 3/19/19  Yes Meenu Hendrickson MD   PARoxetine (PAXIL) 20 mg tablet TAKE 1 TAB BY MOUTH DAILY. 3/19/19  Yes Meenu Hendrickson MD   metoprolol tartrate (LOPRESSOR) 50 mg tablet Take 1 Tab by mouth two (2) times a day. 3/19/19  Yes Meenu Hendrickson MD   cyanocobalamin 1,000 mcg tablet Take 1 Tab by mouth daily. 3/19/19  Yes Meenu Hendrickson MD   amLODIPine (NORVASC) 5 mg tablet Take 1 Tab by mouth daily. 3/19/19  Yes Meenu Hendrickson MD   levothyroxine (SYNTHROID) 25 mcg tablet TAKE 1 TABLET BY MOUTH DAILY BEFORE BREAKFAST 3/19/19  Yes Meenu Hendrickson MD   multivitamin (ONE A DAY) tablet Take 1 Tab by mouth daily. 3/19/19  Yes Meenu Hendrickson MD   polyethylene glycol [facility]) 17 gram/dose powder Take 17 g by mouth daily. 3/19/19  Yes Meenu Hendrickson MD   trospium (SANCTURA) 20 mg tablet TAKE 1 TAB BY MOUTH TWO (2) TIMES A DAY. 3/19/19  Yes Meenu Hendrickson MD   calcium-cholecalciferol, D3, (CALTRATE 600+D) tablet Take 1 Tab by mouth daily. 3/19/19  Yes Meenu Hendrickson MD   rivaroxaban Emperatriz Mohair) 20 mg tab tablet Take 1 Tab by mouth daily (with breakfast). 3/19/19  Yes Meenu Hendrickson MD   albuterol [facility] HFA) 90 mcg/actuation inhaler Take 2 Puffs by inhalation every six (6) hours as needed for Wheezing. 8/14/18  Yes Sarbjit Funk,    zinc oxide-cod liver oil (DESITIN) 40 % ointment Apply  to affected area as needed for Skin Irritation. Yes Provider, Historical   docusate sodium (COLACE) 100 mg capsule Take 100 mg by mouth two (2) times a day.    Yes Provider, Historical        Review of Symptoms:  Except as noted in HPI, patient denies recent fever or chills, nausea, vomiting, diarrhea, hemoptysis, hematemesis, dysuria, myalgias, focal neurologic symptoms, ecchymosis, angioedema, odynophagia, dysphagia, sore throat, earache,rash, melena, hematochezia, depression, GERD, cold intolerance, petechia, bleeding gums, or significant weight loss. A comprehensive review of systems was negative except for that written in the HPI. Subjective:    24 hr VS reviewed, overall VSSAF  Temp (24hrs), Av.2 °F (36.8 °C), Min:97.7 °F (36.5 °C), Max:98.7 °F (37.1 °C)    Patient Vitals for the past 8 hrs:   Pulse   19 020 77    Patient Vitals for the past 8 hrs:   Resp   19 0201 15    Patient Vitals for the past 8 hrs:   BP   19 0201 135/57          Intake/Output Summary (Last 24 hours) at 2019 0645  Last data filed at 2019 1800  Gross per 24 hour   Intake 1155 ml   Output --   Net 1155 ml         Physical Exam (complete single organ system exam)      Visit Vitals  /57 (BP 1 Location: Right arm, BP Patient Position: At rest)   Pulse 77   Temp 98 °F (36.7 °C)   Resp 15   Ht 5' 4\" (1.626 m)   Wt 191 lb 12.8 oz (87 kg)   SpO2 98%   BMI 32.92 kg/m²     General Appearance:  Well developed, well nourished,alert and oriented x 3, and individual in no acute distress. Ears/Nose/Mouth/Throat:   Hearing grossly normal.         Neck: Supple. Chest:   Lungs rales and wheezes   Cardiovascular:  irregular rate and rhythm, S1, S2 normal, no murmur. Abdomen:   Soft, non-tender, bowel sounds are active. Extremities: 1+edema bilaterally. Skin: Warm and dry. Cardiographics    Telemetry: AFIB  ECG:  atrial fibrillation, rate 70s  Echocardiogram: Abnormal, and reviewed by myself:     Labs: No results found for this or any previous visit (from the past 24 hour(s)).      Assessment:     Assessment:   Cough; due to acute systolic HF  Cardiomyopathy; EF 25%; consider ischemic cause  CVA; resolving  AFib; chronic and rate is controlled       Plan:   Tele  Start Coreg/ACEI  Start Lasix  Order Lexiscan stress test today to rule out ischemic cause  Continue Emily Melendez MD

## 2019-05-09 NOTE — ROUTINE PROCESS
2000: Bedside and Verbal shift change report given to [address] [place] [address] (oncoming nurse) by Nura Patel RN (offgoing nurse). Report included the following information SBAR, Kardex, Intake/Output, MAR, Recent Results, Cardiac Rhythm paced and Alarm Parameters . 2045: TRANSFER - OUT REPORT:    Verbal report given to [facility]) on Mary العلي  being transferred to 6S(unit) for routine progression of care       Report consisted of patients Situation, Background, Assessment and   Recommendations(SBAR). Information from the following report(s) SBAR, Kardex, Intake/Output, MAR, Recent Results, Cardiac Rhythm paced/afib and Alarm Parameters  was reviewed with the receiving nurse. Lines:   Peripheral IV 05/07/19 Right Antecubital (Active)   Site Assessment Clean, dry, & intact 5/8/2019  4:00 PM   Phlebitis Assessment 0 5/8/2019  4:00 PM   Infiltration Assessment 0 5/8/2019  4:00 PM   Dressing Status Clean, dry, & intact 5/8/2019  4:00 PM   Dressing Type Tape;Transparent 5/8/2019  4:00 PM   Hub Color/Line Status Blue; Infusing 5/8/2019  4:00 PM   Action Taken Open ports on tubing capped 5/8/2019  4:00 AM   Alcohol Cap Used Yes 5/8/2019  4:00 PM        Opportunity for questions and clarification was provided. Patient transported with: 2 daughters and personal belongings.     Monitor  Patient-specific medications from Pharmacy  Registered Nurse  Tech

## 2019-05-09 NOTE — TELEPHONE ENCOUNTER
Call placed to [address] [place] [address] for orders upon d/c, verbal order per provider for PT, OT, nursing and HHA eval, to fax order

## 2019-05-09 NOTE — PROGRESS NOTES
Hospitalist Progress Note  Cari Bowie MD  Answering service: [phone] OR [phone] from in house phone      Date of Service:  2019  NAME:  Preethi Saleh                                                         :  1924                                               MRN:  [id number]    Brief admission summary    Willa Hilario is a 80 y.o.  female with h/o afib,hypercholesterolemia,hypertension,PE,DVT,brought to ED by EMS because of GLF fall. Subjective/interval history    -Seen and examined ,family in the room. She finished stress test.No complaints. Assessment and plan     Acute CVA (cerebrovascular accident) resulting in fall  -CTA c/w Abrupt occlusion distal M2 posterior division right middle cerebral artery.  -continue Xarelto  -Lipitor  -Neurology and NIS on board  -Echo: Moderately to severely reduced systolic function. Severe systolic dysfunction. Estimated left ventricular ejection fraction is 26 - 30%. Acute on chronic systolic and diastolic heart failure  -On coreg,lisinopril,lasix, aspirin,atorvastatin  -Stress test negative  -Appreciate Dr Segundo Peak help. Pulmonary embolism/DVT  -On xarelto     . Mixed hyperlipidemia   -On statin     Atrial fibrillation  -Has PPM  -On xarelto  -On coreg. Hypothyroid    -Resume synthroid     Scalp laceration   -Wound care    Cough w/o accompanying sxs: treat symptomatically              DVT prophylaxis:scd  Disposition:TBD,anticipate home  Plan of care discussed with:Patient and family. Current facility administered and prior to admit medications reviewed. x         Review of Systems:  A comprehensive review of systems was negative except for that written in the HPI.         PHYSICAL EXAM:  O:  Visit Vitals  /84 (BP 1 Location: Left arm, BP Patient Position: At rest)   Pulse 87   Temp 98.6 °F (37 °C)   Resp 22   Ht 5' 4\" (1.626 m)   Wt 87 kg (191 lb 12.8 oz)   SpO2 97%   BMI 32.92 kg/m²       General Appears comfortable,not in distress. HEENT Head atraumatic. Unicteric sclera. Moist buccal mucosa. PERRLA     CVS RRR, no MRG, no JVD, no peripheral edema       Chest No deformity  No accessory muscle use  Vesicular air entry symmetrically  No wheezing,ronchi or crepitations       Abdomen &Pelvis Not distended. Normoactive. Soft. Non tender. No hepatomegally       Genitourinary  No CVA or suprapubic tenderness       Musculoskeletal No edema, deformity of extremities,back       Skin No erythema,rash,depigmentation       Neurology Mental status: alert and oriented x4  Power LUE 4+/5  Sensation : diminished light touch and cold on the left     Psychiatry            Intake/Output Summary (Last 24 hours) at 5/9/2019 1343  Last data filed at 5/8/2019 1800  Gross per 24 hour   Intake 405 ml   Output --   Net 405 ml          Recent labs & imaging reviewed:    Problem List as of 5/9/2019 Date Reviewed: 5/6/2019          Codes Class Noted - Resolved    * (Principal) Acute CVA (cerebrovascular accident) [facility]) ICD-10-CM: I63.9  ICD-9-CM: 434.91  5/6/2019 - Present        Fall ICD-10-CM: W19. Crissie Pel  ICD-9-CM: X928.3  5/6/2019 - Present        CVA (cerebral vascular accident) [facility]) ICD-10-CM: I63.9  ICD-9-CM: 434.91  5/6/2019 - Present        Scalp laceration ICD-10-CM: S01. 01XA  ICD-9-CM: 873.0  5/6/2019 - Present        Chronic cough ICD-10-CM: R05  ICD-9-CM: 786.2  8/14/2018 - Present        Personal history of pulmonary embolism ICD-10-CM: P53.418  ICD-9-CM: V12.55  8/14/2018 - Present        Gait instability ICD-10-CM: R26.81  ICD-9-CM: 781.2  4/6/2018 - Present        ACP (advance care planning) ICD-10-CM: Z71.89  ICD-9-CM: V65.49  3/30/2017 - Present    Overview Signed 3/30/2017 12:05 PM by Jimmie Cruz RN     Patient has form completed at home and will have POA bring in office to scan into her chart             TIA (transient ischemic attack) ICD-10-CM: G45.9  ICD-9-CM: 435.9  11/2/2016 - Present    Overview Signed 11/4/2016 5:16 PM by Roxana Whitlock RN     Pt seen and diagnosed at [facility]             Pulmonary embolism [facility]) ICD-10-CM: I26.99  ICD-9-CM: 415.19  6/18/2014 - Present        DVT (deep venous thrombosis) ([facility] ICD-10-CM: I82.409  ICD-9-CM: 453.40  6/18/2014 - Present        Mixed hyperlipidemia ICD-10-CM: E78.2  ICD-9-CM: 272.2  6/18/2014 - Present        Atrial fibrillation ([facility] ICD-10-CM: I48.91  ICD-9-CM: 427.31  6/18/2014 - Present        HTN (hypertension) ICD-10-CM: I10  ICD-9-CM: 401.9  6/18/2014 - Present        DJD (degenerative joint disease) ICD-10-CM: M19.90  ICD-9-CM: 715.90  6/18/2014 - Present        Hypothyroid ICD-10-CM: E03.9  ICD-9-CM: 244.9  6/18/2014 - Present                Tila Rudolph MD  Internal Medicine  Date of Service: 5/9/2019

## 2019-05-09 NOTE — PROGRESS NOTES
Bedside shift change report given to [address] (oncoming nurse) by Carley Avilez (offgoing nurse). Report included the following information SBAR, MAR, Recent Results and Cardiac Rhythm Paced.

## 2019-05-09 NOTE — PROGRESS NOTES
Attempted PT tx, patient currently undergoing stress test. Will continue to follow and see as appropriate.     Lucas Moncada, DPT, PT

## 2019-05-09 NOTE — PROGRESS NOTES
Problem: Mobility Impaired (Adult and Pediatric)  Goal: *Acute Goals and Plan of Care (Insert Text)  Description  Physical Therapy Goals  Initiated 5/6/2019  1. Patient will move from supine to sit and sit to supine , scoot up and down and roll side to side in bed with modified independence within 7 day(s). 2.  Patient will transfer from bed to chair and chair to bed with supervision/set-up using the least restrictive device within 7 day(s). 3.  Patient will perform sit to stand with supervision/set-up within 7 day(s). 4.  Patient will ambulate with supervision/set-up for 150 feet with the least restrictive device within 7 day(s). Outcome: Progressing Towards Goal    PHYSICAL THERAPY TREATMENT  Patient: Karon Hendricks (2 y.o. female)  Date: 5/9/2019  Diagnosis: CVA (cerebral vascular accident) [facility]) [I63.9] Acute CVA (cerebrovascular accident) [facility])       Precautions: Fall  Chart, physical therapy assessment, plan of care and goals were reviewed. ASSESSMENT:  Based on the objective data described below, the patient presents with Stand-by assistance and Contact guard assistance level overall for transfers. Gait training completed at Contact guard assistance, 75 feet and using a gait belt and rolling walker. Pt able to gait with HR 75-77 prior to walking, peaks at 105 and stabilizes in the 90s. Continued education to patients family regarding getting pulse oximeter in order to keep monitoring HR at home. The following are barriers to independence while in acute care:   -Cognitive and/or behavioral: orientation, command following and safety awareness  -Medical condition: strength, functional endurance, sitting balance, standing balance and medical history    -Other:         PLAN:  Patient continues to benefit from skilled intervention to address the above impairments. Continue treatment per established plan of care.   Recommend with staff: up ad raissa with walker and contact assist, gait belt and HR below 126 as able  Recommend next PT session: stair training  Discharge recommendations: Home health (to increase independence and safety)  If above is not an option then recommend: None    Patient's barriers to discharging home, in addition to above impairments: family availability to assist  family availability for education/training to then follow up at home  level of physical assist required to maintain patient safety. Equipment recommendations for successful discharge (if) home: rolling walker       SUBJECTIVE:   Patient stated ? I feel better, they gave me something after the [stress test] and it helped. ?    OBJECTIVE DATA SUMMARY:   Critical Behavior:  Neurologic State: Alert  Orientation Level: Oriented X4  Cognition: Appropriate for age attention/concentration, Follows commands  Safety/Judgement: Awareness of environment, Insight into deficits  Functional Mobility Training:  Bed Mobility:  Rolling: Modified independent  Supine to Sit: Modified independent  Sit to Supine: Modified independent  Scooting: Modified independent        Transfers:  Sit to Stand: Stand-by assistance;Contact guard assistance  Stand to Sit: Stand-by assistance;Contact guard assistance        Bed to Chair: Stand-by assistance;Contact guard assistance                    Balance:  Sitting: Intact  Standing: Impaired; With support  Standing - Static: Fair;Good  Standing - Dynamic : Fair;Constant support  Ambulation/Gait Training:  Distance (ft): 75 Feet (ft)  Assistive Device: Walker, rolling;Gait belt  Ambulation - Level of Assistance: Contact guard assistance;Stand-by assistance        Gait Abnormalities: Other;Decreased step clearance        Base of Support: Widened     Speed/Sheryl: Pace decreased (<100 feet/min); Shuffled;Fluctuations                     Greatly improved HR today with gait, fatigues after 75ft but able to keep HR below max of 126  Pain:  Pre treatment: 0 /10   During treatment: 0/10  Post treatment:  0/10 Location: none    Description:  Aggravating factors: Activity Tolerance:   Fair, SpO2 stable on RA and requires rest breaks  Please refer to the flowsheet for vital signs taken during this treatment.     After treatment patient left:   Up in chair  Call light within reach  RN notified  Family at bedside     COMMUNICATION/COLLABORATION:   The patient?s plan of care was discussed with: Registered Nurse    Naseem Canas, PT   Time Calculation: 19 mins

## 2019-05-09 NOTE — PROGRESS NOTES
Problem: Falls - Risk of  Goal: *Absence of Falls  Description  Document Redgie Curet Fall Risk and appropriate interventions in the flowsheet.   Outcome: Progressing Towards Goal     Problem: TIA/CVA Stroke: 0-24 hours  Goal: Consults, if ordered  Outcome: Progressing Towards Goal   Neurology consult complete

## 2019-05-10 VITALS
OXYGEN SATURATION: 98 % | RESPIRATION RATE: 17 BRPM | HEIGHT: 64 IN | TEMPERATURE: 98.7 F | BODY MASS INDEX: 32.22 KG/M2 | WEIGHT: 188.71 LBS | HEART RATE: 100 BPM | DIASTOLIC BLOOD PRESSURE: 84 MMHG | SYSTOLIC BLOOD PRESSURE: 151 MMHG

## 2019-05-10 LAB
ALBUMIN SERPL-MCNC: 3 G/DL (ref 3.5–5)
ALBUMIN/GLOB SERPL: 0.7 {RATIO} (ref 1.1–2.2)
ALP SERPL-CCNC: 77 U/L (ref 45–117)
ALT SERPL-CCNC: 16 U/L (ref 12–78)
ANION GAP SERPL CALC-SCNC: 6 MMOL/L (ref 5–15)
AST SERPL-CCNC: 25 U/L (ref 15–37)
BASOPHILS # BLD: 0.1 K/UL (ref 0–0.1)
BASOPHILS NFR BLD: 1 % (ref 0–1)
BILIRUB SERPL-MCNC: 0.6 MG/DL (ref 0.2–1)
BUN SERPL-MCNC: 14 MG/DL (ref 6–20)
BUN/CREAT SERPL: 13 (ref 12–20)
CALCIUM SERPL-MCNC: 8.6 MG/DL (ref 8.5–10.1)
CHLORIDE SERPL-SCNC: 114 MMOL/L (ref 97–108)
CO2 SERPL-SCNC: 23 MMOL/L (ref 21–32)
CREAT SERPL-MCNC: 1.04 MG/DL (ref 0.55–1.02)
DIFFERENTIAL METHOD BLD: ABNORMAL
EOSINOPHIL # BLD: 0.1 K/UL (ref 0–0.4)
EOSINOPHIL NFR BLD: 2 % (ref 0–7)
ERYTHROCYTE [DISTWIDTH] IN BLOOD BY AUTOMATED COUNT: 17.1 % (ref 11.5–14.5)
GLOBULIN SER CALC-MCNC: 4.1 G/DL (ref 2–4)
GLUCOSE SERPL-MCNC: 94 MG/DL (ref 65–100)
HCT VFR BLD AUTO: 29.4 % (ref 35–47)
HGB BLD-MCNC: 8.4 G/DL (ref 11.5–16)
IMM GRANULOCYTES # BLD AUTO: 0.1 K/UL (ref 0–0.04)
IMM GRANULOCYTES NFR BLD AUTO: 1 % (ref 0–0.5)
LYMPHOCYTES # BLD: 1.3 K/UL (ref 0.8–3.5)
LYMPHOCYTES NFR BLD: 22 % (ref 12–49)
MCH RBC QN AUTO: 24.6 PG (ref 26–34)
MCHC RBC AUTO-ENTMCNC: 28.6 G/DL (ref 30–36.5)
MCV RBC AUTO: 86.2 FL (ref 80–99)
MONOCYTES # BLD: 0.4 K/UL (ref 0–1)
MONOCYTES NFR BLD: 7 % (ref 5–13)
NEUTS SEG # BLD: 3.9 K/UL (ref 1.8–8)
NEUTS SEG NFR BLD: 67 % (ref 32–75)
NRBC # BLD: 0 K/UL (ref 0–0.01)
NRBC BLD-RTO: 0 PER 100 WBC
PLATELET # BLD AUTO: 284 K/UL (ref 150–400)
PMV BLD AUTO: 11 FL (ref 8.9–12.9)
POTASSIUM SERPL-SCNC: 4.1 MMOL/L (ref 3.5–5.1)
PROT SERPL-MCNC: 7.1 G/DL (ref 6.4–8.2)
RBC # BLD AUTO: 3.41 M/UL (ref 3.8–5.2)
RBC MORPH BLD: ABNORMAL
SODIUM SERPL-SCNC: 143 MMOL/L (ref 136–145)
STRESS BASELINE HR: 76 BPM
STRESS ESTIMATED WORKLOAD: 1 METS
STRESS EXERCISE DUR MIN: NORMAL
STRESS PEAK DIAS BP: 85 MMHG
STRESS PEAK SYS BP: 174 MMHG
STRESS PERCENT HR ACHIEVED: 75 %
STRESS POST PEAK HR: 94 BPM
STRESS RATE PRESSURE PRODUCT: NORMAL BPM*MMHG
STRESS TARGET HR: 126 BPM
WBC # BLD AUTO: 5.9 K/UL (ref 3.6–11)

## 2019-05-10 PROCEDURE — 97116 GAIT TRAINING THERAPY: CPT

## 2019-05-10 PROCEDURE — 74011250637 HC RX REV CODE- 250/637: Performed by: PSYCHIATRY & NEUROLOGY

## 2019-05-10 PROCEDURE — 80053 COMPREHEN METABOLIC PANEL: CPT

## 2019-05-10 PROCEDURE — 77010033678 HC OXYGEN DAILY

## 2019-05-10 PROCEDURE — 74011250637 HC RX REV CODE- 250/637: Performed by: INTERNAL MEDICINE

## 2019-05-10 PROCEDURE — 97530 THERAPEUTIC ACTIVITIES: CPT

## 2019-05-10 PROCEDURE — 85025 COMPLETE CBC W/AUTO DIFF WBC: CPT

## 2019-05-10 PROCEDURE — 36415 COLL VENOUS BLD VENIPUNCTURE: CPT

## 2019-05-10 PROCEDURE — 74011250637 HC RX REV CODE- 250/637: Performed by: HOSPITALIST

## 2019-05-10 PROCEDURE — 94760 N-INVAS EAR/PLS OXIMETRY 1: CPT

## 2019-05-10 RX ORDER — LISINOPRIL 2.5 MG/1
2.5 TABLET ORAL DAILY
Qty: 30 TAB | Refills: 0 | Status: SHIPPED | OUTPATIENT
Start: 2019-05-11 | End: 2019-06-05

## 2019-05-10 RX ORDER — CARVEDILOL 3.12 MG/1
3.12 TABLET ORAL 2 TIMES DAILY WITH MEALS
Qty: 60 TAB | Refills: 0 | Status: SHIPPED | OUTPATIENT
Start: 2019-05-10 | End: 2019-06-09

## 2019-05-10 RX ORDER — GUAIFENESIN 100 MG/5ML
81 LIQUID (ML) ORAL DAILY
Qty: 30 TAB | Refills: 0 | Status: SHIPPED | OUTPATIENT
Start: 2019-05-10 | End: 2019-06-09

## 2019-05-10 RX ORDER — ATORVASTATIN CALCIUM 80 MG/1
80 TABLET, FILM COATED ORAL
Qty: 30 TAB | Refills: 0 | Status: SHIPPED | OUTPATIENT
Start: 2019-05-10 | End: 2019-06-09

## 2019-05-10 RX ADMIN — PAROXETINE HYDROCHLORIDE 20 MG: 20 TABLET, FILM COATED ORAL at 08:38

## 2019-05-10 RX ADMIN — Medication 10 ML: at 05:43

## 2019-05-10 RX ADMIN — LISINOPRIL 2.5 MG: 5 TABLET ORAL at 08:42

## 2019-05-10 RX ADMIN — FLUTICASONE PROPIONATE 2 SPRAY: 50 SPRAY, METERED NASAL at 08:43

## 2019-05-10 RX ADMIN — RIVAROXABAN 15 MG: 15 TABLET, FILM COATED ORAL at 17:13

## 2019-05-10 RX ADMIN — LEVOTHYROXINE SODIUM 25 MCG: 25 TABLET ORAL at 06:32

## 2019-05-10 RX ADMIN — ASPIRIN 81 MG CHEWABLE TABLET 81 MG: 81 TABLET CHEWABLE at 17:13

## 2019-05-10 RX ADMIN — FUROSEMIDE 40 MG: 40 TABLET ORAL at 08:42

## 2019-05-10 RX ADMIN — CARVEDILOL 3.12 MG: 3.12 TABLET, FILM COATED ORAL at 17:13

## 2019-05-10 RX ADMIN — POTASSIUM CHLORIDE 20 MEQ: 750 TABLET, EXTENDED RELEASE ORAL at 08:38

## 2019-05-10 RX ADMIN — DOCUSATE SODIUM 100 MG: 100 CAPSULE, LIQUID FILLED ORAL at 08:38

## 2019-05-10 RX ADMIN — GUAIFENESIN 100 MG: 200 SOLUTION ORAL at 11:50

## 2019-05-10 RX ADMIN — Medication 10 ML: at 14:00

## 2019-05-10 RX ADMIN — CARVEDILOL 3.12 MG: 3.12 TABLET, FILM COATED ORAL at 08:39

## 2019-05-10 NOTE — DISCHARGE SUMMARY
Discharge Summary       PATIENT ID: Natalia Villafana  MRN: [id number]   YOB: 1924    DATE OF ADMISSION: 5/6/2019 10:06 AM    DATE OF DISCHARGE: 5/10/2019  PRIMARY CARE PROVIDER: Addy Lambert MD     ATTENDING PHYSICIAN: Tessie Stephen MD  DISCHARGING PROVIDER: Tessie Stephen MD    To contact this individual call [phone] and ask the  to page. If unavailable ask to be transferred the Adult Hospitalist Department. CONSULTATIONS: IP CONSULT TO NEUROLOGY  IP CONSULT TO NEUROLOGY  IP CONSULT TO CARDIOLOGY    PROCEDURES/SURGERIES: * No surgery found *    ADMITTING DIAGNOSES & HOSPITAL COURSE:   Brief admission summary    Nicolás Galvin is a 80 y.o.  female with h/o afib,hypercholesterolemia,hypertension,PE,DVT,brought to ED by EMS because of GLF fall. Assessment and plan      Acute CVA (cerebrovascular accident) resulting in fall  -CTA c/w Abrupt occlusion distal M2 posterior division right middle cerebral artery.  -continue [address] [place] [address]  -Neurology and NIS on board  -Echo: Moderately to severely reduced systolic function. Severe systolic dysfunction. Estimated left ventricular ejection fraction is 26 - 30%. -MRI not done, she has ppm     Acute on chronic systolic and diastolic heart failure  -On coreg,lisinopril,lasix, aspirin,atorvastatin  -Stress test negative  -Appreciate Dr Margot Lucero help. Pulmonary embolism/DVT  -On xarelto     . Mixed hyperlipidemia   -On statin     Atrial fibrillation  -Has PPM  -On xarelto  -On coreg. Hypothyroid    -Resume synthroid     Scalp laceration   -Wound care     Cough w/o accompanying sxs: treat symptomatically      Probable dementia,and sun downing   -family recognize that. I have suggested neurocognitive eval with pcp or neurology once the acute issues have resolved. Hypokalemia: replaced.                       DISCHARGE MEDICATIONS:  Discharge Medication List as of 5/10/2019  5:36 PM      START taking these medications    Details aspirin 81 mg chewable tablet Take 1 Tab by mouth daily for 30 days. , Normal, Disp-30 Tab, R-0      atorvastatin (LIPITOR) 80 mg tablet Take 1 Tab by mouth nightly for 30 days. , Print, Disp-30 Tab, R-0      carvedilol (COREG) 3.125 mg tablet Take 1 Tab by mouth two (2) times daily (with meals) for 30 days. , Print, Disp-60 Tab, R-0      lisinopril (PRINIVIL, ZESTRIL) 2.5 mg tablet Take 1 Tab by mouth daily for 30 days. , Print, Disp-30 Tab, R-0         CONTINUE these medications which have NOT CHANGED    Details   furosemide (LASIX) 40 mg tablet Take 40 mg by mouth daily. , Historical Med      potassium chloride SR (KLOR-CON 10) 10 mEq tablet Take 10 mEq by mouth daily. , Historical Med      alendronate (FOSAMAX) 35 mg tablet Take 35 mg by mouth every Wednesday., Historical Med      loratadine (CLARITIN) 10 mg tablet TAKE 1 TABLET BY MOUTH ONCE DAILY, Normal, Disp-30 Tab, R-0      QUEtiapine (SEROQUEL) 25 mg tablet Take 1 Tab by mouth nightly., Normal, Disp-30 Tab, R-2      fluticasone propionate (FLONASE) 50 mcg/actuation nasal spray 2 spray each nostril QD, Normal, Disp-1 Bottle, R-5      magnesium oxide (MAG-OX) 400 mg tablet TAKE 1 TAB BY MOUTH DAILY., Normal, Disp-90 Tab, R-2      PARoxetine (PAXIL) 20 mg tablet TAKE 1 TAB BY MOUTH DAILY., Normal, Disp-90 Tab, R-3      cyanocobalamin 1,000 mcg tablet Take 1 Tab by mouth daily. , Normal, Disp-30 Tab, R-3      levothyroxine (SYNTHROID) 25 mcg tablet TAKE 1 TABLET BY MOUTH DAILY BEFORE BREAKFAST, Normal, Disp-90 Tab, R-2      multivitamin (ONE A DAY) tablet Take 1 Tab by mouth daily. , Normal, Disp-30 Tab, R-12      polyethylene glycol (MIRALAX) 17 gram/dose powder Take 17 g by mouth daily. , Normal, Disp-510 g, R-4      trospium (SANCTURA) 20 mg tablet TAKE 1 TAB BY MOUTH TWO (2) TIMES A DAY., Normal, Disp-180 Tab, R-2      calcium-cholecalciferol, D3, (CALTRATE 600+D) tablet Take 1 Tab by mouth daily. , Normal, Disp-90 Tab, R-3      rivaroxaban (XARELTO) 20 mg tab tablet Take 1 Tab by mouth daily (with breakfast). , Normal, Disp-90 Tab, R-1      albuterol (PROAIR HFA) 90 mcg/actuation inhaler Take 2 Puffs by inhalation every six (6) hours as needed for Wheezing., Normal, Disp-1 Inhaler, R-2      zinc oxide-cod liver oil (DESITIN) 40 % ointment Apply  to affected area as needed for Skin Irritation. , Historical Med      docusate sodium (COLACE) 100 mg capsule Take 100 mg by mouth two (2) times a day., Historical Med         STOP taking these medications       pravastatin (PRAVACHOL) 20 mg tablet Comments:   Reason for Stopping:         metoprolol tartrate (LOPRESSOR) 50 mg tablet Comments:   Reason for Stopping:         amLODIPine (NORVASC) 5 mg tablet Comments:   Reason for Stopping:               PENDING TEST RESULTS:   At the time of discharge the following test results are still pending: none    FOLLOW UP APPOINTMENTS:    Follow-up Information     Follow up With Specialties Details Why Contact Info     [address]  Please call agency at [phone] if you haven't heard one day after discharge  [address] [place] [address] [id number] [place] [address], [address] [place] [address] Internal Medicine   [address]  Suite [facility] [address] Cleo Miles MD Cardiology   [facility] [address] [address] [phone]             ADDITIONAL CARE RECOMMENDATIONS:     DIET: Regular Diet and Cardiac Diet    ACTIVITY: Activity as tolerated    WOUND CARE: NA    EQUIPMENT needed: NA          NOTIFY YOUR PHYSICIAN FOR ANY OF THE FOLLOWING:   Fever over 101 degrees for 24 hours. Chest pain, shortness of breath, fever, chills, nausea, vomiting, diarrhea, change in mentation, falling, weakness, bleeding. Severe pain or pain not relieved by medications. Or, any other signs or symptoms that you may have questions about.     DISPOSITION:  x  Home With:  x OT x PT x HH  RN       SNF(Name)    Inpatient Rehab(name)    Independent/assisted living(name)    Hospice    Other:       PATIENT CONDITION AT DISCHARGE:     Functional status        Poor    x Deconditioned     Independent    Cognition        Lucid    Forgetful   x Dementia   Catheter/Lines(indications)     Ceron    PICC    PEG   x None   Code status     x Full    DNR       PHYSICAL EXAMINATION AT DISCHARGE:     Visit Vitals  /80   Pulse 83   Temp 98.7 °F (37.1 °C)   Resp 17   Ht 5' 4\" (1.626 m)   Wt 85.6 kg (188 lb 11.4 oz)   SpO2 98%   BMI 32.39 kg/m²      O2 Device: Room air    Temp (24hrs), Av.3 °F (36.8 °C), Min:97.8 °F (36.6 °C), Max:98.7 °F (37.1 °C)    No intake/output data recorded. No intake/output data recorded. GENERAL:  Alert, oriented, cooperative, no apparent distress  HEENT:  Normocephalic, atraumatic, non icteric sclerae, non pallor conjuctivae, EOMs intact, PERRLA. NECK: Supple, trachea midline, no adenopathy, no thyromegally or tenderness, no carotid bruit and no JVD. LUNGS:   Vesicular breath sounds bilaterally, no added sounds. HEART:   S1 and S2 well heard,RRR,  no murmur, click, rub or gallop. ABDOMEB:   Soft, non-tender. Normoactive bowel sounds. No masses,  No organomegaly. EXTREMETIES:  Atraumatic, acyanotic, no edema  PULSES: 2+ and symmetric all extremities. SKIN:  No rashes or lesions  NEUROLOGY: Alert and oriented to PPT, CNII-XII intact. Motor and sensory exam grossly intact. CHRONIC MEDICAL DIAGNOSES:  Problem List as of 5/10/2019 Date Reviewed: 2019          Codes Class Noted - Resolved    * (Principal) Acute CVA (cerebrovascular accident) [facility]) ICD-10-CM: I63.9  ICD-9-CM: 434.91  2019 - Present        Fall ICD-10-CM: W19. Narciso Contreras  ICD-9-CM: L156.3  2019 - Present        CVA (cerebral vascular accident) [facility]) ICD-10-CM: I63.9  ICD-9-CM: 434.91  2019 - Present        Scalp laceration ICD-10-CM: S01. 01XA  ICD-9-CM: 873.0  2019 - Present        Chronic cough ICD-10-CM: R05  ICD-9-CM: 786.2 8/14/2018 - Present        Personal history of pulmonary embolism ICD-10-CM: Z22.697  ICD-9-CM: V12.55  8/14/2018 - Present        Gait instability ICD-10-CM: R26.81  ICD-9-CM: 781.2  4/6/2018 - Present        ACP (advance care planning) ICD-10-CM: Z71.89  ICD-9-CM: V65.49  3/30/2017 - Present    Overview Signed 3/30/2017 12:05 PM by Jimmie Cruz RN     Patient has form completed at home and will have POA bring in office to scan into her chart             TIA (transient ischemic attack) ICD-10-CM: G45.9  ICD-9-CM: 435.9  11/2/2016 - Present    Overview Signed 11/4/2016  5:16 PM by Miya Bautista RN     Pt seen and diagnosed at [address]             Pulmonary embolism [facility]) ICD-10-CM: I26.99  ICD-9-CM: 415.19  6/18/2014 - Present        DVT (deep venous thrombosis) ([facility] ICD-10-CM: I82.409  ICD-9-CM: 453.40  6/18/2014 - Present        Mixed hyperlipidemia ICD-10-CM: E78.2  ICD-9-CM: 272.2  6/18/2014 - Present        Atrial fibrillation ([facility] ICD-10-CM: I48.91  ICD-9-CM: 427.31  6/18/2014 - Present        HTN (hypertension) ICD-10-CM: I10  ICD-9-CM: 401.9  6/18/2014 - Present        DJD (degenerative joint disease) ICD-10-CM: M19.90  ICD-9-CM: 715.90  6/18/2014 - Present        Hypothyroid ICD-10-CM: E03.9  ICD-9-CM: 244.9  6/18/2014 - Present              Greater than 30 minutes were spent with the patient on counseling and coordination of care    Signed:    Rod King MD  5/10/2019  4:57 PM

## 2019-05-10 NOTE — PROGRESS NOTES
Hospitalist Progress Note  Eve Wright MD  Answering service: [phone] OR [phone] from in house phone      Date of Service:  5/10/2019  NAME:  Alma Rosa Stone                                                         :  1924                                               MRN:  [id number]    Brief admission summary    Ish Del Toro is a 80 y.o.  female with h/o afib,hypercholesterolemia,hypertension,PE,DVT,brought to ED by EMS because of GLF fall. Subjective/interval history    -alert and awake this morning. Daughter stated pt had rough night,she was delirious,patient did not remember that. Assessment and plan     Acute CVA (cerebrovascular accident) resulting in fall  -CTA c/w Abrupt occlusion distal M2 posterior division right middle cerebral artery.  -continue [address]  -Neurology and NIS on board  -Echo: Moderately to severely reduced systolic function. Severe systolic dysfunction. Estimated left ventricular ejection fraction is 26 - 30%. -MRI not done, she has ppm    Acute on chronic systolic and diastolic heart failure  -On coreg,lisinopril,lasix, aspirin,atorvastatin  -Stress test negative  -Appreciate Dr Demetrice fernando. Pulmonary embolism/DVT  -On xarelto     . Mixed hyperlipidemia   -On statin     Atrial fibrillation  -Has PPM  -On xarelto  -On coreg. Hypothyroid    -Resume synthroid     Scalp laceration   -Wound care    Cough w/o accompanying sxs: treat symptomatically      Probable dementia,and sun downing   -family recognize that. I have suggested neurocognitive eval with pcp or neurology once the acute issues have resolved. Hypokalemia: replaced. recheck lab pending. DVT prophylaxis:scd  Disposition:TBD,anticipate home  Plan of care discussed with:Patient and family. Anticipate d/c [facility] with [facility]    Current facility administered and prior to admit medications reviewed.   x         Review of Systems:  A comprehensive review of systems was negative except for that written in the HPI. PHYSICAL EXAM:  O:  Visit Vitals  /80   Pulse 99   Temp 98.7 °F (37.1 °C)   Resp 19   Ht 5' 4\" (1.626 m)   Wt 87 kg (191 lb 12.8 oz)   SpO2 97%   BMI 32.92 kg/m²       General Appears comfortable,not in distress. HEENT Head atraumatic. Unicteric sclera. Moist buccal mucosa. PERRLA     CVS RRR, no MRG, no JVD, no peripheral edema       Chest No deformity  No accessory muscle use  Vesicular air entry symmetrically  No wheezing,ronchi or crepitations       Abdomen &Pelvis Not distended. Normoactive. Soft. Non tender. No hepatomegally       Genitourinary  No CVA or suprapubic tenderness       Musculoskeletal No edema, deformity of extremities,back       Skin No erythema,rash,depigmentation       Neurology Mental status: alert and oriented x4  Power LUE 4+/5  Sensation : diminished light touch and cold on the left     Psychiatry          No intake or output data in the 24 hours ending 05/10/19 1015       Recent labs & imaging reviewed:    Problem List as of 5/10/2019 Date Reviewed: 5/6/2019          Codes Class Noted - Resolved    * (Principal) Acute CVA (cerebrovascular accident) [facility]) ICD-10-CM: I63.9  ICD-9-CM: 434.91  5/6/2019 - Present        Fall ICD-10-CM: W19. Tracey Tee  ICD-9-CM: T095.3  5/6/2019 - Present        CVA (cerebral vascular accident) [facility]) ICD-10-CM: I63.9  ICD-9-CM: 434.91  5/6/2019 - Present        Scalp laceration ICD-10-CM: S01. 01XA  ICD-9-CM: 873.0  5/6/2019 - Present        Chronic cough ICD-10-CM: R05  ICD-9-CM: 786.2  8/14/2018 - Present        Personal history of pulmonary embolism ICD-10-CM: B98.240  ICD-9-CM: V12.55  8/14/2018 - Present        Gait instability ICD-10-CM: R26.81  ICD-9-CM: 781.2  4/6/2018 - Present        ACP (advance care planning) ICD-10-CM: Z71.89  ICD-9-CM: V65.49  3/30/2017 - Present    Overview Signed 3/30/2017 12:05 PM by Claudia Mathias RN     Patient has form completed at home and will have POA bring in office to scan into her chart             TIA (transient ischemic attack) ICD-10-CM: G45.9  ICD-9-CM: 435.9  11/2/2016 - Present    Overview Signed 11/4/2016  5:16 PM by Olive Padilla RN     Pt seen and [facility] at [facility]             Pulmonary embolism [facility]) ICD-10-CM: I26.99  ICD-9-CM: 415.19  6/18/2014 - Present        DVT (deep venous thrombosis) ([facility] ICD-10-CM: I82.409  ICD-9-CM: 453.40  6/18/2014 - Present        Mixed hyperlipidemia ICD-10-CM: E78.2  ICD-9-CM: 272.2  6/18/2014 - Present        Atrial fibrillation ([facility] ICD-10-CM: I48.91  ICD-9-CM: 427.31  6/18/2014 - Present        HTN (hypertension) ICD-10-CM: I10  ICD-9-CM: 401.9  6/18/2014 - Present        DJD (degenerative joint disease) ICD-10-CM: M19.90  ICD-9-CM: 715.90  6/18/2014 - Present        Hypothyroid ICD-10-CM: E03.9  ICD-9-CM: 244.9  6/18/2014 - Present                Reyes Blunt, MD  Internal Medicine  Date of Service: 5/10/2019

## 2019-05-10 NOTE — PROGRESS NOTES
Chart reviewed; Patient being discharged home today. Met with patient and two family members at bedside to discuss discharge plan. At [address] accepted for Home Health PT/OT and added SN & HHA. Family will transport. No further case management needs. CM available in case needs arise.    Alfredo Orozco, LOLI,CRM

## 2019-05-10 NOTE — PROGRESS NOTES
Problem: Falls - Risk of  Goal: *Absence of Falls  Description  Document Edgar Brunner Fall Risk and appropriate interventions in the flowsheet. Outcome: Progressing Towards Goal     Problem: Patient Education: Go to Patient Education Activity  Goal: Patient/Family Education  Outcome: Progressing Towards Goal     Problem: Patient Education: Go to Patient Education Activity  Goal: Patient/Family Education  Outcome: Progressing Towards Goal     Problem: Pressure Injury - Risk of  Goal: *Prevention of pressure injury  Description  Document Omid Scale and appropriate interventions in the flowsheet.   Outcome: Progressing Towards Goal     Problem: Patient Education: Go to Patient Education Activity  Goal: Patient/Family Education  Outcome: Progressing Towards Goal     Problem: Patient Education: Go to Patient Education Activity  Goal: Patient/Family Education  Outcome: Progressing Towards Goal     Problem: Pain  Goal: *Control of Pain  Outcome: Progressing Towards Goal  Goal: *PALLIATIVE CARE:  Alleviation of Pain  Outcome: Progressing Towards Goal     Problem: Patient Education: Go to Patient Education Activity  Goal: Patient/Family Education  Outcome: Progressing Towards Goal     Problem: Patient Education: Go to Patient Education Activity  Goal: Patient/Family Education  Outcome: Progressing Towards Goal     Problem: TIA/CVA Stroke: 0-24 hours  Goal: Off Pathway (Use only if patient is Off Pathway)  Outcome: Progressing Towards Goal  Goal: Activity/Safety  Outcome: Progressing Towards Goal  Goal: Consults, if ordered  Outcome: Progressing Towards Goal  Goal: Diagnostic Test/Procedures  Outcome: Progressing Towards Goal  Goal: Nutrition/Diet  Outcome: Progressing Towards Goal  Goal: Discharge Planning  Outcome: Progressing Towards Goal  Goal: Medications  Outcome: Progressing Towards Goal  Goal: Respiratory  Outcome: Progressing Towards Goal  Goal: Treatments/Interventions/Procedures  Outcome: Progressing Towards Goal  Goal: Minimize risk of bleeding post-thrombolytic infusion  Outcome: Progressing Towards Goal  Goal: Monitor for complications post-thrombolytic infusion  Outcome: Progressing Towards Goal  Goal: Psychosocial  Outcome: Progressing Towards Goal  Goal: *Hemodynamically stable  Outcome: Progressing Towards Goal  Goal: *Neurologically stable  Description  Absence of additional neurological deficits    Outcome: Progressing Towards Goal  Goal: *Verbalizes anxiety and depression are reduced or absent  Outcome: Progressing Towards Goal  Goal: *Absence of Signs of Aspiration on Current Diet  Outcome: Progressing Towards Goal  Goal: *Absence of deep venous thrombosis signs and symptoms(Stroke Metric)  Outcome: Progressing Towards Goal  Goal: *Ability to perform ADLs and demonstrates progressive mobility and function  Outcome: Progressing Towards Goal  Goal: *Stroke education started(Stroke Metric)  Outcome: Progressing Towards Goal  Goal: *Dysphagia screen performed(Stroke Metric)  Outcome: Progressing Towards Goal  Goal: *Rehab consulted(Stroke Metric)  Outcome: Progressing Towards Goal     Problem: TIA/CVA Stroke: Day 2 Until Discharge  Goal: Off Pathway (Use only if patient is Off Pathway)  Outcome: Progressing Towards Goal  Goal: Activity/Safety  Outcome: Progressing Towards Goal  Goal: Diagnostic Test/Procedures  Outcome: Progressing Towards Goal  Goal: Nutrition/Diet  Outcome: Progressing Towards Goal  Goal: Discharge Planning  Outcome: Progressing Towards Goal  Goal: Medications  Outcome: Progressing Towards Goal  Goal: Respiratory  Outcome: Progressing Towards Goal  Goal: Treatments/Interventions/Procedures  Outcome: Progressing Towards Goal  Goal: Psychosocial  Outcome: Progressing Towards Goal  Goal: *Verbalizes anxiety and depression are reduced or absent  Outcome: Progressing Towards Goal  Goal: *Absence of aspiration  Outcome: Progressing Towards Goal  Goal: *Absence of deep venous thrombosis signs and symptoms(Stroke Metric)  Outcome: Progressing Towards Goal  Goal: *Optimal pain control at patient's stated goal  Outcome: Progressing Towards Goal  Goal: *Tolerating diet  Outcome: Progressing Towards Goal  Goal: *Ability to perform ADLs and demonstrates progressive mobility and function  Outcome: Progressing Towards Goal  Goal: *Stroke education continued(Stroke Metric)  Outcome: Progressing Towards Goal     Problem: Ischemic Stroke: Discharge Outcomes  Goal: *Verbalizes anxiety and depression are reduced or absent  Outcome: Progressing Towards Goal  Goal: *Verbalize understanding of risk factor modification(Stroke Metric)  Outcome: Progressing Towards Goal  Goal: *Hemodynamically stable  Outcome: Progressing Towards Goal  Goal: *Absence of aspiration pneumonia  Outcome: Progressing Towards Goal  Goal: *Aware of needed dietary changes  Outcome: Progressing Towards Goal  Goal: *Verbalize understanding of prescribed medications including anti-coagulants, anti-lipid, and/or anti-platelets(Stroke Metric)  Outcome: Progressing Towards Goal  Goal: *Tolerating diet  Outcome: Progressing Towards Goal  Goal: *Aware of follow-up diagnostics related to anticoagulants  Outcome: Progressing Towards Goal  Goal: *Ability to perform ADLs and demonstrates progressive mobility and function  Outcome: Progressing Towards Goal  Goal: *Absence of DVT(Stroke Metric)  Outcome: Progressing Towards Goal  Goal: *Absence of aspiration  Outcome: Progressing Towards Goal  Goal: *Optimal pain control at patient's stated goal  Outcome: Progressing Towards Goal  Goal: *Home safety concerns addressed  Outcome: Progressing Towards Goal  Goal: *Describes available resources and support systems  Outcome: Progressing Towards Goal  Goal: *Verbalizes understanding of activation of EMS(911) for stroke symptoms(Stroke Metric)  Outcome: Progressing Towards Goal  Goal: *Understands and describes signs and symptoms to report to providers(Stroke Metric)  Outcome: Progressing Towards Goal  Goal: *Neurolgocially stable (absence of additional neurological deficits)  Outcome: Progressing Towards Goal  Goal: *Verbalizes importance of follow-up with primary care physician(Stroke Metric)  Outcome: Progressing Towards Goal  Goal: *Smoking cessation discussed,if applicable(Stroke Metric)  Outcome: Progressing Towards Goal  Goal: *Depression screening completed(Stroke Metric)  Outcome: Progressing Towards Goal

## 2019-05-10 NOTE — PROGRESS NOTES
Bedside and Verbal shift change report given to Kaila Alas (oncoming nurse) by [employer] (offgoing nurse). Report included the following information SBAR, Kardex, Intake/Output, MAR and Cardiac Rhythm NSR.

## 2019-05-10 NOTE — PROGRESS NOTES
Cardiology Progress Note                                        Admit Date: 5/6/2019    Assessment/Plan:     Cardiomyopathy; nonischemic and slightly better on nuclear study EF 40%; she could go home on current regimen and follow up with her cardiologist, Dr. Juvenal Kendall in one week  CAD; suspected but nuclear study shows no ischemia  Afib; chronic and rate is well controlled  S/p pacer; working well      [facility] Kristen is a 80 y.o. female with     PROBLEM LIST:  Patient Active Problem List    Diagnosis Date Noted    Acute CVA (cerebrovascular accident) ([facility] 05/06/2019    Fall 05/06/2019    CVA (cerebral vascular accident) ([facility] 05/06/2019    Scalp laceration 05/06/2019    Chronic cough 08/14/2018    Personal history of pulmonary embolism 08/14/2018    Gait instability 04/06/2018    ACP (advance care planning) 03/30/2017    TIA (transient ischemic attack) 11/02/2016    Pulmonary embolism ([facility] 06/18/2014    DVT (deep venous thrombosis) ([facility] 06/18/2014    Mixed hyperlipidemia 06/18/2014    Atrial fibrillation ([facility] 06/18/2014    HTN (hypertension) 06/18/2014    DJD (degenerative joint disease) 06/18/2014    Hypothyroid 06/18/2014         Subjective: [facility] reports none.     Visit Vitals  /80   Pulse 99   Temp 98.7 °F (37.1 °C)   Resp 19   Ht 5' 4\" (1.626 m)   Wt 191 lb 12.8 oz (87 kg)   SpO2 97%   BMI 32.92 kg/m²     No intake or output data in the 24 hours ending 05/10/19 1033    Objective:      Physical Exam:  HEENT: Perrla, EOMI  Neck: No JVD,  No thyroidmegaly  Resp: few rales  CV: irregular s1s2 No murmur no s3  Abd:Soft, Nontender  Ext: No edema  Neuro: Alert and oriented; Nonfocal  Skin: Warm, Dry, Intact  Pulses: 2+ DP/PT/Rad      Telemetry: AFIB, V paced beats    Current Facility-Administered Medications   Medication Dose Route Frequency    furosemide (LASIX) tablet 40 mg  40 mg Oral DAILY    lisinopril (PRINIVIL, ZESTRIL) tablet 2.5 mg  2.5 mg Oral DAILY    carvedilol (COREG) tablet 3.125 mg  3.125 mg Oral BID WITH MEALS    PARoxetine (PAXIL) tablet 20 mg  20 mg Oral DAILY    guaiFENesin (ROBITUSSIN) 100 mg/5 mL oral liquid 100 mg  100 mg Oral Q4H PRN    fluticasone propionate (FLONASE) 50 mcg/actuation nasal spray 2 Spray  2 Spray Both Nostrils DAILY    levothyroxine (SYNTHROID) tablet 25 mcg  25 mcg Oral ACB    rivaroxaban (XARELTO) tablet 15 mg  15 mg Oral DAILY WITH DINNER    zinc oxide-cod liver oil (DESITIN) 40 % paste   Topical PRN    sodium chloride (NS) flush 5-40 mL  5-40 mL IntraVENous Q8H    sodium chloride (NS) flush 5-40 mL  5-40 mL IntraVENous PRN    acetaminophen (TYLENOL) tablet 650 mg  650 mg Oral Q4H PRN    Or    acetaminophen (TYLENOL) solution 650 mg  650 mg Per NG tube Q4H PRN    Or    acetaminophen (TYLENOL) suppository 650 mg  650 mg Rectal Q4H PRN    ondansetron (ZOFRAN) injection 4 mg  4 mg IntraVENous Q6H PRN    docusate sodium (COLACE) capsule 100 mg  100 mg Oral BID    atorvastatin (LIPITOR) tablet 80 mg  80 mg Oral QHS    aspirin chewable tablet 81 mg  81 mg Oral DAILY    QUEtiapine (SEROquel) tablet 25 mg  25 mg Oral QHS         Data Review:   Labs:    Recent Results (from the past 24 hour(s))   NUCLEAR CARDIAC STRESS TEST    Collection Time: 05/09/19 12:40 PM   Result Value Ref Range    Target  bpm    Exercise duration time 00:03:00     Stress Base Systolic  mmHg    Stress Base Diastolic BP 85 mmHg    Post peak HR 94 BPM    Baseline HR 76 BPM    Estimated workload 1.0 METS    Percent HR 75 %    Stress Rate Pressure Product 16,356 BPM*mmHg

## 2019-05-10 NOTE — PROGRESS NOTES
Problem: Falls - Risk of  Goal: *Absence of Falls  Description  Document Lex Michael Fall Risk and appropriate interventions in the flowsheet. Outcome: Progressing Towards Goal     Problem: Patient Education: Go to Patient Education Activity  Goal: Patient/Family Education  Outcome: Progressing Towards Goal     Problem: Patient Education: Go to Patient Education Activity  Goal: Patient/Family Education  Outcome: Progressing Towards Goal     Problem: Pressure Injury - Risk of  Goal: *Prevention of pressure injury  Description  Document Omid Scale and appropriate interventions in the flowsheet.   Outcome: Progressing Towards Goal     Problem: Patient Education: Go to Patient Education Activity  Goal: Patient/Family Education  Outcome: Progressing Towards Goal     Problem: Patient Education: Go to Patient Education Activity  Goal: Patient/Family Education  Outcome: Progressing Towards Goal     Problem: Pain  Goal: *Control of Pain  Outcome: Progressing Towards Goal  Goal: *PALLIATIVE CARE:  Alleviation of Pain  Outcome: Progressing Towards Goal     Problem: Patient Education: Go to Patient Education Activity  Goal: Patient/Family Education  Outcome: Progressing Towards Goal     Problem: Patient Education: Go to Patient Education Activity  Goal: Patient/Family Education  Outcome: Progressing Towards Goal     Problem: TIA/CVA Stroke: 0-24 hours  Goal: Off Pathway (Use only if patient is Off Pathway)  Outcome: Progressing Towards Goal  Goal: Activity/Safety  Outcome: Progressing Towards Goal  Goal: Consults, if ordered  Outcome: Progressing Towards Goal  Goal: Diagnostic Test/Procedures  Outcome: Progressing Towards Goal  Goal: Nutrition/Diet  Outcome: Progressing Towards Goal  Goal: Discharge Planning  Outcome: Progressing Towards Goal  Goal: Medications  Outcome: Progressing Towards Goal  Goal: Respiratory  Outcome: Progressing Towards Goal  Goal: Treatments/Interventions/Procedures  Outcome: Progressing Towards Goal  Goal: Minimize risk of bleeding post-thrombolytic infusion  Outcome: Progressing Towards Goal  Goal: Monitor for complications post-thrombolytic infusion  Outcome: Progressing Towards Goal  Goal: Psychosocial  Outcome: Progressing Towards Goal  Goal: *Hemodynamically stable  Outcome: Progressing Towards Goal  Goal: *Neurologically stable  Description  Absence of additional neurological deficits    Outcome: Progressing Towards Goal  Goal: *Verbalizes anxiety and depression are reduced or absent  Outcome: Progressing Towards Goal  Goal: *Absence of Signs of Aspiration on Current Diet  Outcome: Progressing Towards Goal  Goal: *Absence of deep venous thrombosis signs and symptoms(Stroke Metric)  Outcome: Progressing Towards Goal  Goal: *Ability to perform ADLs and demonstrates progressive mobility and function  Outcome: Progressing Towards Goal  Goal: *Stroke education started(Stroke Metric)  Outcome: Progressing Towards Goal  Goal: *Dysphagia screen performed(Stroke Metric)  Outcome: Progressing Towards Goal  Goal: *Rehab consulted(Stroke Metric)  Outcome: Progressing Towards Goal     Problem: TIA/CVA Stroke: Day 2 Until Discharge  Goal: Off Pathway (Use only if patient is Off Pathway)  Outcome: Progressing Towards Goal  Goal: Activity/Safety  Outcome: Progressing Towards Goal  Goal: Diagnostic Test/Procedures  Outcome: Progressing Towards Goal  Goal: Nutrition/Diet  Outcome: Progressing Towards Goal  Goal: Discharge Planning  Outcome: Progressing Towards Goal  Goal: Medications  Outcome: Progressing Towards Goal  Goal: Respiratory  Outcome: Progressing Towards Goal  Goal: Treatments/Interventions/Procedures  Outcome: Progressing Towards Goal  Goal: Psychosocial  Outcome: Progressing Towards Goal  Goal: *Verbalizes anxiety and depression are reduced or absent  Outcome: Progressing Towards Goal  Goal: *Absence of aspiration  Outcome: Progressing Towards Goal  Goal: *Absence of deep venous thrombosis signs and symptoms(Stroke Metric)  Outcome: Progressing Towards Goal  Goal: *Optimal pain control at patient's stated goal  Outcome: Progressing Towards Goal  Goal: *Tolerating diet  Outcome: Progressing Towards Goal  Goal: *Ability to perform ADLs and demonstrates progressive mobility and function  Outcome: Progressing Towards Goal  Goal: *Stroke education continued(Stroke Metric)  Outcome: Progressing Towards Goal     Problem: Ischemic Stroke: Discharge Outcomes  Goal: *Verbalizes anxiety and depression are reduced or absent  Outcome: Progressing Towards Goal  Goal: *Verbalize understanding of risk factor modification(Stroke Metric)  Outcome: Progressing Towards Goal  Goal: *Hemodynamically stable  Outcome: Progressing Towards Goal  Goal: *Absence of aspiration pneumonia  Outcome: Progressing Towards Goal  Goal: *Aware of needed dietary changes  Outcome: Progressing Towards Goal  Goal: *Verbalize understanding of prescribed medications including anti-coagulants, anti-lipid, and/or anti-platelets(Stroke Metric)  Outcome: Progressing Towards Goal  Goal: *Tolerating diet  Outcome: Progressing Towards Goal  Goal: *Aware of follow-up diagnostics related to anticoagulants  Outcome: Progressing Towards Goal  Goal: *Ability to perform ADLs and demonstrates progressive mobility and function  Outcome: Progressing Towards Goal  Goal: *Absence of DVT(Stroke Metric)  Outcome: Progressing Towards Goal  Goal: *Absence of aspiration  Outcome: Progressing Towards Goal  Goal: *Optimal pain control at patient's stated goal  Outcome: Progressing Towards Goal  Goal: *Home safety concerns addressed  Outcome: Progressing Towards Goal  Goal: *Describes available resources and support systems  Outcome: Progressing Towards Goal  Goal: *Verbalizes understanding of activation of EMS(911) for stroke symptoms(Stroke Metric)  Outcome: Progressing Towards Goal  Goal: *Understands and describes signs and symptoms to report to providers(Stroke Metric)  Outcome: Progressing Towards Goal  Goal: *Neurolgocially stable (absence of additional neurological deficits)  Outcome: Progressing Towards Goal  Goal: *Verbalizes importance of follow-up with primary care physician(Stroke Metric)  Outcome: Progressing Towards Goal  Goal: *Smoking cessation discussed,if applicable(Stroke Metric)  Outcome: Progressing Towards Goal  Goal: *Depression screening completed(Stroke Metric)  Outcome: Progressing Towards Goal

## 2019-05-10 NOTE — ROUTINE PROCESS
Bedside RN performed patient education and medication education. Discharge concerns initiated and discussed with patient, including clarification on \"who\" assists the patient at their home and instructions for when the home going patient should call their provider after discharge. Opportunity for questions and clarification was provided. Patient receptive to education: YES  Patient stated: I UNDERSTAND    Barriers to Education: NONE  Diagnosis Education given:  YES    Length of stay: 4  Expected Day of Discharge: 4  Ask if they have \"Help at Home\" & add to white board?   YES    Education Day #: 4    Medication Education Given:  YES  M in the box Medication name: lipitor    Pt aware of HCAHPS survey: YES            Stroke Education documented in Patient Education: YES  Core Measures Documented in [facility]:  Risk Factors: YES  Warning signs of stroke: YES  When to Activate 911: YES  Medication Education for Risk Factors: YES  Smoking cessation if applicable: YES  Written Education Given:  YES    Discharge NIH Completed: YES  Score: 0    BRAINS: YES    Follow Up Appointment Made: YES  Date/Time if applicable:     Stroke Education documented in Patient Education: YES  Core Measures Documented in [facility]:  Risk Factors: YES  Warning signs of stroke: YES  When to Activate 911: YES  Medication Education for Risk Factors: YES  Smoking cessation if applicable: YES  Written Education Given:  YES    Discharge NIH Completed: YES  Score: 0    BRAINS: YES    Follow Up Appointment Made: YES  Date/Time if applicable:

## 2019-05-10 NOTE — PROGRESS NOTES
Problem: Mobility Impaired (Adult and Pediatric)  Goal: *Acute Goals and Plan of Care (Insert Text)  Description  Physical Therapy Goals  Initiated 5/6/2019  1. Patient will move from supine to sit and sit to supine , scoot up and down and roll side to side in bed with modified independence within 7 day(s). 2.  Patient will transfer from bed to chair and chair to bed with supervision/set-up using the least restrictive device within 7 day(s). 3.  Patient will perform sit to stand with supervision/set-up within 7 day(s). 4.  Patient will ambulate with supervision/set-up for 150 feet with the least restrictive device within 7 day(s). Outcome: Progressing Towards Goal   PHYSICAL THERAPY TREATMENT  Patient: Bianca Bolivar (41 y.o. female)  Date: 5/10/2019  Diagnosis: CVA (cerebral vascular accident) [facility]) [I63.9] Acute CVA (cerebrovascular accident) [facility])       Precautions: Fall  Chart, physical therapy assessment, plan of care and goals were reviewed. ASSESSMENT:  Patient is received supine in bed. She HR on monitor fluctuates from  bpm seated at rest. Per RN patient demonstrates varying HR, and HR as high as 110 bpm. Patient ambulates 75 ft with rolling walker this session. She demonstrates decreased pace need for 2 standing rest breaks. Patient demonstrates no scissoring of gait this session. On return patient HR is 107-108 bpm. She is provided seated rest break before continuing with balance training. HR returns to 87-90 bpm within a couple of minutes and standing balance activities are performed. Patient practice reaching across body at different levels with the R hand. About 1 minutes in to activity patient HR is 133 bpm. Patient is SBA with rolling walker to come to rest in recliner. Further activity is deferred secondary to elevated heart rate. Patient has a ramp to enter her house and chair lift to second floor bedroom. No stairs required for D/C.  Patient would continue to benefit from [facility] on D/C in order to improve her endurance and improve her balance to reduce the risk of falls. Patient would also benefit from home assessment as her most recent falls have occurred in the home. Progression toward goals:  ?    Improving appropriately and progressing toward goals  ? Improving slowly and progressing toward goals  ? Not making progress toward goals and plan of care will be adjusted     PLAN:  Patient continues to benefit from skilled intervention to address the above impairments. Continue treatment per established plan of care. Discharge Recommendations:  None  Further Equipment Recommendations for Discharge:  None      SUBJECTIVE:   Patient stated I just get a little tired.     OBJECTIVE DATA SUMMARY:   Critical Behavior:  Neurologic State: Alert  Orientation Level: Oriented X4  Cognition: Follows commands, Memory loss, Appropriate decision making, Appropriate for age attention/concentration, Appropriate safety awareness  Safety/Judgement: Awareness of environment, Insight into deficits  Functional Mobility Training:  Bed Mobility:                    Transfers:  Sit to Stand: Stand-by assistance  Stand to Sit: Stand-by assistance        Bed to Chair: Stand-by assistance                    Balance:  Sitting: Intact; With support  Standing: Impaired  Standing - Static: Good  Standing - Dynamic : Fair  Ambulation/Gait Training:  Distance (ft): 75 Feet (ft)  Assistive Device: Gait belt;Walker, rolling  Ambulation - Level of Assistance: Stand-by assistance        Gait Abnormalities: Decreased step clearance        Base of Support: Widened     Speed/Sheryl: Pace decreased (<100 feet/min)                       Stairs:              Neuro Re-Education:    Therapeutic Exercises:     Pain:  Pain Scale 1: Numeric (0 - 10)  Pain Intensity 1: 0              Activity Tolerance:   Patient demonstrates fair activity tolerance with HR fluctuation from 108-133 bpm with activity.      Please refer to the flowsheet for vital signs taken during this treatment. After treatment:   ?    Patient left in no apparent distress sitting up in chair  ? Patient left in no apparent distress in bed  ? Call bell left within reach  ? Nursing notified  ? Caregiver present- grand daughter and daughter  ?     Bed alarm activated    COMMUNICATION/COLLABORATION:   The patients plan of care was discussed with: Registered Nurse    Jayme Dong, PT   Time Calculation: 32 mins

## 2019-05-10 NOTE — DISCHARGE INSTRUCTIONS
Discharge Instructions       PATIENT ID: Ortiz Umana  MRN: [id number]   YOB: 1924    DATE OF ADMISSION: 5/6/2019 10:06 AM    DATE OF DISCHARGE: 5/10/2019    PRIMARY CARE PROVIDER: Maddie Tavarez MD     ATTENDING PHYSICIAN: Cynthia Almanza MD  DISCHARGING PROVIDER: Latonia Mckeon MD    To contact this individual call [phone] and ask the  to page. If unavailable ask to be transferred the Adult Hospitalist Department. DISCHARGE DIAGNOSES and CARE RECOMMENDATIONS:      Acute CVA (cerebrovascular accident) resulting in fall  -Continue taking the [address]  -Follow up with your primary doctor in a week     Acute on chronic systolic and diastolic heart failure  -You are now on coreg,lisinopril,lasix, aspirin,atorvastatin  -Stress test negative. We would like for you to see Dr Lulu Jaramillo soon after discharge for reassessment of this condition.        DIET: Regular Diet and Cardiac Diet      ACTIVITY: Activity as tolerated and PT/OT per [facility] [address]: NA    SERVICES and EQUIPMENT needed: own    PENDING TEST RESULTS:   At the time of discharge the following test results are still pending: none    FOLLOW UP APPOINTMENTS:   Follow-up Information     Follow up With Specialties Details Why Contact Info     [address]  Please call agency at [phone] if you haven't heard one day after discharge  [address], [address] [facility] Internal Medicine   [address] [employer] [address] Gabbi Sierra MD Cardiology   [address] [facility] [address] [address]  [address] [place] [id number] [phone]               YOU WERE SEEN BY THE FOLLOWING CONSULTATIONS:   IP CONSULT TO NEUROLOGY  IP CONSULT TO NEUROLOGY  IP CONSULT TO CARDIOLOGY    YOU HAD THE FOLLOWING PROCEDURES/SURGERIES  :   * No surgery found *              DISCHARGE MEDICATIONS:   See Medication Reconciliation Form    · It is important that you take the medication exactly as they are prescribed. · Keep your medication in the bottles provided by the pharmacist and keep a list of the medication names, dosages, and times to be taken in your wallet. · Do not take other medications without consulting your doctor. NOTIFY YOUR PHYSICIAN FOR ANY OF THE FOLLOWING:   Fever over 101 degrees for 24 hours. Chest pain, shortness of breath, fever, chills, nausea, vomiting, diarrhea, change in mentation, falling, weakness, bleeding. Severe pain or pain not relieved by medications. Or, any other signs or symptoms that you may have questions about. Services you need on discharge     [facility]... x   PHYSICAL THERAPY x   OCCUPATIONAL THERAPY x   HOSPITAL TO HOME VISIT     DISPATCH HEALTH          Signed:    Paul Marie MD  5/10/2019  4:54 PM

## 2019-05-13 ENCOUNTER — PATIENT OUTREACH (OUTPATIENT)
Dept: INTERNAL MEDICINE CLINIC | Age: 84
End: 2019-05-13

## 2019-05-13 RX ORDER — AMLODIPINE BESYLATE 5 MG/1
TABLET ORAL
Qty: 90 TAB | Refills: 1 | Status: SHIPPED | OUTPATIENT
Start: 2019-05-13 | End: 2019-05-13 | Stop reason: ALTCHOICE

## 2019-05-13 RX ORDER — RIVAROXABAN 20 MG/1
TABLET, FILM COATED ORAL
Qty: 90 TAB | Refills: 1 | Status: SHIPPED | OUTPATIENT
Start: 2019-05-13 | End: 2019-05-22 | Stop reason: SDUPTHER

## 2019-05-13 RX ORDER — TROSPIUM CHLORIDE 20 MG/1
TABLET, FILM COATED ORAL
Qty: 180 TAB | Refills: 2 | Status: SHIPPED | OUTPATIENT
Start: 2019-05-13 | End: 2019-12-26

## 2019-05-13 NOTE — PROGRESS NOTES
Hospital Discharge Follow-Up      Date/Time:  2019 3:53 PM    Patient was admitted to [facility] on 19  and discharged on 5/10/19 for CVA. The physician discharge summary was available at the time of outreach. Patient was contacted within 1 business days of discharge. Top Challenges reviewed with the provider   Acute CVA which caused a GLF. 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 Severe systolic dysfunction. Estimated left ventricular ejection fraction is 26 - 30%.    Acute on chronic systolic and diastolic heart failure  -On coreg,lisinopril,lasix, aspirin,atorvastatin  -Stress test negative    Probable dementia,and sun downing   -family recognize that. I have suggested neurocognitive eval with pcp or neurology once the acute issues have resolved      Daughter wants to talk about code status       Method of communication with provider :staff message    Inpatient RRAT score: 15  Was this a readmission? no   Patient stated reason for the readmission:     Nurse Navigator (NN) contacted the family by telephone to perform post hospital discharge assessment. Verified name and  with family as identifiers. Provided introduction to self, and explanation of the Nurse Navigator role. Reviewed discharge instructions and red flags with family who verbalized understanding. Family given an opportunity to ask questions and does not have any further questions or concerns at this time. The family agrees to contact the PCP office for questions related to their healthcare. NN provided contact information for future reference. Disease Specific:   N/A    Summary of patient's top problems:  1. Acute CVA:  POA stated patient is doing ok but more forgetful and tired. Patient did not want to do much today but stay in bed.   Daughter is concerned about one of patient's eyes are blood shot in the concern and the lump on the back of patient's head from the fall. Had office to set up [facility] appointment for patient for POA to discuss concerns with Dr Abdirizak Macedo    2. CHF:  [place] nurse stated patient was up 1-2 pounds but scale are not the same each time they weigh patient. Per POA patient is not eating well. POA stated patient gets a little SOB at time. Home Health orders at discharge: PT, OT, SN, [facility]: [facility]  Date of initial visit: 5/13/19    Durable Medical Equipment ordered/company:   Durable Medical Equipment received:     Barriers to care? Advance Care Planning:   Does patient have an Advance Directive:  reviewed and current  POA would like to talk about a DDNR and patient being forgetful and needing to take of POA duties      Medication(s):   New Medications at Discharge: asa 80  loipitor  Coreg  lisinopril  Changed Medications at Discharge:   Discontinued Medications at Discharge: norvasc lopressor pravastatin    Medication reconciliation was performed with family, who verbalizes understanding of administration of home medications. There were no barriers to obtaining medications identified at this time. Referral to Pharm D needed: no     Current Outpatient Medications   Medication Sig    XARELTO 20 mg tab tablet TAKE 1 TABLET BY MOUTH EVERY DAY IN THE MORNING WITH BREAKFAST    trospium (SANCTURA) 20 mg tablet TAKE 1 TABLET BY MOUTH TWICE A DAY    aspirin 81 mg chewable tablet Take 1 Tab by mouth daily for 30 days.  atorvastatin (LIPITOR) 80 mg tablet Take 1 Tab by mouth nightly for 30 days.  carvedilol (COREG) 3.125 mg tablet Take 1 Tab by mouth two (2) times daily (with meals) for 30 days.  lisinopril (PRINIVIL, ZESTRIL) 2.5 mg tablet Take 1 Tab by mouth daily for 30 days.  furosemide (LASIX) 40 mg tablet Take 40 mg by mouth daily.  potassium chloride SR (KLOR-CON 10) 10 mEq tablet Take 10 mEq by mouth daily.     alendronate (FOSAMAX) 35 mg tablet Take 35 mg by mouth every Wednesday.  loratadine (CLARITIN) 10 mg tablet TAKE 1 TABLET BY MOUTH ONCE DAILY    QUEtiapine (SEROQUEL) 25 mg tablet Take 1 Tab by mouth nightly.  fluticasone propionate (FLONASE) 50 mcg/actuation nasal spray 2 spray each nostril QD    magnesium oxide (MAG-OX) 400 mg tablet TAKE 1 TAB BY MOUTH DAILY.  PARoxetine (PAXIL) 20 mg tablet TAKE 1 TAB BY MOUTH DAILY.  cyanocobalamin 1,000 mcg tablet Take 1 Tab by mouth daily.  levothyroxine (SYNTHROID) 25 mcg tablet TAKE 1 TABLET BY MOUTH DAILY BEFORE BREAKFAST    multivitamin (ONE A DAY) tablet Take 1 Tab by mouth daily.  polyethylene glycol (MIRALAX) 17 gram/dose powder Take 17 g by mouth daily.  calcium-cholecalciferol, D3, (CALTRATE 600+D) tablet Take 1 Tab by mouth daily.  rivaroxaban (XARELTO) 20 mg tab tablet Take 1 Tab by mouth daily (with breakfast).  albuterol (PROAIR HFA) 90 mcg/actuation inhaler Take 2 Puffs by inhalation every six (6) hours as needed for Wheezing.  zinc oxide-cod liver oil (DESITIN) 40 % ointment Apply  to affected area as needed for Skin Irritation.  docusate sodium (COLACE) 100 mg capsule Take 100 mg by mouth two (2) times a day. No current facility-administered medications for this visit. Medications Discontinued During This Encounter   Medication Reason    amLODIPine (NORVASC) 5 mg tablet Discontinued by Another Clinician    trospium (SANCTURA) 20 mg tablet        [facility] follow up appointment(s):   Future Appointments   Date Time Provider Leo Serrano   7/22/2019 11:30 AM Sabi Vizcarra MD [facility]   8/8/2019 10:00 AM Ibeth [place] [address]      Non-[facility] follow up appointment(s): Dr Vikki Black 5/14/19  [facility]:  n/a       Goals      Maintains daily weight.      5/13/19  Monitor wts daily first thing in the morning.   Spoke to the  [facility] nurse who stated   scales are off and encouraged family to get new scales.  Supportive resources in place to maintain patient in the community (ie.  Home Health, DME equipment, refer to, medication assistant plan, etc.)      5/13/19  Home Health with [facility] for PT/OT SN and aide

## 2019-05-20 RX ORDER — ALENDRONATE SODIUM 35 MG/1
35 TABLET ORAL
Qty: 4 TAB | Refills: 5 | Status: SHIPPED | OUTPATIENT
Start: 2019-05-22 | End: 2019-09-22 | Stop reason: SDUPTHER

## 2019-05-22 ENCOUNTER — OFFICE VISIT (OUTPATIENT)
Dept: INTERNAL MEDICINE CLINIC | Age: 84
End: 2019-05-22

## 2019-05-22 ENCOUNTER — HOSPITAL ENCOUNTER (OUTPATIENT)
Dept: LAB | Age: 84
Discharge: HOME | End: 2019-05-22
Payer: MEDICARE

## 2019-05-22 ENCOUNTER — HOSPITAL ENCOUNTER (OUTPATIENT)
Dept: GENERAL RADIOLOGY | Age: 84
Discharge: HOME | End: 2019-05-22
Payer: MEDICARE

## 2019-05-22 VITALS
DIASTOLIC BLOOD PRESSURE: 70 MMHG | WEIGHT: 174.4 LBS | TEMPERATURE: 97.5 F | HEART RATE: 92 BPM | BODY MASS INDEX: 29.78 KG/M2 | OXYGEN SATURATION: 98 % | RESPIRATION RATE: 20 BRPM | SYSTOLIC BLOOD PRESSURE: 128 MMHG | HEIGHT: 64 IN

## 2019-05-22 DIAGNOSIS — R05.9 COUGH: ICD-10-CM

## 2019-05-22 DIAGNOSIS — I50.9 CHRONIC CONGESTIVE HEART FAILURE, UNSPECIFIED HEART FAILURE TYPE (HCC): ICD-10-CM

## 2019-05-22 DIAGNOSIS — R06.09 DOE (DYSPNEA ON EXERTION): ICD-10-CM

## 2019-05-22 DIAGNOSIS — Z86.73 HISTORY OF CVA (CEREBROVASCULAR ACCIDENT): Primary | ICD-10-CM

## 2019-05-22 DIAGNOSIS — I10 ESSENTIAL HYPERTENSION: ICD-10-CM

## 2019-05-22 DIAGNOSIS — Z86.711 PERSONAL HISTORY OF PULMONARY EMBOLISM: ICD-10-CM

## 2019-05-22 DIAGNOSIS — E03.9 HYPOTHYROIDISM, UNSPECIFIED TYPE: ICD-10-CM

## 2019-05-22 DIAGNOSIS — R41.3 MEMORY LOSS: ICD-10-CM

## 2019-05-22 PROCEDURE — 71046 X-RAY EXAM CHEST 2 VIEWS: CPT

## 2019-05-22 PROCEDURE — 80053 COMPREHEN METABOLIC PANEL: CPT

## 2019-05-22 PROCEDURE — 36415 COLL VENOUS BLD VENIPUNCTURE: CPT

## 2019-05-22 PROCEDURE — 85025 COMPLETE CBC W/AUTO DIFF WBC: CPT

## 2019-05-22 NOTE — PROGRESS NOTES
HISTORY OF PRESENT ILLNESS  Migel Romano is a 80 y.o. female. This is a patient of Dr. Dav Jaquez who presents today for transition of care. Patient was admitted at [address] [place] [address] 5/6/19-5/10/19 after a ground level fall. She was found to have acute CVA. Her past medical history includes Afib, hypercholesterolemia, hypertension, PE, DVT, hypothyroid, and memory loss. She was contacted by care management on 5/13/19. The patient had follow-up with cardiology last week. She has not yet had neurology follow-up. The patient's daughters have noted area of bruising to left hip. They also note a possible boil to buttocks that had blood drainage two days ago. Patient has had continued cough with small clear sputum since time of hospitalization. Cough seems to be worse in the evening when patient is lying flat in bed. Cough syrup and cough drops helps some. Patient experiences shortness of breath when lying flat as well as with exertion. Patient is receiving home health care including PT, OT, nursing, and HHA through [facility]. Patient's family is staying with her 24/7 to assist with ADLs at this time. Visit Vitals  /70 (BP 1 Location: Left arm, BP Patient Position: Sitting)   Pulse 92   Temp 97.5 °F (36.4 °C) (Oral)   Resp 20   Ht 5' 4\" (1.626 m)   Wt 174 lb 6.4 oz (79.1 kg)   SpO2 98%   BMI 29.94 kg/m²     HPI    Review of Systems   Constitutional: Negative for chills and fever. HENT: Positive for congestion. Negative for ear pain and sore throat. Eyes: Negative for blurred vision. Respiratory: Positive for cough, sputum production and shortness of breath. Cardiovascular: Negative for chest pain, palpitations and leg swelling. Gastrointestinal: Negative for abdominal pain. Genitourinary: Negative. Musculoskeletal: Negative. Skin: Negative. Bruising left hip  Possible boil of buttocks   Neurological: Positive for weakness. Negative for dizziness and headaches.         Generalized weakness   Endo/Heme/Allergies: Negative. Psychiatric/Behavioral: Positive for memory loss. Physical Exam   Constitutional: She is oriented to person, place, and time. She appears well-developed and well-nourished. HENT:   Head: Normocephalic and atraumatic. Eyes: Conjunctivae are normal.   Neck: Neck supple. Cardiovascular: Normal rate, regular rhythm and intact distal pulses. Pulmonary/Chest: Effort normal and breath sounds normal. No respiratory distress. She has no wheezes. She has no rales. Abdominal: Soft. Bowel sounds are normal. She exhibits no distension. There is no tenderness. Musculoskeletal: She exhibits no edema. Neurological: She is alert and oriented to person, place, and time. Skin: Skin is warm and dry. No boil noted at time of visit  Hematoma to left hip   Psychiatric: She has a normal mood and affect. Nursing note and vitals reviewed. ASSESSMENT and PLAN  Encounter Diagnoses   Name Primary?  History of CVA (cerebrovascular accident) Yes    Chronic congestive heart failure, unspecified heart failure type ([address]     Cough     PASCUAL (dyspnea on exertion)     Memory loss     Essential hypertension     Personal history of pulmonary embolism     Hypothyroidism, unspecified type      Will order    XR CHEST PA LAT    CBC WITH AUTOMATED DIFF    METABOLIC PANEL, COMPREHENSIVE    Will refer,    [facility] Neurology ref [facility]       Will order   Semi- electric hospital bed. Patient requires the head of the bed to be elevated 45 degrees most of the time due to diagnosis of CHF and shortness of breath without head elevation. Patient requires immediate changes in body position when experiencing shortness of breath. Lab results and schedule of future lab studies reviewed with patient  Reviewed medications and side effects in detail   Follow-up with Dr. Cheryl Harding in 2 weeks, or sooner as needed.

## 2019-05-22 NOTE — PROGRESS NOTES
Health Maintenance Due   Topic Date Due    DTaP/Tdap/Td series (1 - Tdap) 08/29/1945    Shingrix Vaccine Age 50> (1 of 2) 08/29/1974    GLAUCOMA SCREENING Q2Y  08/29/1989    MEDICARE YEARLY EXAM  06/08/2019       Chief Complaint   Patient presents with   [facility] Follow Up     5/6/2019-5/10/2019    Stroke     Dx in ER/Hospital    Fall     5/6/2019       1. Have you been to the ER, urgent care clinic since your last visit? Hospitalized since your last visit? Yes When: 5/6/2019 to 5/10/2019 [facility] for fall and stroke    2. Have you seen or consulted any other health care providers outside of the [facility] since your last visit? Include any pap smears or colon screening. No    3) Do you have an Advance Directive on file? yes    4) Are you interested in receiving information on Advance Directives? NO      Patient is accompanied by daughters I have received verbal consent from Robyn Geiger to discuss any/all medical information while they are present in the room.

## 2019-05-23 LAB
ALBUMIN SERPL-MCNC: 3.4 G/DL (ref 3.2–4.6)
ALBUMIN/GLOB SERPL: 1.1 {RATIO} (ref 1.2–2.2)
ALP SERPL-CCNC: 73 IU/L (ref 39–117)
ALT SERPL-CCNC: 5 IU/L (ref 0–32)
AST SERPL-CCNC: 14 IU/L (ref 0–40)
BASOPHILS # BLD AUTO: 0 X10E3/UL (ref 0–0.2)
BASOPHILS NFR BLD AUTO: 0 %
BILIRUB SERPL-MCNC: 0.5 MG/DL (ref 0–1.2)
BUN SERPL-MCNC: 20 MG/DL (ref 10–36)
BUN/CREAT SERPL: 19 (ref 12–28)
CALCIUM SERPL-MCNC: 8.5 MG/DL (ref 8.7–10.3)
CHLORIDE SERPL-SCNC: 102 MMOL/L (ref 96–106)
CO2 SERPL-SCNC: 25 MMOL/L (ref 20–29)
CREAT SERPL-MCNC: 1.07 MG/DL (ref 0.57–1)
EOSINOPHIL # BLD AUTO: 0.2 X10E3/UL (ref 0–0.4)
EOSINOPHIL NFR BLD AUTO: 3 %
ERYTHROCYTE [DISTWIDTH] IN BLOOD BY AUTOMATED COUNT: 17.2 % (ref 12.3–15.4)
GLOBULIN SER CALC-MCNC: 3.1 G/DL (ref 1.5–4.5)
GLUCOSE SERPL-MCNC: 116 MG/DL (ref 65–99)
HCT VFR BLD AUTO: 25.8 % (ref 34–46.6)
HGB BLD-MCNC: 8.3 G/DL (ref 11.1–15.9)
IMM GRANULOCYTES # BLD AUTO: 0 X10E3/UL (ref 0–0.1)
IMM GRANULOCYTES NFR BLD AUTO: 0 %
INTERPRETATION: NORMAL
LYMPHOCYTES # BLD AUTO: 1.2 X10E3/UL (ref 0.7–3.1)
LYMPHOCYTES NFR BLD AUTO: 23 %
MCH RBC QN AUTO: 24.9 PG (ref 26.6–33)
MCHC RBC AUTO-ENTMCNC: 32.2 G/DL (ref 31.5–35.7)
MCV RBC AUTO: 78 FL (ref 79–97)
MONOCYTES # BLD AUTO: 0.3 X10E3/UL (ref 0.1–0.9)
MONOCYTES NFR BLD AUTO: 6 %
NEUTROPHILS # BLD AUTO: 3.5 X10E3/UL (ref 1.4–7)
NEUTROPHILS NFR BLD AUTO: 68 %
PLATELET # BLD AUTO: 335 X10E3/UL (ref 150–450)
POTASSIUM SERPL-SCNC: 3.8 MMOL/L (ref 3.5–5.2)
PROT SERPL-MCNC: 6.5 G/DL (ref 6–8.5)
RBC # BLD AUTO: 3.33 X10E6/UL (ref 3.77–5.28)
SODIUM SERPL-SCNC: 143 MMOL/L (ref 134–144)
WBC # BLD AUTO: 5.2 X10E3/UL (ref 3.4–10.8)

## 2019-05-24 NOTE — PROGRESS NOTES
Called and verified pt with name and . Informed of improved CXR from the one taken during hospitalization. Pt verbalized understanding and had no further questions at this time.

## 2019-05-26 DIAGNOSIS — J30.1 ALLERGIC RHINITIS DUE TO POLLEN, UNSPECIFIED SEASONALITY: ICD-10-CM

## 2019-05-26 DIAGNOSIS — G47.00 INSOMNIA, UNSPECIFIED TYPE: ICD-10-CM

## 2019-05-28 RX ORDER — LORATADINE 10 MG/1
TABLET ORAL
Qty: 30 TAB | Refills: 1 | Status: SHIPPED | OUTPATIENT
Start: 2019-05-28 | End: 2019-07-23 | Stop reason: SDUPTHER

## 2019-05-28 RX ORDER — QUETIAPINE FUMARATE 25 MG/1
25 TABLET, FILM COATED ORAL
Qty: 30 TAB | Refills: 1 | Status: SHIPPED | OUTPATIENT
Start: 2019-05-28 | End: 2019-06-24 | Stop reason: SDUPTHER

## 2019-06-05 ENCOUNTER — OFFICE VISIT (OUTPATIENT)
Dept: INTERNAL MEDICINE CLINIC | Age: 84
End: 2019-06-05

## 2019-06-05 VITALS
HEIGHT: 64 IN | RESPIRATION RATE: 17 BRPM | SYSTOLIC BLOOD PRESSURE: 110 MMHG | DIASTOLIC BLOOD PRESSURE: 62 MMHG | OXYGEN SATURATION: 98 % | WEIGHT: 173 LBS | TEMPERATURE: 97.9 F | HEART RATE: 100 BPM | BODY MASS INDEX: 29.53 KG/M2

## 2019-06-05 DIAGNOSIS — I48.20 CHRONIC ATRIAL FIBRILLATION (HCC): ICD-10-CM

## 2019-06-05 DIAGNOSIS — Z00.00 MEDICARE ANNUAL WELLNESS VISIT, SUBSEQUENT: ICD-10-CM

## 2019-06-05 DIAGNOSIS — D64.9 ANEMIA, UNSPECIFIED TYPE: ICD-10-CM

## 2019-06-05 DIAGNOSIS — I10 ESSENTIAL HYPERTENSION: Primary | ICD-10-CM

## 2019-06-05 DIAGNOSIS — I63.9 ACUTE CEREBROVASCULAR ACCIDENT (CVA) (HCC): ICD-10-CM

## 2019-06-05 DIAGNOSIS — R26.9 GAIT ABNORMALITY: ICD-10-CM

## 2019-06-05 DIAGNOSIS — R05.3 CHRONIC COUGH: ICD-10-CM

## 2019-06-05 DIAGNOSIS — E78.2 MIXED HYPERLIPIDEMIA: ICD-10-CM

## 2019-06-05 RX ORDER — BENZONATATE 100 MG/1
100 CAPSULE ORAL
Qty: 30 CAP | Refills: 1 | Status: SHIPPED | OUTPATIENT
Start: 2019-06-05 | End: 2019-06-12

## 2019-06-05 NOTE — PROGRESS NOTES
HISTORY OF PRESENT ILLNESS  Ivon Be is a 80 y.o. female here for follow up. She is accompanied by her granddaughter. She had another acute stroke on middle cerebral artery territory. Had mild weakness on the left side. She continued with home health for physical hyper compression therapy improving slowly. Need more help with home health. She had CT head done showed a stroke, MRI was not done since patient has pacemaker. Has A fib,  on metoprolol and Xarelto, no palpitations. She became anemic, has lost a lot of blood when she fell down and laid from scalp. Need to repeat lab work. She is active, able to ambulate with walker. She is having chronic cough, coughing more at night when she is lying down. Using allergy medicine. Need refill on Tessalon Perles. Has hypothyroid, on Synthroid. TSH is normal.  Has hypertension, on medicine. Here for Medicare wellness visit. Memory Loss    Her past medical history is significant for hypertension. Hypertension    Pertinent negatives include no chest pain, no blurred vision and no shortness of breath. Follow-up   Pertinent negatives include no chest pain and no shortness of breath. Cough   Pertinent negatives include no chest pain and no shortness of breath. Cholesterol Problem   Pertinent negatives include no chest pain and no shortness of breath. Thyroid Problem   Pertinent negatives include no chest pain and no shortness of breath. Review of Systems   Constitutional: Negative for chills and fever. HENT: Negative. Eyes: Negative. Negative for blurred vision and double vision. Respiratory: Positive for cough. Negative for shortness of breath and wheezing. Cardiovascular: Negative. Negative for chest pain. Gastrointestinal: Negative. Genitourinary: Negative. Musculoskeletal: Negative. Negative for falls. Skin: Negative. Neurological: Negative. Psychiatric/Behavioral: Positive for memory loss.        Physical Exam   Constitutional: She appears well-developed and well-nourished. No distress. HENT:   Head: Normocephalic and atraumatic. Right Ear: External ear normal.   Left Ear: External ear normal.   Mouth/Throat: Oropharynx is clear and moist. No oropharyngeal exudate. Nasal turbinates:red inflamed,NT    Cobble stoning present. Neck: Normal range of motion. Neck supple. No JVD present. No thyromegaly present. Cardiovascular: Normal rate, regular rhythm, normal heart sounds and intact distal pulses. Pulmonary/Chest: Effort normal and breath sounds normal. No respiratory distress. She has no wheezes. Musculoskeletal: She exhibits no edema or tenderness. Neurological: She is alert. She has normal reflexes. She displays normal reflexes. No cranial nerve deficit. She exhibits normal muscle tone. Coordination abnormal.   Psychiatric: She has a normal mood and affect. Her behavior is normal.   Memory loss. ASSESSMENT and PLAN  Diagnoses and all orders for this visit:    1. Essential hypertension    Stable with current regimen, continue same. 2. Acute cerebrovascular accident (CVA) ([address]    Getting better. On Xarelto and high-dose of statin. She is continuing physical therapy and outpatient therapy via home health. Need  more home health therapy. Will refer,  -     REFERRAL TO NEUROLOGY  Await for hospital bed. 3. Mixed hyperlipidemia  On high-dose statin. Doing well. 4. Chronic atrial fibrillation (HCC)  Rate and rhythm controlled. On Xarelto and Coreg. 5. Gait abnormality  -     REFERRAL TO HOME HEALTH    6. Anemia, unspecified type    She bled a lot when she fell down when she had stroke and bleeding from scalp. Hemoglobin is low. Will check,  -     HGB & HCT  -     IRON PROFILE  -     FERRITIN    7. Chronic cough  On her lisinopril discontinued. She probably having postnasal drip causing her cough. She is taking allergy medicine. Will continue [employer] for now.   Other orders  -     benzonatate (TESSALON) 100 mg capsule; Take 1 Cap by mouth three (3) times daily as needed for Cough for up to 7 days. Discussed expected course/resolution/complications of diagnosis in detail with patient. Medication risks/benefits/costs/interactions/alternatives discussed with patient. Pt was given an after visit summary which includes diagnoses, current medications & vitals. Pt expressed understanding with the diagnosis and plan.

## 2019-06-05 NOTE — PROGRESS NOTES
Health Maintenance Due   Topic Date Due    DTaP/Tdap/Td series (1 - Tdap) 08/29/1945    Shingrix Vaccine Age 50> (1 of 2) 08/29/1974    GLAUCOMA SCREENING Q2Y  08/29/1989    MEDICARE YEARLY EXAM  06/08/2019       Chief Complaint   Patient presents with    Memory Loss    Hypertension    Cholesterol Problem    Thyroid Problem    Annual Wellness Visit       1. Have you been to the ER, urgent care clinic since your last visit? Hospitalized since your last visit? No    2. Have you seen or consulted any other health care providers outside of the [address] [place] [address] since your last visit? Include any pap smears or colon screening. No    3) Do you have an Advance Directive on file? yes    4) Are you interested in receiving information on Advance Directives? NO      Patient is accompanied by daughter I have received verbal consent from Nae Limon to discuss any/all medical information while they are present in the room.

## 2019-06-05 NOTE — PROGRESS NOTES
This is the Subsequent Medicare Annual Wellness Exam, performed 12 months or more after the Initial AWV or the last Subsequent AWV    I have reviewed the patient's medical history in detail and updated the computerized patient record. History     Past Medical History:   Diagnosis Date    Atrial fibrillation ([facility] [address] 2007    Hypercholesterolemia     Hypertension     Osteoarthritis 2010    Stroke ([facility] [address]     Thromboembolus [facility])     Thyroid disease       Past Surgical History:   Procedure Laterality Date    HX HEENT      cataract sx    HX HIP FRACTURE TX  2013    right hip 1989 left hip 2013    HX HIP REPLACEMENT  1989    HX PACEMAKER  2008     Current Outpatient Medications   Medication Sig Dispense Refill    QUEtiapine (SEROQUEL) 25 mg tablet Take 1 Tab by mouth nightly. 30 Tab 1    loratadine (CLARITIN) 10 mg tablet TAKE 1 TABLET BY MOUTH ONCE DAILY 30 Tab 1    alendronate (FOSAMAX) 35 mg tablet Take 1 Tab by mouth every Wednesday. 4 Tab 5    trospium (SANCTURA) 20 mg tablet TAKE 1 TABLET BY MOUTH TWICE A  Tab 2    aspirin 81 mg chewable tablet Take 1 Tab by mouth daily for 30 days. 30 Tab 0    atorvastatin (LIPITOR) 80 mg tablet Take 1 Tab by mouth nightly for 30 days. 30 Tab 0    carvedilol (COREG) 3.125 mg tablet Take 1 Tab by mouth two (2) times daily (with meals) for 30 days. 60 Tab 0    furosemide (LASIX) 40 mg tablet Take 40 mg by mouth daily.  potassium chloride SR (KLOR-CON 10) 10 mEq tablet Take 10 mEq by mouth daily.  fluticasone propionate (FLONASE) 50 mcg/actuation nasal spray 2 spray each nostril QD 1 Bottle 5    magnesium oxide (MAG-OX) 400 mg tablet TAKE 1 TAB BY MOUTH DAILY. 90 Tab 2    PARoxetine (PAXIL) 20 mg tablet TAKE 1 TAB BY MOUTH DAILY. 90 Tab 3    cyanocobalamin 1,000 mcg tablet Take 1 Tab by mouth daily.  30 Tab 3    levothyroxine (SYNTHROID) 25 mcg tablet TAKE 1 TABLET BY MOUTH DAILY BEFORE BREAKFAST 90 Tab 2    multivitamin (ONE A DAY) tablet Take 1 Tab by mouth daily. 30 Tab 12    polyethylene glycol (MIRALAX) 17 gram/dose powder Take 17 g by mouth daily. 510 g 4    calcium-cholecalciferol, D3, (CALTRATE 600+D) tablet Take 1 Tab by mouth daily. 90 Tab 3    rivaroxaban (XARELTO) 20 mg tab tablet Take 1 Tab by mouth daily (with breakfast). 90 Tab 1    albuterol (PROAIR HFA) 90 mcg/actuation inhaler Take 2 Puffs by inhalation every six (6) hours as needed for Wheezing. 1 Inhaler 2    zinc oxide-cod liver oil (DESITIN) 40 % ointment Apply  to affected area as needed for Skin Irritation.  docusate sodium (COLACE) 100 mg capsule Take 100 mg by mouth two (2) times a day.  OTHER Semi-electric hospital bed, DX:  I50.9 1 Each 0    lisinopril (PRINIVIL, ZESTRIL) 2.5 mg tablet Take 1 Tab by mouth daily for 30 days. 27 Tab 0     No Known Allergies  Family History   Problem Relation Age of Onset    Hypertension Mother      Social History     Tobacco Use    Smoking status: Never Smoker    Smokeless tobacco: Never Used   Substance Use Topics    Alcohol use: No     Alcohol/week: 0.0 oz     Patient Active Problem List   Diagnosis Code    Pulmonary embolism (HCC) I26.99    DVT (deep venous thrombosis) (HCC) I82.409    Mixed hyperlipidemia E78.2    Atrial fibrillation (HCC) I48.91    HTN (hypertension) I10    DJD (degenerative joint disease) M19.90   [facility] Hypothyroid E03.9    TIA (transient ischemic attack) G45.9    ACP (advance care planning) Z71.89    Gait instability R26.81    Chronic cough R05    Personal history of pulmonary embolism Z86.711    Acute CVA (cerebrovascular accident) ([facility] [address] I63.9    Fall W19. Garrie Penning    CVA (cerebral vascular accident) ([facility] [address] I63.9    Scalp laceration S01. 01XA       Depression Risk Factor Screening:       No Known Allergies  Family History   Problem Relation Age of Onset    Hypertension Mother      Social History     Tobacco Use    Smoking status: Never Smoker    Smokeless tobacco: Never Used   Substance Use Topics    Alcohol use: No     Alcohol/week: 0.0 oz     Patient Active Problem List   Diagnosis Code    Pulmonary embolism (HCC) I26.99    DVT (deep venous thrombosis) (HCC) I82.409    Mixed hyperlipidemia E78.2    Atrial fibrillation (HCC) I48.91    HTN (hypertension) I10    DJD (degenerative joint disease) M19.90   [employer] Hypothyroid E03.9    TIA (transient ischemic attack) G45.9    ACP (advance care planning) Z71.89    Gait instability R26.81    Chronic cough R05    Personal history of pulmonary embolism Z86.711    Acute CVA (cerebrovascular accident) [facility]) I63.9    Fall W19. [address]    CVA (cerebral vascular accident) ([facility] [address] I63.9    Scalp laceration S01. 01XA       Depression Risk Factor Screening:     3 most recent PHQ Screens 6/5/2019   PHQ Not Done -   Little interest or pleasure in doing things Several days   Feeling down, depressed, irritable, or hopeless Several days   Total Score PHQ 2 2   Trouble falling or staying asleep, or sleeping too much -   Feeling tired or having little energy -   Poor appetite, weight loss, or overeating -   Feeling bad about yourself - or that you are a failure or have let yourself or your family down -   Trouble concentrating on things such as school, work, reading, or watching TV -   Moving or speaking so slowly that other people could have noticed; or the opposite being so fidgety that others notice -   Thoughts of being better off dead, or hurting yourself in some way -   PHQ 9 Score -   How difficult have these problems made it for you to do your work, take care of your home and get along with others -     Alcohol Risk Factor Screening: You do not drink alcohol or very rarely. Functional Ability and Level of Safety:   Hearing Loss  Hearing is good. Activities of Daily Living  The home contains: no safety equipment.  and walker wheel chair  Patient needs help with:  preparing meals, laundry, housework, managing medications, managing money, eating, dressing, bathing, hygiene, bathroom needs and walking    Fall Risk  Fall Risk Assessment, last 12 mths 6/5/2019   Able to walk? Yes   Fall in past 12 months? No   Fall with injury? -   Number of falls in past 12 months -   Fall Risk Score -       Abuse Screen  Patient is not abused    Cognitive Screening   Evaluation of Cognitive Function:  Has your family/caregiver stated any concerns about your memory: yes  Normal, Abnormal    Patient Care Team   Patient Care Team:  Aftab Luque MD as PCP - General (Internal Medicine)  Tari Chaney MD (Orthopedic Surgery)  Leopoldo Bue, MD (Cardiology)    Assessment/Plan   Education and counseling provided:  Are appropriate based on today's review and evaluation  End-of-Life planning (with patient's consent)  Bone mass measurement (DEXA)  Diabetes screening test    Diagnoses and all orders for this visit:    1. Essential hypertension    2. Acute cerebrovascular accident (CVA) ([facility] [address]    3. Mixed hyperlipidemia    4. Chronic atrial fibrillation (HCC)    5. Gait abnormality  -     REFERRAL TO HOME HEALTH    6.  Anemia, unspecified type  -     HGB & HCT  -     IRON PROFILE  -     FERRITIN        Health Maintenance Due   Topic Date Due    DTaP/Tdap/Td series (1 - Tdap) 08/29/1945    Shingrix Vaccine Age 50> (1 of 2) 08/29/1974    GLAUCOMA SCREENING Q2Y  08/29/1989    MEDICARE YEARLY EXAM  06/08/2019

## 2019-06-05 NOTE — PATIENT INSTRUCTIONS
Medicare Wellness Visit, Female     The best way to live healthy is to have a lifestyle where you eat a well-balanced diet, exercise regularly, limit alcohol use, and quit all forms of tobacco/nicotine, if applicable. Regular preventive services are another way to keep healthy. Preventive services (vaccines, screening tests, monitoring & exams) can help personalize your care plan, which helps you manage your own care. Screening tests can find health problems at the earliest stages, when they are easiest to treat. [facility] follows the current, evidence-based guidelines published by the [facility] ([facility]) when recommending preventive services for our patients. Because we follow these guidelines, sometimes recommendations change over time as research supports it. (For example, mammograms used to be recommended annually. Even though Medicare will still pay for an annual mammogram, the newer guidelines recommend a mammogram every two years for women of average risk.)  Of course, you and your doctor may decide to screen more often for some diseases, based on your risk and your health status. Preventive services for you include:  - Medicare offers their members a free annual wellness visit, which is time for you and your primary care provider to discuss and plan for your preventive service needs. Take advantage of this benefit every year!  -All adults over the age of 72 should receive the recommended pneumonia vaccines. Current USPSTF guidelines recommend a series of two vaccines for the best pneumonia protection.   -All adults should have a flu vaccine yearly and a tetanus vaccine every 10 years. All adults age 61 and older should receive a shingles vaccine once in their lifetime.    -A bone mass density test is recommended when a woman turns 65 to screen for osteoporosis. This test is only recommended one time, as a screening.  Some providers will use this same test as a disease monitoring tool if you already have osteoporosis. -All adults age 38-68 who are overweight should have a diabetes screening test once every three years.   -Other screening tests and preventive services for persons with diabetes include: an eye exam to screen for diabetic retinopathy, a kidney function test, a foot exam, and stricter control over your cholesterol.   -Cardiovascular screening for adults with routine risk involves an electrocardiogram (ECG) at intervals determined by your doctor.   -Colorectal cancer screenings should be done for adults age 54-65 with no increased risk factors for colorectal cancer. There are a number of acceptable methods of screening for this type of cancer. Each test has its own benefits and drawbacks. Discuss with your doctor what is most appropriate for you during your annual wellness visit. The different tests include: colonoscopy (considered the best screening method), a fecal occult blood test, a fecal DNA test, and sigmoidoscopy. -Breast cancer screenings are recommended every other year for women of normal risk, age 54-69.  -Cervical cancer screenings for women over age 72 are only recommended with certain risk factors.   -All adults born between [facility] should be screened once for Hepatitis C. Here is a list of your current Health Maintenance items (your personalized list of preventive services) with a due date:  Health Maintenance Due   Topic Date Due    DTaP/Tdap/Td  (1 - Tdap) 08/29/1945    Shingles Vaccine (1 of 2) 08/29/1974    Glaucoma Screening   08/29/1989    Annual Well Visit  06/08/2019         Medicare Wellness Visit, Female     The best way to live healthy is to have a lifestyle where you eat a well-balanced diet, exercise regularly, limit alcohol use, and quit all forms of tobacco/nicotine, if applicable. Regular preventive services are another way to keep healthy.  Preventive services (vaccines, screening tests, monitoring & exams) can help personalize your care plan, which helps you manage your own care. Screening tests can find health problems at the earliest stages, when they are easiest to treat. [facility] follows the current, evidence-based guidelines published by the [facility] ([facility]) when recommending preventive services for our patients. Because we follow these guidelines, sometimes recommendations change over time as research supports it. (For example, mammograms used to be recommended annually. Even though Medicare will still pay for an annual mammogram, the newer guidelines recommend a mammogram every two years for women of average risk.)  Of course, you and your doctor may decide to screen more often for some diseases, based on your risk and your health status. Preventive services for you include:  - Medicare offers their members a free annual wellness visit, which is time for you and your primary care provider to discuss and plan for your preventive service needs. Take advantage of this benefit every year!  -All adults over the age of 72 should receive the recommended pneumonia vaccines. Current USPSTF guidelines recommend a series of two vaccines for the best pneumonia protection.   -All adults should have a flu vaccine yearly and a tetanus vaccine every 10 years. All adults age 61 and older should receive a shingles vaccine once in their lifetime.    -A bone mass density test is recommended when a woman turns 65 to screen for osteoporosis. This test is only recommended one time, as a screening. Some providers will use this same test as a disease monitoring tool if you already have osteoporosis.   -All adults age 38-68 who are overweight should have a diabetes screening test once every three years.   -Other screening tests and preventive services for persons with diabetes include: an eye exam to screen for diabetic retinopathy, a kidney function test, a foot exam, and stricter control over your cholesterol.   -Cardiovascular screening for adults with routine risk involves an electrocardiogram (ECG) at intervals determined by your doctor.   -Colorectal cancer screenings should be done for adults age 54-65 with no increased risk factors for colorectal cancer. There are a number of acceptable methods of screening for this type of cancer. Each test has its own benefits and drawbacks. Discuss with your doctor what is most appropriate for you during your annual wellness visit. The different tests include: colonoscopy (considered the best screening method), a fecal occult blood test, a fecal DNA test, and sigmoidoscopy. -Breast cancer screenings are recommended every other year for women of normal risk, age 54-69.  -Cervical cancer screenings for women over age 72 are only recommended with certain risk factors.   -All adults born between [facility] should be screened once for Hepatitis C. Here is a list of your current Health Maintenance items (your personalized list of preventive services) with a due date:  Health Maintenance Due   Topic Date Due    DTaP/Tdap/Td  (1 - Tdap) 08/29/1945    Shingles Vaccine (1 of 2) 08/29/1974    Glaucoma Screening   08/29/1989    Annual Well Visit  06/08/2019            Iron-Rich Diet: Care Instructions  Your Care Instructions    Your body needs iron to make hemoglobin. Hemoglobin is a substance in red blood cells that carries oxygen from the lungs to cells all through your body. If you do not get enough iron, your body makes fewer and smaller red blood cells. As a result, your body's cells may not get enough oxygen. Adult men need 8 milligrams of iron a day; adult women need 18 milligrams of iron a day. After menopause, women need 8 milligrams of iron a day. A pregnant woman needs 27 milligrams of iron a day. Infants and young children have higher iron needs relative to their size than other age groups.  People who have lost blood because of ulcers or heavy menstrual periods may become very low in iron and may develop anemia. Most people can get the iron their bodies need by eating enough of certain iron-rich foods. Your doctor may recommend that you take an iron supplement along with eating an iron-rich diet. Follow-up care is a key part of your treatment and safety. Be sure to make and go to all appointments, and call your doctor if you are having problems. It's also a good idea to know your test results and keep a list of the medicines you take. How can you care for yourself at home? · Make iron-rich foods a part of your daily diet. Iron-rich foods include:  ? All meats, such as chicken, beef, lamb, pork, fish, and shellfish. Liver is especially high in iron. ? Leafy green vegetables. ? Raisins, peas, beans, lentils, barley, and eggs. ? Iron-fortified breakfast cereals. · Eat foods with vitamin C along with iron-rich foods. Vitamin C helps you absorb more iron from food. Drink a glass of orange juice or another citrus juice with your food. · Eat meat and vegetables or grains together. The iron in meat helps your body absorb the iron in other foods. Where can you learn more? Go to http://francisca-abigail.info/. Enter [id number] in the search box to learn more about \"Iron-Rich Diet: Care Instructions. \"  Current as of: March 28, 2018  Content Version: 11.9  © 3934-1164 [employer]. Care instructions adapted under license by [employer] (which disclaims liability or warranty for this information). If you have questions about a medical condition or this instruction, always ask your healthcare professional. [place] [address] any warranty or liability for your use of this information.

## 2019-06-06 ENCOUNTER — TELEPHONE (OUTPATIENT)
Dept: INTERNAL MEDICINE CLINIC | Age: 84
End: 2019-06-06

## 2019-06-06 NOTE — TELEPHONE ENCOUNTER
Returned Edwige's call and after verifying 2 patient identifiers, advised Max Lara that Dr. Cheryl Harding does want the patient to have PT. Max Lara voiced thanks and understanding and will fax over orders for signature.

## 2019-06-06 NOTE — TELEPHONE ENCOUNTER
Is patient continuing Physical Therapy?   Pleas contact Kenneth Anguiano (therapist) at [phone] for confirmation

## 2019-06-10 ENCOUNTER — TELEPHONE (OUTPATIENT)
Dept: INTERNAL MEDICINE CLINIC | Age: 84
End: 2019-06-10

## 2019-06-10 NOTE — TELEPHONE ENCOUNTER
Spoke with Melissa, they are processing    Call placed to [facility] and provided contact information for Melissa and also Dr Cassia Newman and [place] office number, does not need a referral

## 2019-06-10 NOTE — TELEPHONE ENCOUNTER
What company is her hospital bed ordered through?  Patients daughter wants the phone # so she can follow up with the status of her bed.-requested 1 month ago      Patient also needs a referral for ears checked and cleaned- with Dr. Evert Burroughs

## 2019-06-12 ENCOUNTER — TELEPHONE (OUTPATIENT)
Dept: INTERNAL MEDICINE CLINIC | Age: 84
End: 2019-06-12

## 2019-06-12 ENCOUNTER — TELEPHONE (OUTPATIENT)
Dept: NEUROLOGY | Age: 84
End: 2019-06-12

## 2019-06-12 ENCOUNTER — OFFICE VISIT (OUTPATIENT)
Dept: NEUROLOGY | Age: 84
End: 2019-06-12

## 2019-06-12 VITALS
RESPIRATION RATE: 20 BRPM | BODY MASS INDEX: 29.71 KG/M2 | SYSTOLIC BLOOD PRESSURE: 120 MMHG | DIASTOLIC BLOOD PRESSURE: 60 MMHG | HEART RATE: 98 BPM | HEIGHT: 64 IN | OXYGEN SATURATION: 99 % | WEIGHT: 174 LBS

## 2019-06-12 DIAGNOSIS — R41.3 MEMORY CHANGES: ICD-10-CM

## 2019-06-12 DIAGNOSIS — I63.411 CEREBROVASCULAR ACCIDENT (CVA) DUE TO EMBOLISM OF RIGHT MIDDLE CEREBRAL ARTERY (HCC): Primary | ICD-10-CM

## 2019-06-12 DIAGNOSIS — I48.20 CHRONIC ATRIAL FIBRILLATION (HCC): ICD-10-CM

## 2019-06-12 RX ORDER — ASPIRIN 81 MG/1
TABLET ORAL DAILY
COMMUNITY
End: 2019-07-06

## 2019-06-12 RX ORDER — CARVEDILOL 3.12 MG/1
3.12 TABLET ORAL 2 TIMES DAILY WITH MEALS
COMMUNITY
End: 2019-06-25 | Stop reason: SDUPTHER

## 2019-06-12 RX ORDER — ATORVASTATIN CALCIUM 80 MG/1
80 TABLET, FILM COATED ORAL DAILY
COMMUNITY
End: 2020-05-22 | Stop reason: SDUPTHER

## 2019-06-12 NOTE — PATIENT INSTRUCTIONS
A Healthy Lifestyle: Care Instructions  Your Care Instructions    A healthy lifestyle can help you feel good, stay at a healthy weight, and have plenty of energy for both work and play. A healthy lifestyle is something you can share with your whole family. A healthy lifestyle also can lower your risk for serious health problems, such as high blood pressure, heart disease, and diabetes. You can follow a few steps listed below to improve your health and the health of your family. Follow-up care is a key part of your treatment and safety. Be sure to make and go to all appointments, and call your doctor if you are having problems. It's also a good idea to know your test results and keep a list of the medicines you take. How can you care for yourself at home? · Do not eat too much sugar, fat, or fast foods. You can still have dessert and treats now and then. The goal is moderation. · Start small to improve your eating habits. Pay attention to portion sizes, drink less juice and soda pop, and eat more fruits and vegetables. ? Eat a healthy amount of food. A 3-ounce serving of meat, for example, is about the size of a deck of cards. Fill the rest of your plate with vegetables and whole grains. ? Limit the amount of soda and sports drinks you have every day. Drink more water when you are thirsty. ? Eat at least 5 servings of fruits and vegetables every day. It may seem like a lot, but it is not hard to reach this goal. A serving or helping is 1 piece of fruit, 1 cup of vegetables, or 2 cups of leafy, raw vegetables. Have an apple or some carrot sticks as an afternoon snack instead of a candy bar. Try to have fruits and/or vegetables at every meal.  · Make exercise part of your daily routine. You may want to start with simple activities, such as walking, bicycling, or slow swimming. Try to be active 30 to 60 minutes every day. You do not need to do all 30 to 60 minutes all at once.  For example, you can exercise 3 times a day for 10 or 20 minutes. Moderate exercise is safe for most people, but it is always a good idea to talk to your doctor before starting an exercise program.  · Keep moving. Andres Elam the lawn, work in the garden, or [place]. Take the stairs instead of the elevator at work. · If you smoke, quit. People who smoke have an increased risk for heart attack, stroke, cancer, and other lung illnesses. Quitting is hard, but there are ways to boost your chance of quitting tobacco for good. ? Use nicotine gum, patches, or lozenges. ? Ask your doctor about stop-smoking programs and medicines. ? Keep trying. In addition to reducing your risk of diseases in the future, you will notice some benefits soon after you stop using tobacco. If you have shortness of breath or asthma symptoms, they will likely get better within a few weeks after you quit. · Limit how much alcohol you drink. Moderate amounts of alcohol (up to 2 drinks a day for men, 1 drink a day for women) are okay. But drinking too much can lead to liver problems, high blood pressure, and other health problems. Family health  If you have a family, there are many things you can do together to improve your health. · Eat meals together as a family as often as possible. · Eat healthy foods. This includes fruits, vegetables, lean meats and dairy, and whole grains. · Include your family in your fitness plan. Most people think of activities such as jogging or tennis as the way to fitness, but there are many ways you and your family can be more active. Anything that makes you breathe hard and gets your heart pumping is exercise. Here are some tips:  ? Walk to do errands or to take your child to school or the bus.  ? Go for a family bike ride after dinner instead of watching TV. Where can you learn more? Go to http://francisca-abigail.info/. Enter R343 in the search box to learn more about \"A Healthy Lifestyle: Care Instructions. \"  Current as of: September 11, 2018  Content Version: 11.9  © 5171-3843 [employer]. Care instructions adapted under license by [employer] (which disclaims liability or warranty for this information). If you have questions about a medical condition or this instruction, always ask your healthcare professional. [address] any warranty or liability for your use of this information. 10 [facility]   Statement to Patients  April 1, 2014      In an effort to ensure the large volume of patient prescription refills is processed in the most efficient and expeditious manner, we are asking our patients to assist us by calling your Pharmacy for all prescription refills, this will include also your  Mail Order Pharmacy. The pharmacy will contact our office electronically to continue the refill process. Please do not wait until the last minute to call your pharmacy. We need at least 48 hours (2days) to fill prescriptions. We also encourage you to call your pharmacy before going to  your prescription to make sure it is ready. With regard to controlled substance prescription refill requests (narcotic refills) that need to be picked up at our office, we ask your cooperation by providing us with at least 72 hours (3days) notice that you will need a refill. We will not refill narcotic prescription refill requests after 4:00pm on any weekday, Monday through Thursday, or after 2:00pm on Fridays, or on the weekends. We encourage everyone to explore another way of getting your prescription refill request processed using [employer], our patient web portal through our electronic medical record system. [employer] is an efficient and effective way to communicate your medication request directly to the office and  downloadable as an landen on your smart phone .  [employer] also features a review functionality that allows you to view your medication list as well as leave messages for your physician. Are you ready to get connected? If so please review the attatched instructions or speak to any of our staff to get you set up right away! Thank you so much for your cooperation. Should you have any questions please contact our Practice Administrator.     The Physicians and Staff,  [facility]

## 2019-06-12 NOTE — TELEPHONE ENCOUNTER
----- Message from Alley Santos sent at 6/12/2019 10:04 AM EDT -----  Regarding: Dr. Delonte Aviles returning a miss call from Iam Caba.   Best contact number [phone]

## 2019-06-12 NOTE — TELEPHONE ENCOUNTER
Patients daughter wants to know the status of the hospital bed.  She called [facility] today and they state they do not have any request from [facility] for bed

## 2019-06-12 NOTE — TELEPHONE ENCOUNTER
Call placed to [address] [place] [address] at [place] [phone] who has now stated that they did not receive any orders on this pt, all necessary paperwork completed and faxed to [place] [phone], writer confirmed with [address] [place] [address] that they received them and states she will reach out to the dgter and advise of process, etc

## 2019-06-12 NOTE — PROGRESS NOTES
Ramu Braden is a 80 y.o. female who was recently admitted to the hospital after a stroke. She had left-sided sensory deficit and was found to have occlusion of the posterior division of the right middle cerebral artery. This was suspected to be cardioembolic as she has atrial fibrillation and there was a concern that she may have not have been compliant with Xarelto. She could not get an MRI because of the pacer. She was noted to have left-sided neglect on exam while she was in the hospital.  Otherwise, she remains fully functional and is at her baseline. She is currently living with her family. Family has expressed concern about her memory. She had memory issues prior to the stroke but after the stroke she seems to have gotten worse. Remains independent with basic activities of daily living but needs some help. She is able to ablate with the help of a walker. EXAM  Exam:  Visit Vitals  /60   Pulse 98   Resp 20   Ht 5' 4\" (1.626 m)   Wt 78.9 kg (174 lb)   SpO2 99%   BMI 29.87 kg/m²     Gen: Alert and fully oriented  CV: RRR  Lungs: non labored breathing  Abd: non distending  Neuro: Knows the name of this hospital.  Had difficulty telling me today's date. Knows the name of the president. Could not do serial 7 subtractions. Knows her street address. Could spell the word house forwards only. No dysarthria or aphasia  CN II-XII: PERRL, EOMI, face symmetric, tongue/palate midline  Motor: strength 5/5 all four ext  Sensory: intact to LT.  Minor neglect in the left upper and lower extremity   gait: normal    LABS/ IMAGING  CT Results (most recent):  Results from Hospital Encounter encounter on 05/06/19   CT CODE NEURO PERF W CBF    Narrative EXAM:  CT CODE NEURO PERF W CBF  INDICATION:  LEFT FACIAL DROOP AND LEFT SIDED WEAKNESS  TECHNIQUE:  During rapid bolus infusion 40 mL Isovue-370 CT perfusion imaging of the head  was performed with color coded mapping reconstructions of time to peak, blood  volume, blood flow, mean transit time and T-Max. CT dose reduction was achieved  through use of a standardized protocol tailored for this examination and  automatic exposure control for dose modulation. COMPARISON: CT head  FINDINGS:  There is asymmetric abnormal decreased blood flow and perfusion in the right  posterior superior temporal lobe including posterior frontal and parietal lobes. Impression IMPRESSION: Abnormal decreased perfusion right posterior frontal/parietal lobes. Lab Results   Component Value Date/Time    Cholesterol, total 136 05/07/2019 02:07 AM    HDL Cholesterol 55 05/07/2019 02:07 AM    LDL,Direct 85 01/23/2019 12:48 PM    LDL, calculated 64.4 05/07/2019 02:07 AM    VLDL, calculated 16.6 05/07/2019 02:07 AM    Triglyceride 83 05/07/2019 02:07 AM    CHOL/HDL Ratio 2.5 05/07/2019 02:07 AM         ASSESSMENT    ICD-10-CM ICD-9-CM    1. Cerebrovascular accident (CVA) due to embolism of right middle cerebral artery ([facility] [address] I63.411 434.11    2. Chronic atrial fibrillation (HCC) I48.2 427.31    3.  Memory changes R41.3 780.93           PLAN  She likely had a cardioembolic stroke which is suspected to be due to noncompliance with oral anticoagulation  She is now back on Xarelto and aspirin which she should continue  Fortunately, she does not have any significant deficits and has minimal neglect on the left side  She does have cognitive issues in the form of short-term memory impairment which I suspect could be due to an underlying senile dementia of mixed type  Recent stroke has likely decompensated her ability to do numbers, calculations etc.  Agree with the plan to proceed with comprehensive neuropsychological assessment which she has scheduled for August  We will follow-up after that  Deferring any additional pharmacologic therapy for now    Amarilis Alexander MD

## 2019-06-15 ENCOUNTER — HOSPITAL ENCOUNTER (EMERGENCY)
Age: 84
Discharge: HOME | End: 2019-06-15
Attending: EMERGENCY MEDICINE
Payer: MEDICARE

## 2019-06-15 VITALS
WEIGHT: 174 LBS | OXYGEN SATURATION: 99 % | SYSTOLIC BLOOD PRESSURE: 132 MMHG | RESPIRATION RATE: 17 BRPM | DIASTOLIC BLOOD PRESSURE: 77 MMHG | BODY MASS INDEX: 28.99 KG/M2 | HEIGHT: 65 IN | HEART RATE: 119 BPM

## 2019-06-15 DIAGNOSIS — R04.0 EPISTAXIS: Primary | ICD-10-CM

## 2019-06-15 DIAGNOSIS — R03.0 ELEVATED BLOOD PRESSURE READING: ICD-10-CM

## 2019-06-15 DIAGNOSIS — Z79.01 CURRENT USE OF LONG TERM ANTICOAGULATION: ICD-10-CM

## 2019-06-15 PROCEDURE — 75810000284 HC CNTRL NASAL HEMORHRAGE SIMPLE

## 2019-06-15 PROCEDURE — 74011250637 HC RX REV CODE- 250/637: Performed by: EMERGENCY MEDICINE

## 2019-06-15 PROCEDURE — 99284 EMERGENCY DEPT VISIT MOD MDM: CPT

## 2019-06-15 PROCEDURE — 77030011649 HC PK NSL RHNO S&N -B

## 2019-06-15 PROCEDURE — 74011000250 HC RX REV CODE- 250: Performed by: EMERGENCY MEDICINE

## 2019-06-15 RX ORDER — CLONIDINE HYDROCHLORIDE 0.1 MG/1
0.1 TABLET ORAL
Status: COMPLETED | OUTPATIENT
Start: 2019-06-15 | End: 2019-06-15

## 2019-06-15 RX ORDER — OXYMETAZOLINE HCL 0.05 %
2 SPRAY, NON-AEROSOL (ML) NASAL
Status: COMPLETED | OUTPATIENT
Start: 2019-06-15 | End: 2019-06-15

## 2019-06-15 RX ORDER — LIDOCAINE HYDROCHLORIDE 40 MG/ML
SOLUTION TOPICAL
Status: COMPLETED | OUTPATIENT
Start: 2019-06-15 | End: 2019-06-15

## 2019-06-15 RX ADMIN — TRANEXAMIC ACID 10 ML: 1 INJECTION, SOLUTION INTRAVENOUS at 21:21

## 2019-06-15 RX ADMIN — LIDOCAINE HYDROCHLORIDE: 40 SOLUTION TOPICAL at 20:05

## 2019-06-15 RX ADMIN — OXYMETAZOLINE HCL 2 SPRAY: 0.05 SPRAY NASAL at 20:05

## 2019-06-15 RX ADMIN — CLONIDINE HYDROCHLORIDE 0.1 MG: 0.1 TABLET ORAL at 20:36

## 2019-06-16 NOTE — ED PROVIDER NOTES
EMERGENCY DEPARTMENT HISTORY AND PHYSICAL EXAM           Date: 6/15/2019  Patient Name: Tonny Bennett    History of Presenting Illness     Chief Complaint   Patient presents with    Epistaxis     Patient arrives with nosebleed x30 minutes. EMS states the bleeding is coming from both nares, patient currently has a towel to nares and it appears controlled. Patient on xarelto and akbar aspirin for atrial fib. Patient has no other complaints. History Provided By: Patient    HPI: Tonny Bennett is a 80 y.o. female, with significant pmhx of afib, chf, anxiety, arthritis, who presents ambulatory to the ED with c/o nosebleed from right nare that started approximately 1 hour ago. Patient family reports she was blowing her nose when she suddenly started bleeding. Patient noted to be on Xarelto and aspirin but no anti-hypertensive medications. Patient without history of similar episodes in the past.  Denies having chest pain or nausea or shortness of breath. Pt specifically denies any recent fevers, chills, nausea, vomiting, diarrhea, abd pain, CP, SOB, urinary sxs, changes in BM, or headache. PCP: Sarah Dewitt MD    Social Hx: denies tobacco, denies EtOH, denies Illicit Drugs     There are no other complaints, changes, or physical findings at this time. Allergies   Allergen Reactions    Ace Inhibitors Cough         Current Outpatient Medications   Medication Sig Dispense Refill    atorvastatin (LIPITOR) 80 mg tablet Take 80 mg by mouth daily.  carvedilol (COREG) 3.125 mg tablet Take  by mouth two (2) times daily (with meals).  aspirin delayed-release 81 mg tablet Take  by mouth daily.  OTHER Semi-electric hospital bed, DX:  I50.9 1 Each 0    QUEtiapine (SEROQUEL) 25 mg tablet Take 1 Tab by mouth nightly. 30 Tab 1    loratadine (CLARITIN) 10 mg tablet TAKE 1 TABLET BY MOUTH ONCE DAILY 30 Tab 1    alendronate (FOSAMAX) 35 mg tablet Take 1 Tab by mouth every Wednesday.  4 Tab 5    trospium (SANCTURA) 20 mg tablet TAKE 1 TABLET BY MOUTH TWICE A  Tab 2    furosemide (LASIX) 40 mg tablet Take 40 mg by mouth daily.  potassium chloride SR (KLOR-CON 10) 10 mEq tablet Take 10 mEq by mouth daily.  fluticasone propionate (FLONASE) 50 mcg/actuation nasal spray 2 spray each nostril QD 1 Bottle 5    magnesium oxide (MAG-OX) 400 mg tablet TAKE 1 TAB BY MOUTH DAILY. 90 Tab 2    PARoxetine (PAXIL) 20 mg tablet TAKE 1 TAB BY MOUTH DAILY. 90 Tab 3    cyanocobalamin 1,000 mcg tablet Take 1 Tab by mouth daily. 30 Tab 3    levothyroxine (SYNTHROID) 25 mcg tablet TAKE 1 TABLET BY MOUTH DAILY BEFORE BREAKFAST 90 Tab 2    multivitamin (ONE A DAY) tablet Take 1 Tab by mouth daily. 30 Tab 12    polyethylene glycol (MIRALAX) 17 gram/dose powder Take 17 g by mouth daily. 510 g 4    calcium-cholecalciferol, D3, (CALTRATE 600+D) tablet Take 1 Tab by mouth daily. 90 Tab 3    rivaroxaban (XARELTO) 20 mg tab tablet Take 1 Tab by mouth daily (with breakfast). 90 Tab 1    albuterol (PROAIR HFA) 90 mcg/actuation inhaler Take 2 Puffs by inhalation every six (6) hours as needed for Wheezing. 1 Inhaler 2    zinc oxide-cod liver oil (DESITIN) 40 % ointment Apply  to affected area as needed for Skin Irritation.  docusate sodium (COLACE) 100 mg capsule Take 100 mg by mouth two (2) times a day.          Past History     Past Medical History:  Past Medical History:   Diagnosis Date    Atrial fibrillation ([facility] [address] 2007    Hypercholesterolemia     Hypertension     Osteoarthritis 2010    Stroke ([facility] [address]     Thromboembolus ([facility] [address]     Thyroid disease        Past Surgical History:  Past Surgical History:   Procedure Laterality Date    CARDIAC SURG PROCEDURE UNLIST      HX HEENT      cataract sx    HX HIP FRACTURE TX  2013    right hip 1989 left hip 2013    HX HIP REPLACEMENT  1989    HX PACEMAKER  2008       Family History:  Family History   Problem Relation Age of Onset    Hypertension Mother Social History:  Social History     Tobacco Use    Smoking status: Never Smoker    Smokeless tobacco: Never Used   Substance Use Topics    Alcohol use: No     Alcohol/week: 0.0 oz    Drug use: No       Allergies: Allergies   Allergen Reactions    Ace Inhibitors Cough         Review of Systems   Review of Systems   Constitutional: Negative. Negative for fever. HENT: Positive for nosebleeds. Eyes: Negative. Respiratory: Negative. Negative for shortness of breath. Cardiovascular: Negative for chest pain. Gastrointestinal: Negative for abdominal pain, nausea and vomiting. Endocrine: Negative. Genitourinary: Negative. Negative for difficulty urinating, dysuria and hematuria. Musculoskeletal: Negative. Skin: Negative. Neurological: Negative. Psychiatric/Behavioral: Negative for suicidal ideas. All other systems reviewed and are negative. Physical Exam   Physical Exam   Constitutional: She is oriented to person, place, and time. She appears well-developed and well-nourished. No distress. HENT:   Head: Normocephalic and atraumatic. Nose: Epistaxis (Right nares, thin continuous bleeding) is observed. Eyes: Conjunctivae and EOM are normal. No scleral icterus. Neck: Normal range of motion. No tracheal deviation present. Cardiovascular: Normal rate, regular rhythm, normal heart sounds and intact distal pulses. Exam reveals no friction rub. No murmur heard. Pulmonary/Chest: Effort normal and breath sounds normal. No stridor. No respiratory distress. She has no wheezes. She has no rales. Abdominal: Soft. Bowel sounds are normal. She exhibits no distension. There is no tenderness. There is no rebound. Musculoskeletal: Normal range of motion. She exhibits no tenderness. Neurological: She is alert and oriented to person, place, and time. No cranial nerve deficit. Skin: Skin is warm and dry. No rash noted. She is not diaphoretic.    Psychiatric: She has a normal mood and affect. Her speech is normal and behavior is normal. Judgment and thought content normal. Cognition and memory are normal.   Nursing note and vitals reviewed. Diagnostic Study Results     Labs -   No results found for this or any previous visit (from the past 12 hour(s)). Radiologic Studies -   No orders to display     CT Results  (Last 48 hours)    None        CXR Results  (Last 48 hours)    None            Medical Decision Making   I am the first provider for this patient. I reviewed the vital signs, available nursing notes, past medical history, past surgical history, family history and social history. Vital Signs-Reviewed the patient's vital signs. Patient Vitals for the past 12 hrs:   Pulse Resp BP SpO2   06/15/19 2200 -- -- 132/77 99 %   06/15/19 2115 -- -- 140/75 100 %   06/15/19 2015 -- -- 140/80 96 %   06/15/19 2000 -- -- (!) 182/98 98 %   06/15/19 1953 (!) 119 17 (!) 207/90 100 %       Pulse Oximetry Analysis - 100% on RA    Records Reviewed: Nursing Notes, Old Medical Records, Ambulance Run Sheet, Previous Radiology Studies and Previous Laboratory Studies    Provider Notes (Medical Decision Making):     DDX:  Anterior/posterior nosebleed, anticoagulation, hypertension    Plan:  Afrin/lidocaine nasal packing with nose clamps, ice,  0.1 of clonidine to improve blood pressure will consider TXA if Afrin/lidocaine mixture does not work    Impression:  Epistasix, anticoagulation    ED Course:   Initial assessment performed. The patients presenting problems have been discussed, and they are in agreement with the care plan formulated and outlined with them. I have encouraged them to ask questions as they arise throughout their visit.     I reviewed our electronic medical record system for any past medical records that were available that may contribute to the patients current condition, the nursing notes and and vital signs from today's visit  Nursing notes will be reviewed as they become available in realtime while the pt has been in the ED. Candace Bartlett MD      HYPERTENSION COUNSELING:  Patient made aware of their elevated blood pressure and is instructed to follow up this week with their Primary Care or [facility] for [facility] recheck. Patient is counseled regarding consequences of chronic, uncontrolled hypertension including kidney disease, heart disease, stroke or even death. Patient states their understanding           Procedure Note - Epistaxis Management:   Performed by Candace Bartlett MD .    Immediately prior to the procedure, the patient was reevaluated and found suitable for the planned procedure and any planned medications. Immediately prior to the procedure a time out was called to verify the correct patient, procedure, equipment, staff, and marking as appropriate. After administration of oxymetozline and lidocaine, the patient underwent direct pressure application for 30 minutes. and lidocaine and afrin soaked cottong in both nares. Hemostasis was not achieved after placement. The procedure was tolerated well. Procedure Note - Epistaxis Management:   Performed by Candace Bartlett MD .    Immediately prior to the procedure, the patient was reevaluated and found suitable for the planned procedure and any planned medications. Immediately prior to the procedure a time out was called to verify the correct patient, procedure, equipment, staff, and marking as appropriate. After administration of TXA, the patient underwent nasal pack placement with TXA soaked cotton in the right nare(s). and direct pressure application for 30 minutes. .  Hemostasis was not achieved after placement. The procedure was tolerated well. Procedure Note - Epistaxis Management:   Performed by Merari Pearson MD .    Immediately prior to the procedure, the patient was reevaluated and found suitable for the planned procedure and any planned medications.     Immediately prior to the procedure a time out was called to verify the correct patient, procedure, equipment, staff, and marking as appropriate. The patient underwent nasal pack placement with RhinoRocket in the right nare(s). .  Hemostasis was achieved after placement. The procedure was tolerated well. 10:54 PM  Progress note:  Pt noted to be feeling better, ready for discharge. Pt will follow up with pcp and ENT as instructed. All questions have been answered, pt voiced understanding and agreement with plan. Specific return precautions provided in addition to instructions for pt to return to the ED immediately should sx worsen at any time. Donte Ballesteros MD      Critical Care Time:     none      Diagnosis     Clinical Impression:   1. Epistaxis    2. Current use of long term anticoagulation    3. Elevated blood pressure reading        PLAN:  1. Current Discharge Medication List        2. Follow-up Information     Follow up With Specialties Details Why Contact Info    Wily Cisneros MD Internal Medicine Schedule an appointment as soon as possible for a visit in 2 days  [address]  [address] [facility]      Bailey Laguna MD Otolaryngology Schedule an appointment as soon as possible for a visit in 2 days  [facility]  [phone]          Return to ED if worse     Disposition:    10:54 PM   The patient's results have been reviewed with family and/or caregiver. They verbally convey their understanding and agreement of the patient's signs, symptoms, diagnosis, treatment and prognosis and additionally agree to follow up as recommended in the discharge instructions or to return to the Emergency Room should the patient's condition change prior to their follow-up appointment. The family and/or caregiver verbally agrees with the care-plan and all of their questions have been answered.  The discharge instructions have also been provided to the them with educational information regarding the patient's diagnosis as well a list of reasons why the patient would want to return to the ER prior to their follow-up appointment should their condition change. Kimber Lewis MD            Please note that this dictation was completed with [employer], the computer voice recognition software. Quite often unanticipated grammatical, syntax, homophones, and other interpretive errors are inadvertently transcribed by the computer software. Please disregard these errors. Please excuse any errors that have escaped final proofreading. This note will not be viewable in [address]

## 2019-06-16 NOTE — ED NOTES
Patient's nasal dressing oozing, complaining of coughing up blood. Dr. Amador Del Rosario at bedside for rhino rocket placement.

## 2019-06-16 NOTE — DISCHARGE INSTRUCTIONS
Patient Education        Nosebleeds: Care Instructions  Your Care Instructions    Nosebleeds are common, especially if you have colds or allergies. Many things can cause a nosebleed. Some nosebleeds stop on their own with pressure. Others need packing. Some get cauterized (sealed). If you have gauze or other packing materials in your nose, you will need to follow up with your doctor to have the packing removed. You may need more treatment if you get nosebleeds a lot. The doctor has checked you carefully, but problems can develop later. If you notice any problems or new symptoms, get medical treatment right away. Follow-up care is a key part of your treatment and safety. Be sure to make and go to all appointments, and call your doctor if you are having problems. It's also a good idea to know your test results and keep a list of the medicines you take. How can you care for yourself at home? · If you get another nosebleed:  ? Sit up and tilt your head slightly forward. This keeps blood from going down your throat. ? Use your thumb and index finger to pinch your nose shut for 10 minutes. Use a clock. Do not check to see if the bleeding has stopped before the 10 minutes are up. If the bleeding has not stopped, pinch your nose shut for another 10 minutes. ? When the bleeding has stopped, try not to pick, rub, or blow your nose for 12 hours. Avoiding these things helps keep your nose from bleeding again. · If your doctor prescribed antibiotics, take them as directed. Do not stop taking them just because you feel better. You need to take the full course of antibiotics. To prevent nosebleeds  · Do not blow your nose too hard. · Try not to lift or strain after a nosebleed. · Raise your head on a pillow while you sleep. · Put a thin layer of a saline- or water-based nasal gel, such as NasoGel, inside your nose. Put it on the septum, which divides your nostrils.  This will prevent dryness that can cause nosebleeds. · Use a vaporizer or humidifier to add moisture to your bedroom. Follow the directions for cleaning the machine. · Do not use aspirin, ibuprofen (Advil, Motrin), or naproxen (Aleve) for 36 to 48 hours after a nosebleed unless your doctor tells you to. You can use acetaminophen (Tylenol) for pain relief. · Talk to your doctor about stopping any other medicines you are taking. Some medicines may make you more likely to get a nosebleed. · Do not use cold medicines or nasal sprays without first talking to your doctor. They can make your nose dry. When should you call for help? Call 911 anytime you think you may need emergency care. For example, call if:    · You passed out (lost consciousness).    Call your doctor now or seek immediate medical care if:    · You get another nosebleed and your nose is still bleeding after you have applied pressure 3 times for 10 minutes each time (30 minutes total).     · There is a lot of blood running down the back of your throat even after you pinch your nose and tilt your head forward.     · You have a fever.     · You have sinus pain.    Watch closely for changes in your health, and be sure to contact your doctor if:    · You get nosebleeds often, even if they stop.     · You do not get better as expected. Where can you learn more? Go to http://francisca-abigail.info/. Enter S156 in the search box to learn more about \"Nosebleeds: Care Instructions. \"  Current as of: September 23, 2018  Content Version: 11.9  © 9742-2031 [employer]. Care instructions adapted under license by [employer] (which disclaims liability or warranty for this information).  If you have questions about a medical condition or this instruction, always ask your healthcare professional. [address] any warranty or liability for your use of this information.            Nasal Packing: Care Instructions  Your Care Instructions  After a nose injury or surgery, gauze is packed high up into the nose. It soaks up fluids that drain from the nose, such as blood. The doctor may change the gauze. Or he or she may leave it in place for a few days. Your face may look puffy. The skin near your eyes may be bruised. This may last for many days, but it will fade over time. Follow-up care is a key part of your treatment and safety. Be sure to make and go to all appointments, and call your doctor if you are having problems. It's also a good idea to know your test results and keep a list of the medicines you take. How can you care for yourself at home?    · Follow your doctor's advice for taking care of the packing. Your doctor may want to take it out at his or her office. Activity    · Avoid strenuous activities for 1 week or until your doctor says it is okay. These include bicycle riding, jogging, weight lifting, and aerobic exercise.     · You may drive when you are no longer taking prescription pain pills and feel up to it. Medicines    · Do not take aspirin, medicines that contain aspirin, or anti-inflammatory medicines such as ibuprofen (Advil, Motrin) or naproxen (Aleve) for 3 weeks after surgery unless your doctor says it is okay.     · Take pain medicines exactly as directed. ? If the doctor gave you a prescription medicine for pain, take it as prescribed.     · If your doctor prescribed antibiotics, take them as directed. Do not stop taking them just because you feel better. You need to take the full course of antibiotics.     · If you think your pain medicine is making you sick to your stomach:  ? Take your medicine after meals (unless your doctor has told you not to). ? Ask your doctor for a different pain medicine.    Other instructions    · Do not blow your nose for 1 week after surgery.     · Do not put anything into your nose.     · If you must sneeze, open your mouth and sneeze naturally.     · After the packing is removed, use saline (saltwater) nasal washes to help keep your nasal passages open. This will wash out mucus and bacteria. You can buy saline nose drops at a grocery store or drugstore. Or you can make your own at home. Add 1 teaspoon of salt and 1 teaspoon of baking soda to 2 cups of distilled water. If you make your own, fill a bulb syringe with the solution. Put the tip into your nostril, and squeeze gently. Natividad Randolph your nose. When should you call for help? Call 911 anytime you think you may need emergency care. For example, call if:    · You passed out (lost consciousness).     · You have trouble breathing.     · You have sudden chest pain and shortness of breath, or you cough up blood.    Call your doctor now or seek immediate medical care if:    · You have symptoms of infection, such as:  ? Increased pain, swelling, warmth, or redness. ? Red streaks leading from the area. ? Pus draining from the area. ? A fever.     · You seem to be getting sicker.     · You have new pain or the pain gets worse.     · You have bleeding through the nasal packing that is not slowing.    Watch closely for changes in your health, and be sure to contact your doctor if:    · You do not get better as expected. Where can you learn more? Go to http://francisca-abigail.info/. Enter Q444 in the search box to learn more about \"Nasal Packing: Care Instructions. \"  Current as of: March 27, 2018  Content Version: 11.9  © 6901-3064 [employer]. Care instructions adapted under license by [employer] (which disclaims liability or warranty for this information).  If you have questions about a medical condition or this instruction, always ask your healthcare professional. [place] [address] any warranty or liability for your use of this information.

## 2019-06-16 NOTE — ED NOTES
patient discharged with instructions by Dr. Vaughn Hdz. No PIV; patient assisted off unit, home via private car with family.

## 2019-06-18 ENCOUNTER — OFFICE VISIT (OUTPATIENT)
Dept: INTERNAL MEDICINE CLINIC | Age: 84
End: 2019-06-18

## 2019-06-18 VITALS
HEIGHT: 65 IN | RESPIRATION RATE: 18 BRPM | OXYGEN SATURATION: 99 % | SYSTOLIC BLOOD PRESSURE: 102 MMHG | BODY MASS INDEX: 28.96 KG/M2 | DIASTOLIC BLOOD PRESSURE: 52 MMHG | HEART RATE: 98 BPM | TEMPERATURE: 98 F

## 2019-06-18 DIAGNOSIS — R04.0 RIGHT-SIDED EPISTAXIS: Primary | ICD-10-CM

## 2019-06-18 DIAGNOSIS — R26.9 GAIT ABNORMALITY: ICD-10-CM

## 2019-06-18 DIAGNOSIS — I10 ESSENTIAL HYPERTENSION: ICD-10-CM

## 2019-06-18 DIAGNOSIS — I48.20 CHRONIC ATRIAL FIBRILLATION (HCC): ICD-10-CM

## 2019-06-18 DIAGNOSIS — R05.3 CHRONIC COUGH: ICD-10-CM

## 2019-06-18 RX ORDER — CODEINE PHOSPHATE AND GUAIFENESIN 10; 100 MG/5ML; MG/5ML
5 SOLUTION ORAL
Qty: 118 ML | Refills: 0 | Status: SHIPPED | OUTPATIENT
Start: 2019-06-18 | End: 2019-06-25

## 2019-06-18 NOTE — PROGRESS NOTES
Health Maintenance Due   Topic Date Due    DTaP/Tdap/Td series (1 - Tdap) 08/29/1945    Shingrix Vaccine Age 50> (1 of 2) 08/29/1974    GLAUCOMA SCREENING Q2Y  08/29/1989       Chief Complaint   Patient presents with    ED Follow-up     [facility] ED for nosebleed       1. Have you been to the ER, urgent care clinic since your last visit? Hospitalized since your last visit? Yes When: Saturday [place] [facility] Reason for visit: nose[place]ed    2. Have you seen or consulted any other health care providers outside of the [address] [place] [address] since your last visit? Include any pap smears or colon screening. No    3) Do you have an Advance Directive on file? no    4) Are you interested in receiving information on Advance Directives? NO      Patient is accompanied by daughter I have received verbal consent from Juliocesar Fowler to discuss any/all medical information while they are present in the room.

## 2019-06-18 NOTE — PROGRESS NOTES
HISTORY OF PRESENT ILLNESS  Juan Tobias is a 80 y.o. female. Pt. comes in with her daughter for f/u. Has multiple medical problems. Reports recent ER visit with right nostril bleeding. I reviewed records in [facility]. Patient has chronic A. fib. She is on Xarelto and aspirin. Cardiac status has been stable. Right nostril was packed in the ER. Aspirin was stopped. She remains on Xarelto. Denies any pain. Reports a chronic dry cough. She is here for us to remove the right nostril pack. Reports compliance with medications and diet. Med list and most recent labs/studies reviewed with pt. Reports no other new c/o. HPI    Review of Systems   Constitutional: Negative. HENT: Positive for nosebleeds. Eyes: Negative. Respiratory: Positive for cough. Negative for hemoptysis, sputum production, shortness of breath and wheezing. Cardiovascular: Negative. Negative for chest pain and leg swelling. Gastrointestinal: Negative. Genitourinary: Negative. Musculoskeletal: Positive for joint pain. Negative for falls. Skin: Negative. Neurological: Negative for dizziness, sensory change, focal weakness and headaches. Psychiatric/Behavioral: Positive for memory loss. Physical Exam   Constitutional: She is oriented to person, place, and time. She appears well-developed and well-nourished. No distress. Pleasant elderly lady in wheelchair   HENT:   Head: Normocephalic and atraumatic. Mouth/Throat: Oropharynx is clear and moist.   Dried blood in right nostril  Small amount of bleeding as well   Eyes: Conjunctivae are normal.   Neck: Normal range of motion. Neck supple. No JVD present. No thyromegaly present. Cardiovascular: Normal rate, regular rhythm, normal heart sounds and intact distal pulses. No murmur heard. Pulmonary/Chest: Effort normal and breath sounds normal. No respiratory distress. She has no wheezes. She has no rales. Abdominal: Soft.  Bowel sounds are normal. She exhibits no distension. Musculoskeletal: She exhibits no edema or tenderness. Neurological: She is alert and oriented to person, place, and time. Coordination abnormal.   DJD   Skin: Skin is warm and dry. No rash noted. Psychiatric: She has a normal mood and affect. Her behavior is normal.   Nursing note and vitals reviewed. ASSESSMENT and PLAN  Diagnoses and all orders for this visit:    1. Right-sided epistaxis  -     guaiFENesin-codeine (ROBAFEN AC) 100-10 mg/5 mL solution; Take 5 mL by mouth three (3) times daily as needed for Cough for up to 7 days. Max Daily Amount: 15 mL. 2. Chronic atrial fibrillation ([address]    3. Essential hypertension    4. Gait abnormality    5. Chronic cough  -     guaiFENesin-codeine (ROBAFEN AC) 100-10 mg/5 mL solution; Take 5 mL by mouth three (3) times daily as needed for Cough for up to 7 days. Max Daily Amount: 15 mL.       Follow-up and Dispositions    · Return if symptoms worsen or fail to improve.     lab results and schedule of future lab studies reviewed with patient  reviewed diet, exercise and weight control  reviewed medications and side effects in detail  Right nostril pack was removed without any difficulty but unfortunately patient started bleeding  We contacted ENT/Dr. Horacio Orourke who will see patient later today for further evaluation  I explained to patient and her daughter that the risk of bleeding will be high as long as she is on Xarelto  I advised thrm to check with her cardiologist/Dr. Rogelio Arellano about use of Xarelto as well

## 2019-06-19 ENCOUNTER — TELEPHONE (OUTPATIENT)
Dept: INTERNAL MEDICINE CLINIC | Age: 84
End: 2019-06-19

## 2019-06-20 NOTE — TELEPHONE ENCOUNTER
Cristine sent the information to [facility] as requested. All information has been faxed over with confirmation received.

## 2019-06-24 DIAGNOSIS — G47.00 INSOMNIA, UNSPECIFIED TYPE: ICD-10-CM

## 2019-06-24 RX ORDER — LANOLIN ALCOHOL/MO/W.PET/CERES
1000 CREAM (GRAM) TOPICAL DAILY
Qty: 30 TAB | Refills: 3 | Status: SHIPPED | OUTPATIENT
Start: 2019-06-24 | End: 2019-07-06

## 2019-06-24 RX ORDER — QUETIAPINE FUMARATE 25 MG/1
25 TABLET, FILM COATED ORAL
Qty: 30 TAB | Refills: 0 | Status: SHIPPED | OUTPATIENT
Start: 2019-06-24 | End: 2019-08-25 | Stop reason: SDUPTHER

## 2019-06-25 RX ORDER — CARVEDILOL 3.12 MG/1
3.12 TABLET ORAL 2 TIMES DAILY WITH MEALS
Qty: 30 TAB | Refills: 5 | Status: ON HOLD | OUTPATIENT
Start: 2019-06-25 | End: 2019-07-06 | Stop reason: DRUGHIGH

## 2019-07-05 ENCOUNTER — HOSPITAL ENCOUNTER (OUTPATIENT)
Dept: LAB | Age: 84
Discharge: HOME | DRG: 812 | End: 2019-07-05
Payer: MEDICARE

## 2019-07-05 PROCEDURE — 83550 IRON BINDING TEST: CPT

## 2019-07-05 PROCEDURE — 85018 HEMOGLOBIN: CPT

## 2019-07-05 PROCEDURE — 36415 COLL VENOUS BLD VENIPUNCTURE: CPT

## 2019-07-05 PROCEDURE — 82728 ASSAY OF FERRITIN: CPT

## 2019-07-06 ENCOUNTER — HOSPITAL ENCOUNTER (INPATIENT)
Age: 84
LOS: 1 days | Discharge: HOME | DRG: 812 | End: 2019-07-08
Attending: EMERGENCY MEDICINE | Admitting: HOSPITALIST
Payer: MEDICARE

## 2019-07-06 DIAGNOSIS — D64.9 ANEMIA, UNSPECIFIED TYPE: Primary | ICD-10-CM

## 2019-07-06 LAB
ALBUMIN SERPL-MCNC: 2.8 G/DL (ref 3.5–5)
ALBUMIN/GLOB SERPL: 0.7 {RATIO} (ref 1.1–2.2)
ALP SERPL-CCNC: 76 U/L (ref 45–117)
ALT SERPL-CCNC: 8 U/L (ref 12–78)
ANION GAP SERPL CALC-SCNC: 7 MMOL/L (ref 5–15)
AST SERPL-CCNC: 18 U/L (ref 15–37)
BILIRUB SERPL-MCNC: 0.4 MG/DL (ref 0.2–1)
BUN SERPL-MCNC: 30 MG/DL (ref 6–20)
BUN/CREAT SERPL: 20 (ref 12–20)
CALCIUM SERPL-MCNC: 8.3 MG/DL (ref 8.5–10.1)
CHLORIDE SERPL-SCNC: 107 MMOL/L (ref 97–108)
CO2 SERPL-SCNC: 28 MMOL/L (ref 21–32)
COMMENT, HOLDF: NORMAL
CREAT SERPL-MCNC: 1.51 MG/DL (ref 0.55–1.02)
FERRITIN SERPL-MCNC: 35 NG/ML (ref 15–150)
GLOBULIN SER CALC-MCNC: 4 G/DL (ref 2–4)
GLUCOSE SERPL-MCNC: 113 MG/DL (ref 65–100)
HCT VFR BLD AUTO: 20 % (ref 34–46.6)
HEMOCCULT STL QL: NEGATIVE
HGB BLD-MCNC: 5.9 G/DL (ref 11.1–15.9)
IRON SATN MFR SERPL: 5 % (ref 15–55)
IRON SERPL-MCNC: 13 UG/DL (ref 27–139)
POTASSIUM SERPL-SCNC: 3.4 MMOL/L (ref 3.5–5.1)
PROT SERPL-MCNC: 6.8 G/DL (ref 6.4–8.2)
SAMPLES BEING HELD,HOLD: NORMAL
SODIUM SERPL-SCNC: 142 MMOL/L (ref 136–145)
TIBC SERPL-MCNC: 287 UG/DL (ref 250–450)
UIBC SERPL-MCNC: 274 UG/DL (ref 118–369)

## 2019-07-06 PROCEDURE — 30233N1 TRANSFUSION OF NONAUTOLOGOUS RED BLOOD CELLS INTO PERIPHERAL VEIN, PERCUTANEOUS APPROACH: ICD-10-PCS | Performed by: EMERGENCY MEDICINE

## 2019-07-06 PROCEDURE — 36415 COLL VENOUS BLD VENIPUNCTURE: CPT

## 2019-07-06 PROCEDURE — 85025 COMPLETE CBC W/AUTO DIFF WBC: CPT

## 2019-07-06 PROCEDURE — 99284 EMERGENCY DEPT VISIT MOD MDM: CPT

## 2019-07-06 PROCEDURE — 80053 COMPREHEN METABOLIC PANEL: CPT

## 2019-07-06 PROCEDURE — 99218 HC RM OBSERVATION: CPT

## 2019-07-06 PROCEDURE — P9016 RBC LEUKOCYTES REDUCED: HCPCS

## 2019-07-06 PROCEDURE — 74011250637 HC RX REV CODE- 250/637: Performed by: HOSPITALIST

## 2019-07-06 PROCEDURE — 86900 BLOOD TYPING SEROLOGIC ABO: CPT

## 2019-07-06 PROCEDURE — 74011250637 HC RX REV CODE- 250/637: Performed by: EMERGENCY MEDICINE

## 2019-07-06 PROCEDURE — 82272 OCCULT BLD FECES 1-3 TESTS: CPT

## 2019-07-06 PROCEDURE — 36430 TRANSFUSION BLD/BLD COMPNT: CPT

## 2019-07-06 PROCEDURE — 93005 ELECTROCARDIOGRAM TRACING: CPT

## 2019-07-06 PROCEDURE — 86923 COMPATIBILITY TEST ELECTRIC: CPT

## 2019-07-06 RX ORDER — QUETIAPINE FUMARATE 25 MG/1
25 TABLET, FILM COATED ORAL
Status: DISCONTINUED | OUTPATIENT
Start: 2019-07-06 | End: 2019-07-08 | Stop reason: HOSPADM

## 2019-07-06 RX ORDER — FUROSEMIDE 40 MG/1
40 TABLET ORAL 2 TIMES DAILY
Status: DISCONTINUED | OUTPATIENT
Start: 2019-07-07 | End: 2019-07-08 | Stop reason: HOSPADM

## 2019-07-06 RX ORDER — SODIUM CHLORIDE 9 MG/ML
250 INJECTION, SOLUTION INTRAVENOUS AS NEEDED
Status: DISCONTINUED | OUTPATIENT
Start: 2019-07-06 | End: 2019-07-08 | Stop reason: HOSPADM

## 2019-07-06 RX ORDER — DOCUSATE SODIUM 100 MG/1
100 CAPSULE, LIQUID FILLED ORAL 2 TIMES DAILY
Status: DISCONTINUED | OUTPATIENT
Start: 2019-07-06 | End: 2019-07-08 | Stop reason: HOSPADM

## 2019-07-06 RX ORDER — SODIUM CHLORIDE 0.9 % (FLUSH) 0.9 %
5-40 SYRINGE (ML) INJECTION AS NEEDED
Status: DISCONTINUED | OUTPATIENT
Start: 2019-07-06 | End: 2019-07-08 | Stop reason: HOSPADM

## 2019-07-06 RX ORDER — LORATADINE 10 MG/1
10 TABLET ORAL
Status: COMPLETED | OUTPATIENT
Start: 2019-07-06 | End: 2019-07-06

## 2019-07-06 RX ORDER — OXYBUTYNIN CHLORIDE 5 MG/1
5 TABLET, EXTENDED RELEASE ORAL DAILY
Status: DISCONTINUED | OUTPATIENT
Start: 2019-07-07 | End: 2019-07-08 | Stop reason: HOSPADM

## 2019-07-06 RX ORDER — LANOLIN ALCOHOL/MO/W.PET/CERES
400 CREAM (GRAM) TOPICAL DAILY
Status: DISCONTINUED | OUTPATIENT
Start: 2019-07-06 | End: 2019-07-08 | Stop reason: HOSPADM

## 2019-07-06 RX ORDER — LEVOTHYROXINE SODIUM 25 UG/1
25 TABLET ORAL
Status: DISCONTINUED | OUTPATIENT
Start: 2019-07-07 | End: 2019-07-08 | Stop reason: HOSPADM

## 2019-07-06 RX ORDER — LORATADINE 10 MG/1
10 TABLET ORAL DAILY
Status: DISCONTINUED | OUTPATIENT
Start: 2019-07-07 | End: 2019-07-08 | Stop reason: HOSPADM

## 2019-07-06 RX ORDER — CODEINE PHOSPHATE AND GUAIFENESIN 10; 100 MG/5ML; MG/5ML
5 SOLUTION ORAL
COMMUNITY
End: 2019-07-17 | Stop reason: ALTCHOICE

## 2019-07-06 RX ORDER — POTASSIUM CHLORIDE 750 MG/1
10 TABLET, FILM COATED, EXTENDED RELEASE ORAL
Status: COMPLETED | OUTPATIENT
Start: 2019-07-06 | End: 2019-07-06

## 2019-07-06 RX ORDER — CODEINE PHOSPHATE AND GUAIFENESIN 10; 100 MG/5ML; MG/5ML
5 SOLUTION ORAL
Status: DISCONTINUED | OUTPATIENT
Start: 2019-07-06 | End: 2019-07-08 | Stop reason: HOSPADM

## 2019-07-06 RX ORDER — ALBUTEROL SULFATE 0.83 MG/ML
2.5 SOLUTION RESPIRATORY (INHALATION)
Status: DISCONTINUED | OUTPATIENT
Start: 2019-07-06 | End: 2019-07-08 | Stop reason: HOSPADM

## 2019-07-06 RX ORDER — POLYETHYLENE GLYCOL 3350 17 G/17G
17 POWDER, FOR SOLUTION ORAL
COMMUNITY
End: 2020-05-22 | Stop reason: SDUPTHER

## 2019-07-06 RX ORDER — FUROSEMIDE 40 MG/1
40 TABLET ORAL DAILY
Status: DISCONTINUED | OUTPATIENT
Start: 2019-07-06 | End: 2019-07-06 | Stop reason: SDUPTHER

## 2019-07-06 RX ORDER — SODIUM CHLORIDE 0.9 % (FLUSH) 0.9 %
5-40 SYRINGE (ML) INJECTION EVERY 8 HOURS
Status: DISCONTINUED | OUTPATIENT
Start: 2019-07-06 | End: 2019-07-08 | Stop reason: HOSPADM

## 2019-07-06 RX ORDER — CARVEDILOL 3.12 MG/1
3.12 TABLET ORAL 2 TIMES DAILY WITH MEALS
COMMUNITY
End: 2020-05-22 | Stop reason: SDUPTHER

## 2019-07-06 RX ORDER — ATORVASTATIN CALCIUM 40 MG/1
80 TABLET, FILM COATED ORAL DAILY
Status: DISCONTINUED | OUTPATIENT
Start: 2019-07-07 | End: 2019-07-08 | Stop reason: HOSPADM

## 2019-07-06 RX ORDER — CARVEDILOL 3.12 MG/1
3.12 TABLET ORAL 2 TIMES DAILY WITH MEALS
Status: DISCONTINUED | OUTPATIENT
Start: 2019-07-07 | End: 2019-07-07

## 2019-07-06 RX ORDER — PAROXETINE HYDROCHLORIDE 20 MG/1
20 TABLET, FILM COATED ORAL DAILY
Status: DISCONTINUED | OUTPATIENT
Start: 2019-07-07 | End: 2019-07-08 | Stop reason: HOSPADM

## 2019-07-06 RX ORDER — POLYETHYLENE GLYCOL 3350 17 G/17G
17 POWDER, FOR SOLUTION ORAL DAILY PRN
Status: DISCONTINUED | OUTPATIENT
Start: 2019-07-06 | End: 2019-07-08 | Stop reason: HOSPADM

## 2019-07-06 RX ORDER — POTASSIUM CHLORIDE 750 MG/1
10 TABLET, FILM COATED, EXTENDED RELEASE ORAL DAILY
Status: DISCONTINUED | OUTPATIENT
Start: 2019-07-07 | End: 2019-07-08

## 2019-07-06 RX ORDER — LANOLIN ALCOHOL/MO/W.PET/CERES
1000 CREAM (GRAM) TOPICAL DAILY
COMMUNITY
End: 2019-11-01 | Stop reason: SDUPTHER

## 2019-07-06 RX ADMIN — POTASSIUM CHLORIDE 10 MEQ: 750 TABLET, FILM COATED, EXTENDED RELEASE ORAL at 17:47

## 2019-07-06 RX ADMIN — DOCUSATE SODIUM 100 MG: 100 CAPSULE, LIQUID FILLED ORAL at 20:11

## 2019-07-06 RX ADMIN — QUETIAPINE FUMARATE 25 MG: 25 TABLET, FILM COATED ORAL at 22:00

## 2019-07-06 RX ADMIN — FUROSEMIDE 40 MG: 40 TABLET ORAL at 17:47

## 2019-07-06 RX ADMIN — Medication 10 ML: at 22:01

## 2019-07-06 RX ADMIN — MAGNESIUM OXIDE TAB 400 MG (241.3 MG ELEMENTAL MG) 400 MG: 400 (241.3 MG) TAB at 17:47

## 2019-07-06 RX ADMIN — LORATADINE 10 MG: 10 TABLET ORAL at 17:47

## 2019-07-06 NOTE — ROUTINE PROCESS
Primary Nurse Val Hale RN and CAMILA Holcomb performed a dual skin assessment on this patient Impairment noted- see wound doc flow sheet - skin tear on sacrum  Omid score is 19

## 2019-07-06 NOTE — ED PROVIDER NOTES
80 y.o. female with past medical history significant for stroke (May 2019), thyroid disease, hypercholesteremia, osteoarthritis, a-fib, HTN, and thromboemolus who presents via personal vehicle with chief complaint of abnormal lab results. Pt presents to the ED and reports being referred to ED for a blood transfusion after she was recently found to have a Hg of 5.9. Pt currently takes Xarelto (last dosage today 7/6/19) and notes that she has not missed any dosages other than two days (7/2/19 and 7/3/19) as directed by her PCP following a severe episode of epistaxis on 7/1/19. Pt notes that this is the second episode of severe epistaxis that she has had, with the first episode being on 6/15/19. Second episode on 7/1/19 not as bad as first. Pt states that during the episode on 6/15/19, she required hospitalization and also was bleeding from her mouth and eyes. Pt had the episode controlled, was discharged, and eventually followed up with ENT specialists. Pt notes she saw the ENT specialists yesterday 7/5/19 which is where she was found to have abnormally low Hg. Pt notes recent stroke episode in May 2019. Pt currently has a pacemaker in place. Pt currently takes 10 mg potassium, 10 mg Claritin, 40 mg lasix, and 400 mg mag-ox daily at 1600 which she states she has not received today. Pt denies any chest pain, SOB, rectal bleeding, blood in stool, or melena. There are no other acute medical concerns at this time. Surgical hx - pacemaker placement, hip replacement, unlisted cardiac surgery   Social hx - Tobacco use: non-smoker, Alcohol Use: non-drinker, Drug Use: denies    PCP: Roxanne Enriquez MD    Note written by Zulema Hayes, as dictated by Anthony Ferro MD 2:55 PM.      The history is provided by the patient and a relative. No  was used.         Past Medical History:   Diagnosis Date    Atrial fibrillation [facility]) 2007    Hypercholesterolemia     Hypertension     Osteoarthritis 2010    [facility] [address]     [facility]emb[facility]us [facility])     Thyroid disease        Past Surgical History:   Procedure Laterality Date    CARDIAC SURG PROCEDURE UNLIST      HX HEENT      cataract sx    HX HIP FRACTURE [place]  2013    right hip 1989 left hip 2013    HX HIP REPLACEMENT  1989    HX PACEMAKER  2008         Family History:   Problem Relation Age of Onset    Hypertension Mother        Social History     Socioeconomic History    Marital status:      Spouse name: Not on file    Number of children: Not on file    Years of education: Not on file    Highest education level: Not on file   Occupational History    Not on file   Social Needs    Financial resource strain: Not on file    Food insecurity:     Worry: Not on file     Inability: Not on file    Transportation needs:     Medical: Not on file     Non-medical: Not on file   Tobacco Use    Smoking status: Never Smoker    Smokeless tobacco: Never Used   Substance and Sexual Activity    Alcohol use: No     Alcohol/week: 0.0 oz    Drug use: No    Sexual activity: Never     Birth control/protection: None     Comment: retired,living in independant living. Lifestyle    Physical activity:     Days per week: Not on file     Minutes per session: Not on file    Stress: Not on file   Relationships    Social connections:     Talks on phone: Not on file     Gets together: Not on file     Attends Latter-day service: Not on file     Active member of club or organization: Not on file     Attends meetings of clubs or organizations: Not on file     Relationship status: Not on file    Intimate partner violence:     Fear of current or ex partner: Not on file     Emotionally abused: Not on file     Physically abused: Not on file     Forced sexual activity: Not on file   Other Topics Concern    Not on file   Social History Narrative    11/4/16 Pt lives with son Ermelinda Click # [phone]. Dtr [address] [place] [address] #[phone].  Pt has both a rollator and a rolling walker in the home. ALLERGIES: Ace inhibitors    Review of Systems   Constitutional: Negative for chills and fever. Respiratory: Negative for shortness of breath. Cardiovascular: Negative for chest pain. Gastrointestinal: Negative for abdominal pain, anal bleeding, blood in stool, constipation, diarrhea and vomiting. Neurological: Negative for dizziness and light-headedness. All other systems reviewed and are negative. Vitals:    07/06/19 1349   Pulse: 88   SpO2: 100%            Physical Exam   Constitutional: She is oriented to person, place, and time. She appears well-developed and well-nourished. HENT:   Head: Normocephalic and atraumatic. Eyes: No scleral icterus. Neck: Normal range of motion. Cardiovascular:   Pt has an irregularly irregular heartbeat   Pulmonary/Chest: Effort normal.   Abdominal: She exhibits no distension. Genitourinary:   Genitourinary Comments: Brown stool upon rectal exam    Neurological: She is alert and oriented to person, place, and time. Skin:   Pt has a sacral decubitus ulcer with mild skin breakdown    Nursing note and vitals reviewed. Note written by Zulema Arrington, as dictated by Rachel Zamorano MD 2:55 PM.    MDM  Number of Diagnoses or Management Options  Anemia, unspecified type:   Diagnosis management comments: Pt presents with anemia, likely secondary to nose bleed, no active bleeding, no CVA, chest pain, syncope, melena. Pt admitted for transfusion. Procedures      Patient is being admitted to the hospital.  The results of their tests and reasons for their admission have been discussed with them and/or available family. They convey agreement and understanding for the need to be admitted and for their admission diagnosis. Consultation will be made now with the inpatient physician for hospitalization.     Hospitalist José Miguel for Admission  4:19 PM    ED Room Number: ER26/26  Patient Name and age: Tee Valverde 80 y.o.  female  Working Diagnosis:   1.  Anemia, unspecified type      Readmission: no  Isolation Requirements:  no  Recommended Level of Care:  telemetry  Code Status:  Full  Other:  Anemia likely secondary to recent nosebleeds while on Xarelto

## 2019-07-06 NOTE — PROGRESS NOTES
Problem: Pressure Injury - Risk of  Goal: *Prevention of pressure injury  Description  Document Omid Scale and appropriate interventions in the flowsheet. Outcome: Progressing Towards Goal  Note:   Pressure Injury Interventions:             Activity Interventions: Increase time out of bed    Mobility Interventions: Assess need for specialty bed, HOB 30 degrees or less    Nutrition Interventions: Document food/fluid/supplement intake    Friction and Shear Interventions: Apply protective barrier, creams and emollients, HOB 30 degrees or less, Lift team/patient mobility team, Minimize layers

## 2019-07-06 NOTE — PROGRESS NOTES
Admission Medication Reconciliation:    Information obtained from:  Patient daughter, RX paper/RxQuery    Comments:    3)  Spoke with daughter over the phone and used medication list to update PTA medication list on file. 2)  Medication changes (since last review): Added  - Afrin (for nasal bleed)  -Aryl Gel  -Robitussin  AC    Adjusted  - Lasix (was 40mg daily, now 40mg BID),  -miralax (was 17g daily, now 17g PRN)    Removed  - Aspirin  -Flonase    3)  Contacted Hospitalist with updates for Lasix, Miralax, and Robitussin         Allergies:  Ace inhibitors    Significant PMH/Disease States:   Past Medical History:   Diagnosis Date    Atrial fibrillation ([facility] [address] 2007    Hypercholesterolemia     Hypertension     Osteoarthritis 2010    Stroke ([facility] [address]     Thromboembolus [facility])     Thyroid disease        Chief Complaint for this Admission:    Chief Complaint   Patient presents with    Abnormal Lab Results       Prior to Admission Medications:   Prior to Admission Medications   Prescriptions Last Dose Informant Patient Reported? Taking? PARoxetine (PAXIL) 20 mg tablet 7/6/2019 at Unknown time  No Yes   Sig: TAKE 1 TAB BY MOUTH DAILY. QUEtiapine (SEROQUEL) 25 mg tablet 7/5/2019 at Unknown time  No Yes   Sig: Take 1 Tab by mouth nightly. albuterol (PROAIR HFA) 90 mcg/actuation inhaler   No Yes   Sig: Take 2 Puffs by inhalation every six (6) hours as needed for Wheezing. alendronate (FOSAMAX) 35 mg tablet 7/3/2019  No Yes   Sig: Take 1 Tab by mouth every Wednesday. atorvastatin (LIPITOR) 80 mg tablet 7/5/2019 at Unknown time  Yes Yes   Sig: Take 80 mg by mouth daily. calcium-cholecalciferol, D3, (CALTRATE 600+D) tablet 7/5/2019 at Unknown time  No Yes   Sig: Take 1 Tab by mouth daily. carvedilol (COREG) 3.125 mg tablet 7/6/2019 at AM  No Yes   Sig: Take 1 Tab by mouth two (2) times daily (with meals).  Indications: 2 in am 1 at pm   cyanocobalamin 1,000 mcg tablet 7/6/2019 at Unknown time  Yes Yes   Sig: Take 1,000 mcg by mouth daily. docusate sodium (COLACE) 100 mg capsule 2019 at Unknown time  Yes Yes   Sig: Take 100 mg by mouth two (2) times a day. furosemide (LASIX) 40 mg tablet 2019 at AM  Yes Yes   Sig: Take 40 mg by mouth two (2) times a day. guaiFENesin-codeine (ROBITUSSIN AC) 100-10 mg/5 mL solution   Yes Yes   Sig: Take 5 mL by mouth three (3) times daily as needed for Cough. levothyroxine (SYNTHROID) 25 mcg tablet 2019 at Unknown time  No Yes   Sig: TAKE 1 TABLET BY MOUTH DAILY BEFORE BREAKFAST   loratadine (CLARITIN) 10 mg tablet 2019 at Unknown time  No Yes   Sig: TAKE 1 TABLET BY MOUTH ONCE DAILY   magnesium oxide (MAG-OX) 400 mg tablet   No No   Sig: TAKE 1 TAB BY MOUTH DAILY. multivitamin (ONE A DAY) tablet 2019 at Unknown time  No Yes   Sig: Take 1 Tab by mouth daily. oxymetazoline HCl (AFRIN NA)   Yes Yes   [place] by Both Nostrils route as needed (nose bleeding). polyethylene glycol (MIRALAX) 17 gram packet   Yes Yes   Sig: Take 17 g by mouth daily as needed. potassium chloride SR (KLOR-CON 10) 10 mEq tablet 2019 at Unknown time  Yes Yes   Sig: Take 10 mEq by mouth daily. rivaroxaban (XARELTO) 20 mg tab tablet 2019 at Unknown time  No Yes   Sig: Take 1 Tab by mouth daily (with breakfast). sodium chloride-aloe vera (AYR SALINE) topical gel   Yes Yes   Sig: Apply  to affected area once. trospium (SANCTURA) 20 mg tablet 2019 at Unknown time  No Yes   Sig: TAKE 1 TABLET BY MOUTH TWICE A DAY   zinc oxide-cod liver oil (DESITIN) 40 % ointment   Yes Yes   Sig: Apply  to affected area as needed for Skin Irritation.       Facility-Administered Medications: None

## 2019-07-06 NOTE — ED NOTES
1616 TRANSFER - OUT REPORT:    Verbal report given to Francie(name) on Edie Gowers  being transferred to [facility]) for routine progression of care       Report consisted of patients Situation, Background, Assessment and   Recommendations(SBAR). Information from the following report(s) SBAR was reviewed with the receiving nurse. Lines:   Peripheral IV 07/06/19 Right Antecubital (Active)   Site Assessment Clean 7/6/2019  5:44 PM   Phlebitis Assessment 0 7/6/2019  5:44 PM   Infiltration Assessment 0 7/6/2019  5:44 PM   Dressing Status Clean, dry, & intact 7/6/2019  5:44 PM   Dressing Type Transparent 7/6/2019  5:44 PM   Hub Color/Line Status Pink 7/6/2019  5:44 PM   Action Taken Blood drawn 7/6/2019  5:44 PM       Peripheral IV 07/06/19 Left Antecubital (Active)   Site Assessment Clean, dry, & intact 7/6/2019  6:03 PM   Phlebitis Assessment 0 7/6/2019  6:03 PM   Infiltration Assessment 0 7/6/2019  6:03 PM   Dressing Status Clean, dry, & intact 7/6/2019  6:03 PM   Dressing Type Transparent 7/6/2019  6:03 PM   Hub Color/Line Status Pink;Flushed;Patent 7/6/2019  6:03 PM        Opportunity for questions and clarification was provided. Patient transported with:    Caren Abernathy There is an order for 3 units of blood total. Tiger text Dr. Cornelio Rojas, order to give 2 units only.

## 2019-07-06 NOTE — ROUTINE PROCESS
Bedside shift change report given to Sanjiv Riddle RN (oncoming nurse) by Griselda Morales RN (offgoing nurse). Report included the following information SBAR, Kardex, ED Summary, Intake/Output, MAR, Accordion, Recent Results and Cardiac Rhythm A-fib.

## 2019-07-06 NOTE — ED TRIAGE NOTES
Pt ambulatory to ED accompanied by daughter after being referred from PCP for abnormal lab results. Hgb 5.9 in office. Referred to ED for blood transfusion. Pt denies CP or SOB.

## 2019-07-07 LAB
ANION GAP SERPL CALC-SCNC: 8 MMOL/L (ref 5–15)
BASOPHILS # BLD: 0 K/UL (ref 0–0.1)
BASOPHILS NFR BLD: 0 % (ref 0–1)
BUN SERPL-MCNC: 28 MG/DL (ref 6–20)
BUN/CREAT SERPL: 21 (ref 12–20)
CALCIUM SERPL-MCNC: 8 MG/DL (ref 8.5–10.1)
CHLORIDE SERPL-SCNC: 107 MMOL/L (ref 97–108)
CO2 SERPL-SCNC: 23 MMOL/L (ref 21–32)
CREAT SERPL-MCNC: 1.36 MG/DL (ref 0.55–1.02)
DIFFERENTIAL METHOD BLD: ABNORMAL
EOSINOPHIL # BLD: 0.1 K/UL (ref 0–0.4)
EOSINOPHIL NFR BLD: 2 % (ref 0–7)
ERYTHROCYTE [DISTWIDTH] IN BLOOD BY AUTOMATED COUNT: 15.5 % (ref 11.5–14.5)
ERYTHROCYTE [DISTWIDTH] IN BLOOD BY AUTOMATED COUNT: 16 % (ref 11.5–14.5)
GLUCOSE SERPL-MCNC: 100 MG/DL (ref 65–100)
HCT VFR BLD AUTO: 19.8 % (ref 35–47)
HCT VFR BLD AUTO: 24.7 % (ref 35–47)
HCT VFR BLD AUTO: 24.9 % (ref 35–47)
HGB BLD-MCNC: 5.5 G/DL (ref 11.5–16)
HGB BLD-MCNC: 7.6 G/DL (ref 11.5–16)
HGB BLD-MCNC: 7.8 G/DL (ref 11.5–16)
IMM GRANULOCYTES # BLD AUTO: 0.1 K/UL (ref 0–0.04)
IMM GRANULOCYTES NFR BLD AUTO: 1 % (ref 0–0.5)
LYMPHOCYTES # BLD: 1 K/UL (ref 0.8–3.5)
LYMPHOCYTES NFR BLD: 16 % (ref 12–49)
MCH RBC QN AUTO: 21.8 PG (ref 26–34)
MCH RBC QN AUTO: 24.3 PG (ref 26–34)
MCHC RBC AUTO-ENTMCNC: 27.8 G/DL (ref 30–36.5)
MCHC RBC AUTO-ENTMCNC: 30.8 G/DL (ref 30–36.5)
MCV RBC AUTO: 78.6 FL (ref 80–99)
MCV RBC AUTO: 78.9 FL (ref 80–99)
MONOCYTES # BLD: 0.5 K/UL (ref 0–1)
MONOCYTES NFR BLD: 8 % (ref 5–13)
NEUTS SEG # BLD: 4.7 K/UL (ref 1.8–8)
NEUTS SEG NFR BLD: 73 % (ref 32–75)
NRBC # BLD: 0 K/UL (ref 0–0.01)
NRBC # BLD: 0 K/UL (ref 0–0.01)
NRBC BLD-RTO: 0 PER 100 WBC
NRBC BLD-RTO: 0 PER 100 WBC
PATH REV BLD -IMP: ABNORMAL
PLATELET # BLD AUTO: 329 K/UL (ref 150–400)
PLATELET # BLD AUTO: 414 K/UL (ref 150–400)
PMV BLD AUTO: 10.5 FL (ref 8.9–12.9)
PMV BLD AUTO: 10.7 FL (ref 8.9–12.9)
POTASSIUM SERPL-SCNC: 3.7 MMOL/L (ref 3.5–5.1)
RBC # BLD AUTO: 2.52 M/UL (ref 3.8–5.2)
RBC # BLD AUTO: 3.13 M/UL (ref 3.8–5.2)
RBC MORPH BLD: ABNORMAL
SODIUM SERPL-SCNC: 138 MMOL/L (ref 136–145)
WBC # BLD AUTO: 6.4 K/UL (ref 3.6–11)
WBC # BLD AUTO: 8.2 K/UL (ref 3.6–11)
WBC MORPH BLD: ABNORMAL

## 2019-07-07 PROCEDURE — 74011250637 HC RX REV CODE- 250/637: Performed by: INTERNAL MEDICINE

## 2019-07-07 PROCEDURE — 85018 HEMOGLOBIN: CPT

## 2019-07-07 PROCEDURE — 99218 HC RM OBSERVATION: CPT

## 2019-07-07 PROCEDURE — 74011250637 HC RX REV CODE- 250/637: Performed by: HOSPITALIST

## 2019-07-07 PROCEDURE — 74011250637 HC RX REV CODE- 250/637: Performed by: EMERGENCY MEDICINE

## 2019-07-07 PROCEDURE — 36415 COLL VENOUS BLD VENIPUNCTURE: CPT

## 2019-07-07 PROCEDURE — 80048 BASIC METABOLIC PNL TOTAL CA: CPT

## 2019-07-07 PROCEDURE — 85027 COMPLETE CBC AUTOMATED: CPT

## 2019-07-07 RX ORDER — CARVEDILOL 6.25 MG/1
6.25 TABLET ORAL 2 TIMES DAILY WITH MEALS
Status: DISCONTINUED | OUTPATIENT
Start: 2019-07-07 | End: 2019-07-08 | Stop reason: HOSPADM

## 2019-07-07 RX ADMIN — CARVEDILOL 6.25 MG: 6.25 TABLET, FILM COATED ORAL at 16:45

## 2019-07-07 RX ADMIN — ATORVASTATIN CALCIUM 80 MG: 40 TABLET, FILM COATED ORAL at 09:11

## 2019-07-07 RX ADMIN — FUROSEMIDE 40 MG: 40 TABLET ORAL at 09:11

## 2019-07-07 RX ADMIN — PAROXETINE HYDROCHLORIDE 20 MG: 20 TABLET, FILM COATED ORAL at 09:11

## 2019-07-07 RX ADMIN — GUAIFENESIN AND CODEINE PHOSPHATE 5 ML: 10; 100 LIQUID ORAL at 11:22

## 2019-07-07 RX ADMIN — LEVOTHYROXINE SODIUM 25 MCG: 25 TABLET ORAL at 05:30

## 2019-07-07 RX ADMIN — Medication 10 ML: at 21:31

## 2019-07-07 RX ADMIN — FUROSEMIDE 40 MG: 40 TABLET ORAL at 16:45

## 2019-07-07 RX ADMIN — OXYBUTYNIN CHLORIDE 5 MG: 5 TABLET, EXTENDED RELEASE ORAL at 09:11

## 2019-07-07 RX ADMIN — DOCUSATE SODIUM 100 MG: 100 CAPSULE, LIQUID FILLED ORAL at 09:11

## 2019-07-07 RX ADMIN — Medication 10 ML: at 16:46

## 2019-07-07 RX ADMIN — CARVEDILOL 3.12 MG: 3.12 TABLET, FILM COATED ORAL at 09:11

## 2019-07-07 RX ADMIN — MAGNESIUM OXIDE TAB 400 MG (241.3 MG ELEMENTAL MG) 400 MG: 400 (241.3 MG) TAB at 09:11

## 2019-07-07 RX ADMIN — POTASSIUM CHLORIDE 10 MEQ: 750 TABLET, FILM COATED, EXTENDED RELEASE ORAL at 09:11

## 2019-07-07 RX ADMIN — RIVAROXABAN 15 MG: 15 TABLET, FILM COATED ORAL at 11:17

## 2019-07-07 RX ADMIN — LORATADINE 10 MG: 10 TABLET ORAL at 09:17

## 2019-07-07 RX ADMIN — QUETIAPINE FUMARATE 25 MG: 25 TABLET, FILM COATED ORAL at 21:31

## 2019-07-07 RX ADMIN — Medication 10 ML: at 05:31

## 2019-07-07 NOTE — CONSULTS
Cardiology Consult Note    CC: anemia  Reason for consult:  Chronic anticoagulation in setting of recurrent bleeding  Requesting MD:  Dr. Marilee Mancilla     Subjective:      Date of  Admission: 7/6/2019  2:45 PM     Admission type:Emergency    Angela Mcclure is a 80 y.o. female admitted for Anemia [D64.9]. Patient complains of SOB and found to be anemic and received two units of PRBCs. I am asked to see her for her chronic anticoagulation, whether she can take or reduce the dose. She is normally followed by her cardiologist, Dr. Marilee Mancilla, who said to pt that she needs to be absolutely staying on chronic anticoagulation despite recent recurrent nosebleed. Her past cardiac data include chronic Afib, non-ischemic cardiomyopathy, chronic systolic HF, EF 32% from echo in 5/19 here, and h/o DVT/PE. She had recent severe nosebleed which most likely contributed to her anemia. Denies any orthopnea or CP or palpitations.  Her MARY is better than past.    Patient Active Problem List    Diagnosis Date Noted    Anemia 07/06/2019    Gait abnormality 06/18/2019    Acute CVA (cerebrovascular accident) ([address] 05/06/2019    Fall 05/06/2019    CVA (cerebral vascular accident) ([address] 05/06/2019    Scalp laceration 05/06/2019    Chronic cough 08/14/2018    Personal history of pulmonary embolism 08/14/2018    Gait instability 04/06/2018    ACP (advance care planning) 03/30/2017    TIA (transient ischemic attack) 11/02/2016    DVT (deep venous thrombosis) ([address] 06/18/2014    Mixed hyperlipidemia 06/18/2014    Atrial fibrillation ([address] 06/18/2014    HTN (hypertension) 06/18/2014    DJD (degenerative joint disease) 06/18/2014    Hypothyroid 06/18/2014      Tamela Evans MD  Past Medical History:   Diagnosis Date    Atrial fibrillation [facility]) 2007    Hypercholesterolemia     Hypertension     Osteoarthritis 2010    Stroke ([address]     Thromboembolus [facility])     Thyroid disease       Past Surgical History:   Procedure Laterality Date    CARDIAC SURG PROCEDURE UNLIST      HX HEENT      cataract sx    HX HIP FRACTURE [place]  2013    right hip 1989 left hip 2013    HX HIP REPLACEMENT  1989    HX PACEMAKER  2008     Allergies   Allergen Reactions    Ace Inhibitors Cough      Family History   Problem Relation Age of Onset    Hypertension Mother       Current Facility-Administered Medications   Medication Dose Route Frequency    0.9% sodium chloride infusion 250 mL  250 mL IntraVENous PRN    magnesium oxide (MAG-OX) tablet 400 mg  400 mg Oral DAILY    sodium chloride (NS) flush 5-40 mL  5-40 mL IntraVENous Q8H    sodium chloride (NS) flush 5-40 mL  5-40 mL IntraVENous PRN    atorvastatin (LIPITOR) tablet 80 mg  80 mg Oral DAILY    carvedilol (COREG) tablet 3.125 mg  3.125 mg Oral BID WITH MEALS    furosemide (LASIX) tablet 40 mg  40 mg Oral BID    levothyroxine (SYNTHROID) tablet 25 mcg  25 mcg Oral 6am    docusate sodium (COLACE) capsule 100 mg  100 mg Oral BID    loratadine (CLARITIN) tablet 10 mg  10 mg Oral DAILY    PARoxetine (PAXIL) tablet 20 mg  20 mg Oral DAILY    polyethylene glycol (MIRALAX) packet 17 g  17 g Oral DAILY PRN    QUEtiapine (SEROquel) tablet 25 mg  25 mg Oral QHS    potassium chloride SR (KLOR-CON 10) tablet 10 mEq  10 mEq Oral DAILY    0.9% sodium chloride infusion 250 mL  250 mL IntraVENous PRN    albuterol (PROVENTIL VENTOLIN) nebulizer solution 2.5 mg  2.5 mg Nebulization Q6H PRN    oxybutynin chloride XL (DITROPAN XL) tablet 5 mg  5 mg Oral DAILY    guaiFENesin-codeine (ROBITUSSIN AC) 100-10 mg/5 mL solution 5 mL  5 mL Oral Q8H PRN        Prior to Admission Medications:  Prior to Admission medications    Medication Sig Start Date End Date Taking? Authorizing Provider   sodium chloride-aloe vera (AYR SALINE) topical gel Apply  to affected area once. Yes Provider, Historical   oxymetazoline HCl (AFRIN NA) 1 [place] by Both Nostrils route as needed (nose bleeding).    Yes Provider, Historical   cyanocobalamin 1,000 mcg tablet Take 1,000 mcg by mouth daily. Yes Provider, Historical   polyethylene glycol (MIRALAX) 17 gram packet Take 17 g by mouth daily as needed. Yes Provider, Historical   guaiFENesin-codeine (ROBITUSSIN AC) 100-10 mg/5 mL solution Take 5 mL by mouth three (3) times daily as needed for Cough. Yes Provider, Historical   carvedilol (COREG) 3.125 mg tablet Take 3.125 mg by mouth two (2) times daily (with meals). Yes Provider, Historical   QUEtiapine (SEROQUEL) 25 mg tablet Take 1 Tab by mouth nightly. 6/24/19  Yes Jonathan Solorzano NP   atorvastatin (LIPITOR) 80 mg tablet Take 80 mg by mouth daily. Yes Provider, Historical   loratadine (CLARITIN) 10 mg tablet TAKE 1 TABLET BY MOUTH ONCE DAILY 5/28/19  Yes Uziel Keita MD   alendronate (FOSAMAX) 35 mg tablet Take 1 Tab by mouth every Wednesday. 5/22/19  Yes Uziel Keita MD   [facility]) 20 mg tablet TAKE 1 TABLET BY MOUTH TWICE A DAY 5/13/19  Yes Jonathan Solorzano NP   furosemide (LASIX) 40 mg tablet Take 40 mg by mouth two (2) times a day. Yes Provider, Historical   potassium chloride SR (KLOR-CON 10) 10 mEq tablet Take 10 mEq by mouth daily. Yes Provider, Historical   magnesium oxide (MAG-OX) 400 mg tablet TAKE 1 TAB BY MOUTH DAILY. 3/19/19  Yes Uziel Keita MD   PARoxetine (PAXIL) 20 mg tablet TAKE 1 TAB BY MOUTH DAILY. 3/19/19  Yes Uziel Keita MD   levothyroxine (SYNTHROID) 25 mcg tablet TAKE 1 TABLET BY MOUTH DAILY BEFORE BREAKFAST 3/19/19  Yes Uziel Keita MD   multivitamin (ONE A DAY) tablet Take 1 Tab by mouth daily. 3/19/19  Yes Uziel Keita MD   calcium-cholecalciferol, D3, (CALTRATE 600+D) tablet Take 1 Tab by mouth daily. 3/19/19  Yes Uziel Keita MD   rivaroxaban [place]) 20 mg tab tablet Take 1 Tab by mouth daily (with breakfast). 3/19/19  Yes Uziel Keita MD   albuterol [facility] HFA) 90 mcg/actuation inhaler Take 2 Puffs by inhalation every six (6) hours as needed for Wheezing.  8/14/18  Yes Carley Jauregui, DO   zinc oxide-cod liver oil (DESITIN) 40 % ointment Apply  to affected area as needed for Skin Irritation. Yes Provider, Historical   docusate sodium (COLACE) 100 mg capsule Take 100 mg by mouth two (2) times a day. Yes Provider, Historical        Review of Symptoms:  Except as noted in HPI, patient denies recent fever or chills, nausea, vomiting, diarrhea, hemoptysis, hematemesis, dysuria, myalgias, focal neurologic symptoms, ecchymosis, angioedema, odynophagia, dysphagia, sore throat, earache,rash, melena, hematochezia, depression, GERD, cold intolerance, petechia, bleeding gums, or significant weight loss. A comprehensive review of systems was negative except for that written in the HPI. Subjective:    24 hr VS reviewed, overall VSSAF  Temp (24hrs), Av.4 °F (36.9 °C), Min:97.7 °F (36.5 °C), Max:99.1 °F (37.3 °C)    Patient Vitals for the past 8 hrs:   Pulse   19 0722 100   19 034 (!) 104    Patient Vitals for the past 8 hrs:   Resp   19 0722 18   19 0349 18    Patient Vitals for the past 8 hrs:   BP   19 0722 126/72   199 131/63          Intake/Output Summary (Last 24 hours) at 2019 0913  Last data filed at 2019 0108  Gross per 24 hour   Intake 652.5 ml   Output --   Net 652.5 ml         Physical Exam (complete single organ system exam)      Visit Vitals  /72 (BP 1 Location: Left arm, BP Patient Position: At rest)   Pulse 100   Temp 99.1 °F (37.3 °C)   Resp 18   Ht 5' 6\" (1.676 m)   Wt 173 lb 9.6 oz (78.7 kg)   SpO2 99%   Breastfeeding? No   BMI 28.02 kg/m²     General Appearance:  Well developed, well nourished,alert and oriented x 3, and individual in no acute distress. Ears/Nose/Mouth/Throat:   Hearing grossly normal.         Neck: Supple. Chest:   clear   Cardiovascular:  irregular rate and rhythm, S1, S2 normal, no murmur. Abdomen:   Soft, non-tender, bowel sounds are active. Extremities: 1+ edema bilaterally. Skin: Warm and dry. Cardiographics    Telemetry: AFIB  ECG:  atrial fibrillation, rate 120  Echocardiogram: Not done    Labs:   Recent Results (from the past 24 hour(s))   TYPE & SCREEN    Collection Time: 07/06/19  2:50 PM   Result Value Ref Range    Crossmatch Expiration 07/09/2019     ABO/Rh(D) O POSITIVE     Antibody screen NEG     Unit number [id number]     Blood component type RC LR     Unit division 00     Status of unit TRANSFUSED     Crossmatch result Compatible     Unit number [id number]     Blood component type RC LR,1     Unit division 00     Status of unit TRANSFUSED     Crossmatch result Compatible     Unit number [id number]     Blood component type RC LR     Unit division 00     Status of unit ALLOCATED     Crossmatch result Compatible     Unit number [id number]     Blood component type RC LR     Unit division 00     Status of unit ALLOCATED     Crossmatch result Compatible    CBC WITH AUTOMATED DIFF    Collection Time: 07/06/19  2:51 PM   Result Value Ref Range    WBC 6.4 3.6 - 11.0 K/uL    RBC 2.52 (L) 3.80 - 5.20 M/uL    HGB 5.5 (LL) 11.5 - 16.0 g/dL    HCT 19.8 (L) 35.0 - 47.0 %    MCV 78.6 (L) 80.0 - 99.0 FL    MCH 21.8 (L) 26.0 - 34.0 PG    MCHC 27.8 (L) 30.0 - 36.5 g/dL    RDW 15.5 (H) 11.5 - 14.5 %    PLATELET 807 (H) 959 - 400 K/uL    MPV 10.7 8.9 - 12.9 FL    NRBC 0.0 0  WBC    ABSOLUTE NRBC 0.00 0.00 - 0.01 K/uL    NEUTROPHILS 73 32 - 75 %    LYMPHOCYTES 16 12 - 49 %    MONOCYTES 8 5 - 13 %    EOSINOPHILS 2 0 - 7 %    BASOPHILS 0 0 - 1 %    IMMATURE GRANULOCYTES 1 (H) 0.0 - 0.5 %    ABS. NEUTROPHILS 4.7 1.8 - 8.0 K/UL    ABS. LYMPHOCYTES 1.0 0.8 - 3.5 K/UL    ABS. MONOCYTES 0.5 0.0 - 1.0 K/UL    ABS. EOSINOPHILS 0.1 0.0 - 0.4 K/UL    ABS. BASOPHILS 0.0 0.0 - 0.1 K/UL    ABS. IMM.  GRANS. 0.1 (H) 0.00 - 0.04 K/UL    DF MANUAL      RBC COMMENTS ANISOCYTOSIS  1+        RBC COMMENTS HYPOCHROMIA  2+        RBC COMMENTS POIKILOCYTOSIS  2+        RBC COMMENTS CANDACE CELLS  PRESENT        RBC COMMENTS SCHISTOCYTES  PRESENT        WBC COMMENTS Pathology Review Requested     METABOLIC PANEL, COMPREHENSIVE    Collection Time: 07/06/19  2:51 PM   Result Value Ref Range    Sodium 142 136 - 145 mmol/L    Potassium 3.4 (L) 3.5 - 5.1 mmol/L    Chloride 107 97 - 108 mmol/L    CO2 28 21 - 32 mmol/L    Anion gap 7 5 - 15 mmol/L    Glucose 113 (H) 65 - 100 mg/dL    BUN 30 (H) 6 - 20 MG/DL    Creatinine 1.51 (H) 0.55 - 1.02 MG/DL    BUN/Creatinine ratio 20 12 - 20      GFR est AA 39 (L) >60 ml/min/1.73m2    GFR est non-AA 32 (L) >60 ml/min/1.73m2    Calcium 8.3 (L) 8.5 - 10.1 MG/DL    Bilirubin, total 0.4 0.2 - 1.0 MG/DL    ALT (SGPT) 8 (L) 12 - 78 U/L    AST (SGOT) 18 15 - 37 U/L    Alk. phosphatase 76 45 - 117 U/L    Protein, total 6.8 6.4 - 8.2 g/dL    Albumin 2.8 (L) 3.5 - 5.0 g/dL    Globulin 4.0 2.0 - 4.0 g/dL    A-G Ratio 0.7 (L) 1.1 - 2.2     SAMPLES BEING HELD    Collection Time: 07/06/19  2:51 PM   Result Value Ref Range    SAMPLES BEING HELD 1RED 1BLU     COMMENT        Add-on orders for these samples will be processed based on acceptable specimen integrity and analyte stability, which may vary by analyte.    OCCULT BLOOD, STOOL    Collection Time: 07/06/19  6:26 PM   Result Value Ref Range    Occult blood, stool NEGATIVE  NEG     METABOLIC PANEL, BASIC    Collection Time: 07/07/19  4:08 AM   Result Value Ref Range    Sodium 138 136 - 145 mmol/L    Potassium 3.7 3.5 - 5.1 mmol/L    Chloride 107 97 - 108 mmol/L    CO2 23 21 - 32 mmol/L    Anion gap 8 5 - 15 mmol/L    Glucose 100 65 - 100 mg/dL    BUN 28 (H) 6 - 20 MG/DL    Creatinine 1.36 (H) 0.55 - 1.02 MG/DL    BUN/Creatinine ratio 21 (H) 12 - 20      GFR est AA 44 (L) >60 ml/min/1.73m2    GFR est non-AA 36 (L) >60 ml/min/1.73m2    Calcium 8.0 (L) 8.5 - 10.1 MG/DL   CBC W/O DIFF    Collection Time: 07/07/19  4:08 AM   Result Value Ref Range    WBC 8.2 3.6 - 11.0 K/uL    RBC 3.13 (L) 3.80 - 5.20 M/uL    HGB 7.6 (L) 11.5 - 16.0 g/dL    HCT 24.7 (L) 35.0 - 47.0 %    MCV 78.9 (L) 80.0 - 99.0 FL    MCH 24.3 (L) 26.0 - 34.0 PG    MCHC 30.8 30.0 - 36.5 g/dL    RDW 16.0 (H) 11.5 - 14.5 %    PLATELET 636 335 - 753 K/uL    MPV 10.5 8.9 - 12.9 FL    NRBC 0.0 0  WBC    ABSOLUTE NRBC 0.00 0.00 - 0.01 K/uL        Assessment:     Assessment:   Anemia; from recent nosebleeds; s/p two units of blood now  Afib; chronic but rate is not controlled  H/o DVT/PE  CKD  Cardiomyopathy; non-ischemic and severe on Coreg and Laisx  CHF; chronic systolic;   ACEI intolerance     Plan:   Given her advanced age and CKD, I feel we can try Xarelto 15 mg, which is right dose for her  Anemia; due to recent blood loss; will need to monitor  Continue other regimen except for increasing Coreg some for better Afib rate control    Tabatha Page MD

## 2019-07-07 NOTE — PROGRESS NOTES
Hospitalist Progress Note  Kirby Valverde MD  Answering service: [phone] OR [phone] from in house phone      Date of Service:  2019  NAME:  Tone Hendrickson                                                         :  1924                                               MRN:  [id number]    Brief admission summary   From H&P  \"The patient is a very pleasant 57-year-old Afro American female who has previous history significant for PE, DVT, chronic atrial fibrillation nonischemic cardiomyopathy was brought to the emergency room due to low blood count. \"      Subjective/interval history    -Patient laying comfortably. Daughter in the room. No nose or other bleeding. Daughter tells me all last week her mom was feeling very weak,fatigued. Patient had two episodes of severe nose bleed,the first time she was treated with nasal pack. She is seen ENT. She is on xarelto which she continued to take. She was recently switched from warfarin to xarelto by her cardiologist to eliminate the need for frequent lab monitoring with warfarin. Assessment and plan   Acute blood loss anemia, on top of chronic anemia, due to recent severe nose bleeding  -HB 5.5 on presentation. S/p 2 units of blood. HB 7. 6. If repeat Hb is <8,will give one more unit to keep her >8 given her extensive CAD history and also to cushion her in case she rebleeds.   -Cardiology input appreciated. Xarelto decreased to 15 mg. I have also discussed with patient and her daughters that she may have to go back to warfarin for 1.she did not have trouble with warfarin and ,2. Warfarin has antidote. They will discuss this with her cardiologist,Dr Leonardo Gustafson. Recent severe epistaxis: treated with nasal pack. Sees ENT. No active nose bleed last few days. EMMA on CKD III due to severe anemia. Creatinine improved with transfusion   Recent CVA: continue statin. She is on xarelto. Not on antiplatelet due to bleeding risk     Chronic systolic and diastolic heart failure. NYHA class II  -Coreg increased on ,atorvastatin  -Recent stress test negative       Pulmonary embolism/DVT  -On xarelto     . Mixed hyperlipidemia   -On statin     Atrial fibrillation  -Has PPM  -On xarelto  -On coreg. Hypothyroid    -Resume synthroid     Probable dementia,and sun downing noted last admission. Diet:cardiac  Code status:full  DVT prophylaxis:on xarelto  Disposition:anticipate home   Plan of care discussed with:patient,one daughter at bedside and another daughter on the phone. Current facility administered and prior to admit medications reviewed. x         Review of Systems:  A comprehensive review of systems was negative except for that written in the HPI. PHYSICAL EXAM:  O:  Visit Vitals  /68 (BP 1 Location: Left arm, BP Patient Position: At rest)   Pulse 89   Temp 98.7 °F (37.1 °C)   Resp 16   Ht 5' 6\" (1.676 m)   Wt 78.7 kg (173 lb 9.6 oz)   SpO2 98%   Breastfeeding? No   BMI 28.02 kg/m²       General Appears comfortable,not in distress. HEENT Head atraumatic. Unicteric sclera. Moist buccal mucosa. PERRLA     CVS RRR, no MRG, no JVD, no peripheral edema       Chest No deformity  No accessory muscle use  Vesicular air entry symmetrically  No wheezing,ronchi or crepitations       Abdomen &Pelvis Not distended. Normoactive. Soft. Non tender. No hepatomegally       Genitourinary  No CVA or suprapubic tenderness       Musculoskeletal No edema, deformity of extremities,back       Skin No erythema,rash,depigmentation       Neurology Mental status: alert and oriented x3  Cranial nerves: CN 2-12 intact.   Motor 5/5 through out     Psychiatry            Intake/Output Summary (Last 24 hours) at 7/7/2019 1312  Last data filed at 7/7/2019 0108  Gross per 24 hour   Intake 652.5 ml   Output --   Net 652.5 ml          Recent labs & imaging reviewed:    Problem List as of 7/7/2019 Date Reviewed: 7/6/2019          Codes Class Noted - Resolved * (Principal) Anemia ICD-10-CM: D64.9  ICD-9-CM: 285.9  7/6/2019 - Present        Gait abnormality ICD-10-CM: R26.9  ICD-9-CM: 781.2  6/18/2019 - Present        Acute CVA (cerebrovascular accident) [facility]) ICD-10-CM: I63.9  ICD-9-CM: 434.91  5/6/2019 - Present        Fall ICD-10-CM: W19. Mandy Spencer  ICD-9-CM: R815.7  5/6/2019 - Present        CVA (cerebral vascular accident) [facility]) ICD-10-CM: I63.9  ICD-9-CM: 434.91  5/6/2019 - Present        Scalp laceration ICD-10-CM: S01. 01XA  ICD-9-CM: 873.0  5/6/2019 - Present        Chronic cough ICD-10-CM: R05  ICD-9-CM: 786.2  8/14/2018 - Present        Personal history of pulmonary embolism ICD-10-CM: T45.887  ICD-9-CM: V12.55  8/14/2018 - Present        Gait instability ICD-10-CM: R26.81  ICD-9-CM: 781.2  4/6/2018 - Present        ACP (advance care planning) ICD-10-CM: Z71.89  ICD-9-CM: V65.49  3/30/2017 - Present    Overview Signed 3/30/2017 12:05 PM by Annette Montgomery RN     Patient has form completed at home and will have POA bring in office to scan into her chart             TIA (transient ischemic attack) ICD-10-CM: G45.9  ICD-9-CM: 435.9  11/2/2016 - Present    Overview Signed 11/4/2016  5:16 PM by Heaven Tipton RN     Pt seen and diagnosed at [facility]             DVT (deep venous thrombosis) ([address] ICD-10-CM: I82.409  ICD-9-CM: 453.40  6/18/2014 - Present        Mixed hyperlipidemia ICD-10-CM: E78.2  ICD-9-CM: 272.2  6/18/2014 - Present        Atrial fibrillation ([facility] ICD-10-CM: I48.91  ICD-9-CM: 427.31  6/18/2014 - Present        HTN (hypertension) ICD-10-CM: I10  ICD-9-CM: 401.9  6/18/2014 - Present        DJD (degenerative joint disease) ICD-10-CM: M19.90  ICD-9-CM: 715.90  6/18/2014 - Present        Hypothyroid ICD-10-CM: E03.9  ICD-9-CM: 244.9  6/18/2014 - Present        RESOLVED: Pulmonary embolism ([address] ICD-10-CM: I26.99  ICD-9-CM: 415.19  6/18/2014 - 6/18/2019                Kirby Valverde MD  Internal Medicine  Date of Service: 7/7/2019

## 2019-07-07 NOTE — PROGRESS NOTES
Problem: Falls - Risk of  Goal: *Absence of Falls  Description  Document Luciano Lopez Fall Risk and appropriate interventions in the flowsheet. Outcome: Progressing Towards Goal     Pt up with assistance x1. Assistance devices includes walker and bedside commode. Pt informed to call for assistance OOB. Pt A&Ox4, verbalized understanding. Call bell in reach and family at the bedside. Bedside shift change report given to Jazmín Nguyễn RN (oncoming nurse) by Nyasia Merida RN (offgoing nurse). Report included the following information SBAR, Kardex, Intake/Output, MAR, Recent Results and Cardiac Rhythm Afib.

## 2019-07-07 NOTE — H&P
[address]  HISTORY AND PHYSICAL    Name:  Alireza Black  MR#:  [id number]  :  1924  ACCOUNT #:  [de-identified]  ADMIT DATE:  2019    PRIMARY CARE PHYSICIAN:  Tanvi Kaur MD    PRESENTING COMPLAINT:  Sent for anemia. HISTORY OF PRESENTING ILLNESS:  The patient is a very pleasant 80year-old Afro American female who has previous history significant for PE, DVT, chronic atrial fibrillation nonischemic cardiomyopathy, lives with her son, was brought to the emergency room due to low blood count. The patient tells me that she is currently on Xarelto for the last couple of years. She  had two recent nosebleeds, first nosebleed was in 2019, which required a nasal packing and balloon, second episode of epistaxis  was on last Monday. The patient has been seen by Dr. Cole Sesay who is an ENT doctor and was seen just yesterday and according to the family everything was okay. The patient has been feeling short of breath and dizzy. Blood work was done yesterday by her PCP, which showed anemia. The patient was sent for blood transfusion to the emergency room. The patient denies having any chest pain. She has not noticed any blood in her stools. Her stool is of normal color. In the emergency room, the patient's hemoglobin was found to be 5.5, blood transfusion has been ordered. PAST MEDICAL HISTORY:  Significant for,  1. Chronic atrial fibrillation. 2.  History of heart failure. 3.  History of PE and DVT. 4.  History of hyperlipidemia. 5.  History of hypothyroidism. 6.  History of nosebleeds. SOCIOECONOMIC HISTORY:      The patient does not smoke or drink. She walks with a rollator. She lives with her son. Code status is full code. CURRENT MEDICATIONS:      Her current medications prior to admission include the following,   1. The patient is on Xarelto 20 mg daily. 2.  Paroxetine 20 mg daily. 3.  Multivitamin. 4.  Lasix 20 mg daily.   5.  Coreg 3.125 mg b.i.d.  6.  Seroquel 25 mg at night. 7.  Lipitor 80 mg daily. 8.  Claritin. 9.  Levothyroxine 25 mcg daily. 10.  Potassium chloride 10 mEq daily. 11.  Vitamin B12 1000 mcg daily. REVIEW OF SYSTEMS:      Negative except as mentioned in history of presenting illness. All system were reviewed. No other positive finding was noticed. FAMILY HISTORY:      Significant for hypertension. PHYSICAL EXAMINATION:    GENERAL:      On examination, the patient is a 19-year-old female who looks younger than her stated age, is not in any acute distress. VITAL SIGNS:  Reveals temperature is 98, blood pressure is 130/80, pulse is 88, respiratory rate is 18. HEENT:  Examination reveals pupils equally reacting to light and accommodation. NECK:  Supple. There is no lymphadenopathy or JVD. CHEST:  Clear. No wheezing, no crackles. CARDIOVASCULAR SYSTEM:  S1 and S2. No murmur. No S3.  ABDOMEN:  Examination reveals no tenderness, no guarding, no rigidity. Bowel sounds are active. EXTREMITIES:  No pedal edema. CNS:  Examination reveals the patient is alert and oriented, has normal strength and normal reflexes. Plantars are downgoing. Cranial nerves are normal.  PSYCHIATRIC:  Examination is unremarkable. LABORATORY DATA:  Lab work done in the emergency room revealed a CBC with a white count of 6.4; hemoglobin of 5.5, yesterday it was 5.9, on 05/22 it was 8.3; hematocrit is 19.8. Her MCV is 78.6, platelet count is 771,941. Chemistry revealed sodium of 142, potassium 3.4, chloride is 107, bicarb is 28, gap of 7, glucose 113, BUN is 30, creatinine is 1.51, bilirubin 0.4, protein is 6.8, albumin is 2.8, ALT is 8, AST is 18, alk phos is 76. Her iron saturation is 5%. TIBC is 287. Iron is 13. Ferritin is 35. Stool Hemoccult is pending at this time.     ASSESSMENT AND PLAN:      The patient is a 19-year-old female who is currently on Xarelto for chronic atrial fibrillation and previous history of deep venous thrombosis and pulmonary embolism, is having frequent nosebleeds, is admitted     1. Acute blood loss anemia. The patient will be transfused with two units of packed red blood cells. 2.  Check stool for Hemoccult. 3.  The patient should be discharged on iron replacement therapy as she does have iron deficiency anemia. 4.  History of stroke in the past.  The patient is on statin, which will be continued. 5.  History of congestive heart failure (systolic). The patient is currently on Lasix and Coreg, which will be continued. 6.  Hypothyroidism. Continue thyroid replacement therapy. 7.  Monitor serial hemoglobin and hematocrit. 8.  Mild hypokalemia which will be replaced. 9. Cardiology consulted to decide about continuation of Xarelto.        MD EMILY [email or web address]  D:  07/06/2019 16:57  T:  07/06/2019 17:08  JOB #:  [id number]

## 2019-07-07 NOTE — PROGRESS NOTES
2015: Bedside shift change report given to Alberto Hernandez RN (oncoming nurse) by Mayela Schwartz RN (offgoing nurse). Report included the following information SBAR, Kardex, Intake/Output, MAR, Recent Results, Med Rec Status and Cardiac Rhythm A-Fib. Patient has not fallen during shift. Call bell within reach. Tray table within reach. Bed in lowest position. Non-skid socks on patient's feet when ambulating. Appropriate lighting in room. Patient up with assistance and walker. Problem: Falls - Risk of  Goal: *Absence of Falls  Description  Document Selina Miller Fall Risk and appropriate interventions in the flowsheet. Outcome: Progressing Towards Goal    Linear breakdown noted to intergluteal fold. Mepilex applied. Problem: Pressure Injury - Risk of  Goal: *Prevention of pressure injury  Description  Document Omid Scale and appropriate interventions in the flowsheet. Outcome: Progressing Towards Goal     Two units of blood given Saturday night. Hgb 7.6 Sunday. Will continue to monitor per MD orders.   Problem: Anemia Care Plan (Adult and Pediatric)  Goal: *Labs within defined limits  Outcome: Progressing Towards Goal

## 2019-07-08 VITALS
SYSTOLIC BLOOD PRESSURE: 111 MMHG | BODY MASS INDEX: 27.9 KG/M2 | TEMPERATURE: 98.7 F | OXYGEN SATURATION: 98 % | RESPIRATION RATE: 18 BRPM | HEART RATE: 78 BPM | HEIGHT: 66 IN | DIASTOLIC BLOOD PRESSURE: 56 MMHG | WEIGHT: 173.6 LBS

## 2019-07-08 PROBLEM — D64.9 SEVERE ANEMIA: Status: ACTIVE | Noted: 2019-07-08

## 2019-07-08 LAB
ANION GAP SERPL CALC-SCNC: 8 MMOL/L (ref 5–15)
ATRIAL RATE: 153 BPM
BUN SERPL-MCNC: 23 MG/DL (ref 6–20)
BUN/CREAT SERPL: 17 (ref 12–20)
CALCIUM SERPL-MCNC: 7.9 MG/DL (ref 8.5–10.1)
CALCULATED R AXIS, ECG10: -7 DEGREES
CALCULATED T AXIS, ECG11: -45 DEGREES
CHLORIDE SERPL-SCNC: 108 MMOL/L (ref 97–108)
CO2 SERPL-SCNC: 28 MMOL/L (ref 21–32)
CREAT SERPL-MCNC: 1.35 MG/DL (ref 0.55–1.02)
DIAGNOSIS, 93000: NORMAL
GLUCOSE SERPL-MCNC: 93 MG/DL (ref 65–100)
HCT VFR BLD AUTO: 23.9 % (ref 35–47)
HCT VFR BLD AUTO: 29.9 % (ref 35–47)
HGB BLD-MCNC: 7.4 G/DL (ref 11.5–16)
HGB BLD-MCNC: 9.3 G/DL (ref 11.5–16)
POTASSIUM SERPL-SCNC: 3.3 MMOL/L (ref 3.5–5.1)
Q-T INTERVAL, ECG07: 364 MS
QRS DURATION, ECG06: 100 MS
QTC CALCULATION (BEZET), ECG08: 450 MS
SODIUM SERPL-SCNC: 144 MMOL/L (ref 136–145)
VENTRICULAR RATE, ECG03: 92 BPM

## 2019-07-08 PROCEDURE — 74011250637 HC RX REV CODE- 250/637: Performed by: HOSPITALIST

## 2019-07-08 PROCEDURE — 74011250637 HC RX REV CODE- 250/637: Performed by: EMERGENCY MEDICINE

## 2019-07-08 PROCEDURE — 99218 HC RM OBSERVATION: CPT

## 2019-07-08 PROCEDURE — 65660000000 HC RM CCU STEPDOWN

## 2019-07-08 PROCEDURE — 85018 HEMOGLOBIN: CPT

## 2019-07-08 PROCEDURE — 74011250637 HC RX REV CODE- 250/637: Performed by: INTERNAL MEDICINE

## 2019-07-08 PROCEDURE — P9016 RBC LEUKOCYTES REDUCED: HCPCS

## 2019-07-08 PROCEDURE — 36430 TRANSFUSION BLD/BLD COMPNT: CPT

## 2019-07-08 PROCEDURE — 36415 COLL VENOUS BLD VENIPUNCTURE: CPT

## 2019-07-08 PROCEDURE — 80048 BASIC METABOLIC PNL TOTAL CA: CPT

## 2019-07-08 RX ORDER — POTASSIUM CHLORIDE 750 MG/1
20 TABLET, FILM COATED, EXTENDED RELEASE ORAL DAILY
Qty: 60 TAB | Refills: 0 | Status: SHIPPED | OUTPATIENT
Start: 2019-07-08 | End: 2019-08-07

## 2019-07-08 RX ORDER — POTASSIUM CHLORIDE 750 MG/1
40 TABLET, FILM COATED, EXTENDED RELEASE ORAL DAILY
Status: DISCONTINUED | OUTPATIENT
Start: 2019-07-08 | End: 2019-07-08 | Stop reason: HOSPADM

## 2019-07-08 RX ORDER — SODIUM CHLORIDE 9 MG/ML
250 INJECTION, SOLUTION INTRAVENOUS AS NEEDED
Status: DISCONTINUED | OUTPATIENT
Start: 2019-07-08 | End: 2019-07-08 | Stop reason: HOSPADM

## 2019-07-08 RX ADMIN — POTASSIUM CHLORIDE 10 MEQ: 750 TABLET, FILM COATED, EXTENDED RELEASE ORAL at 09:08

## 2019-07-08 RX ADMIN — OXYBUTYNIN CHLORIDE 5 MG: 5 TABLET, EXTENDED RELEASE ORAL at 09:08

## 2019-07-08 RX ADMIN — ATORVASTATIN CALCIUM 80 MG: 40 TABLET, FILM COATED ORAL at 09:08

## 2019-07-08 RX ADMIN — Medication 10 ML: at 07:19

## 2019-07-08 RX ADMIN — FUROSEMIDE 40 MG: 40 TABLET ORAL at 09:08

## 2019-07-08 RX ADMIN — DOCUSATE SODIUM 100 MG: 100 CAPSULE, LIQUID FILLED ORAL at 17:19

## 2019-07-08 RX ADMIN — LORATADINE 10 MG: 10 TABLET ORAL at 09:08

## 2019-07-08 RX ADMIN — CARVEDILOL 6.25 MG: 6.25 TABLET, FILM COATED ORAL at 08:37

## 2019-07-08 RX ADMIN — DOCUSATE SODIUM 100 MG: 100 CAPSULE, LIQUID FILLED ORAL at 09:08

## 2019-07-08 RX ADMIN — LEVOTHYROXINE SODIUM 25 MCG: 25 TABLET ORAL at 07:18

## 2019-07-08 RX ADMIN — PAROXETINE HYDROCHLORIDE 20 MG: 20 TABLET, FILM COATED ORAL at 09:08

## 2019-07-08 RX ADMIN — MAGNESIUM OXIDE TAB 400 MG (241.3 MG ELEMENTAL MG) 400 MG: 400 (241.3 MG) TAB at 09:08

## 2019-07-08 RX ADMIN — FUROSEMIDE 40 MG: 40 TABLET ORAL at 15:33

## 2019-07-08 RX ADMIN — CARVEDILOL 6.25 MG: 6.25 TABLET, FILM COATED ORAL at 17:19

## 2019-07-08 RX ADMIN — POTASSIUM CHLORIDE 40 MEQ: 750 TABLET, FILM COATED, EXTENDED RELEASE ORAL at 15:33

## 2019-07-08 RX ADMIN — RIVAROXABAN 15 MG: 15 TABLET, FILM COATED ORAL at 09:08

## 2019-07-08 RX ADMIN — GUAIFENESIN AND CODEINE PHOSPHATE 5 ML: 10; 100 LIQUID ORAL at 12:44

## 2019-07-08 NOTE — ROUTINE PROCESS
I have reviewed discharge instructions with the patient and caregiver. The patient and caregiver verbalized understanding. THe patient and daughter had no questions at discharge. THe patient's IV was removed. The patient's telemetry was removed.  The patient was provided with prescriptions

## 2019-07-08 NOTE — PROGRESS NOTES
Cardiology Progress Note                                        Admit Date: 7/6/2019    Assessment/Plan:     H/o CMY; now resolved as her echo yesterday showed EF 55%; on appropriate therapy; she could be discharged from cardiac perspective and follow up with her cardiologist, Dr. Izabel Chamorro, within one week  Recent nosebleed; no recurrence so far  H/o DVT/PE; on Xarelto  Chronic Afib; back on lesser dose of Xarelto 15 mg everyday    Caryl Tavarez is a 80 y.o. female with     PROBLEM LIST:  Patient Active Problem List    Diagnosis Date Noted    Anemia 07/06/2019    Gait abnormality 06/18/2019    Acute CVA [facility]cerebrovascular accident) [facility] 05/06/2019    Fall 05/06/2019    [facility] 05/06/2019    Scalp laceration 05/06/2019    Chronic cough 08/14/2018    Personal history of pulmonary embolism 08/14/2018    Gait instability 04/06/2018    ACP (advance care planning) 03/30/2017    TIA (transient ischemic attack) 11/02/2016    DVT (deep venous thrombosis) ([facility] 06/18/2014    Mixed hyperlipidemia 06/18/2014    Atrial fibrillation ([facility] 06/18/2014    HTN (hypertension) 06/18/2014    DJD (degenerative joint disease) 06/18/2014    Hypothyroid 06/18/2014         Subjective: Caryl Tavarez reports none. Visit Vitals  /54 (BP 1 Location: Left arm, BP Patient Position: At rest)   Pulse (!) 102   Temp 98.4 °F (36.9 °C)   Resp 18   Ht 5' 6\" (1.676 m)   Wt 173 lb 9.6 oz (78.7 kg)   SpO2 99%   Breastfeeding? No   BMI 28.02 kg/m²       Intake/Output Summary (Last 24 hours) at 7/8/2019 0634  Last data filed at 7/7/2019 1916  Gross per 24 hour   Intake 840 ml   Output --   Net 840 ml       Objective:      Physical Exam:  HEENT: Perrla, EOMI  Neck: No JVD,  No thyroidmegaly  Resp: CTA bilaterally;  No wheezes or rales  CV: RRR s1s2 No murmur no s3  Abd:Soft, Nontender  Ext: No edema  Neuro: Alert and oriented; Nonfocal  Skin: Warm, Dry, Intact  Pulses: 2+ DP/PT/Rad      Telemetry: normal sinus rhythm    Current Facility-Administered Medications   Medication Dose Route Frequency    rivaroxaban (XARELTO) tablet 15 mg  15 mg Oral DAILY    carvedilol (COREG) tablet 6.25 mg  6.25 mg Oral BID WITH MEALS    0.9% sodium chloride infusion 250 mL  250 mL IntraVENous PRN    magnesium oxide (MAG-OX) tablet 400 mg  400 mg Oral DAILY    sodium chloride (NS) flush 5-40 mL  5-40 mL IntraVENous Q8H    sodium chloride (NS) flush 5-40 mL  5-40 mL IntraVENous PRN    atorvastatin (LIPITOR) tablet 80 mg  80 mg Oral DAILY    furosemide (LASIX) tablet 40 mg  40 mg Oral BID    levothyroxine (SYNTHROID) tablet 25 mcg  25 mcg Oral 6am    docusate sodium (COLACE) capsule 100 mg  100 mg Oral BID    loratadine (CLARITIN) tablet 10 mg  10 mg Oral DAILY    PARoxetine (PAXIL) tablet 20 mg  20 mg Oral DAILY    polyethylene glycol (MIRALAX) packet 17 g  17 g Oral DAILY PRN    QUEtiapine (SEROquel) tablet 25 mg  25 mg Oral QHS    potassium chloride SR (KLOR-CON 10) tablet 10 mEq  10 mEq Oral DAILY    0.9% sodium chloride infusion 250 mL  250 mL IntraVENous PRN    albuterol (PROVENTIL VENTOLIN) nebulizer solution 2.5 mg  2.5 mg Nebulization Q6H PRN    oxybutynin chloride XL (DITROPAN XL) tablet 5 mg  5 mg Oral DAILY    guaiFENesin-codeine (ROBITUSSIN AC) 100-10 mg/5 mL solution 5 mL  5 mL Oral Q8H PRN         Data Review:   Labs:    Recent Results (from the past 24 hour(s))   HGB & HCT    Collection Time: 07/07/19  1:40 PM   Result Value Ref Range    HGB 7.8 (L) 11.5 - 16.0 g/dL    HCT 24.9 (L) 35.0 - 47.0 %   HGB & HCT    Collection Time: 07/08/19  4:51 AM   Result Value Ref Range    HGB 7.4 (L) 11.5 - 16.0 g/dL    HCT 23.9 (L) 35.0 - 08.9 %   METABOLIC PANEL, BASIC    Collection Time: 07/08/19  4:51 AM   Result Value Ref Range    Sodium 144 136 - 145 mmol/L    Potassium 3.3 (L) 3.5 - 5.1 mmol/L    Chloride 108 97 - 108 mmol/L    CO2 28 21 - 32 mmol/L    Anion gap 8 5 - 15 mmol/L    Glucose 93 65 - 100 mg/dL BUN 23 (H) 6 - 20 MG/DL    Creatinine 1.35 (H) 0.55 - 1.02 MG/DL    BUN/Creatinine ratio 17 12 - 20      GFR est AA 44 (L) >60 ml/min/1.73m2    GFR est non-AA 37 (L) >60 ml/min/1.73m2    Calcium 7.9 (L) 8.5 - 10.1 MG/DL

## 2019-07-08 NOTE — PROGRESS NOTES
Hospitalist Progress Note  Olivia Card MD  Answering service: [phone] OR [phone] from in house phone      Date of Service:  2019  NAME:  Edith Palomo                                                         :  1924                                               MRN:  [id number]    Brief admission summary   From H&P  \"The patient is a very pleasant 80-year-old Afro American female who has previous history significant for PE, DVT, chronic atrial fibrillation nonischemic cardiomyopathy was brought to the emergency room due to low blood count. \"      Subjective/interval history    -Patient is feeling better. No recurrence of nose bleed or any bleeding. Daughter in the room. They are inclined to go back to warfarin as she has been on it and never had problem except the incontinence of the frequent  blood work. They will discuss this with her cardiologist, Dr Margarita Black and plan   Acute blood loss anemia, on top of chronic anemia, due to recent severe nose bleeding  -HB 5.5 on presentation. S/p 2 units of blood. Hb is <8,will give one more unit to keep her >8 given her extensive CAD history and also to cushion her in case she rebleeds.   -Cardiology input appreciated. Xarelto decreased to 15 mg. I have also discussed with patient and her daughters that she may have to go back to warfarin for 1.she did not have trouble with warfarin and ,2. Warfarin has antidote. They will discuss this with her cardiologist,Dr Diane Ge. Recent severe epistaxis: treated with nasal pack. Sees ENT. No active nose bleed last few days. EMMA on CKD III due to severe anemia. Creatinine improved with transfusion   Recent CVA: continue statin. She is on xarelto. Not on antiplatelet due to bleeding risk     Chronic systolic and diastolic heart failure. NYHA class II  -Coreg increased on ,atorvastatin  -Recent stress test negative       Pulmonary embolism/DVT  -On xarelto,dose decreased to 15 mg .Mixed hyperlipidemia   -On statin     Atrial fibrillation  -Has PPM  -On xarelto  -On coreg. Hypothyroid    -Resume synthroid     Probable dementia,and sun downing noted last admission. Diet:cardiac  Code status:full  DVT prophylaxis:on xarelto  Disposition:anticipate home later today  Plan of care discussed with:patient,her daughter in the room. Current facility administered and prior to admit medications reviewed. x         Review of Systems:  A comprehensive review of systems was negative except for that written in the HPI. PHYSICAL EXAM:  O:  Visit Vitals  /55 (BP 1 Location: Left arm, BP Patient Position: At rest)   Pulse 83   Temp 99 °F (37.2 °C)   Resp 16   Ht 5' 6\" (1.676 m)   Wt 78.7 kg (173 lb 9.6 oz)   SpO2 96%   Breastfeeding? No   BMI 28.02 kg/m²       General Appears comfortable,not in distress. HEENT Head atraumatic. Unicteric sclera. Moist buccal mucosa. PERRLA     CVS RRR, no MRG, no JVD, no peripheral edema       Chest No deformity  No accessory muscle use  Vesicular air entry symmetrically  No wheezing,ronchi or crepitations       Abdomen &Pelvis Not distended. Normoactive. Soft. Non tender. No hepatomegally       Genitourinary  No CVA or suprapubic tenderness       Musculoskeletal No edema, deformity of extremities,back       Skin No erythema,rash,depigmentation       Neurology Mental status: alert and oriented x3  Cranial nerves: CN 2-12 intact.   Motor 5/5 through out     Psychiatry            Intake/Output Summary (Last 24 hours) at 7/8/2019 1118  Last data filed at 7/7/2019 1916  Gross per 24 hour   Intake 840 ml   Output --   Net 840 ml          Recent labs & imaging reviewed:    Problem List as of 7/8/2019 Date Reviewed: 7/6/2019          Codes Class Noted - Resolved    Severe anemia ICD-10-CM: D64.9  ICD-9-CM: 285.9  7/8/2019 - Present        * (Principal) Anemia ICD-10-CM: D64.9  ICD-9-CM: 285.9  7/6/2019 - Present        Gait abnormality ICD-10-CM: R26.9  ICD-9-CM: 781.2  6/18/2019 - Present        Acute CVA (cerebrovascular accident) [facility]) ICD-10-CM: I63.9  ICD-9-CM: 434.91  5/6/2019 - Present        Fall ICD-10-CM: W19. Malou Blinks  ICD-9-CM: I316.3  5/6/2019 - Present        CVA (cerebral vascular accident) [facility]) ICD-10-CM: I63.9  ICD-9-CM: 434.91  5/6/2019 - Present        Scalp laceration ICD-10-CM: S01. 01XA  ICD-9-CM: 873.0  5/6/2019 - Present        Chronic cough ICD-10-CM: R05  ICD-9-CM: 786.2  8/14/2018 - Present        Personal history of pulmonary embolism ICD-10-CM: V36.883  ICD-9-CM: V12.55  8/14/2018 - Present        Gait instability ICD-10-CM: R26.81  ICD-9-CM: 781.2  4/6/2018 - Present        ACP (advance care planning) ICD-10-CM: Z71.89  ICD-9-CM: V65.49  3/30/2017 - Present    Overview Signed 3/30/2017 12:05 PM by Donna Campbell RN     Patient has form completed at home and will have POA bring in office to scan into her chart             TIA (transient ischemic attack) ICD-10-CM: G45.9  ICD-9-CM: 435.9  11/2/2016 - Present    Overview Signed 11/4/2016  5:16 PM by Bryson Henderson RN     Pt seen and diagnosed at [facility]             DVT (deep venous thrombosis) ([facility] [address] ICD-10-CM: I82.409  ICD-9-CM: 453.40  6/18/2014 - Present        Mixed hyperlipidemia ICD-10-CM: E78.2  ICD-9-CM: 272.2  6/18/2014 - Present        Atrial fibrillation ([facility] ICD-10-CM: I48.91  ICD-9-CM: 427.31  6/18/2014 - Present        HTN (hypertension) ICD-10-CM: I10  ICD-9-CM: 401.9  6/18/2014 - Present        DJD (degenerative joint disease) ICD-10-CM: M19.90  ICD-9-CM: 715.90  6/18/2014 - Present        Hypothyroid ICD-10-CM: E03.9  ICD-9-CM: 244.9  6/18/2014 - Present        RESOLVED: Pulmonary embolism ([address] ICD-10-CM: I26.99  ICD-9-CM: 415.19  6/18/2014 - 6/18/2019                Terrance Youssef MD  Internal Medicine  Date of Service: 7/8/2019

## 2019-07-08 NOTE — ROUTINE PROCESS
Verified with Dr. North Held that he wants the patient to receive the unit of blood despite the Hgb not being less than 7.  Will transfuse and recheck hgb post-transfusion

## 2019-07-08 NOTE — PHYSICIAN ADVISORY
Letter of Status Determination:   Recommend hospitalization status upgraded from   OBSERVATION  to INPATIENT  Status     Pt Name:  Ar Jimenez   MR#   [facility] # [id number] /  [id number]  Payor: Mir Torres / Plan: Kirk Hoffmannry Faisal / Product Type: Medicare /    CSN#  [id number]   Room and Hospital  408/02  @ [facility] hospital   Hospitalization date  7/6/2019  2:45 PM   Current Attending Physician  Lyle Osborne MD   Principal diagnosis  Anemia      Clinicals    The patient is a very pleasant 80year-old Afro American female who has previous history significant for PE, DVT, chronic atrial fibrillation nonischemic cardiomyopathy, lives with her son, was brought to the emergency room due to low blood count. The patient tells me that she is currently on Xarelto for the last couple of years. She  had two recent nosebleeds, first nosebleed was in June of 2019, which required a nasal packing and balloon, second episode of epistaxis  was on last Monday. The patient has been seen by Dr. Vick Benjamin who is an ENT doctor and was seen just yesterday and according to the family everything was okay.       The patient has been feeling short of breath and dizzy. Blood work was done yesterday by her PCP, which showed anemia. The patient was sent for blood transfusion to the emergency room. The patient denies having any chest pain. She has not noticed any blood in her stools. Her stool is of normal color. In the emergency room, the patient's hemoglobin was found to be 5.5, blood transfusion has been ordered. Pt with Hb of 5.5 transfused pRBC x 2 units, currently on Xarelto for prophylaxis due to A fib, also with recurrent severe epistaxis, Pt also with EMMA at admission, hypokalemic and still anemic,          Milliman (MCG) criteria       STATUS DETERMINATION  INPATIENT    The final decision of the patient's hospitalization status depends on the attending physician's judgment    Additional comments     [place]or: [place] Nati Montoya / Plan: [place] MEDICARE PART A & B / Product Type: Medicare /         Roderick Corbett MD  Cell: [phone]  Physician Advisor                   .

## 2019-07-08 NOTE — DISCHARGE INSTRUCTIONS
Discharge Instructions       PATIENT ID: Edith Palomo  MRN: [id number]   YOB: 1924    DATE OF ADMISSION: 7/6/2019  2:45 PM    DATE OF DISCHARGE: 7/8/2019    PRIMARY CARE PROVIDER: Misti Jama MD     ATTENDING PHYSICIAN: Kamlesh Burnett MD  DISCHARGING PROVIDER: Olivia Card MD    To contact this individual call [phone] and ask the  to page. If unavailable ask to be transferred the Adult Hospitalist Department. DISCHARGE DIAGNOSES and CARE RECOMMENDATIONS:  Severe anemia due to blood loose due to nose bleed. -You have received three unit of blood. Have your doctors check your blood count,hemoglobin check in one week  -If you experience recurrence of the nose bleed or bleeding from other sites,stop the Xarelto and call your doctor immediatly of the bleeding is severe,go to the emergency room/call 911.  -The xarelto is reduced to 15 mg.I recommend you discuss with Dr Diane Ge about going back to warfarin. DIET: Regular Diet and Cardiac Diet      ACTIVITY: Activity as tolerated    WOUND CARE: NA    SERVICES and EQUIPMENT needed: own    PENDING TEST RESULTS:   At the time of discharge the following test results are still pending: none    FOLLOW UP APPOINTMENTS:   Follow-up Information     Follow up With Specialties Details Why Contact Info    Misti Jama MD Internal Medicine Today  [address] [place] [address] [address] [facility]  [phone]               YOU WERE SEEN BY THE FOLLOWING CONSULTATIONS:   IP CONSULT TO HOSPITALIST  IP CONSULT TO CARDIOLOGY    YOU HAD THE FOLLOWING PROCEDURES/SURGERIES  :   * No surgery found *              DISCHARGE MEDICATIONS:   See Medication Reconciliation Form    · It is important that you take the medication exactly as they are prescribed. · Keep your medication in the bottles provided by the pharmacist and keep a list of the medication names, dosages, and times to be taken in your wallet.    · Do not take other medications without consulting your doctor. NOTIFY YOUR PHYSICIAN FOR ANY OF THE FOLLOWING:   Fever over 101 degrees for 24 hours. Chest pain, shortness of breath, fever, chills, nausea, vomiting, diarrhea, change in mentation, falling, weakness, bleeding. Severe pain or pain not relieved by medications. Or, any other signs or symptoms that you may have questions about. Signed:    Jabier Michael MD  7/8/2019  4:16 PM

## 2019-07-08 NOTE — WOUND CARE
WOCN Note:    New consult placed for assessment of sacrum. Daughter at the bedside. Chart reviewed. Admitted DX:  Anemia   Past Medical History: pe, dvt, afib    Assessment:   Patient is A&O x 3, communicative and ambulates with assistance of 1. Bed: total care  Patient reports no pain. Patient repositioned on left side. Heels intact without erythema. 1. POA  Sacrum/Gluteal cleft, partial thickness wound from suspected moisture but there may be a pressure component as well: scattered open areas within a field 3 x 1 x 0.1 cm  100% red. no exudate. Surrounded by slow blanching red; surrounding skin is hyperpigmented and dark. Recommendations:    1. Sacrum/Gluteal cleft; Every 3 days cleanse, dry and apply Marathon skin prep. Keep the area clean and dry. 2.  Use Waffle cushion when seated in chair. Skin Care & Pressure Prevention:  Minimize layers of linen/pads under patient to optimize support surface. Turn/reposition approximately every 2 hours and offload heels. Discussed above plan with patient and Marimar Calderon RN.     Transition of Care: Plan to follow as needed while admitted to hospital.    JACQUIE CliftonN  Jay Benton [phone]  Pager [phone]

## 2019-07-08 NOTE — PROGRESS NOTES
Bedside shift change report given to [address] [place] [address] Street (oncoming nurse) by Heidi Campbell RN (offgoing nurse). Report included the following information SBAR, Intake/Output, MAR, Recent Results, Med Rec Status and Cardiac Rhythm Afib.

## 2019-07-08 NOTE — DISCHARGE SUMMARY
Discharge Summary       PATIENT ID: Ar Jimenez  MRN: [id number]   YOB: 1924    DATE OF ADMISSION: 7/6/2019  2:45 PM    DATE OF DISCHARGE: 7/8/2019  PRIMARY CARE PROVIDER: Georgette Kimble MD     ATTENDING PHYSICIAN: Stefan Patrick MD  DISCHARGING PROVIDER: Stefan Patrick MD    To contact this individual call [phone] and ask the  to page. If unavailable ask to be transferred the Adult Hospitalist Department. CONSULTATIONS: IP CONSULT TO HOSPITALIST  IP CONSULT TO CARDIOLOGY    PROCEDURES/SURGERIES: * No surgery found *    ADMITTING [facility] COURSE:       Brief admission summary   From H&P  \"The patient is a very pleasant 80year-old Afro American female who has previous history significant for PE, DVT, chronic atrial fibrillation nonischemic cardiomyopathy was brought to the emergency room due to low blood count. \"        On the day of discharge ,patient remained stable and ready for discharge. Assessment and plan   Acute blood loss anemia, on top of chronic anemia, due to recent severe nose bleeding  -HB 5.5 on presentation. S/p 2 units of blood. Hb is <8,gave one more unit before discharge to keep her >8 given her extensive CAD history and also to cushion her in case she rebleeds.   -Cardiology input appreciated. Xarelto decreased to 15 mg. I have also discussed with patient and her daughters that she may have to go back to warfarin for 1.she did not have trouble with warfarin and ,2. Warfarin has antidote. They will discuss this with her cardiologist,Dr Angelo Villalobos. Recent severe epistaxis: treated with nasal pack. Sees ENT. No active nose bleed last few days. EMMA on CKD III due to severe anemia. Creatinine improved with transfusion   Recent CVA: continue statin. She is on xarelto. Not on antiplatelet due to bleeding risk     Chronic systolic and diastolic heart failure. NYHA class II  -Coreg increased on ,atorvastatin  -Recent stress test negative        Pulmonary embolism/DVT  -On xarelto,dose decreased to 15 mg      . Mixed hyperlipidemia   -On statin     Atrial fibrillation  -Has PPM  -On xarelto  -On coreg. Hypothyroid    -Resume synthroid     Probable dementia,and sun downing noted last admission. DISCHARGE MEDICATIONS:  Current Discharge Medication List      CONTINUE these medications which have CHANGED    Details   potassium chloride SR (KLOR-CON 10) 10 mEq tablet Take 2 Tabs by mouth daily for 30 days. Qty: 60 Tab, Refills: 0      rivaroxaban (XARELTO) 15 mg tab tablet Take 1 Tab by mouth daily for 30 days. Qty: 30 Tab, Refills: 0         CONTINUE these medications which have NOT CHANGED    Details   sodium chloride-aloe vera (AYR SALINE) topical gel Apply  to affected area once. oxymetazoline HCl (AFRIN NA) 1 [place] by Both Nostrils route as needed (nose bleeding). cyanocobalamin 1,000 mcg tablet Take 1,000 mcg by mouth daily. polyethylene glycol (MIRALAX) 17 gram packet Take 17 g by mouth daily as needed. guaiFENesin-codeine (ROBITUSSIN AC) 100-10 mg/5 mL solution Take 5 mL by mouth three (3) times daily as needed for Cough. carvedilol (COREG) 3.125 mg tablet Take 3.125 mg by mouth two (2) times daily (with meals). QUEtiapine (SEROQUEL) 25 mg tablet Take 1 Tab by mouth nightly. Qty: 30 Tab, Refills: 0    Associated Diagnoses: Insomnia, unspecified type      atorvastatin (LIPITOR) 80 mg tablet Take 80 mg by mouth daily. loratadine (CLARITIN) 10 mg tablet TAKE 1 TABLET BY MOUTH ONCE DAILY  Qty: 30 Tab, Refills: 1    Associated Diagnoses: Allergic rhinitis due to pollen, unspecified seasonality      alendronate (FOSAMAX) 35 mg tablet Take 1 Tab by mouth every Wednesday. Qty: 4 Tab, Refills: 5      trospium (SANCTURA) 20 mg tablet TAKE 1 TABLET BY MOUTH TWICE A DAY  Qty: 180 Tab, Refills: 2      furosemide (LASIX) 40 mg tablet Take 40 mg by mouth two (2) times a day.       magnesium oxide (MAG-OX) 400 mg tablet TAKE 1 TAB BY MOUTH DAILY. Qty: 90 Tab, Refills: 2    Associated Diagnoses: Hypomagnesemia      PARoxetine (PAXIL) 20 mg tablet TAKE 1 TAB BY MOUTH DAILY. Qty: 90 Tab, Refills: 3    Associated Diagnoses: Anxiety      levothyroxine (SYNTHROID) 25 mcg tablet TAKE 1 TABLET BY MOUTH DAILY BEFORE BREAKFAST  Qty: 90 Tab, Refills: 2      multivitamin (ONE A DAY) tablet Take 1 Tab by mouth daily. Qty: 30 Tab, Refills: 12      calcium-cholecalciferol, D3, (CALTRATE 600+D) tablet Take 1 Tab by mouth daily. Qty: 90 Tab, Refills: 3      albuterol (PROAIR HFA) 90 mcg/actuation inhaler Take 2 Puffs by inhalation every six (6) hours as needed for Wheezing. Qty: 1 Inhaler, Refills: 2      zinc oxide-cod liver oil (DESITIN) 40 % ointment Apply  to affected area as needed for Skin Irritation. docusate sodium (COLACE) 100 mg capsule Take 100 mg by mouth two (2) times a day. STOP taking these medications       polyethylene glycol (MIRALAX) 17 gram/dose powder Comments:   Reason for Stopping:               PENDING TEST RESULTS:   At the time of discharge the following test results are still pending: none    FOLLOW UP APPOINTMENTS:    Follow-up Information     Follow up With Specialties Details Why Contact Info    Ayala Hussein MD [facility]  [address]  [address] [facility]  [phone]             ADDITIONAL CARE RECOMMENDATIONS:     DIET: Regular Diet and Cardiac Diet    ACTIVITY: Activity as tolerated    WOUND CARE: NA    EQUIPMENT needed: NA          NOTIFY YOUR PHYSICIAN FOR ANY OF THE FOLLOWING:   Fever over 101 degrees for 24 hours. Chest pain, shortness of breath, fever, chills, nausea, vomiting, diarrhea, change in mentation, falling, weakness, bleeding. Severe pain or pain not relieved by medications. Or, any other signs or symptoms that you may have questions about.     DISPOSITION:  x  Home With:   OT  PT  [place]  RN       SNF(Name)    Inpatient Rehab(name) Independent/assisted living(name)    Hospice    Other:       PATIENT CONDITION AT DISCHARGE:     Functional status        Poor     Deconditioned    x Independent    Cognition        Lucid    Forgetful   x Dementia   Catheter/Lines(indications)     Ceron    PICC    PEG   x None   Code status     x Full    DNR       PHYSICAL EXAMINATION AT DISCHARGE:     Visit Vitals  /56 (BP 1 Location: Left arm, BP Patient Position: At rest)   Pulse 78   Temp 98.7 °F (37.1 °C)   Resp 18   Ht 5' 6\" (1.676 m)   Wt 78.7 kg (173 lb 9.6 oz)   SpO2 98%   Breastfeeding? No   BMI 28.02 kg/m²      O2 Device: Room air    Temp (24hrs), Av.6 °F (37 °C), Min:98.2 °F (36.8 °C), Max:99 °F (37.2 °C)    701 - 1900  In: 357.9   Out: -    1901 - 700  In: 1492.5 [P.O.:840]  Out: -     GENERAL:  Alert, oriented, cooperative, no apparent distress  HEENT:  Normocephalic, atraumatic, non icteric sclerae, non pallor conjuctivae, EOMs intact, PERRLA. NECK: Supple, trachea midline, no adenopathy, no thyromegally or tenderness, no carotid bruit and no JVD. LUNGS:   Vesicular breath sounds bilaterally, no added sounds. HEART:   S1 and S2 well heard,RRR,  no murmur, click, rub or gallop. ABDOMEB:   Soft, non-tender. Normoactive bowel sounds. No masses,  No organomegaly. EXTREMETIES:  Atraumatic, acyanotic, no edema  PULSES: 2+ and symmetric all extremities. SKIN:  No rashes or lesions  NEUROLOGY: Alert and oriented to PPT, CNII-XII intact. Motor and sensory exam grossly intact.       CHRONIC MEDICAL DIAGNOSES:  Problem List as of 2019 Date Reviewed: 2019          Codes Class Noted - Resolved    Severe anemia ICD-10-CM: D64.9  ICD-9-CM: 285.9  2019 - Present        * (Principal) Anemia ICD-10-CM: D64.9  ICD-9-CM: 285.9  2019 - Present        Gait abnormality ICD-10-CM: R26.9  ICD-9-CM: 781.2  2019 - Present        Acute CVA (cerebrovascular accident) ([facility] ICD-10-CM: I63.9  ICD-9-CM: 434.91  2019 - Present        Fall ICD-10-CM: W19Solange Carballo  ICD-9-CM: O364.7  5/6/2019 - Present        CVA (cerebral vascular accident) [facility]) ICD-10-CM: I63.9  ICD-9-CM: 434.91  5/6/2019 - Present        Scalp laceration ICD-10-CM: S01. 01XA  ICD-9-CM: 873.0  5/6/2019 - Present        Chronic cough ICD-10-CM: R05  ICD-9-CM: 786.2  8/14/2018 - Present        Personal history of pulmonary embolism ICD-10-CM: J94.896  ICD-9-CM: V12.55  8/14/2018 - Present        Gait instability ICD-10-CM: R26.81  ICD-9-CM: 781.2  4/6/2018 - Present        ACP (advance care planning) ICD-10-CM: Z71.89  ICD-9-CM: V65.49  3/30/2017 - Present    Overview Signed 3/30/2017 12:05 PM by Dillon Gramajo RN     Patient has form completed at home and will have POA bring in office to scan into her chart             TIA (transient ischemic attack) ICD-10-CM: G45.9  ICD-9-CM: 435.9  11/2/2016 - Present    Overview Signed 11/4/2016  5:16 PM by Ai Maynard RN     Pt seen and diagnosed at [facility]             DVT (deep venous thrombosis) ([facility] [address] ICD-10-CM: I82.409  ICD-9-CM: 453.40  6/18/2014 - Present        Mixed hyperlipidemia ICD-10-CM: E78.2  ICD-9-CM: 272.2  6/18/2014 - Present        Atrial fibrillation ([facility] [address] ICD-10-CM: I48.91  ICD-9-CM: 427.31  6/18/2014 - Present        HTN (hypertension) ICD-10-CM: I10  ICD-9-CM: 401.9  6/18/2014 - Present        DJD (degenerative joint disease) ICD-10-CM: M19.90  ICD-9-CM: 715.90  6/18/2014 - Present        Hypothyroid ICD-10-CM: E03.9  ICD-9-CM: 244.9  6/18/2014 - Present        RESOLVED: Pulmonary embolism ([facility] [address] ICD-10-CM: I26.99  ICD-9-CM: 415.19  6/18/2014 - 6/18/2019              Greater than 30 minutes were spent with the patient on counseling and coordination of care    Signed:    Olivia Card MD  7/8/2019  4:21 PM

## 2019-07-09 ENCOUNTER — PATIENT OUTREACH (OUTPATIENT)
Dept: INTERNAL MEDICINE CLINIC | Age: 84
End: 2019-07-09

## 2019-07-09 LAB
ABO + RH BLD: NORMAL
BLD PROD TYP BPU: NORMAL
BLOOD GROUP ANTIBODIES SERPL: NORMAL
BPU ID: NORMAL
CROSSMATCH RESULT,%XM: NORMAL
SPECIMEN EXP DATE BLD: NORMAL
STATUS OF UNIT,%ST: NORMAL
UNIT DIVISION, %UDIV: 0

## 2019-07-09 NOTE — PROGRESS NOTES
Hospital Discharge Follow-Up      Date/Time:  2019 2:33 PM    Patient was admitted to [facility] on 19 and discharged on 19 for nose bleed. The physician discharge summary was not available at the time of outreach. Patient was contacted within 1 business days of discharge. Top Challenges reviewed with the provider   Anemia   Nose bleeds on Xarelto 15mg now   19 H/H 5.9/20.0  19 hgb drop to 5.5    At d/c H/H 9.3/29.2  Will need H/H repeated  Sacrum skin break down would benefit from [facility] and air matrass for hospital bed. Method of communication with provider :staff message    Inpatient RRAT score: 25  Was this a readmission? no   Patient stated reason for the readmission:     Nurse Navigator (NN) contacted the family by telephone to perform post hospital discharge assessment. Verified name and  with family as identifiers. Provided introduction to self, and explanation of the Nurse Navigator role. Reviewed discharge instructions and red flags with family who verbalized understanding. Family given an opportunity to ask questions and does not have any further questions or concerns at this time. The family agrees to contact the PCP office for questions related to their healthcare. NN provided contact information for future reference. Disease Specific:   N/A    Summary of patient's top problems:  1. Acute blood loss anemia, on top of chronic anemia, due to recent severe nose bleeding  -HB 5.5 on presentation. S/p 2 units of blood. Hb is <8,will give one more unit to keep her >8 given her extensive CAD history and also to cushion her in case she rebleeds.   -Cardiology input appreciated. Xarelto decreased to 15 mg. I have also discussed with patient and her daughters that she may have to go back to warfarin for 1.she did not have trouble with warfarin and ,2. Warfarin has antidote. They will discuss this with her cardiologist,Dr Bony Tyson    2.  Recent severe epistaxis: treated with nasal pack. .No active nose bleed last few days. Per daughter patient has 1 nose bleed yesterday after they got home. Stopped quickly none since. Per daughter other wise patient is going ok         [address] orders at discharge: No  order sent request for [facility] to MD for skin breakdown on sacrum   [address] in the past  Date of initial visit:     [address] ordered/company:   Durable Medical Equipment received:     Barriers to care? none    Advance Care Planning:   Does patient have an Advance Directive:  reviewed and current     Medication(s):   New Medications at Discharge: na  Changed Medications at Discharge: KCL xarelto  Discontinued Medications at Discharge: na    Medication reconciliation was performed with family, who verbalizes understanding of administration of home medications. There were no barriers to obtaining medications identified at this time. Referral to Pharm D needed: no     Current Outpatient Medications   Medication Sig    potassium chloride SR (KLOR-CON 10) 10 mEq tablet Take 2 Tabs by mouth daily for 30 days.  rivaroxaban (XARELTO) 15 mg tab tablet Take 1 Tab by mouth daily for 30 days.  oxymetazoline HCl (AFRIN NA) 1 [place] by Both Nostrils route as needed (nose bleeding).  cyanocobalamin 1,000 mcg tablet Take 1,000 mcg by mouth daily.  polyethylene glycol (MIRALAX) 17 gram packet Take 17 g by mouth daily as needed.  carvedilol (COREG) 3.125 mg tablet Take 3.125 mg by mouth two (2) times daily (with meals).  QUEtiapine (SEROQUEL) 25 mg tablet Take 1 Tab by mouth nightly.  atorvastatin (LIPITOR) 80 mg tablet Take 80 mg by mouth daily.  loratadine (CLARITIN) 10 mg tablet TAKE 1 TABLET BY MOUTH ONCE DAILY    alendronate (FOSAMAX) 35 mg tablet Take 1 Tab by mouth every Wednesday.     trospium (SANCTURA) 20 mg tablet TAKE 1 TABLET BY MOUTH TWICE A DAY    furosemide (LASIX) 40 mg tablet Take 40 mg by mouth two (2) times a day.    magnesium oxide (MAG-OX) 400 mg tablet TAKE 1 TAB BY MOUTH DAILY.  PARoxetine (PAXIL) 20 mg tablet TAKE 1 TAB BY MOUTH DAILY.  levothyroxine (SYNTHROID) 25 mcg tablet TAKE 1 TABLET BY MOUTH DAILY BEFORE BREAKFAST    multivitamin (ONE A DAY) tablet Take 1 Tab by mouth daily.  calcium-cholecalciferol, D3, (CALTRATE 600+D) tablet Take 1 Tab by mouth daily.  albuterol (PROAIR HFA) 90 mcg/actuation inhaler Take 2 Puffs by inhalation every six (6) hours as needed for Wheezing.  docusate sodium (COLACE) 100 mg capsule Take 100 mg by mouth two (2) times a day.  sodium chloride-aloe vera (AYR SALINE) topical gel Apply  to affected area once.  guaiFENesin-codeine (ROBITUSSIN AC) 100-10 mg/5 mL solution Take 5 mL by mouth three (3) times daily as needed for Cough.  zinc oxide-cod liver oil (DESITIN) 40 % ointment Apply  to affected area as needed for Skin Irritation. No current facility-administered medications for this visit. There are no discontinued medications. [facility] follow up appointment(s):   Future Appointments   Date Time Provider Leo Serrano   7/17/2019  9:00 AM Jamey Paulson MD [facility] MICHEL SCHED   7/22/2019 11:30 AM Jamey Paulson MD [facility] MICHEL SCHED   8/8/2019 10:00 AM Ibeth Beckman 27   10/11/2019 11:40 AM Steffanie Turcios MD [address] [place] [address]      Non-[facility] follow up appointment(s): ENT 7/29  Card 7/16  [facility]:  n/a       Goals      Initiate and reinforce education of the patient/family about management of disease and lifestyle changes. 7/9/19  Reinforce the importance of turn patient while in bed to prevent further skin break down. Patient will need air matrass and HH for wound evaluation.  Understands red flags post discharge. 7/8/19  Monitor for nose bleeds.   Call MD if unable to get the nose bleed under control

## 2019-07-10 ENCOUNTER — PATIENT OUTREACH (OUTPATIENT)
Dept: INTERNAL MEDICINE CLINIC | Age: 84
End: 2019-07-10

## 2019-07-10 DIAGNOSIS — D50.8 OTHER IRON DEFICIENCY ANEMIA: Primary | ICD-10-CM

## 2019-07-10 NOTE — PROGRESS NOTES
Called and spoke with Jr Arguello at Dr. Luz Maria Riuz office concerning orders for air matrass and [facility] for patient.

## 2019-07-17 ENCOUNTER — OFFICE VISIT (OUTPATIENT)
Dept: INTERNAL MEDICINE CLINIC | Age: 84
End: 2019-07-17

## 2019-07-17 VITALS
BODY MASS INDEX: 27.03 KG/M2 | DIASTOLIC BLOOD PRESSURE: 68 MMHG | TEMPERATURE: 98.1 F | SYSTOLIC BLOOD PRESSURE: 132 MMHG | HEART RATE: 68 BPM | WEIGHT: 168.2 LBS | RESPIRATION RATE: 18 BRPM | OXYGEN SATURATION: 95 % | HEIGHT: 66 IN

## 2019-07-17 DIAGNOSIS — I48.20 CHRONIC ATRIAL FIBRILLATION (HCC): ICD-10-CM

## 2019-07-17 DIAGNOSIS — R04.0 EPISTAXIS: ICD-10-CM

## 2019-07-17 DIAGNOSIS — L89.90 PRESSURE INJURY OF SKIN, UNSPECIFIED INJURY STAGE, UNSPECIFIED LOCATION: ICD-10-CM

## 2019-07-17 DIAGNOSIS — R26.9 GAIT ABNORMALITY: ICD-10-CM

## 2019-07-17 DIAGNOSIS — I10 ESSENTIAL HYPERTENSION: ICD-10-CM

## 2019-07-17 DIAGNOSIS — D50.9 IRON DEFICIENCY ANEMIA, UNSPECIFIED IRON DEFICIENCY ANEMIA TYPE: Primary | ICD-10-CM

## 2019-07-17 DIAGNOSIS — I50.9 CONGESTIVE HEART FAILURE, UNSPECIFIED HF CHRONICITY, UNSPECIFIED HEART FAILURE TYPE (HCC): ICD-10-CM

## 2019-07-17 RX ORDER — LANOLIN ALCOHOL/MO/W.PET/CERES
325 CREAM (GRAM) TOPICAL
Qty: 30 TAB | Refills: 3 | Status: SHIPPED | OUTPATIENT
Start: 2019-07-17 | End: 2019-10-13 | Stop reason: SDUPTHER

## 2019-07-17 NOTE — PROGRESS NOTES
Lee Ann Pearson is a 80 y.o. female    Chief Complaint   Patient presents with    Hypertension    Cholesterol Problem    [address] Follow Up     1. Have you been to the ER, urgent care clinic since your last visit? Hospitalized since your last visit? Yes, patient was recently treated at [address] due to low Hemoglobin. 2. Have you seen or consulted any other health care providers outside of the [address] [place] [address] since your last visit? Include any pap smears or colon screening.   No      Visit Vitals  /68 (BP 1 Location: Left arm, BP Patient Position: Sitting)   Pulse 68   Temp 98.1 °F (36.7 °C) (Oral)   Resp 18   Ht 5' 6\" (1.676 m)   Wt 168 lb 3.2 oz (76.3 kg)   SpO2 95%   BMI 27.15 kg/m²

## 2019-07-17 NOTE — PROGRESS NOTES
HISTORY OF PRESENT ILLNESS  Debra Larsen is a 80 y.o. female here for transition of care. She was contacted by my nurse navigator within 48 hours. She was admitted to hospital with a severe anemia from epistaxis. Has received 3 units of packed RBC. Hemoglobin came up to 9.5 and now today from cardiologist office hemoglobin is over 10 mg per DL. No further bleeding noticed. Her Xarelto dosage was reduced to 50 mg a day. Has atrial fibrillation's, DVT and PE. Coumadin was not advised. She has some irregular heartbeat. She was placed on a Holter monitor by cardiologist.  She is on Coreg. No palpitation. Has congestive heart failure, no shortness of breath orthopnea. Taking Lasix every day. She has developed some pressure ulcer on the buttock. Using Desitin. Would like to get your mattress and hospital bed. She needs to get leg elevation and had elevation for congestive heart failure. Having PT and OT via home health at home. All labs and imaging reviewed. All meds reconciled. HPI    Review of Systems   Constitutional: Negative. HENT: Negative. Eyes: Negative. Respiratory: Negative. Cardiovascular: Negative. Gastrointestinal: Negative. Genitourinary: Negative. Musculoskeletal: Negative. Skin: Negative. Neurological: Negative. Psychiatric/Behavioral: Negative. Physical Exam   Constitutional: She appears well-developed and well-nourished. No distress. Neck: Normal range of motion. Neck supple. No JVD present. No thyromegaly present. Cardiovascular: Normal rate, regular rhythm, normal heart sounds and intact distal pulses. Pulmonary/Chest: Effort normal and breath sounds normal. No respiratory distress. She has no wheezes. Abdominal: Soft. Bowel sounds are normal. She exhibits no distension. There is no tenderness. Musculoskeletal: She exhibits no edema or tenderness. Lymphadenopathy:     She has no cervical adenopathy.    Psychiatric: She has a normal mood and affect. Her behavior is normal.       ASSESSMENT and PLAN  Diagnoses and all orders for this visit:    1. Iron deficiency anemia, unspecified iron deficiency anemia type    She has received 3 units of PRBC. Repeat lab work done in cardiologist office today, hemoglobin is over 10 mg per DL. Iron study was done last month, showed a very low iron with the very low ferritin level. Will start,  -     ferrous sulfate 325 mg (65 mg iron) tablet; Take 1 Tab by mouth Daily (before breakfast). 2. Epistaxis    Now resolved. Advised patient to use nasal saline and nasal gel. May use Afrin as needed if there is bleeding. Xarelto dosage was reduced. 3. Essential hypertension  Stable on current regimen. Will continue same. 4. Gait abnormality  Getting PT and OT at home. 5. Chronic atrial fibrillation (HCC)  Rate and rhythm control. Seen by cardiologist.  Not willing to change blood thinner to Coumadin, because she has to go to Lovenox injections. We will continue low-dose of Xarelto, monitor blood loss. Also on Coreg. 6. Congestive heart failure, unspecified HF chronicity, unspecified heart failure type ([address]  On Lasix. Stable. 7. Pressure injury of skin, unspecified injury stage, unspecified location  -     OTHER; Hospital bed with airmattress    Discussed expected course/resolution/complications of diagnosis in detail with patient. Medication risks/benefits/costs/interactions/alternatives discussed with patient. Pt was given an after visit summary which includes diagnoses, current medications & vitals. Pt expressed understanding with the diagnosis and plan.

## 2019-07-23 DIAGNOSIS — J30.1 ALLERGIC RHINITIS DUE TO POLLEN, UNSPECIFIED SEASONALITY: ICD-10-CM

## 2019-07-23 RX ORDER — POLYETHYLENE GLYCOL 3350 17 G/17G
17 POWDER, FOR SOLUTION ORAL
Qty: 765 G | Refills: 0 | Status: SHIPPED | OUTPATIENT
Start: 2019-07-23 | End: 2019-08-25 | Stop reason: SDUPTHER

## 2019-07-23 RX ORDER — LORATADINE 10 MG/1
TABLET ORAL
Qty: 30 TAB | Refills: 1 | Status: SHIPPED | OUTPATIENT
Start: 2019-07-23 | End: 2019-09-22 | Stop reason: SDUPTHER

## 2019-07-31 ENCOUNTER — PATIENT OUTREACH (OUTPATIENT)
Dept: INTERNAL MEDICINE CLINIC | Age: 84
End: 2019-07-31

## 2019-08-08 ENCOUNTER — OFFICE VISIT (OUTPATIENT)
Dept: NEUROLOGY | Age: 84
End: 2019-08-08

## 2019-08-08 DIAGNOSIS — F41.9 ANXIETY AND DEPRESSION: ICD-10-CM

## 2019-08-08 DIAGNOSIS — G31.84 MILD COGNITIVE IMPAIRMENT: Primary | ICD-10-CM

## 2019-08-08 DIAGNOSIS — R44.2 HYPNOPOMPIC HALLUCINATION: ICD-10-CM

## 2019-08-08 DIAGNOSIS — Z86.73 HISTORY OF STROKE: ICD-10-CM

## 2019-08-08 DIAGNOSIS — R41.840 INATTENTION: ICD-10-CM

## 2019-08-08 DIAGNOSIS — R41.89 DIFFICULTY PROCESSING INFORMATION: ICD-10-CM

## 2019-08-08 DIAGNOSIS — R41.3 SHORT-TERM MEMORY LOSS: ICD-10-CM

## 2019-08-08 DIAGNOSIS — F32.A ANXIETY AND DEPRESSION: ICD-10-CM

## 2019-08-08 DIAGNOSIS — R41.3 MEMORY CHANGES: ICD-10-CM

## 2019-08-08 NOTE — PROGRESS NOTES
[address] [facility],[address]  [address]   [place] [address] [place] [address] [place] [address] [address]   [phone] Office   [phone] Fax      Neuropsychology    Initial Diagnostic Interview Note      Referral:  Tim Cox MD    Lizbet Sweet is a 80 y.o. right handed   female who was accompanied by her daughter to the initial clinical interview on 8/8/19. Please refer to her medical records for details pertaining to her history. Briefly, the patient reported that she completed the 9th grade and later obtained her GED. No history of previously diagnosed LD and/or receipt of special education services. She lives with her son and his family. She has noticed a progressive decline in short term memory. Forgets the content of conversations. Misplaces things . Longer to process information. Starts tasks and does not complete. Loses words sometimes. She is more easily distracted, and can be tangential at times. Per the daughter, the patient has been repeating herself. They notice these issues - they are not major but they are constant and progressively worsening. She gets reminders for medications, finances. She is independent for her ADLs for the most part, though she has some physical issues. The issues have been going on for a number of months before her stroke, and then had a stroke in May of this year. After the stroke, memory problems have been worsening more, but slightly so. She has been having falls. She is not driving. She has trouble with her left knee. She has no changes in sense of taste or smell. Appetite and sleep generally unchanged. She has chronic depression and she has always been quite the worrier. She continues to have those issues. No counseling, psychiatrist.  The older she has gotten, the worse her anxiety has become. She has been on Paxil since 2009. Stays quite active in [place]. There is a family history of dementia (patient's youngest sister), and her aunt also had dementia. No previous neuropsych. Neuropsychological Mental Status Exam (NMSE):  Historian: Good  Praxis: No UE apraxia  R/L Orientation: Intact to self and to other  Dress: within normal limits   Weight: within normal limits   Appearance/Hygiene: within normal limits   Gait: not assessed, WC  Assistive Devices:  WC, Glasses  Mood: within normal limits   Affect: within normal limits   Comprehension: within normal limits   Thought Process: within normal limits   Expressive Language: within normal limits   Receptive Language: within normal limits   Motor:  No cognitive or motor perseveration  ETOH: Denied  Tobacco: Denied  Illicit: Denied  SI/HI: Denied  Psychosis: Denied current. After her spouse , and at other occasions, she has had episodes where she was having visual hallucinations. Spouse  in . Last episode was a few months ago. This is happening when she is sleeping, and she will wake up right then and see something that isn't there. Doesn't happen during the day.     Insight: Within normal limits  Judgment: Within normal limits  Other Psych:      Past Medical History:   Diagnosis Date    Atrial fibrillation ([address] 2007    Hypercholesterolemia     Hypertension     Osteoarthritis 2010    [facility]     Thromboembolus ([facility]     Thyroid disease        Past Surgical History:   Procedure Laterality Date    CARDIAC SURG PROCEDURE UNLIST      HX HEENT      cataract sx    HX HIP FRACTURE [facility]      right hip  left hip     HX HIP REPLACEMENT      HX PACEMAKER  2008       Allergies   Allergen Reactions    Ace Inhibitors Cough       Family History   Problem Relation Age of Onset    Hypertension Mother        Social History     Tobacco Use    Smoking status: Never Smoker    Smokeless tobacco: Never Used   Substance Use Topics    Alcohol use: No     Alcohol/week: 0.0 standard drinks  Drug use: No       Current Outpatient Medications   Medication Sig Dispense Refill    polyethylene glycol (GAVILAX) 17 gram/dose powder Take 17 g by mouth daily as needed (constipation). 765 g 0    loratadine (CLARITIN) 10 mg tablet TAKE 1 TABLET BY MOUTH ONCE DAILY 30 Tab 1    ferrous sulfate 325 mg (65 mg iron) tablet Take 1 Tab by mouth Daily (before breakfast). [address] [facility],[address] bed with airmattress 1 Each 0    rivaroxaban (XARELTO) 15 mg tab tablet Take 1 Tab by mouth daily for 30 days. 30 Tab 0    sodium chloride-aloe vera (AYR SALINE) topical gel Apply  to affected area once.  oxymetazoline HCl (AFRIN NA) 1 [place] by Both Nostrils route as needed (nose bleeding).  cyanocobalamin 1,000 mcg tablet Take 1,000 mcg by mouth daily.  polyethylene glycol (MIRALAX) 17 gram packet Take 17 g by mouth daily as needed.  carvedilol (COREG) 3.125 mg tablet Take 3.125 mg by mouth two (2) times daily (with meals).  QUEtiapine (SEROQUEL) 25 mg tablet Take 1 Tab by mouth nightly. 30 Tab 0    atorvastatin (LIPITOR) 80 mg tablet Take 80 mg by mouth daily.  alendronate (FOSAMAX) 35 mg tablet Take 1 Tab by mouth every Wednesday. 4 Tab 5    trospium (SANCTURA) 20 mg tablet TAKE 1 TABLET BY MOUTH TWICE A  Tab 2    furosemide (LASIX) 40 mg tablet Take 40 mg by mouth two (2) times a day.  magnesium oxide (MAG-OX) 400 mg tablet TAKE 1 TAB BY MOUTH DAILY. 90 Tab 2    PARoxetine (PAXIL) 20 mg tablet TAKE 1 TAB BY MOUTH DAILY. 90 Tab 3    levothyroxine (SYNTHROID) 25 mcg tablet TAKE 1 TABLET BY MOUTH DAILY BEFORE BREAKFAST 90 Tab 2    multivitamin (ONE A DAY) tablet Take 1 Tab by mouth daily. 30 Tab 12    calcium-cholecalciferol, D3, (CALTRATE 600+D) tablet Take 1 Tab by mouth daily. 90 Tab 3    albuterol (PROAIR HFA) 90 mcg/actuation inhaler Take 2 Puffs by inhalation every six (6) hours as needed for Wheezing.  1 Inhaler 2    zinc oxide-cod liver oil (DESITIN) 40 % ointment Apply  to affected area as needed for Skin Irritation.  docusate sodium (COLACE) 100 mg capsule Take 100 mg by mouth two (2) times a day. Plan:  Obtain authorization for testing from insurance company. Report to follow once testing, scoring, and interpretation completed. ? Organic based neurocognitive issues versus mood disorder or combination of same. ? Problems organic, functional, or both? This note will not be viewable in [address] [id number] patient with mild cognitive decline then had a stroke and declining since then. She likely needs a sleep specialist consult for hypnopompic hallucination.

## 2019-08-14 ENCOUNTER — PATIENT OUTREACH (OUTPATIENT)
Dept: INTERNAL MEDICINE CLINIC | Age: 84
End: 2019-08-14

## 2019-08-14 NOTE — PROGRESS NOTES
Patient has graduated from the Transitions of Care Coordination  program on 8/14/19. Patient's symptoms are stable at this time. Patient/family has the ability to self-manage. Care management goals have been completed at this time. No further nurse navigator follow up scheduled. Goals Addressed                 This Visit's Progress     COMPLETED: Initiate and reinforce education of the patient/family about management of disease and lifestyle changes. 7/9/19  Reinforce the importance of turn patient while in bed to prevent further skin break down. Patient will need air matrass and  for wound evaluation.  COMPLETED: Understands red flags post discharge. 7/8/19  Monitor for nose bleeds. Call MD if unable to get the nose bleed under control            Pt has nurse navigator's contact information for any further questions, concerns, or needs.   Patients upcoming visits:    Future Appointments   Date Time Provider Leo Serrano   8/28/2019  1:00 PM Lupie Duverney, PsyD [address]   10/11/2019 11:40 AM Darya Jones MD [address] [place] [address]

## 2019-08-25 DIAGNOSIS — G47.00 INSOMNIA, UNSPECIFIED TYPE: ICD-10-CM

## 2019-08-26 RX ORDER — QUETIAPINE FUMARATE 25 MG/1
25 TABLET, FILM COATED ORAL
Qty: 30 TAB | Refills: 3 | Status: SHIPPED | OUTPATIENT
Start: 2019-08-26 | End: 2019-12-26

## 2019-08-26 RX ORDER — POLYETHYLENE GLYCOL 3350 17 G/17G
POWDER, FOR SOLUTION ORAL
Qty: 765 G | Refills: 5 | Status: SHIPPED | OUTPATIENT
Start: 2019-08-26 | End: 2019-10-23 | Stop reason: SDUPTHER

## 2019-08-28 ENCOUNTER — OFFICE VISIT (OUTPATIENT)
Dept: NEUROLOGY | Age: 84
End: 2019-08-28

## 2019-08-28 DIAGNOSIS — G31.84 MILD COGNITIVE IMPAIRMENT: Primary | ICD-10-CM

## 2019-09-11 ENCOUNTER — OFFICE VISIT (OUTPATIENT)
Dept: NEUROLOGY | Age: 84
End: 2019-09-11

## 2019-09-11 DIAGNOSIS — F02.80 LATE ONSET ALZHEIMER'S DISEASE WITHOUT BEHAVIORAL DISTURBANCE (HCC): Primary | ICD-10-CM

## 2019-09-11 DIAGNOSIS — G30.1 LATE ONSET ALZHEIMER'S DISEASE WITHOUT BEHAVIORAL DISTURBANCE (HCC): Primary | ICD-10-CM

## 2019-09-11 DIAGNOSIS — F32.A ANXIETY AND DEPRESSION: ICD-10-CM

## 2019-09-11 DIAGNOSIS — R44.2 HYPNOPOMPIC HALLUCINATION: ICD-10-CM

## 2019-09-11 DIAGNOSIS — F41.9 ANXIETY AND DEPRESSION: ICD-10-CM

## 2019-09-11 NOTE — LETTER
9/12/19 Patient: Gail Woodson YOB: 1924 Date of Visit: 9/11/2019 Brianna Hernandez MD 
[address] [place] [address] [address] [place] [address] [id number] VIA In Basket Dear Brianna Hernandez MD, Thank you for referring Ms. Reese Roque to [facility] for evaluation. My notes for this consultation are attached. If you have questions, please do not hesitate to call me. I look forward to following your patient along with you. Sincerely, Adebayo Fitzgerald PsyD

## 2019-09-12 NOTE — PROGRESS NOTES
Patient arrived for testing but due to slow time to completion all ordered tests not completed today. Will return to complete the test battery and report to follow once testing, scoring, and interpretation completed.

## 2019-09-12 NOTE — PROGRESS NOTES
[address] [facility],5Th Floor  [address]   [place] [address] [facility] [place] [address] [address]   [phone] Office   [phone] Fax      Neuropsychological Evaluation Report  Referral:  Babak Stewart MD    Mariel Flores is a 80 y.o. right handed   female who was accompanied by her daughter to the initial clinical interview on 8/8/19. Please refer to her medical records for details pertaining to her history. Briefly, the patient reported that she completed the 9th grade and later obtained her GED. No history of previously diagnosed LD and/or receipt of special education services. She lives with her son and his family. She has noticed a progressive decline in short term memory. Forgets the content of conversations. Misplaces things . Longer to process information. Starts tasks and does not complete. Loses words sometimes. She is more easily distracted, and can be tangential at times. Per the daughter, the patient has been repeating herself. They notice these issues - they are not major but they are constant and progressively worsening. She gets reminders for medications, finances. She is independent for her ADLs for the most part, though she has some physical issues. The issues have been going on for a number of months before her stroke, and then had a stroke in May of this year. After the stroke, memory problems have been worsening more, but slightly so. She has been having falls. She is not driving. She has trouble with her left knee. She has no changes in sense of taste or smell. Appetite and sleep generally unchanged. She has chronic depression and she has always been quite the worrier. She continues to have those issues. No counseling, psychiatrist.  The older she has gotten, the worse her anxiety has become. She has been on Paxil since 2009. Stays quite active in [employer].  There is a family history of dementia (patient's youngest sister), and her aunt also had dementia. No previous neuropsych. Neuropsychological Mental Status Exam (NMSE):  Historian: Good  Praxis: No UE apraxia  R/L Orientation: Intact to self and to other  Dress: within normal limits   Weight: within normal limits   Appearance/Hygiene: within normal limits   Gait: not assessed, WC  Assistive Devices:  WC, Glasses  Mood: within normal limits   Affect: within normal limits   Comprehension: within normal limits   Thought Process: within normal limits   Expressive Language: within normal limits   Receptive Language: within normal limits   Motor:  No cognitive or motor perseveration  ETOH: Denied  Tobacco: Denied  Illicit: Denied  SI/HI: Denied  Psychosis: Denied current. After her spouse , and at other occasions, she has had episodes where she was having visual hallucinations. Spouse  in . Last episode was a few months ago. This is happening when she is sleeping, and she will wake up right then and see something that isn't there. Doesn't happen during the day. Insight: Within normal limits  Judgment: Within normal limits  Other Psych:      Past Medical History:   Diagnosis Date    Atrial fibrillation ([address] 2007    Hypercholesterolemia     Hypertension     Osteoarthritis 2010    Stroke ([address]     [facility]     Thyroid disease        Past Surgical History:   Procedure Laterality Date    CARDIAC SURG PROCEDURE UNLIST      HX HEENT      cataract sx    HX HIP FRACTURE [place]      right hip  left hip     HX HIP REPLACEMENT      HX PACEMAKER  2008       Allergies   Allergen Reactions    Ace Inhibitors Cough       Family History   Problem Relation Age of Onset    Hypertension Mother        Social History     Tobacco Use    Smoking status: Never Smoker    Smokeless tobacco: Never Used   Substance Use Topics    Alcohol use: No     Alcohol/week: 0.0 standard drinks    Drug use:  No Current Outpatient Medications   Medication Sig Dispense Refill    QUEtiapine (SEROQUEL) 25 mg tablet Take 1 Tab by mouth nightly. 30 Tab 3    GAVILAX 17 gram/dose powder Take 17 g by mouth daily as needed (constipation). 765 g 5    loratadine (CLARITIN) 10 mg tablet TAKE 1 TABLET BY MOUTH ONCE DAILY 30 Tab 1    ferrous sulfate 325 mg (65 mg iron) tablet Take 1 Tab by mouth Daily (before breakfast). [address] [facility],[address] bed with airmattress 1 Each 0    sodium chloride-aloe vera (AYR SALINE) topical gel Apply  to affected area once.  oxymetazoline HCl (AFRIN NA) 1 [place] by Both Nostrils route as needed (nose bleeding).  cyanocobalamin 1,000 mcg tablet Take 1,000 mcg by mouth daily.  polyethylene glycol (MIRALAX) 17 gram packet Take 17 g by mouth daily as needed.  carvedilol (COREG) 3.125 mg tablet Take 3.125 mg by mouth two (2) times daily (with meals).  atorvastatin (LIPITOR) 80 mg tablet Take 80 mg by mouth daily.  alendronate (FOSAMAX) 35 mg tablet Take 1 Tab by mouth every Wednesday. 4 Tab 5    trospium (SANCTURA) 20 mg tablet TAKE 1 TABLET BY MOUTH TWICE A  Tab 2    furosemide (LASIX) 40 mg tablet Take 40 mg by mouth two (2) times a day.  magnesium oxide (MAG-OX) 400 mg tablet TAKE 1 TAB BY MOUTH DAILY. 90 Tab 2    PARoxetine (PAXIL) 20 mg tablet TAKE 1 TAB BY MOUTH DAILY. 90 Tab 3    levothyroxine (SYNTHROID) 25 mcg tablet TAKE 1 TABLET BY MOUTH DAILY BEFORE BREAKFAST 90 Tab 2    multivitamin (ONE A DAY) tablet Take 1 Tab by mouth daily. 30 Tab 12    calcium-cholecalciferol, D3, (CALTRATE 600+D) tablet Take 1 Tab by mouth daily. 90 Tab 3    albuterol (PROAIR HFA) 90 mcg/actuation inhaler Take 2 Puffs by inhalation every six (6) hours as needed for Wheezing. 1 Inhaler 2    zinc oxide-cod liver oil (DESITIN) 40 % ointment Apply  to affected area as needed for Skin Irritation.       docusate sodium (COLACE) 100 mg capsule Take 100 mg by mouth two (2) times a day. Plan:  Obtain authorization for testing from insurance company. Report to follow once testing, scoring, and interpretation completed. ? Organic based neurocognitive issues versus mood disorder or combination of same. ? Problems organic, functional, or both? This note will not be viewable in [address] [address]*6 [id number] patient with mild cognitive decline then had a stroke and declining since then. She likely needs a sleep specialist consult for hypnopompic hallucination. Neuropsychological Test Results  Patient Testing 8/28/19 and 9/11/19 Report Completed 9/12/19  A Psychometrist Assisted w/ portions of this evaluation while under my direct  supervision    The following evaluation procedures/tests were administered:      Neuropsychologist Performed, Interpreted, & Reported:  Neuropsychological Mental Status Exam, Revised Memory & Behavior Checklist,  Mini Mental Status Exam, Clock Drawing Test, Jeimy-Melzack Pain Questionnaire, Test Of Premorbid Functioning, History Taking  & Clinical Interview With The Patient, Additional History Taking w/ the Patient's Daughter, CASE, ABAS-3, TIFFANY, CPT-III, Review Of Available Records. Psychometrist Administered under Neuropsychologist Supervision & Neuropsychologist Interpreted & Neuropsychologist Reported:  Verbal Fluency Tests, Rafy & Rafy - Revised, Trailmaking Test Parts A & B, Wechsler Adult Intelligence Scale - IV, [place] Verbal Learning Test - 3, Grooved Pegboard, Mas Depression Inventory - II, Mas Anxiety Inventory. Test Findings:  Test Findings:  Note:  The patients raw data have been compared with currently available norms which include demographic corrections for age, gender, and/or education. Sometimes, the patients scores are compared to demographically similar individuals as close to the patients age, education level, etc., as possible.   \"Average\" is viewed as being +/- 1 standard deviation (SD) from the stated mean for a particular test score. \"Low average\" is viewed as being between 1 and 2 SD below the mean, and above average is viewed as being 1 and 2 SD above the mean. Scores falling in the borderline range (between 1-1/2 and 2 SD below the mean) are viewed with particular attention as to whether they are normal or abnormal neurocognitive test scores. Other methods of inference in analyzing the test data are also utilized, including the pattern and range of scores in the profile, bilateral motor functions, and the presence, if any, of pathognomonic signs. Behaviorally, the patient was friendly and cooperative and appeared motivated to perform well during this examination. She was tested over two days due to slow time to completion, and had her birthday (89[birth year]) in the interim. Scores on the HRB converted using norms from demographically similar individuals as close to the patient's age as possible (and done on appropriate date of testing for age change). Within this context, the results of this evaluation are viewed as a valid reflection of the patients actual neurocognitive and emotional status. The patient's score of 22/30 on the Mini-Mental Status Exam was impaired. In this regard, she was not oriented to county. Registration of three words was 1/ 3 correct on Trial 1. Backwards spelling was 3/5 correct. Recall for three words after a brief delay was 2/3 correct. Repetition was impaired. Visual construction was impaired. Clock drawing was impaired. Her structured word list fluency, as assessed by the FAS Test, was within the mildly impaired range with a T score of 37. Category fluency was within the mildly to moderately impaired range with a T score of 33. Confrontation naming ability, as assessed by the [facility] Test - Revised, was within the mildly impaired range at 20/60 correct (T = 36).   This pattern of performance is indicative of a patient who is at increased risk for day-to-day problems with verbal fluency and confrontation naming. The patient was administered the [place] Continuous Performance Test - III and review of the subscales within this instrument revealed numerous concerns for inattentiveness without impulsivity. This pattern of performance is indicative of a patient who is at increased risk for day-to-day problems with sustained visual attention/concentration. The patient is showing problems with working memory capacity (4th %ile) and processing speed (5th %ile) on the WAIS-IV. Her Verbal Comprehension Index score of 68 was within the extremely low range. Her Perceptual Reasoning Index score of 60 was extremely low . These scores reflect a decline in functioning based on an assessment of premorbid functioning. The patient was administered the [place] Verbal Learning Test  - 3 and generated an impaired range (and positive) learning curve over five repeated auditory word list learning trials. An interference trial was impaired. Free and cued, short and long delayed recall were all impaired. Recognition recall was low average, and force choice recall was normal.  This pattern of performance is indicative of a patient who is at increased risk for day-to-day problems with auditory learning and/or memory. Simple timed visual motor sequencing (Trailmaking Test Part A) was within the mildly to moderately impaired range with a T score of 34. Her performance on a similar, but more complex task of timed visual motor sequencing (Trailmaking Test Part B) was within the mildly impaired range with a T score of 37. This latter test was discontinued. This pattern of performance is indicative of a patient who is at increased risk for day-to-day problems with executive functioning. Fine motor dexterity was within the mildly impaired range bilaterally.  strength was mildly to moderately impaired bilaterally.     This does not raise concern for a particularly lateralized brain dysfunction. The patient rated her current level of pain as \"0/5- No Pain\" on the Jeimy-Melzack Pain Questionnaire. Her Mas Depression Inventory -II score of 8 was within the minimally  depressed range. Her Mas Anxiety Inventory score of 12 reflected mild anxiety. The patient's responses on the Personality Assessment Inventory were deemed valid for interpretation. Within this context, mild depression is present, and she tends to be withdrawn and introverted. She can be laquita and unassertive at times, and may be in a position where others may try and take advantage of her. The personality profile is otherwise normal.       The daughter completed the ABAS-3 and did not report clinically significant concerns regarding the patient's general adaptive skills (13th  %ile), conceptual skills (10th  %ile), social skills (18th %ile), or practical skills (14th %ile). On the CASE, the daughter reported clinically significant concerns regarding the patient's cognitive functioning as well as concern for depression and anxiety. Impressions & Recommendations: This is an abnormal range Neuropsychological Evaluation with respect to neurocognitive functioning. In this regard, she is showing impairments with mental status, verbal fluency, confrontation naming, visual attention, auditory learning, auditory memory, processing speed, working memory, verbal comprehension, perceptual reasoning, bilateral motor skills, and executive functioning. Casual language skills are an important strength but may serve to mask underlying cognitive deficits at times. Emotionally, there is concern for mild depression and anxiety. She is not currently reporting psychosis, but a sleep specialist consultation may be wise here. In my opinion, this appears to be a case of mild to moderate dementia exacerbated by mild depression and anxiety.   I suggest consideration for medication for memory, attention, and depression. Counseling may prove helpful as well. She should be encouraged to remain as mentally, socially, and physically active as possible. I do not find her competent and a POA should be established if this has not been done so already. She also likely requires supervision for those domains pertaining to memory. This includes medication management supervision and supervision of financial dealings. No driving. The family is supportive and so long as they are able to assist with the supervision as noted, I agree with her current living arrangement. Otherwise, consider in-home health or transfer to assisted living. Baseline now established. Follow up prn. Clinical correlation is, of course, indicated. I will discuss these findings with the patient when she follows up with me in the near future. A follow up Neuropsychological Evaluation is indicated on a prn basis, especially if there are any cognitive and/or emotional changes. 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 If there is any additional information of which I am currently unaware, I would be more than happy to review it upon having it made available to me. Thank you for the opportunity to see this interesting individual.     Sincerely,       Mya Dietrich. Reg Flowers, EdS    CC:  Carlos Heredia MD    Time Documentation:    06517 x 1: Neurobehavioral Status Exam/Clinical Interview: (1 hour, (already billed on first date of service)    56419 x 1: Neurobehavioral Status Exam, Additional: (35 additional minutes, see above)     96138 x 1  96139 x 5 Test Administration/Data Gathering By Technician: (3 hours).  41873 x 1 (first 30 minutes), 84042 x 5 (each additional 30 minutes)    96132 x 1  96133 x 1 Testing Evaluation Services by Neuropsychologist (1 hour, 50 minutes) 96132 x 1 (first hour), 96133 x 1 (50 minutes)    Definitions:      45376/20911:  Neurobehavioral Status Exam, Clinical interview. Clinical assessment of thinking, reasoning and judgment, by neuropsychologist, both face to face time with patient and time interpreting those test results and reporting, first and subsequent hours)    64936/06649: Neuropsychological Test Administration by Technician/Psychometrist, first 30 minutes and each additional 30 minutes. The above includes: Record review. Review of history provided by patient. Review of collaborative information. Testing by Clinician. Review of raw data. Scoring. Report writing of individual tests administered by Clinician. Integration of individual tests administered by psychometrist with NSE/testing by clinician, review of records/history/collaborative information, case Conceptualization, treatment planning, clinical decision making, report writing, coordination Of Care. Psychometry test codes as time spent by psychometrist administering and scoring neurocognitive/psychological tests under supervision of neuropsychologist.  Integral services including scoring of raw data, data interpretation, case conceptualization, report writing etcetera were initiated after the patient finished testing/raw data collected and was completed on the date the report was signed.

## 2019-09-22 DIAGNOSIS — J30.1 ALLERGIC RHINITIS DUE TO POLLEN, UNSPECIFIED SEASONALITY: ICD-10-CM

## 2019-09-23 RX ORDER — LORATADINE 10 MG/1
TABLET ORAL
Qty: 30 TAB | Refills: 0 | Status: SHIPPED | OUTPATIENT
Start: 2019-09-23 | End: 2019-10-26 | Stop reason: SDUPTHER

## 2019-09-23 RX ORDER — ALENDRONATE SODIUM 35 MG/1
35 TABLET ORAL
Qty: 4 TAB | Refills: 4 | Status: SHIPPED | OUTPATIENT
Start: 2019-09-25 | End: 2020-02-19

## 2019-10-02 ENCOUNTER — TELEPHONE (OUTPATIENT)
Dept: INTERNAL MEDICINE CLINIC | Age: 84
End: 2019-10-02

## 2019-10-02 NOTE — TELEPHONE ENCOUNTER
Daughter is requesting an order for home health nurse- mostly for in the morning and in evening to help with bathing/ dressing, medication management. Pt recently fell again- living with son and his wife  Pt having lots of pain in her hand. Is there a cream she can get for her arthritis?  - preferrably what she has used in past

## 2019-10-03 ENCOUNTER — TELEPHONE (OUTPATIENT)
Dept: INTERNAL MEDICINE CLINIC | Age: 84
End: 2019-10-03

## 2019-10-03 DIAGNOSIS — M19.041 OSTEOARTHRITIS OF BOTH HANDS, UNSPECIFIED OSTEOARTHRITIS TYPE: Primary | ICD-10-CM

## 2019-10-03 DIAGNOSIS — I63.411 CEREBROVASCULAR ACCIDENT (CVA) DUE TO EMBOLISM OF RIGHT MIDDLE CEREBRAL ARTERY (HCC): ICD-10-CM

## 2019-10-03 DIAGNOSIS — M19.042 OSTEOARTHRITIS OF BOTH HANDS, UNSPECIFIED OSTEOARTHRITIS TYPE: Primary | ICD-10-CM

## 2019-10-03 DIAGNOSIS — R26.81 GAIT INSTABILITY: ICD-10-CM
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
## 2019-10-03 RX ORDER — DICLOFENAC SODIUM 10 MG/G
2 GEL TOPICAL 2 TIMES DAILY
Qty: 100 G | Refills: 2 | Status: SHIPPED | OUTPATIENT
Start: 2019-10-03 | End: 2020-05-22 | Stop reason: SDUPTHER

## 2019-10-03 NOTE — TELEPHONE ENCOUNTER
Channing Borges (daughter) calling re: Arthritis cream, and also getting an Aide to help with pt's bathing and getting dressed.   Pls call

## 2019-10-03 NOTE — TELEPHONE ENCOUNTER
Returned call and advised that [facility] can be ordered, but that a Face to Face exam must be performed. An appt was scheduled for 10/9/19. Also advised that Dr. Sai Gutierrez prescribed Diclofenac gel for the patient. Jesús Harmon voiced thanks and understanding.

## 2019-10-13 DIAGNOSIS — D50.9 IRON DEFICIENCY ANEMIA, UNSPECIFIED IRON DEFICIENCY ANEMIA TYPE: ICD-10-CM

## 2019-10-14 RX ORDER — LANOLIN ALCOHOL/MO/W.PET/CERES
CREAM (GRAM) TOPICAL
Qty: 90 TAB | Refills: 1 | Status: SHIPPED | OUTPATIENT
Start: 2019-10-14 | End: 2021-01-28 | Stop reason: SDUPTHER

## 2019-10-21 ENCOUNTER — TELEPHONE (OUTPATIENT)
Dept: INTERNAL MEDICINE CLINIC | Age: 84
End: 2019-10-21

## 2019-10-21 NOTE — TELEPHONE ENCOUNTER
Returned [facility] and after verifying HIPAA was advised by Kris Cohen that the pt needs an office visit to have [facility]. Appt made for Wednesday 10/23/19 with Kike Moon NP for check up and orders. Kris Cohen voiced thanks and understanding.

## 2019-10-23 ENCOUNTER — TELEPHONE (OUTPATIENT)
Dept: INTERNAL MEDICINE CLINIC | Age: 84
End: 2019-10-23

## 2019-10-23 ENCOUNTER — OFFICE VISIT (OUTPATIENT)
Dept: INTERNAL MEDICINE CLINIC | Age: 84
End: 2019-10-23

## 2019-10-23 VITALS
HEART RATE: 86 BPM | RESPIRATION RATE: 14 BRPM | SYSTOLIC BLOOD PRESSURE: 134 MMHG | BODY MASS INDEX: 26.78 KG/M2 | TEMPERATURE: 98.4 F | WEIGHT: 166.6 LBS | OXYGEN SATURATION: 98 % | DIASTOLIC BLOOD PRESSURE: 78 MMHG | HEIGHT: 66 IN

## 2019-10-23 DIAGNOSIS — Z23 ENCOUNTER FOR IMMUNIZATION: ICD-10-CM

## 2019-10-23 DIAGNOSIS — R26.9 GAIT ABNORMALITY: ICD-10-CM

## 2019-10-23 DIAGNOSIS — I10 ESSENTIAL HYPERTENSION: ICD-10-CM

## 2019-10-23 DIAGNOSIS — R41.3 MEMORY CHANGE: ICD-10-CM
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
## 2019-10-23 DIAGNOSIS — E03.9 HYPOTHYROIDISM, UNSPECIFIED TYPE: ICD-10-CM

## 2019-10-23 DIAGNOSIS — Z86.711 PERSONAL HISTORY OF PULMONARY EMBOLISM: ICD-10-CM

## 2019-10-23 DIAGNOSIS — R26.9 GAIT ABNORMALITY: Primary | ICD-10-CM

## 2019-10-23 DIAGNOSIS — I63.411 CEREBROVASCULAR ACCIDENT (CVA) DUE TO EMBOLISM OF RIGHT MIDDLE CEREBRAL ARTERY (HCC): Primary | ICD-10-CM

## 2019-10-23 DIAGNOSIS — G45.9 TIA (TRANSIENT ISCHEMIC ATTACK): ICD-10-CM

## 2019-10-23 DIAGNOSIS — D50.8 OTHER IRON DEFICIENCY ANEMIA: ICD-10-CM

## 2019-10-23 RX ORDER — RIVAROXABAN 15 MG/1
TABLET, FILM COATED ORAL
COMMUNITY
Start: 2019-10-19 | End: 2021-01-28 | Stop reason: SDUPTHER

## 2019-10-23 RX ORDER — LOSARTAN POTASSIUM 25 MG/1
TABLET ORAL
Refills: 5 | COMMUNITY
Start: 2019-09-18 | End: 2020-05-22 | Stop reason: SDUPTHER

## 2019-10-23 NOTE — TELEPHONE ENCOUNTER
----- Message from Maria De Jesus Moreno sent at 10/23/2019 12:28 PM EDT -----  Regarding: Dr. Nguyễn Ratel: [phone]  Caller's first and last name: Marolyn Goltz from [facility]  Reason for call: Referral   Callback required yes/no and why: Yes, would like for [facility] to call and confirm if it is okay to start services for pt on Saturday 10/26/19.    Best contact number(s): [phone]  Details to clarify the request:

## 2019-10-23 NOTE — PROGRESS NOTES
HISTORY OF PRESENT ILLNESS  Braeden Peterson is a 80 y.o. female that is a patient of Dr Jenny Richard with a history of HTN, CVA, anemia, Afib, PE, DVT and falls. That presents today for evaluation of home health aid. Daughter and patient state that she lives by herself, falls are occurring more frequently and completing her ADL's is becoming less. Her last fall was 3 weeks ago and she reports no injury. She was hospiltilized for  anemia. Denies fever, chills CP, GI or  symptoms. Visit Vitals  /78 (BP 1 Location: Left arm, BP Patient Position: Sitting)   Pulse 86   Temp 98.4 °F (36.9 °C) (Oral)   Resp 14   Ht 5' 6\" (1.676 m)   Wt 166 lb 9.6 oz (75.6 kg)   SpO2 98%   BMI 26.89 kg/m²     Past Medical History:   Diagnosis Date    Atrial fibrillation ([address] 2007    Hypercholesterolemia     Hypertension     Osteoarthritis 2010    [facility]     Th[facility]boembolus [facility]     Thyroid disease      Past Surgical History:   Procedure Laterality Date    CARDIAC SURG PROCEDURE UNLIST      HX HEENT      cataract sx    HX HIP FRACTURE [place]  2013    right hip 1989 left hip 2013    HX HIP REPLACEMENT  1989    HX PACEMAKER  2008     Family History   Problem Relation Age of Onset    Hypertension Mother      Outpatient Encounter Medications as of 10/23/2019   Medication Sig Dispense Refill    losartan (COZAAR) 25 mg tablet TAKE 1 TABLET BY MOUTH EVERY DAY  5    XARELTO 15 mg tab tablet       OTHER rollator walker for gait abnormality 1 Device 0    ferrous sulfate 325 mg (65 mg iron) tablet TAKE 1 TABLET BY MOUTH EVERY DAY BEFORE BREAKFAST 90 Tab 1    diclofenac (VOLTAREN) 1 % gel Apply 2 g to affected area two (2) times a day. 100 g 2    alendronate (FOSAMAX) 35 mg tablet Take 1 Tab by mouth every Wednesday. 4 Tab 4    loratadine (CLARITIN) 10 mg tablet TAKE 1 TABLET BY MOUTH ONCE DAILY 30 Tab 0    QUEtiapine (SEROQUEL) 25 mg tablet Take 1 Tab by mouth nightly.  30 Tab 3    sodium chloride-aloe vera (AYR SALINE) topical gel Apply  to affected area once.  oxymetazoline HCl (AFRIN NA) 1 [place] by Both Nostrils route as needed (nose bleeding).  cyanocobalamin 1,000 mcg tablet Take 1,000 mcg by mouth daily.  polyethylene glycol (MIRALAX) 17 gram packet Take 17 g by mouth daily as needed.  carvedilol (COREG) 3.125 mg tablet Take 3.125 mg by mouth two (2) times daily (with meals).  atorvastatin (LIPITOR) 80 mg tablet Take 80 mg by mouth daily.  trospium (SANCTURA) 20 mg tablet TAKE 1 TABLET BY MOUTH TWICE A  Tab 2    furosemide (LASIX) 40 mg tablet Take 40 mg by mouth two (2) times a day.  magnesium oxide (MAG-OX) 400 mg tablet TAKE 1 TAB BY MOUTH DAILY. 90 Tab 2    PARoxetine (PAXIL) 20 mg tablet TAKE 1 TAB BY MOUTH DAILY. 90 Tab 3    levothyroxine (SYNTHROID) 25 mcg tablet TAKE 1 TABLET BY MOUTH DAILY BEFORE BREAKFAST 90 Tab 2    multivitamin (ONE A DAY) tablet Take 1 Tab by mouth daily. 30 Tab 12    calcium-cholecalciferol, D3, (CALTRATE 600+D) tablet Take 1 Tab by mouth daily. 90 Tab 3    albuterol (PROAIR HFA) 90 mcg/actuation inhaler Take 2 Puffs by inhalation every six (6) hours as needed for Wheezing. 1 Inhaler 2    zinc oxide-cod liver oil (DESITIN) 40 % ointment Apply  to affected area as needed for Skin Irritation.  docusate sodium (COLACE) 100 mg capsule Take 100 mg by mouth two (2) times daily as needed.  [DISCONTINUED] GAVILAX 17 gram/dose powder Take 17 g by mouth daily as needed (constipation). 765 g [address] bed with airmattress 1 Each 0     No facility-administered encounter medications on file as of 10/23/2019. Kaylyn Bañuelos HPI    Review of Systems   Constitutional: Positive for weight loss. Negative for chills and fever. HENT: Negative. Eyes: Negative. Respiratory: Negative. Cardiovascular: Positive for leg swelling. Negative for chest pain and palpitations. Gastrointestinal: Negative.     Genitourinary: Positive for urgency. Musculoskeletal: Positive for back pain, falls and joint pain. Neurological: Negative. Psychiatric/Behavioral: Negative. Physical Exam   Constitutional: She is oriented to person, place, and time. She appears well-developed. HENT:   Head: Atraumatic. Neck: Neck supple. No thyromegaly present. Cardiovascular: Normal rate. An irregular rhythm present. Pulses:       Posterior tibial pulses are 2+ on the right side, and 2+ on the left side. Atrial fibrillation    Pulmonary/Chest: Effort normal and breath sounds normal.   Abdominal: Soft. Bowel sounds are normal.   Musculoskeletal: She exhibits edema. Bilateral lower leg edema +1   Neurological: She is alert and oriented to person, place, and time. Skin: Skin is warm. Psychiatric: She has a normal mood and affect. Nursing note and vitals reviewed. ASSESSMENT and PLAN  Diagnoses and all orders for this visit:    1. Gait abnormality  -     REFERRAL TO HOME HEALTH  -     OTHER; rollator walker for gait abnormality    2. Memory change  -     [facility]    3. Fall, sequela    4.  Encounter for immunization  -     INFLUENZA VACCINE INACTIVATED (IIV), SUBUNIT, ADJUVANTED, IM  -     ADMIN INFLUENZA VIRUS VAC          reviewed medications and side effects in detail  use of aspirin to prevent MI and TIA's discussed  Daughter states that an ACP has been made       Follow up with Dr Gerry Mosquera in 4 months

## 2019-10-23 NOTE — PATIENT INSTRUCTIONS
Vaccine Information Statement    Influenza (Flu) Vaccine (Inactivated or Recombinant): What You Need to Know    Many Vaccine Information Statements are available in [place] sor[place] and other languages. See www.immunize.org/vis  Hojas de información sobre vacunas están disponibles en español y en muchos otros idiomas. Visite www.immunize.org/vis    1. Why get vaccinated? Influenza vaccine can prevent influenza (flu). Flu is a contagious disease that spreads around the [facility] every year, usually between October and May. Anyone can get the flu, but it is more dangerous for some people. Infants and young children, people 72years of age and older, pregnant women, and people with certain health conditions or a weakened immune system are at greatest risk of flu complications. Pneumonia, bronchitis, sinus infections and ear infections are examples of flu-related complications. If you have a medical condition, such as heart disease, cancer or diabetes, flu can make it worse. Flu can cause fever and chills, sore throat, muscle aches, fatigue, cough, headache, and runny or stuffy nose. Some people may have vomiting and diarrhea, though this is more common in children than adults. Each year thousands of people in the [facility] die from flu, and many more are hospitalized. Flu vaccine prevents millions of illnesses and flu-related visits to the doctor each year. 2. Influenza vaccines     CDC recommends everyone 10months of age and older get vaccinated every flu season. Children 6 months through 6years of age may need 2 doses during a single flu season. Everyone else needs only 1 dose each flu season. It takes about 2 weeks for protection to develop after vaccination. There are many flu viruses, and they are always changing. Each year a new flu vaccine is made to protect against three or four viruses that are likely to cause disease in the upcoming flu season.  Even when the vaccine doesnt exactly match these viruses, it may still provide some protection. Influenza vaccine does not cause flu. Influenza vaccine may be given at the same time as other vaccines. 3. Talk with your health care provider    Tell your vaccine provider if the person getting the vaccine:   Has had an allergic reaction after a previous dose of influenza vaccine, or has any severe, life-threatening allergies.  Has ever had Guillain-Barré Syndrome (also called GBS). In some cases, your health care provider may decide to postpone influenza vaccination to a future visit. People with minor illnesses, such as a cold, may be vaccinated. People who are moderately or severely ill should usually wait until they recover before getting influenza vaccine. Your health care provider can give you more information. 4. Risks of a reaction     Soreness, redness, and swelling where shot is given, fever, muscle aches, and headache can happen after influenza vaccine.  There may be a very small increased risk of Guillain-Barré Syndrome (GBS) after inactivated influenza vaccine (the flu shot). [place] children who get the flu shot along with pneumococcal vaccine (PCV13), and/or DTaP vaccine at the same time might be slightly more likely to have a seizure caused by fever. Tell your health care provider if a child who is getting flu vaccine has ever had a seizure. People sometimes faint after medical procedures, including vaccination. Tell your provider if you feel dizzy or have vision changes or ringing in the ears. As with any medicine, there is a very remote chance of a vaccine causing a severe allergic reaction, other serious injury, or death. 5. What if there is a serious problem? An allergic reaction could occur after the vaccinated person leaves the clinic.  If you see signs of a severe allergic reaction (hives, swelling of the face and throat, difficulty breathing, a fast heartbeat, dizziness, or weakness), call 9-1-1 and get the person to the nearest hospital.    For other signs that concern you, call your health care provider. Adverse reactions should be reported to the Vaccine Adverse Event Reporting System (VAERS). Your health care provider will usually file this report, or you can do it yourself. Visit the VAERS website at www.vaers. [facility].gov or call 4-561.145.7157. VAERS is only for reporting reactions, and VAERS staff do not give medical advice. 6. The National Vaccine Injury Compensation Program    The [facility] Vaccine Injury Compensation Program (VICP) is a federal program that was created to compensate people who may have been injured by certain vaccines. Visit the VICP website at www.hrsa.gov/vaccinecompensation or call 5-502.143.5105 to learn about the program and about filing a claim. There is a time limit to file a claim for compensation. 7. How can I learn more?  Ask your health care provider.  Call your local or state health department.  Contact the Centers for Disease Control and Prevention (CDC):  - Call 8-929.917.1483 (1-800-CDC-INFO) or  - Visit CDCs influenza website at www.cdc.gov/flu    Vaccine Information Statement (Interim)  Inactivated Influenza Vaccine   8/15/2019  42 GABBY [id number]   Department of Health and Human Services  Centers for Disease Control and Prevention    Office Use Only

## 2019-10-24 ENCOUNTER — HOME HEALTH ADMISSION (OUTPATIENT)
Dept: HOME HEALTH SERVICES | Facility: HOME HEALTH | Age: 84
End: 2019-10-24

## 2019-10-26 DIAGNOSIS — J30.1 ALLERGIC RHINITIS DUE TO POLLEN, UNSPECIFIED SEASONALITY: ICD-10-CM

## 2019-10-28 RX ORDER — LORATADINE 10 MG/1
TABLET ORAL
Qty: 30 TAB | Refills: 1 | Status: SHIPPED | OUTPATIENT
Start: 2019-10-28 | End: 2019-12-26

## 2019-11-01 RX ORDER — LANOLIN ALCOHOL/MO/W.PET/CERES
1000 CREAM (GRAM) TOPICAL DAILY
Qty: 30 TAB | Refills: 1 | Status: SHIPPED | OUTPATIENT
Start: 2019-11-01 | End: 2019-12-23 | Stop reason: SDUPTHER

## 2019-11-05 ENCOUNTER — TELEPHONE (OUTPATIENT)
Dept: INTERNAL MEDICINE CLINIC | Age: 84
End: 2019-11-05

## 2019-11-19 DIAGNOSIS — E83.42 HYPOMAGNESEMIA: ICD-10-CM

## 2019-11-19 RX ORDER — LANOLIN ALCOHOL/MO/W.PET/CERES
CREAM (GRAM) TOPICAL
Qty: 90 TAB | Refills: 1 | Status: SHIPPED | OUTPATIENT
Start: 2019-11-19 | End: 2021-01-28 | Stop reason: SDUPTHER

## 2019-11-19 RX ORDER — LEVOTHYROXINE SODIUM 25 UG/1
TABLET ORAL
Qty: 90 TAB | Refills: 1 | Status: SHIPPED | OUTPATIENT
Start: 2019-11-19 | End: 2020-05-19

## 2019-12-23 RX ORDER — LANOLIN ALCOHOL/MO/W.PET/CERES
1000 CREAM (GRAM) TOPICAL DAILY
Qty: 30 TAB | Refills: 5 | Status: SHIPPED | OUTPATIENT
Start: 2019-12-23 | End: 2020-06-18 | Stop reason: DRUGHIGH

## 2019-12-23 NOTE — TELEPHONE ENCOUNTER
[facility] faxed requesting  Script for Vitamin b-12 1000 mcg tab  10/23/2019  No upcoming  Pill packs

## 2019-12-26 DIAGNOSIS — G47.00 INSOMNIA, UNSPECIFIED TYPE: ICD-10-CM

## 2019-12-26 DIAGNOSIS — J30.1 ALLERGIC RHINITIS DUE TO POLLEN, UNSPECIFIED SEASONALITY: ICD-10-CM

## 2019-12-26 RX ORDER — LORATADINE 10 MG/1
TABLET ORAL
Qty: 30 TAB | Refills: 0 | Status: SHIPPED | OUTPATIENT
Start: 2019-12-26 | End: 2020-01-27

## 2019-12-26 RX ORDER — QUETIAPINE FUMARATE 25 MG/1
25 TABLET, FILM COATED ORAL
Qty: 30 TAB | Refills: 2 | Status: SHIPPED | OUTPATIENT
Start: 2019-12-26 | End: 2020-02-19

## 2019-12-26 RX ORDER — TROSPIUM CHLORIDE 20 MG/1
TABLET, FILM COATED ORAL
Qty: 180 TAB | Refills: 1 | Status: SHIPPED | OUTPATIENT
Start: 2019-12-26 | End: 2020-06-18

## 2020-01-24 DIAGNOSIS — J30.1 ALLERGIC RHINITIS DUE TO POLLEN, UNSPECIFIED SEASONALITY: ICD-10-CM

## 2020-01-27 RX ORDER — LORATADINE 10 MG/1
TABLET ORAL
Qty: 30 TAB | Refills: 0 | Status: SHIPPED | OUTPATIENT
Start: 2020-01-27 | End: 2020-02-20

## 2020-02-19 DIAGNOSIS — G47.00 INSOMNIA, UNSPECIFIED TYPE: ICD-10-CM

## 2020-02-19 DIAGNOSIS — F41.9 ANXIETY: ICD-10-CM

## 2020-02-19 RX ORDER — PAROXETINE HYDROCHLORIDE 20 MG/1
TABLET, FILM COATED ORAL
Qty: 90 TAB | Refills: 2 | Status: SHIPPED | OUTPATIENT
Start: 2020-02-19 | End: 2020-06-17 | Stop reason: ALTCHOICE

## 2020-02-19 RX ORDER — ALENDRONATE SODIUM 35 MG/1
35 TABLET ORAL
Qty: 4 TAB | Refills: 3 | Status: SHIPPED | OUTPATIENT
Start: 2020-02-19 | End: 2020-06-18 | Stop reason: SDUPTHER

## 2020-02-19 RX ORDER — POLYETHYLENE GLYCOL 3350 17 G/17G
POWDER, FOR SOLUTION ORAL
Qty: 714 G | Refills: 4 | Status: SHIPPED | OUTPATIENT
Start: 2020-02-19

## 2020-02-19 RX ORDER — MULTIVITAMIN
1 TABLET ORAL DAILY
Qty: 90 TAB | Refills: 2 | Status: SHIPPED | OUTPATIENT
Start: 2020-02-19 | End: 2020-11-16

## 2020-02-19 RX ORDER — QUETIAPINE FUMARATE 25 MG/1
25 TABLET, FILM COATED ORAL
Qty: 30 TAB | Refills: 1 | Status: SHIPPED | OUTPATIENT
Start: 2020-02-19 | End: 2020-06-18 | Stop reason: SDUPTHER

## 2020-02-20 DIAGNOSIS — J30.1 ALLERGIC RHINITIS DUE TO POLLEN, UNSPECIFIED SEASONALITY: ICD-10-CM

## 2020-02-20 RX ORDER — LORATADINE 10 MG/1
TABLET ORAL
Qty: 30 TAB | Refills: 0 | Status: SHIPPED | OUTPATIENT
Start: 2020-02-20 | End: 2020-03-20

## 2020-02-25 RX ORDER — BISMUTH SUBSALICYLATE 262 MG
1 TABLET,CHEWABLE ORAL DAILY
Qty: 30 TAB | Refills: 12 | Status: SHIPPED | OUTPATIENT
Start: 2020-02-25 | End: 2021-01-25

## 2020-03-20 DIAGNOSIS — J30.1 ALLERGIC RHINITIS DUE TO POLLEN, UNSPECIFIED SEASONALITY: ICD-10-CM

## 2020-03-20 RX ORDER — LORATADINE 10 MG/1
TABLET ORAL
Qty: 30 TAB | Refills: 0 | Status: SHIPPED | OUTPATIENT
Start: 2020-03-20 | End: 2020-04-19 | Stop reason: SDUPTHER

## 2020-04-19 DIAGNOSIS — J30.1 ALLERGIC RHINITIS DUE TO POLLEN, UNSPECIFIED SEASONALITY: ICD-10-CM

## 2020-04-19 RX ORDER — LORATADINE 10 MG/1
TABLET ORAL
Qty: 30 TAB | Refills: 0 | Status: SHIPPED | OUTPATIENT
Start: 2020-04-19 | End: 2020-05-22 | Stop reason: SDUPTHER

## 2020-05-19 RX ORDER — LEVOTHYROXINE SODIUM 25 UG/1
TABLET ORAL
Qty: 90 TAB | Refills: 0 | Status: SHIPPED | OUTPATIENT
Start: 2020-05-19 | End: 2020-08-18

## 2020-05-22 ENCOUNTER — VIRTUAL VISIT (OUTPATIENT)
Dept: INTERNAL MEDICINE CLINIC | Age: 85
End: 2020-05-22

## 2020-05-22 VITALS — HEIGHT: 66 IN | BODY MASS INDEX: 26.89 KG/M2

## 2020-05-22 DIAGNOSIS — E55.9 VITAMIN D DEFICIENCY: ICD-10-CM

## 2020-05-22 DIAGNOSIS — M19.041 OSTEOARTHRITIS OF BOTH HANDS, UNSPECIFIED OSTEOARTHRITIS TYPE: ICD-10-CM

## 2020-05-22 DIAGNOSIS — D50.9 IRON DEFICIENCY ANEMIA, UNSPECIFIED IRON DEFICIENCY ANEMIA TYPE: ICD-10-CM

## 2020-05-22 DIAGNOSIS — G56.02 CARPAL TUNNEL SYNDROME OF LEFT WRIST: ICD-10-CM

## 2020-05-22 DIAGNOSIS — Z20.822 CLOSE EXPOSURE TO COVID-19 VIRUS: Primary | ICD-10-CM

## 2020-05-22 DIAGNOSIS — I50.9 CHRONIC CONGESTIVE HEART FAILURE, UNSPECIFIED HEART FAILURE TYPE (HCC): ICD-10-CM

## 2020-05-22 DIAGNOSIS — M19.042 OSTEOARTHRITIS OF BOTH HANDS, UNSPECIFIED OSTEOARTHRITIS TYPE: ICD-10-CM

## 2020-05-22 DIAGNOSIS — I10 ESSENTIAL HYPERTENSION: ICD-10-CM

## 2020-05-22 DIAGNOSIS — E03.9 HYPOTHYROIDISM, UNSPECIFIED TYPE: ICD-10-CM

## 2020-05-22 DIAGNOSIS — I63.411 CEREBROVASCULAR ACCIDENT (CVA) DUE TO EMBOLISM OF RIGHT MIDDLE CEREBRAL ARTERY (HCC): ICD-10-CM

## 2020-05-22 DIAGNOSIS — J30.1 ALLERGIC RHINITIS DUE TO POLLEN, UNSPECIFIED SEASONALITY: ICD-10-CM

## 2020-05-22 RX ORDER — ATORVASTATIN CALCIUM 80 MG/1
80 TABLET, FILM COATED ORAL DAILY
Qty: 90 TAB | Refills: 1 | Status: SHIPPED | OUTPATIENT
Start: 2020-05-22 | End: 2021-01-28 | Stop reason: SDUPTHER

## 2020-05-22 RX ORDER — LORATADINE 10 MG/1
TABLET ORAL
Qty: 30 TAB | Refills: 1 | Status: SHIPPED | OUTPATIENT
Start: 2020-05-22 | End: 2021-04-14

## 2020-05-22 RX ORDER — LOSARTAN POTASSIUM 25 MG/1
25 TABLET ORAL DAILY
Qty: 90 TAB | Refills: 1 | Status: SHIPPED | OUTPATIENT
Start: 2020-05-22 | End: 2021-01-25

## 2020-05-22 RX ORDER — CARVEDILOL 3.12 MG/1
3.12 TABLET ORAL 2 TIMES DAILY WITH MEALS
Qty: 180 TAB | Refills: 1 | Status: SHIPPED | OUTPATIENT
Start: 2020-05-22 | End: 2020-11-16

## 2020-05-22 RX ORDER — DICLOFENAC SODIUM 10 MG/G
2 GEL TOPICAL 2 TIMES DAILY
Qty: 100 G | Refills: 2 | Status: SHIPPED | OUTPATIENT
Start: 2020-05-22 | End: 2020-06-17 | Stop reason: SDUPTHER

## 2020-05-22 NOTE — PROGRESS NOTES
Silas Delacruz is a 80 y.o. female who was seen by synchronous (real-time) audio-video technology on 5/22/2020. Consent: Silas Delacruz, who was seen by synchronous (real-time) audio-video technology, and/or her healthcare decision maker, is aware that this patient-initiated, Telehealth encounter on 5/22/2020 is a billable service, with coverage as determined by her insurance carrier. She is aware that she may receive a bill and has provided verbal consent to proceed: Yes. Assessment & Plan:   Diagnoses and all orders for this visit:    1. Essential hypertension    Stable blood pressure. Will check,  -     CBC WITH AUTOMATED DIFF  -     METABOLIC PANEL, COMPREHENSIVE  -     losartan (COZAAR) 25 mg tablet; Take 1 Tab by mouth daily. -     carvediloL (Coreg) 3.125 mg tablet; Take 1 Tab by mouth two (2) times daily (with meals). 2. Hypothyroidism, unspecified type    On Synthroid. Will give refill. Will check,  -     TSH 3RD GENERATION    3. Cerebrovascular accident (CVA) due to embolism of right middle cerebral artery ([address]    Doing fine. Will check,  -     CBC WITH AUTOMATED DIFF  -     LIPID PANEL  -     atorvastatin (LIPITOR) 80 mg tablet; Take 1 Tab by mouth daily. 4. Chronic congestive heart failure, unspecified heart failure type ([address]    Compensated. Will check,  -     CBC WITH AUTOMATED DIFF  -     METABOLIC PANEL, COMPREHENSIVE    5. Carpal tunnel syndrome of left wrist    Has a combination of DJD and carpal tunnel. Will use wrist splint. Advised her to use diclofenac gel on the knuckle. May take Tylenol as needed and do some exercise.  -     OTHER; Left wrist splint use everyday    6. Vitamin D deficiency  -     VITAMIN D, 25 HYDROXY    7. Iron deficiency anemia, unspecified iron deficiency anemia type    Had history of GI bleeding in the past.  Will repeat,  -     METABOLIC PANEL, COMPREHENSIVE  -     IRON  -     FERRITIN    8.  Osteoarthritis of both hands, unspecified osteoarthritis type    Will refill,  -     diclofenac (VOLTAREN) 1 % gel; Apply 2 g to affected area two (2) times a day. 9. Allergic rhinitis due to pollen, unspecified seasonality    We will give,  -     loratadine (CLARITIN) 10 mg tablet; 1 tab po every day  10. COVID exposure    Her son is diagnosed with COVID and right now he is on ventilator. Patient was staying in the same household with him. Right now she is relocated with her daughter. Advised her daughter to take her to [facility] to get her tested for COVID19 PCR. She should be quarantined for 14 days. I spent at least 40 minutes on this visit with this established patient. ([id number])    Subjective: José Miguel Foy is a 80 y.o. female who was seen for Dementia; Hand Pain (left sided, pain in fingers also notes numbness in tips of fingers); Medication Refill (loratadine); and Thyroid Problem  Ms. Aretha Shah is staying with her daughter right now. She was staying with her son who is on ventilator for COVID infection. She was staying in the same home. She was not tested for COVID. She has no symptoms. No fever cough or achiness. No shortness of breath. Report pain on left wrist and tingling sensation on and off for last several weeks. She already have some arthritis on the hand. Using diclofenac gel which is helping her. Has hypertension, compliant with medicine. Denies chest pain palpitation or shortness of breath. Need refill on medicine. Has low thyroid, on Synthroid. Need refill. Need lab work. Had history of CVA, on Xarelto. No active bleeding right now. Has history of CHF. No shortness of breath orthopnea. Prior to Admission medications    Medication Sig Start Date End Date Taking? Authorizing Provider   atorvastatin (LIPITOR) 80 mg tablet Take 1 Tab by mouth daily. 5/22/20  Yes Uziel Keita MD   diclofenac (VOLTAREN) 1 % gel Apply 2 g to affected area two (2) times a day.  5/22/20  Yes Uziel Keita MD   losartan ([facility]) 25 mg tablet Take 1 Tab by mouth daily. 5/22/20  Yes Bisi Trejo MD   carvediloL (Coreg) 3.125 mg tablet Take 1 Tab by mouth two (2) times daily (with meals). 5/22/20  Yes Bisi Trejo MD   loratadine (CLARITIN) 10 mg tablet 1 tab po every day 5/22/20  Yes Bisi Trejo MD   OTHER Left wrist splint use everyday 5/22/20  Yes Bisi Trejo MD   levothyroxine (SYNTHROID) 25 mcg tablet TAKE 1 TABLET BY MOUTH DAILY BEFORE BREAKFAST 5/19/20  Yes Caitie Gamez NP   multivitamin (ONE A DAY) tablet Take 1 Tab by mouth daily. 2/25/20  Yes Bisi Trejo MD   PARoxetine (PAXIL) 20 mg tablet TAKE 1 TAB BY MOUTH DAILY. 2/19/20  Yes Bisi Trejo MD   calcium-cholecalciferol, D3, (CALTRATE 600+D) tablet Take 1 Tab by mouth daily. 2/19/20  Yes Bisi Trejo MD   alendronate (FOSAMAX) 35 mg tablet Take 1 Tab by mouth every Wednesday. 2/19/20  Yes Bisi Trejo MD   QUEtiapine (SEROQUEL) 25 mg tablet Take 1 Tab by mouth nightly. 2/19/20  Yes Bisi Trejo MD   GAVILAX 17 gram/dose powder Take 17 g by mouth daily as needed (constipation). 2/19/20  Yes Bisi Trejo MD   trospium (SANCTURA) 20 mg tablet TAKE 1 TAB BY MOUTH TWO (2) TIMES A DAY. 12/26/19  Yes Bisi Trejo MD   cyanocobalamin 1,000 mcg tablet Take 1 Tab by mouth daily. 12/23/19  Yes Bisi Trejo MD   magnesium oxide (MAG-OX) 400 mg tablet TAKE 1 TAB BY MOUTH DAILY. 11/19/19  Yes Caitie Gamez NP   XARELTO 15 mg tab tablet  10/19/19  Yes Provider, Historical   OTHER rollator walker for gait abnormality 10/23/19  Yes Caitie Gamez NP   ferrous sulfate 325 mg (65 mg iron) tablet TAKE 1 TABLET BY MOUTH EVERY DAY BEFORE BREAKFAST 10/14/19  Yes Bisi Trejo MD   sodium chloride-aloe vera (AYR SALINE) topical gel Apply  to affected area once. Yes Provider, Historical   oxymetazoline HCl (AFRIN NA) 1 [place] by Both Nostrils route as needed (nose bleeding). Yes Provider, Historical   furosemide (LASIX) 40 mg tablet Take 40 mg by mouth two (2) times a day.    Yes Provider, Historical   albuterol (PROAIR HFA) 90 mcg/actuation inhaler Take 2 Puffs by inhalation every six (6) hours as needed for Wheezing. 8/14/18  Yes Mirshahi, Dorethea Hatchet, DO   zinc oxide-cod liver oil (DESITIN) 40 % ointment Apply  to affected area as needed for Skin Irritation. Yes Provider, Historical   docusate sodium (COLACE) 100 mg capsule Take 100 mg by mouth two (2) times daily as needed. Yes Provider, Historical   loratadine (CLARITIN) 10 mg tablet TAKE 1 TABLET BY MOUTH ONCE DAILY 4/19/20 5/22/20  Joaquin Gomez NP   losartan (COZAAR) 25 mg tablet TAKE 1 TABLET BY MOUTH EVERY DAY 9/18/19 5/22/20  Provider, Historical   diclofenac (VOLTAREN) 1 % gel Apply 2 g to affected area two (2) times a day. 10/3/19 5/22/20  Amadou Johnson MD   polyethylene glycol [facility]) 17 gram packet Take 17 g by mouth daily as needed. 5/22/20  Provider, Historical   carvedilol (COREG) 3.125 mg tablet Take 3.125 mg by mouth two (2) times daily (with meals). 5/22/20  Provider, Historical   atorvastatin (LIPITOR) 80 mg tablet Take 80 mg by mouth daily. 5/22/20  Provider, Historical     Allergies   Allergen Reactions    Ace Inhibitors Cough       Past Medical History:   Diagnosis Date    Atrial [facility] ([facility] 2007    Hypercholesterolemia     Hypertension     Osteoarthritis 2010    Stroke [facility]     Thromboembolus ([facility]     Thyroid disease        ROS significant for left hand pain and tingling sensation.     Objective:   Vital Signs: (As obtained by patient/caregiver at home)  Visit Vitals  Ht 5' 6\" (1.676 m)   BMI 26.89 kg/m²            Constitutional: [x] Appears well-developed and well-nourished [x] No apparent distress      [] Abnormal -     Mental status: [x] Alert and awake  [x] Oriented to person/place/time [x] Able to follow commands    [] Abnormal -        HENT: [x] Normocephalic, atraumatic  [] Abnormal -   [x] Mouth/Throat: Mucous membranes are moist    External Ears [x] Normal  [] Abnormal -    Neck: [x] No visualized mass [] Abnormal -     Pulmonary/Chest: [x] Respiratory effort normal   [x] No visualized signs of difficulty breathing or respiratory distress        [] Abnormal -      Musculoskeletal:   [x] Normal gait with no signs of ataxia         [x] Normal range of motion of neck        [] Abnormal -   Left hand: Few [place] nodules on the knuckles. Mild tingling sensation on middle finger. Neurological:        [x] No Facial Asymmetry (Cranial nerve 7 motor function) (limited exam due to video visit)          [x] No gaze palsy        [] Abnormal -          Skin:        [x] No significant exanthematous lesions or discoloration noted on facial skin         [] Abnormal -            Psychiatric:       [x] Normal Affect [] Abnormal -        [x] No Hallucinations    Other pertinent observable physical exam findings:-        We discussed the expected course, resolution and complications of the diagnosis(es) in detail. Medication risks, benefits, costs, interactions, and alternatives were discussed as indicated. I advised her to contact the office if her condition worsens, changes or fails to improve as anticipated. She expressed understanding with the diagnosis(es) and plan. Phyllis Boucher is a 80 y.o. female who was evaluated by a video visit encounter for concerns as above. Patient identification was verified prior to start of the visit. A caregiver was present when appropriate. Due to this being a TeleHealth encounter (During [facility] public health emergency), evaluation of the following organ systems was limited: Vitals/Constitutional/EENT/Resp/CV/GI//MS/Neuro/Skin/Heme-Lymph-Imm.   Pursuant to the emergency declaration under the [address] [place] [address] and the [employer] Act, this Virtual  Visit was conducted, with patient's (and/or legal guardian's) consent, to reduce the patient's risk of exposure to COVID-19 and provide necessary medical care. Services were provided through a video synchronous discussion virtually to substitute for in-person clinic visit. Patient and provider were located at their individual homes.       Dax Snyder MD

## 2020-05-22 NOTE — PROGRESS NOTES
Health Maintenance Due   Topic Date Due    DTaP/Tdap/Td series (1 - Tdap) 08/29/1945    Shingrix Vaccine Age 50> (1 of 2) 08/29/1974    GLAUCOMA SCREENING Q2Y  08/29/1989    Lipid Screen  05/07/2020    Medicare Yearly Exam  06/05/2020       No chief complaint on file. 1. Have you been to the ER, urgent care clinic since your last visit? Hospitalized since your last visit? No    2. Have you seen or consulted any other health care providers outside of the [address] [place] [address] since your last visit? Include any pap smears or colon screening. No    3) Do you have an Advance Directive on file? yes    4) Are you interested in receiving information on Advance Directives? NO      Patient is accompanied by daughter I have received verbal consent from Angela Mcclure to discuss any/all medical information while they are present in the room.

## 2020-06-08 ENCOUNTER — TELEPHONE (OUTPATIENT)
Dept: INTERNAL MEDICINE CLINIC | Age: 85
End: 2020-06-08

## 2020-06-08 NOTE — TELEPHONE ENCOUNTER
Pt daughter wants to know if the office received the results of the dementia test? If so she is asking for the results

## 2020-06-09 DIAGNOSIS — F43.21 GRIEF: ICD-10-CM

## 2020-06-09 DIAGNOSIS — F41.0 ANXIETY ATTACK: Primary | ICD-10-CM

## 2020-06-09 RX ORDER — ALPRAZOLAM 0.25 MG/1
0.25 TABLET ORAL
Qty: 20 TAB | Refills: 0 | Status: SHIPPED | OUTPATIENT
Start: 2020-06-09 | End: 2021-01-28 | Stop reason: SDUPTHER

## 2020-06-09 NOTE — TELEPHONE ENCOUNTER
Per Chuckie Siemens mail something with diagnosis of dementia to her. Nicolas  [address]   [address]

## 2020-06-11 NOTE — MR AVS SNAPSHOT
Visit Information Date & Time Provider Department Dept. Phone Encounter #  
 12/13/2017 10:00 AM Lacey Hinojosa, [address] [facility] Internal Medicine [phone] [id number] Upcoming Health Maintenance Date Due DTaP/Tdap/Td series (1 - Tdap) [birth year]29/1945 ZOSTER VACCINE AGE 60> 6/29/1984 GLAUCOMA SCREENING Q2Y 8/29/1989 Influenza Age 5 to Adult 8/1/2017 MEDICARE YEARLY EXAM 3/31/2018 Allergies as of 12/13/2017  Review Complete On: 12/13/2017 By: Lacey Hinojosa MD  
 No Known Allergies Current Immunizations  Reviewed on 12/13/2017 Name Date Influenza Vaccine 3/30/2017, 10/10/2014 Pneumococcal Conjugate (PCV-13) 3/10/2016 Pneumococcal Polysaccharide (PPSV-23) 3/10/2013 Reviewed by Lacey Hinojosa MD on 12/13/2017 at 10:54 AM  
You Were Diagnosed With   
  
 Codes Comments Fatigue, unspecified type    -  Primary ICD-10-CM: R53.83 ICD-9-CM: 780.79 Essential hypertension     ICD-10-CM: I10 
ICD-9-CM: 401.9 Transient cerebral ischemia, unspecified type     ICD-10-CM: G45.9 ICD-9-CM: 435.9 Hypothyroidism, unspecified type     ICD-10-CM: E03.9 ICD-9-CM: 244.9 Allergic rhinitis due to pollen, unspecified chronicity, unspecified seasonality     ICD-10-CM: J30.1 ICD-9-CM: 477.0 Chronic cough     ICD-10-CM: R05 ICD-9-CM: 648. 2 Vitamin D deficiency     ICD-10-CM: E55.9 ICD-9-CM: 268.9 Neck pain     ICD-10-CM: M54.2 ICD-9-CM: 723.1 Vitals BP Pulse Temp Resp Height(growth percentile) Weight(growth percentile) 152/59 (BP 1 Location: Right arm, BP Patient Position: Sitting) 65 97.7 °F (36.5 °C) (Oral) 20 5' 4\" (1.626 m) 192 lb (87.1 kg) SpO2 BMI OB Status Smoking Status 96% 32.96 kg/m2 Menopause Never Smoker Vitals History BMI and BSA Data Body Mass Index Body Surface Area  
 32.96 kg/m 2 1.98 m 2 Preferred Pharmacy Pharmacy Name Phone [facility][id number] Mello, [address] [place] [address] [address] [phone] Your Updated Medication List  
  
   
This list is accurate as of: 12/13/17 10:54 AM.  Always use your most recent med list.  
  
  
  
  
 alendronate 35 mg tablet Commonly known as:  FOSAMAX TAKE 1 TABLET BY MOUTH EVERY SEVEN (7) DAYS. calcium-cholecalciferol (D3) tablet Commonly known as:  CALTRATE 600+D Take 1 Tab by mouth daily. cyanocobalamin 1,000 mcg tablet Take 1,000 mcg by mouth daily. Indications: 5000mcg  
  
 docusate sodium 100 mg capsule Commonly known as:  Nahomi Mule Take 100 mg by mouth two (2) times a day. fluticasone 50 mcg/actuation nasal spray Commonly known as:  Montine Whitmer 2 Sprays by Both Nostrils route daily. hydroCHLOROthiazide 25 mg tablet Commonly known as:  HYDRODIURIL  
TAKE 1/2 TABLET BY MOUTH DAILY  
  
 levothyroxine 25 mcg tablet Commonly known as:  SYNTHROID  
TAKE 1 TABLET BY MOUTH DAILY BEFORE BREAKFAST  
  
 loratadine 10 mg tablet Commonly known as:  CLARITIN  
TAKE 1 TABLET BY MOUTH ONCE DAILY losartan 50 mg tablet Commonly known as:  COZAAR  
TAKE 1 TABLET BY MOUTH DAILY  
  
 magnesium oxide 400 mg tablet Commonly known as:  MAG-OX  
TAKE 1 TAB BY MOUTH DAILY. multivitamin tablet Commonly known as:  ONE A DAY Take 1 Tab by mouth daily. PARoxetine 20 mg tablet Commonly known as:  PAXIL TAKE 1 TAB BY MOUTH DAILY. pravastatin 20 mg tablet Commonly known as:  PRAVACHOL  
TAKE 1 TABLET BY MOUTH ONCE NIGHTLY  
  
 promethazine-codeine 6.25-10 mg/5 mL syrup Commonly known as:  PHENERGAN with CODEINE Take 5 mL by mouth nightly as needed for Cough. rivaroxaban 20 mg Tab tablet Commonly known as:  Hodan Barrie TAKE 1 TABLET BY MOUTH ONCE DAILY WITH BREAKFAST  
  
 sotalol 80 mg tablet Commonly known as:  Cydney Patee Take 80 mg by mouth two (2) times a day. trospium 20 mg tablet Commonly known as:  Adamaris Gonzalez TAKE 1 TAB BY MOUTH TWO (2) TIMES A DAY. zinc oxide-cod liver oil 40 % ointment Commonly known as:  Birtha Aus Apply  to affected area as needed for Skin Irritation. Prescriptions Printed Refills  
 promethazine-codeine (PHENERGAN WITH CODEINE) 6.25-10 mg/5 mL syrup 0 Sig: Take 5 mL by mouth nightly as needed for Cough. Class: Print Route: Oral  
  
We Performed the Following CBC W/O DIFF [27457 CPT(R)] METABOLIC PANEL, COMPREHENSIVE [75583 CPT(R)] REFERRAL TO PHYSICAL THERAPY [QCV85 Custom] Comments:  
 Neck pain TSH 3RD GENERATION [83422 CPT(R)] VITAMIN D, 25 HYDROXY [id number] CPT(R)] To-Do List   
 12/14/2017 Imaging:  XR SPINE CERV 4 OR 5 V Referral Information Referral ID Referred By Referred To  
  
 [id number] Kasey Qureshi Not Available Visits Status Start Date End Date 1 New Request 12/13/17 12/13/18 If your referral has a status of pending review or denied, additional information will be sent to support the outcome of this decision. Please provide this summary of care documentation to your next provider. Your primary care clinician is listed as Romana Sanchez. If you have any questions after today's visit, please call [phone]. MIn decrease in R Shoulder flexion 2* fall. 3-/5/deficits as listed below

## 2020-06-16 ENCOUNTER — TELEPHONE (OUTPATIENT)
Dept: INTERNAL MEDICINE CLINIC | Age: 85
End: 2020-06-16

## 2020-06-16 DIAGNOSIS — F41.8 DEPRESSION WITH ANXIETY: Primary | ICD-10-CM

## 2020-06-16 DIAGNOSIS — G56.02 CARPAL TUNNEL SYNDROME OF LEFT WRIST: ICD-10-CM

## 2020-06-16 DIAGNOSIS — M19.042 OSTEOARTHRITIS OF BOTH HANDS, UNSPECIFIED OSTEOARTHRITIS TYPE: ICD-10-CM

## 2020-06-16 DIAGNOSIS — M19.041 OSTEOARTHRITIS OF BOTH HANDS, UNSPECIFIED OSTEOARTHRITIS TYPE: ICD-10-CM

## 2020-06-16 NOTE — TELEPHONE ENCOUNTER
Pt has fallen out of the bed two times this week, once in a queen size bed then this morning at 3am sliding out of a twin size bed. No injuries reported. Please contact pt daughter.  She wants to make sure it is not due to the xanax the pt is taking

## 2020-06-17 RX ORDER — SERTRALINE HYDROCHLORIDE 50 MG/1
50 TABLET, FILM COATED ORAL DAILY
Qty: 30 TAB | Refills: 5 | Status: SHIPPED | OUTPATIENT
Start: 2020-06-17 | End: 2020-07-09 | Stop reason: SDUPTHER

## 2020-06-17 RX ORDER — DICLOFENAC SODIUM 10 MG/G
2 GEL TOPICAL 2 TIMES DAILY
Qty: 100 G | Refills: 2 | Status: SHIPPED | OUTPATIENT
Start: 2020-06-17 | End: 2020-07-13 | Stop reason: SDUPTHER

## 2020-06-17 NOTE — TELEPHONE ENCOUNTER
Writer conferred with provider:  Please discontinue her Xanax. Call placed to daughter and made aware. Daughter voiced understanding and appreciation. She wanted to make Dr Wilmer Pace aware that her mothers dementia has worsened, having some behavioral concerns. States mother has been on paxil long term but no dementia medication and wanted to inquire about those medications to help with her mother.  Will confer with provider    Per provider decrease paxil to 10mg x 1 week then d/c, start sertraline 25mg x 1 week then increase to 50mg, call placed to daughter and made aware, voiced understanding with read back of order

## 2020-06-18 DIAGNOSIS — G47.00 INSOMNIA, UNSPECIFIED TYPE: ICD-10-CM

## 2020-06-18 DIAGNOSIS — E53.8 VITAMIN B12 DEFICIENCY: Primary | ICD-10-CM

## 2020-06-18 RX ORDER — TROSPIUM CHLORIDE 20 MG/1
TABLET, FILM COATED ORAL
Qty: 180 TAB | Refills: 0 | Status: SHIPPED | OUTPATIENT
Start: 2020-06-18 | End: 2020-09-11

## 2020-06-18 RX ORDER — ZINC GLUCONATE 50 MG
TABLET ORAL
Qty: 30 TAB | Refills: 4 | OUTPATIENT
Start: 2020-06-18

## 2020-06-18 RX ORDER — LANOLIN ALCOHOL/MO/W.PET/CERES
500 CREAM (GRAM) TOPICAL DAILY
Qty: 30 TAB | Refills: 3 | Status: SHIPPED | OUTPATIENT
Start: 2020-06-18 | End: 2021-01-28 | Stop reason: ALTCHOICE

## 2020-06-18 NOTE — TELEPHONE ENCOUNTER
Requested Prescriptions     Pending Prescriptions Disp Refills    alendronate (FOSAMAX) 35 mg tablet 4 Tab 3     Sig: Take 1 Tab by mouth every Wednesday.  QUEtiapine (SEROquel) 25 mg tablet 30 Tab 1     Sig: Take 1 Tab by mouth nightly.      HPO:7/03/6308

## 2020-06-19 RX ORDER — QUETIAPINE FUMARATE 25 MG/1
25 TABLET, FILM COATED ORAL
Qty: 30 TAB | Refills: 1 | Status: SHIPPED | OUTPATIENT
Start: 2020-06-19 | End: 2020-07-13 | Stop reason: SDUPTHER

## 2020-06-19 RX ORDER — ALENDRONATE SODIUM 35 MG/1
35 TABLET ORAL
Qty: 4 TAB | Refills: 3 | Status: SHIPPED | OUTPATIENT
Start: 2020-06-24 | End: 2021-01-28 | Stop reason: SDUPTHER

## 2020-07-06 DIAGNOSIS — G47.00 INSOMNIA, UNSPECIFIED TYPE: ICD-10-CM

## 2020-07-06 RX ORDER — QUETIAPINE FUMARATE 25 MG/1
25 TABLET, FILM COATED ORAL
Qty: 30 TAB | Refills: 1 | Status: CANCELLED | OUTPATIENT
Start: 2020-07-06

## 2020-07-06 NOTE — TELEPHONE ENCOUNTER
pharmacy requesting 90 day supply  Requested Prescriptions     Pending Prescriptions Disp Refills    QUEtiapine (SEROquel) 25 mg tablet 30 Tab 1     Sig: Take 1 Tab by mouth nightly.      05/22/2020  [facility] upcoming  [facility]

## 2020-07-09 DIAGNOSIS — F41.8 DEPRESSION WITH ANXIETY: ICD-10-CM

## 2020-07-09 NOTE — TELEPHONE ENCOUNTER
Pharmacy sent request for 90 day supply. Requested Prescriptions     Pending Prescriptions Disp Refills    sertraline (ZOLOFT) 50 mg tablet 30 Tab 5     Sig: Take 1 Tab by mouth daily.  **give 25mg by mouth 1 x a day x 1 week then increase to 50mg po every day**       05/22/2020  No upcoming appointment  [facility]

## 2020-07-13 DIAGNOSIS — G47.00 INSOMNIA, UNSPECIFIED TYPE: ICD-10-CM

## 2020-07-13 RX ORDER — SERTRALINE HYDROCHLORIDE 50 MG/1
50 TABLET, FILM COATED ORAL DAILY
Qty: 30 TAB | Refills: 5 | Status: SHIPPED | OUTPATIENT
Start: 2020-07-13 | End: 2020-07-13 | Stop reason: SDUPTHER

## 2020-07-13 NOTE — TELEPHONE ENCOUNTER
Please call pill pack regarding zolof, diclofenac,     [facility]el [facility] this time since pt is out.    All future refills need to be sent to pill pack

## 2020-07-14 RX ORDER — QUETIAPINE FUMARATE 25 MG/1
25 TABLET, FILM COATED ORAL
Qty: 30 TAB | Refills: 0 | Status: SHIPPED | OUTPATIENT
Start: 2020-07-14 | End: 2020-07-28 | Stop reason: DRUGHIGH

## 2020-07-28 ENCOUNTER — VIRTUAL VISIT (OUTPATIENT)
Dept: INTERNAL MEDICINE CLINIC | Age: 85
End: 2020-07-28

## 2020-07-28 DIAGNOSIS — Z00.00 MEDICARE ANNUAL WELLNESS VISIT, SUBSEQUENT: ICD-10-CM

## 2020-07-28 DIAGNOSIS — F41.8 DEPRESSION WITH ANXIETY: ICD-10-CM

## 2020-07-28 DIAGNOSIS — E03.9 HYPOTHYROIDISM, UNSPECIFIED TYPE: ICD-10-CM

## 2020-07-28 DIAGNOSIS — G47.00 INSOMNIA, UNSPECIFIED TYPE: ICD-10-CM

## 2020-07-28 DIAGNOSIS — Z71.89 ACP (ADVANCE CARE PLANNING): ICD-10-CM

## 2020-07-28 DIAGNOSIS — I10 ESSENTIAL HYPERTENSION: Primary | ICD-10-CM

## 2020-07-28 DIAGNOSIS — I63.411 CEREBROVASCULAR ACCIDENT (CVA) DUE TO EMBOLISM OF RIGHT MIDDLE CEREBRAL ARTERY (HCC): ICD-10-CM

## 2020-07-28 RX ORDER — SERTRALINE HYDROCHLORIDE 50 MG/1
50 TABLET, FILM COATED ORAL DAILY
Qty: 90 TAB | Refills: 1 | Status: SHIPPED | OUTPATIENT
Start: 2020-07-28 | End: 2020-09-15 | Stop reason: SDUPTHER

## 2020-07-28 NOTE — ACP (ADVANCE CARE PLANNING)

## 2020-07-28 NOTE — PROGRESS NOTES
This is the Subsequent Medicare Annual Wellness Exam, performed 12 months or more after the Initial AWV or the last Subsequent AWV    I have reviewed the patient's medical history in detail and updated the computerized patient record. History     Patient Active Problem List   Diagnosis Code    DVT (deep venous thrombosis) ([facility]) I82.409    Mixed hyperlipidemia E78.2    Atrial fibrillation (HCC) I48.91    HTN (hypertension) I10    DJD (degenerative joint disease) M19.90   [facility] Hypothyroid E03.9    TIA (transient ischemic attack) G45.9    ACP (advance care planning) Z71.89    Gait instability R26.81    Chronic cough R05    Personal history of pulmonary embolism Z86.711    Acute CVA (cerebrovascular accident) [facility]) I63.9    Fall W19. Jazz Galan    CVA (cerebral vascular accident) ([facility] [address] I63.9    Scalp laceration S01. 01XA    Gait abnormality R26.9    Anemia D64.9    Severe anemia D64.9     Past Medical History:   Diagnosis Date    Atrial fibrillation ([facility] [address] [facility] 2007    Hypercholesterolemia     Hypertension     Osteoarthritis 2010    Stroke ([facility]     Thromboembolus ([facility] [address]     Thyroid disease       Past Surgical History:   Procedure Laterality Date    CARDIAC SURG PROCEDURE UNLIST      HX HEENT      cataract sx    HX HIP FRACTURE TX  2013    right hip 1989 left hip 2013    HX HIP REPLACEMENT  1989    HX PACEMAKER  2008     Current Outpatient Medications   Medication Sig Dispense Refill    QUEtiapine (SEROquel) 25 mg tablet Take 1 Tab by mouth nightly. 30 Tab 0    diclofenac (VOLTAREN) 1 % gel Apply 2 g to affected area two (2) times a day. 100 g 2    sertraline (ZOLOFT) 50 mg tablet Take 1 Tab by mouth daily. **give 25mg by mouth 1 x a day x 1 week then increase to 50mg po every day** 30 Tab 5    alendronate (FOSAMAX) 35 mg tablet Take 1 Tab by mouth every Wednesday. 4 Tab 3    trospium (SANCTURA) 20 mg tablet TAKE 1 TAB BY MOUTH TWO (2) TIMES A DAY.  180 Tab 0    cyanocobalamin (VITAMIN B12) 500 mcg tablet Take 1 Tab by mouth daily. DC B12 1000- mcg 30 Tab 3    OTHER Left wrist splint use everyday 1 Each 0    ALPRAZolam (XANAX) 0.25 mg tablet Take 1 Tab by mouth two (2) times daily as needed for Anxiety. Max Daily Amount: 0.5 mg. 20 Tab 0    atorvastatin (LIPITOR) 80 mg tablet Take 1 Tab by mouth daily. 90 Tab 1    losartan (COZAAR) 25 mg tablet Take 1 Tab by mouth daily. 90 Tab 1    carvediloL (Coreg) 3.125 mg tablet Take 1 Tab by mouth two (2) times daily (with meals). 180 Tab 1    loratadine (CLARITIN) 10 mg tablet 1 tab po every day 30 Tab 1    levothyroxine (SYNTHROID) 25 mcg tablet TAKE 1 TABLET BY MOUTH DAILY BEFORE BREAKFAST 90 Tab 0    multivitamin (ONE A DAY) tablet Take 1 Tab by mouth daily. 30 Tab 12    calcium-cholecalciferol, D3, (CALTRATE 600+D) tablet Take 1 Tab by mouth daily. 90 Tab 2    GAVILAX 17 gram/dose powder Take 17 g by mouth daily as needed (constipation). 714 g 4    magnesium oxide (MAG-OX) 400 mg tablet TAKE 1 TAB BY MOUTH DAILY. 90 Tab 1    XARELTO 15 mg tab tablet       OTHER rollator walker for gait abnormality 1 Device 0    ferrous sulfate 325 mg (65 mg iron) tablet TAKE 1 TABLET BY MOUTH EVERY DAY BEFORE BREAKFAST 90 Tab 1    sodium chloride-aloe vera (AYR SALINE) topical gel Apply  to affected area once.  oxymetazoline HCl (AFRIN NA) 1 [place] by Both Nostrils route as needed (nose bleeding).  furosemide (LASIX) 40 mg tablet Take 40 mg by mouth two (2) times a day.  albuterol (PROAIR HFA) 90 mcg/actuation inhaler Take 2 Puffs by inhalation every six (6) hours as needed for Wheezing. 1 Inhaler 2    zinc oxide-cod liver oil (DESITIN) 40 % ointment Apply  to affected area as needed for Skin Irritation.  docusate sodium (COLACE) 100 mg capsule Take 100 mg by mouth two (2) times daily as needed.        Allergies   Allergen Reactions    Ace Inhibitors Cough       Family History   Problem Relation Age of Onset    Hypertension Mother      Social History     Tobacco Use    Smoking status: Never Smoker    Smokeless tobacco: Never Used   Substance Use Topics    Alcohol use: No     Alcohol/week: 0.0 standard drinks       Depression Risk Factor Screening:     3 most recent PHQ Screens 7/28/2020   PHQ Not Done -   Little interest or pleasure in doing things Not at all   Feeling down, depressed, irritable, or hopeless Not at all   Total Score PHQ 2 0   Trouble falling or staying asleep, or sleeping too much -   Feeling tired or having little energy -   Poor appetite, weight loss, or overeating -   Feeling bad about yourself - or that you are a failure or have let yourself or your family down -   Trouble concentrating on things such as school, work, reading, or watching TV -   Moving or speaking so slowly that other people could have noticed; or the opposite being so fidgety that others notice -   Thoughts of being better off dead, or hurting yourself in some way -   PHQ 9 Score -   How difficult have these problems made it for you to do your work, take care of your home and get along with others -       Alcohol Risk Factor Screening:   Do you average 1 drink per night or more than 7 drinks a week:  No    On any one occasion in the past three months have you have had more than 3 drinks containing alcohol:  No      Functional Ability and Level of Safety:   Hearing: Hearing is good. Activities of Daily Living: The home contains: walker  Patient needs help with:  managing medications and bathing     Ambulation: with no difficulty     Fall Risk:  Fall Risk Assessment, last 12 mths 7/28/2020   Able to walk? Yes   Fall in past 12 months?  No   Fall with injury? -   Number of falls in past 12 months -   Fall Risk Score -     Abuse Screen:  Patient is not abused       Cognitive Screening   Has your family/caregiver stated any concerns about your memory: no    Cognitive Screening: Normal - MMSE (Mini Mental Status Exam)    Patient Care Team   Patient Care Team:  Brandie Howe Manjula Yeh MD as PCP - General (Internal Medicine)  Jaswinder Burroughs MD as PCP - [facility][1] Provider  Nico Wolf MD (Orthopedic Surgery)  Kitty Peabody, MD (Cardiology)  Natasha Michael LPN as Ambulatory Care Manager    Assessment/Plan   Education and counseling provided:  Are appropriate based on today's review and evaluation  End-of-Life planning (with patient's consent)  Screening for glaucoma        Health Maintenance Due   Topic Date Due    DTaP/Tdap/Td series (1 - Tdap) 08/29/1945    Shingrix Vaccine Age 50> (1 of 2) 08/29/1974    GLAUCOMA SCREENING Q2Y  08/29/1989    Lipid Screen  05/07/2020       Venkat Hinkle, who was evaluated through a synchronous (real-time) audio-video encounter, and/or her healthcare decision maker, is aware that it is a billable service, with coverage as determined by her insurance carrier. She provided verbal consent to proceed: Yes, and patient identification was verified. It was conducted pursuant to the emergency declaration under the [facility], [address] [place] [address] authority and the [employer] General Act. A caregiver was present when appropriate. Ability to conduct physical exam was limited. I was in the office. The patient was at home.     Raul Castro MD

## 2020-07-28 NOTE — PATIENT INSTRUCTIONS
Medicare Wellness Visit, Female The best way to live healthy is to have a lifestyle where you eat a well-balanced diet, exercise regularly, limit alcohol use, and quit all forms of tobacco/nicotine, if applicable. Regular preventive services are another way to keep healthy. Preventive services (vaccines, screening tests, monitoring & exams) can help personalize your care plan, which helps you manage your own care. Screening tests can find health problems at the earliest stages, when they are easiest to treat. [facility] follows the current, evidence-based guidelines published by the [facility] ([facility]) when recommending preventive services for our patients. Because we follow these guidelines, sometimes recommendations change over time as research supports it. (For example, mammograms used to be recommended annually. Even though Medicare will still pay for an annual mammogram, the newer guidelines recommend a mammogram every two years for women of average risk). Of course, you and your doctor may decide to screen more often for some diseases, based on your risk and your co-morbidities (chronic disease you are already diagnosed with). Preventive services for you include: - Medicare offers their members a free annual wellness visit, which is time for you and your primary care provider to discuss and plan for your preventive service needs. Take advantage of this benefit every year! 
-All adults over the age of 72 should receive the recommended pneumonia vaccines. Current USPSTF guidelines recommend a series of two vaccines for the best pneumonia protection.  
-All adults should have a flu vaccine yearly and a tetanus vaccine every 10 years.  
-All adults age 48 and older should receive the shingles vaccines (series of two vaccines). -All adults age 38-68 who are overweight should have a diabetes screening test once every three years. -All adults born between [birth year] and 1965 should be screened once for Hepatitis C. 
-Other screening tests and preventive services for persons with diabetes include: an eye exam to screen for diabetic retinopathy, a kidney function test, a foot exam, and stricter control over your cholesterol.  
-Cardiovascular screening for adults with routine risk involves an electrocardiogram (ECG) at intervals determined by your doctor.  
-Colorectal cancer screenings should be done for adults age 54-65 with no increased risk factors for colorectal cancer. There are a number of acceptable methods of screening for this type of cancer. Each test has its own benefits and drawbacks. Discuss with your doctor what is most appropriate for you during your annual wellness visit. The different tests include: colonoscopy (considered the best screening method), a fecal occult blood test, a fecal DNA test, and sigmoidoscopy. 
 
-A bone mass density test is recommended when a woman turns 65 to screen for osteoporosis. This test is only recommended one time, as a screening. Some providers will use this same test as a disease monitoring tool if you already have osteoporosis. -Breast cancer screenings are recommended every other year for women of normal risk, age 54-69. 
-Cervical cancer screenings for women over age 72 are only recommended with certain risk factors. Here is a list of your current Health Maintenance items (your personalized list of preventive services) with a due date: 
Health Maintenance Due Topic Date Due  
 DTaP/Tdap/Td  (1 - Tdap) 08/29/1945  Shingles Vaccine (1 of 2) 08/29/1974  Glaucoma Screening   08/29/1989  Cholesterol Test   05/07/2020

## 2020-07-28 NOTE — PROGRESS NOTES
Health Maintenance Due   Topic Date Due    DTaP/Tdap/Td series (1 - Tdap) 08/29/1945    Shingrix Vaccine Age 50> (1 of 2) 08/29/1974    GLAUCOMA SCREENING Q2Y  08/29/1989    Lipid Screen  05/07/2020    Medicare Yearly Exam  06/05/2020       Chief Complaint   Patient presents with    Anxiety     not sleeping well, feels like she is taking too many pills and attempts to refuse medications daily    Hand Pain     states left hand is cold and throbbing at times       1. Have you been to the ER, urgent care clinic since your last visit? Hospitalized since your last visit? No    2. Have you seen or consulted any other health care providers outside of the [address] [place] [address] since your last visit? Include any pap smears or colon screening. No    3) Do you have an Advance Directive on file? no    4) Are you interested in receiving information on Advance Directives? NO      Patient is accompanied by self I have received verbal consent from Curtis Melendez to discuss any/all medical information while they are present in the room.

## 2020-07-28 NOTE — PROGRESS NOTES
Nevin Dale is a 80 y.o. female who was seen by synchronous (real-time) audio-video technology on 7/28/2020 for Anxiety (not sleeping well, feels like she is taking too many pills and attempts to refuse medications daily) and Hand Pain (states left hand is cold and throbbing at times)        Assessment & Plan:   Diagnoses and all orders for this visit:    1. Essential hypertension  Stable blood pressure. On the same regimen. 2. Hypothyroidism, unspecified type  On Synthroid. Need lab work. 3. 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 Doing well. 4. Medicare annual wellness visit, subsequent    5. ACP (advance care planning)    6. Insomnia, unspecified type  Already started on Zoloft. Seroquel 25 mg is not helping her. Will increase to 50 mg a day. 7. Depression with anxiety    We will continue,  -     sertraline (ZOLOFT) 50 mg tablet; Take 1 Tab by mouth daily. I spent at least 25 minutes on this visit with this established patient. Subjective:     Ms. Leila Washington is staying with her daughter. She has lost her son. She is not able to sleep at night. She is started on Zoloft which is helping her but I guess she is still anxious and depressed. She had history of stroke, taking medications. Doing well. Able to do almost all activities but needs some help to do breathing. Has CHF, on Lasix, seeing cardiologist.  Ejection fraction is low. She reports that she has been having urinary frequency. No shortness of breath or orthopnea. Report pain on left hand sometimes. Has lot of numbness and tingling. Here for Medicare wellness visit. Has living will. Prior to Admission medications    Medication Sig Start Date End Date Taking? Authorizing Provider   sertraline (ZOLOFT) 50 mg tablet Take 1 Tab by mouth daily.  **give 25mg by mouth 1 x a day x 1 week then increase to 50mg po every day** 7/28/20  Yes Giuseppe Humphrey MD   diclofenac (VOLTAREN) 1 % gel Apply 2 g to affected area two (2) times a day. 7/14/20  Yes Jayleen Ross MD   alendronate (FOSAMAX) 35 mg tablet Take 1 Tab by mouth every Wednesday. 6/24/20  Yes Jayleen Ross MD   trospium (SANCTURA) 20 mg tablet TAKE 1 TAB BY MOUTH TWO (2) TIMES A DAY. 6/18/20  Yes Jayleen Ross MD   cyanocobalamin (VITAMIN B12) 500 mcg tablet Take 1 Tab by mouth daily. DC B12 1000- mcg 6/18/20  Yes Jayleen Ross MD   OTHER Left wrist splint use everyday 6/17/20  Yes Jayleen Ross MD   ALPRAZolam Elane Perks) 0.25 mg tablet Take 1 Tab by mouth two (2) times daily as needed for Anxiety. Max Daily Amount: 0.5 mg. 6/9/20  Yes Jayleen Ross MD   atorvastatin (LIPITOR) 80 mg tablet Take 1 Tab by mouth daily. 5/22/20  Yes Jayleen Ross MD   losartan (COZAAR) 25 mg tablet Take 1 Tab by mouth daily. 5/22/20  Yes Jayleen Ross MD   carvediloL (Coreg) 3.125 mg tablet Take 1 Tab by mouth two (2) times daily (with meals). 5/22/20  Yes Jayleen Ross MD   loratadine (CLARITIN) 10 mg tablet 1 tab po every day 5/22/20  Yes Jayleen Ross MD   levothyroxine (SYNTHROID) 25 mcg tablet TAKE 1 TABLET BY MOUTH DAILY BEFORE BREAKFAST 5/19/20  Yes Ridge Gamez NP   multivitamin (ONE A DAY) tablet Take 1 Tab by mouth daily. 2/25/20  Yes Jayleen Ross MD   calcium-cholecalciferol, D3, (CALTRATE 600+D) tablet Take 1 Tab by mouth daily. 2/19/20  Yes Jayleen Ross MD   GAVILAX 17 gram/dose powder Take 17 g by mouth daily as needed (constipation). 2/19/20  Yes Jayleen Ross MD   magnesium oxide (MAG-OX) 400 mg tablet TAKE 1 TAB BY MOUTH DAILY. 11/19/19  Yes Ridge Gamez NP   XARELTO 15 mg tab tablet  10/19/19  Yes Provider, Historical   OTHER rollator walker for gait abnormality 10/23/19  Yes Ridge Gamez, NP   ferrous sulfate 325 mg (65 mg iron) tablet TAKE 1 TABLET BY MOUTH EVERY DAY BEFORE BREAKFAST 10/14/19  Yes Jayleen Ross MD   sodium chloride-aloe vera (AYR SALINE) topical gel Apply  to affected area once.    Yes Provider, Historical oxymetazoline HCl (AFRIN NA) 1 [place] by Both Nostrils route as needed (nose bleeding). Yes Provider, Historical   furosemide (LASIX) 40 mg tablet Take 40 mg by mouth two (2) times a day. Yes Provider, Historical   albuterol (PROAIR HFA) 90 mcg/actuation inhaler Take 2 Puffs by inhalation every six (6) hours as needed for Wheezing. 8/14/18  Yes Karo Funk, DO   zinc oxide-cod liver oil (DESITIN) 40 % ointment Apply  to affected area as needed for Skin Irritation. Yes Provider, Historical   docusate sodium (COLACE) 100 mg capsule Take 100 mg by mouth two (2) times daily as needed. Yes Provider, Historical   QUEtiapine (SEROquel) 25 mg tablet Take 1 Tab by mouth nightly. 7/14/20 7/28/20  Candance Maris, MD   sertraline (ZOLOFT) 50 mg tablet Take 1 Tab by mouth daily. **give 25mg by mouth 1 x a day x 1 week then increase to 50mg po every day** 7/14/20 7/28/20  Candance Maris, MD     Past Medical History:   Diagnosis Date    Atrial fibrillation [facility]) 2007    Hypercholesterolemia     Hypertension     Osteoarthritis 2010    Stroke ([facility] [address]     Thromboembolus [facility])     Thyroid disease        ROS significant for insomnia, and tingling sensation on the left arm.     Objective:     Patient-Reported Vitals 7/28/2020   Patient-Reported Height 5f6        Constitutional: [x] Appears well-developed and well-nourished [x] No apparent distress      [] Abnormal -     Mental status: [x] Alert and awake  [x] Oriented to person/place/time [x] Able to follow commands    [] Abnormal -     HENT: [x] Normocephalic, atraumatic  [] Abnormal -   [x] Mouth/Throat: Mucous membranes are moist    External Ears [x] Normal  [] Abnormal -    Neck: [x] No visualized mass [] Abnormal -     Pulmonary/Chest: [x] Respiratory effort normal   [x] No visualized signs of difficulty breathing or respiratory distress        [] Abnormal -      Musculoskeletal:   [x] Normal gait with no signs of ataxia         [x] Normal range of motion of neck [] Abnormal -     Neurological:        [x] No Facial Asymmetry (Cranial nerve 7 motor function) (limited exam due to video visit)          [x] No gaze palsy        [] Abnormal -          Skin:        [x] No significant exanthematous lesions or discoloration noted on facial skin         [] Abnormal -            Psychiatric:     Depressed and anxious. Other pertinent observable physical exam findings:-        We discussed the expected course, resolution and complications of the diagnosis(es) in detail. Medication risks, benefits, costs, interactions, and alternatives were discussed as indicated. I advised her to contact the office if her condition worsens, changes or fails to improve as anticipated. She expressed understanding with the diagnosis(es) and plan. Maggie Patel, who was evaluated through a patient-initiated, synchronous (real-time) audio-video encounter, and/or her healthcare decision maker, is aware that it is a billable service, with coverage as determined by her insurance carrier. She provided verbal consent to proceed: Yes, and patient identification was verified. It was conducted pursuant to the emergency declaration under the [address] [place] [address], [address] [place] [address] authority and the [employer] General Act. A caregiver was present when appropriate. Ability to conduct physical exam was limited. I was in the office. The patient was at home.       Andres Carpenter MD

## 2020-08-03 ENCOUNTER — TELEPHONE (OUTPATIENT)
Dept: INTERNAL MEDICINE CLINIC | Age: 85
End: 2020-08-03

## 2020-08-03 NOTE — TELEPHONE ENCOUNTER
Returned Gladys's call and after verifying HIPAA provided clarification of the pt's sertraline prescriptions.

## 2020-08-13 ENCOUNTER — HOSPITAL ENCOUNTER (OUTPATIENT)
Dept: LAB | Age: 85
Discharge: HOME | End: 2020-08-13
Payer: MEDICARE

## 2020-08-13 PROCEDURE — 36415 COLL VENOUS BLD VENIPUNCTURE: CPT

## 2020-08-13 PROCEDURE — 82728 ASSAY OF FERRITIN: CPT

## 2020-08-13 PROCEDURE — 84443 ASSAY THYROID STIM HORMONE: CPT

## 2020-08-13 PROCEDURE — 80061 LIPID PANEL: CPT

## 2020-08-13 PROCEDURE — 83540 ASSAY OF IRON: CPT

## 2020-08-13 PROCEDURE — 82306 VITAMIN D 25 HYDROXY: CPT

## 2020-08-13 PROCEDURE — 85025 COMPLETE CBC W/AUTO DIFF WBC: CPT

## 2020-08-13 PROCEDURE — 80053 COMPREHEN METABOLIC PANEL: CPT

## 2020-08-14 LAB
25(OH)D3+25(OH)D2 SERPL-MCNC: 35 NG/ML (ref 30–100)
ALBUMIN SERPL-MCNC: 3.7 G/DL (ref 3.5–4.6)
ALBUMIN/GLOB SERPL: 1.2 {RATIO} (ref 1.2–2.2)
ALP SERPL-CCNC: 88 IU/L (ref 39–117)
ALT SERPL-CCNC: 16 IU/L (ref 0–32)
AST SERPL-CCNC: 28 IU/L (ref 0–40)
BASOPHILS # BLD AUTO: 0 X10E3/UL (ref 0–0.2)
BASOPHILS NFR BLD AUTO: 1 %
BILIRUB SERPL-MCNC: 0.4 MG/DL (ref 0–1.2)
BUN SERPL-MCNC: 31 MG/DL (ref 10–36)
BUN/CREAT SERPL: 24 (ref 12–28)
CALCIUM SERPL-MCNC: 9.1 MG/DL (ref 8.7–10.3)
CHLORIDE SERPL-SCNC: 102 MMOL/L (ref 96–106)
CHOLEST SERPL-MCNC: 148 MG/DL (ref 100–199)
CO2 SERPL-SCNC: 25 MMOL/L (ref 20–29)
CREAT SERPL-MCNC: 1.3 MG/DL (ref 0.57–1)
EOSINOPHIL # BLD AUTO: 0.1 X10E3/UL (ref 0–0.4)
EOSINOPHIL NFR BLD AUTO: 2 %
ERYTHROCYTE [DISTWIDTH] IN BLOOD BY AUTOMATED COUNT: 14 % (ref 11.7–15.4)
FERRITIN SERPL-MCNC: 144 NG/ML (ref 15–150)
GLOBULIN SER CALC-MCNC: 3 G/DL (ref 1.5–4.5)
GLUCOSE SERPL-MCNC: 101 MG/DL (ref 65–99)
HCT VFR BLD AUTO: 34.4 % (ref 34–46.6)
HDLC SERPL-MCNC: 64 MG/DL
HGB BLD-MCNC: 11.7 G/DL (ref 11.1–15.9)
IMM GRANULOCYTES # BLD AUTO: 0 X10E3/UL (ref 0–0.1)
IMM GRANULOCYTES NFR BLD AUTO: 0 %
INTERPRETATION, 910389: NORMAL
INTERPRETATION: NORMAL
IRON SERPL-MCNC: 90 UG/DL (ref 27–139)
LDLC SERPL CALC-MCNC: 68 MG/DL (ref 0–99)
LYMPHOCYTES # BLD AUTO: 1.3 X10E3/UL (ref 0.7–3.1)
LYMPHOCYTES NFR BLD AUTO: 33 %
Lab: NORMAL
MCH RBC QN AUTO: 29.5 PG (ref 26.6–33)
MCHC RBC AUTO-ENTMCNC: 34 G/DL (ref 31.5–35.7)
MCV RBC AUTO: 87 FL (ref 79–97)
MONOCYTES # BLD AUTO: 0.4 X10E3/UL (ref 0.1–0.9)
MONOCYTES NFR BLD AUTO: 10 %
NEUTROPHILS # BLD AUTO: 2.2 X10E3/UL (ref 1.4–7)
NEUTROPHILS NFR BLD AUTO: 54 %
PDF IMAGE, 910387: NORMAL
PLATELET # BLD AUTO: 245 X10E3/UL (ref 150–450)
POTASSIUM SERPL-SCNC: 3.9 MMOL/L (ref 3.5–5.2)
PROT SERPL-MCNC: 6.7 G/DL (ref 6–8.5)
RBC # BLD AUTO: 3.96 X10E6/UL (ref 3.77–5.28)
SODIUM SERPL-SCNC: 140 MMOL/L (ref 134–144)
TRIGL SERPL-MCNC: 79 MG/DL (ref 0–149)
TSH SERPL DL<=0.005 MIU/L-ACNC: 2.16 UIU/ML (ref 0.45–4.5)
VLDLC SERPL CALC-MCNC: 16 MG/DL (ref 5–40)
WBC # BLD AUTO: 4 X10E3/UL (ref 3.4–10.8)

## 2020-08-18 RX ORDER — LEVOTHYROXINE SODIUM 25 UG/1
TABLET ORAL
Qty: 90 TAB | Refills: 0 | Status: SHIPPED | OUTPATIENT
Start: 2020-08-18 | End: 2020-11-16 | Stop reason: SDUPTHER

## 2020-09-08 RX ORDER — QUETIAPINE FUMARATE 25 MG/1
25 TABLET, FILM COATED ORAL
Qty: 30 TAB | Refills: 5 | Status: SHIPPED | OUTPATIENT
Start: 2020-09-08 | End: 2020-09-23 | Stop reason: SDUPTHER

## 2020-09-11 RX ORDER — TROSPIUM CHLORIDE 20 MG/1
TABLET, FILM COATED ORAL
Qty: 180 TAB | Refills: 0 | Status: SHIPPED | OUTPATIENT
Start: 2020-09-11 | End: 2020-12-10

## 2020-09-15 DIAGNOSIS — F41.8 DEPRESSION WITH ANXIETY: ICD-10-CM

## 2020-09-15 RX ORDER — SERTRALINE HYDROCHLORIDE 50 MG/1
50 TABLET, FILM COATED ORAL DAILY
Qty: 90 TAB | Refills: 1 | Status: SHIPPED | OUTPATIENT
Start: 2020-09-15 | End: 2021-01-26 | Stop reason: SDUPTHER

## 2020-09-15 NOTE — TELEPHONE ENCOUNTER
Requested Prescriptions     Pending Prescriptions Disp Refills    sertraline (ZOLOFT) 50 mg tablet 90 Tab 1     Sig: Take 1 Tab by mouth daily.  **give 25mg by mouth 1 x a day x 1 week then increase to 50mg po every day**     07/28/2020  [facility] upcoming  [facility]

## 2020-09-23 DIAGNOSIS — F41.8 DEPRESSION WITH ANXIETY: Primary | ICD-10-CM

## 2020-09-23 RX ORDER — QUETIAPINE FUMARATE 25 MG/1
25 TABLET, FILM COATED ORAL
Qty: 30 TAB | Refills: 5 | Status: SHIPPED | OUTPATIENT
Start: 2020-09-23 | End: 2021-01-28 | Stop reason: ALTCHOICE

## 2020-10-16 RX ORDER — QUETIAPINE FUMARATE 50 MG/1
TABLET, FILM COATED ORAL
Qty: 90 TAB | Refills: 0 | Status: SHIPPED | OUTPATIENT
Start: 2020-10-16 | End: 2021-01-12

## 2020-11-15 DIAGNOSIS — I10 ESSENTIAL HYPERTENSION: ICD-10-CM

## 2020-11-16 RX ORDER — MULTIVITAMIN
TABLET ORAL
Qty: 90 TAB | Refills: 1 | Status: SHIPPED | OUTPATIENT
Start: 2020-11-16 | End: 2021-03-25

## 2020-11-16 RX ORDER — LEVOTHYROXINE SODIUM 25 UG/1
TABLET ORAL
Qty: 90 TAB | Refills: 0 | Status: SHIPPED | OUTPATIENT
Start: 2020-11-16 | End: 2021-01-28 | Stop reason: SDUPTHER

## 2020-11-16 RX ORDER — CARVEDILOL 3.12 MG/1
TABLET ORAL
Qty: 180 TAB | Refills: 0 | Status: SHIPPED | OUTPATIENT
Start: 2020-11-16 | End: 2021-01-28 | Stop reason: DRUGHIGH

## 2020-12-10 RX ORDER — TROSPIUM CHLORIDE 20 MG/1
TABLET, FILM COATED ORAL
Qty: 180 TAB | Refills: 0 | Status: SHIPPED | OUTPATIENT
Start: 2020-12-10 | End: 2021-01-28 | Stop reason: SDUPTHER

## 2021-01-12 RX ORDER — QUETIAPINE FUMARATE 50 MG/1
TABLET, FILM COATED ORAL
Qty: 90 TAB | Refills: 0 | Status: SHIPPED | OUTPATIENT
Start: 2021-01-12 | End: 2021-03-25

## 2021-01-24 DIAGNOSIS — I10 ESSENTIAL HYPERTENSION: ICD-10-CM

## 2021-01-25 RX ORDER — BISMUTH SUBSALICYLATE 262 MG
TABLET,CHEWABLE ORAL
Qty: 30 TAB | Refills: 11 | Status: SHIPPED | OUTPATIENT
Start: 2021-01-25 | End: 2021-12-16

## 2021-01-25 RX ORDER — LOSARTAN POTASSIUM 25 MG/1
TABLET ORAL
Qty: 90 TAB | Refills: 0 | Status: SHIPPED | OUTPATIENT
Start: 2021-01-25 | End: 2021-04-26

## 2021-01-28 ENCOUNTER — VIRTUAL VISIT (OUTPATIENT)
Dept: INTERNAL MEDICINE CLINIC | Age: 86
End: 2021-01-28
Payer: MEDICARE

## 2021-01-28 DIAGNOSIS — E03.9 HYPOTHYROIDISM, UNSPECIFIED TYPE: Primary | ICD-10-CM

## 2021-01-28 DIAGNOSIS — E55.9 VITAMIN D DEFICIENCY: ICD-10-CM

## 2021-01-28 DIAGNOSIS — I63.411 CEREBROVASCULAR ACCIDENT (CVA) DUE TO EMBOLISM OF RIGHT MIDDLE CEREBRAL ARTERY (HCC): ICD-10-CM

## 2021-01-28 DIAGNOSIS — I48.91 ATRIAL FIBRILLATION, UNSPECIFIED TYPE (HCC): ICD-10-CM

## 2021-01-28 DIAGNOSIS — E53.8 B12 DEFICIENCY: ICD-10-CM

## 2021-01-28 DIAGNOSIS — I10 ESSENTIAL HYPERTENSION: ICD-10-CM

## 2021-01-28 DIAGNOSIS — N32.81 OAB (OVERACTIVE BLADDER): ICD-10-CM

## 2021-01-28 DIAGNOSIS — F43.21 GRIEF: ICD-10-CM

## 2021-01-28 DIAGNOSIS — D50.9 IRON DEFICIENCY ANEMIA, UNSPECIFIED IRON DEFICIENCY ANEMIA TYPE: ICD-10-CM

## 2021-01-28 DIAGNOSIS — E03.9 HYPOTHYROIDISM, UNSPECIFIED TYPE: ICD-10-CM

## 2021-01-28 DIAGNOSIS — E78.2 MIXED HYPERLIPIDEMIA: ICD-10-CM

## 2021-01-28 DIAGNOSIS — E83.42 HYPOMAGNESEMIA: ICD-10-CM

## 2021-01-28 DIAGNOSIS — M81.0 OSTEOPOROSIS, UNSPECIFIED OSTEOPOROSIS TYPE, UNSPECIFIED PATHOLOGICAL FRACTURE PRESENCE: ICD-10-CM

## 2021-01-28 DIAGNOSIS — F41.0 ANXIETY ATTACK: ICD-10-CM

## 2021-01-28 DIAGNOSIS — F41.8 DEPRESSION WITH ANXIETY: ICD-10-CM

## 2021-01-28 PROCEDURE — G0463 HOSPITAL OUTPT CLINIC VISIT: HCPCS | Performed by: INTERNAL MEDICINE

## 2021-01-28 PROCEDURE — 99214 OFFICE O/P EST MOD 30 MIN: CPT | Performed by: INTERNAL MEDICINE

## 2021-01-28 RX ORDER — ALPRAZOLAM 0.25 MG/1
0.25 TABLET ORAL
Qty: 20 TAB | Refills: 0 | Status: SHIPPED | OUTPATIENT
Start: 2021-01-28 | End: 2021-04-28 | Stop reason: SDUPTHER

## 2021-01-28 RX ORDER — SERTRALINE HYDROCHLORIDE 50 MG/1
50 TABLET, FILM COATED ORAL DAILY
Qty: 90 TAB | Refills: 1 | Status: SHIPPED | OUTPATIENT
Start: 2021-01-28 | End: 2021-04-28 | Stop reason: DRUGHIGH

## 2021-01-28 RX ORDER — ALENDRONATE SODIUM 35 MG/1
35 TABLET ORAL
Qty: 12 TAB | Refills: 3 | Status: SHIPPED | OUTPATIENT
Start: 2021-02-03 | End: 2021-12-15

## 2021-01-28 RX ORDER — TROSPIUM CHLORIDE 20 MG/1
20 TABLET, FILM COATED ORAL 2 TIMES DAILY
Qty: 180 TAB | Refills: 1 | Status: SHIPPED | OUTPATIENT
Start: 2021-01-28 | End: 2021-07-19 | Stop reason: SDUPTHER

## 2021-01-28 RX ORDER — LANOLIN ALCOHOL/MO/W.PET/CERES
325 CREAM (GRAM) TOPICAL
Qty: 90 TAB | Refills: 1 | Status: SHIPPED | OUTPATIENT
Start: 2021-01-28 | End: 2021-07-23

## 2021-01-28 RX ORDER — CARVEDILOL 6.25 MG/1
6.25 TABLET ORAL 2 TIMES DAILY WITH MEALS
Qty: 180 TAB | Refills: 1 | Status: SHIPPED | OUTPATIENT
Start: 2021-01-28 | End: 2021-07-19 | Stop reason: DRUGHIGH

## 2021-01-28 RX ORDER — ATORVASTATIN CALCIUM 80 MG/1
80 TABLET, FILM COATED ORAL DAILY
Qty: 90 TAB | Refills: 1 | Status: SHIPPED | OUTPATIENT
Start: 2021-01-28 | End: 2021-07-19 | Stop reason: SDUPTHER

## 2021-01-28 RX ORDER — RIVAROXABAN 15 MG/1
15 TABLET, FILM COATED ORAL
Qty: 90 TAB | Refills: 1 | Status: SHIPPED | OUTPATIENT
Start: 2021-01-28 | End: 2021-08-23

## 2021-01-28 RX ORDER — LEVOTHYROXINE SODIUM 25 UG/1
TABLET ORAL
Qty: 90 TAB | Refills: 1 | Status: SHIPPED | OUTPATIENT
Start: 2021-01-28 | End: 2021-07-23

## 2021-01-28 RX ORDER — LANOLIN ALCOHOL/MO/W.PET/CERES
CREAM (GRAM) TOPICAL
Qty: 90 TAB | Refills: 1 | Status: SHIPPED | OUTPATIENT
Start: 2021-01-28 | End: 2021-09-21

## 2021-01-28 NOTE — PROGRESS NOTES
Silas Delacruz is a 80 y.o. female who was seen by synchronous (real-time) audio-video technology on 1/28/2021 for Hypertension, Cerebrovascular Accident, and Hypothyroidism        Assessment & Plan:   Diagnoses and all orders for this visit:    1. Hypothyroidism, unspecified type    Need lab work. Will refill,  -     levothyroxine (SYNTHROID) 25 mcg tablet; 1 tab po am  -     TSH 3RD GENERATION; Future    2. Cerebrovascular accident (CVA) due to embolism of right middle cerebral artery ([facility]    Staying home, able to ambulate okay. Will refill,  -     atorvastatin (LIPITOR) 80 mg tablet; Take 1 Tab by mouth daily.  -     CBC WITH AUTOMATED DIFF; Future  -     METABOLIC PANEL, COMPREHENSIVE; Future    3. Depression with anxiety    Stable depression. Will refill,  -     sertraline (ZOLOFT) 50 mg tablet; Take 1 Tab by mouth daily. 50mg po every day    4. Essential hypertension    On losartan. Doing well. Will check,  -     CBC WITH AUTOMATED DIFF; Future  -     METABOLIC PANEL, COMPREHENSIVE; Future    5. Hypomagnesemia    Taking magnesium for past 2 years. Will repeat magnesium level.  -     magnesium oxide (MAG-OX) 400 mg tablet; 1 tab po QD  -     MAGNESIUM; Future    6. Anxiety attack    We will refill,  -     ALPRAZolam (XANAX) 0.25 mg tablet; Take 1 Tab by mouth two (2) times daily as needed for Anxiety. Max Daily Amount: 0.5 mg.      8. Iron deficiency anemia, unspecified iron deficiency anemia type  We will refill,  -     ferrous sulfate 325 mg (65 mg iron) tablet; Take 1 Tab by mouth Daily (before breakfast). -     FERRITIN; Future  -     IRON; Future    9. Mixed hyperlipidemia    10. Atrial fibrillation, unspecified type ([facility]    Rate and rhythm control. Will refill,  -     Xarelto 15 mg tab tablet; Take 1 Tab by mouth daily (with breakfast). -     carvediloL (COREG) 6.25 mg tablet; Take 1 Tab by mouth two (2) times daily (with meals). -     CBC WITH AUTOMATED DIFF;  Future  -     METABOLIC PANEL, COMPREHENSIVE; Future    11. Osteoporosis, unspecified osteoporosis type, unspecified pathological fracture presence  -     alendronate (FOSAMAX) 35 mg tablet; Take 1 Tab by mouth every Wednesday. 12. OAB (overactive bladder)  -     trospium (SANCTURA) 20 mg tablet; Take 1 Tab by mouth two (2) times a day. 13. Vitamin D deficiency  -     VITAMIN D, 25 HYDROXY; Future    14. B12 deficiency    Finished up supplement. Will check,  -     VITAMIN B12; Future        I spent at least 40 minutes on this visit with this established patient. Subjective:   Ms. Toyin Roth is staying with her daughter. She has lost her son. Still sometimes she remain anxious. She has other daughter lives in [place] who is also calling and doing teleconference with me. She has atrial fibrillation, on multiple medications including blood thinner. Coreg dosage was increased. Need refill. Need refill. Had history of stroke  , Taking high-dose of statin. Able to tolerate it well. She is ambulating well. Has iron deficiency anemia, still taking iron supplement. Stop taking B12 right now. Need to recheck labs. Has hypertension, compliant with medicine. Denies chest pain palpitation or shortness of breath. Has overactive bladder, stopped taking Lasix. No shortness of breath or orthopnea. Prior to Admission medications    Medication Sig Start Date End Date Taking? Authorizing Provider   atorvastatin (LIPITOR) 80 mg tablet Take 1 Tab by mouth daily. 1/28/21  Yes Sarah Arriola MD   sertraline (ZOLOFT) 50 mg tablet Take 1 Tab by mouth daily. 50mg po every day 1/28/21  Yes Sarah Arriola MD   levothyroxine (SYNTHROID) 25 mcg tablet 1 tab po am 1/28/21  Yes Sarah Arriola MD   Xarelto 15 mg tab tablet Take 1 Tab by mouth daily (with breakfast). 1/28/21  Yes Sarah Arriola MD   [facility]) 20 mg tablet Take 1 Tab by mouth two (2) times a day.  1/28/21  Yes Sarah Arriola MD   alendronate (FOSAMAX) 35 mg tablet Take 1 Tab by mouth every Wednesday. 2/3/21  Yes Rodrigo Rai MD   magnesium oxide (MAG-OX) 400 mg tablet 1 tab po QD 1/28/21  Yes Rodrigo Rai MD   ALPRAZolam Sarah Gather) 0.25 mg tablet Take 1 Tab by mouth two (2) times daily as needed for Anxiety. Max Daily Amount: 0.5 mg. 1/28/21  Yes Rodrigo Rai MD   ferrous sulfate 325 mg (65 mg iron) tablet Take 1 Tab by mouth Daily (before breakfast). 1/28/21  Yes Rodrigo Rai MD   carvediloL (COREG) 6.25 mg tablet Take 1 Tab by mouth two (2) times daily (with meals). 1/28/21  Yes Rodrigo Rai MD   multivitamin (ONE A DAY) tablet Take 1 tablet by mouth daily. 1/25/21  Yes Rodrigo Rai MD   losartan (COZAAR) 25 mg tablet Take 1 tablet by mouth daily. 1/25/21  Yes Rodrigo Rai MD   QUEtiapine (SEROquel) 50 mg tablet Take 1 tablet by mouth daily. 1/12/21  Yes Rodrigo Rai MD   calcium-cholecalciferol, D3, (CALTRATE 600+D) tablet Take 1 tablet by mouth daily. 11/16/20  Yes Rodrigo Rai MD   diclofenac (VOLTAREN) 1 % gel Apply 2 g to affected area two (2) times a day. 7/14/20  Yes Rodrigo Rai MD   OTHER Left wrist splint use everyday 6/17/20  Yes Rodrigo Rai MD   loratadine (CLARITIN) 10 mg tablet 1 tab po every day 5/22/20  Yes Rodrigo Rai MD   GAVILAX 17 gram/dose powder Take 17 g by mouth daily as needed (constipation). 2/19/20  Yes Rodrigo Rai MD   OTHER rollator walker for gait abnormality 10/23/19  Yes Preston Gamez NP   sodium chloride-aloe vera (AYR SALINE) topical gel Apply  to affected area once. Yes Provider, Historical   furosemide (LASIX) 40 mg tablet Take 40 mg by mouth two (2) times a day. Yes Provider, Historical   albuterol (PROAIR HFA) 90 mcg/actuation inhaler Take 2 Puffs by inhalation every six (6) hours as needed for Wheezing. 8/14/18  Yes Pelon Funk,    zinc oxide-cod liver oil (DESITIN) 40 % ointment Apply  to affected area as needed for Skin Irritation.    Yes Provider, Historical   docusate sodium (COLACE) 100 mg capsule Take 100 mg by mouth two (2) times daily as needed. Yes Provider, Historical   sertraline (ZOLOFT) 50 mg tablet Take 1 Tab by mouth daily. 50mg po every day 1/26/21 1/28/21  Nico Quintana MD   [facility]) 20 mg tablet Take 1 tablet by mouth twice daily. 12/10/20 1/28/21  Nico Quintana MD   carvediloL (COREG) 3.125 mg tablet Take 1 tablet by mouth twice daily with meals. 11/16/20 1/28/21  Nico Quintana MD   levothyroxine (SYNTHROID) 25 mcg tablet Take 1 tablet by mouth daily before breakfast. 11/16/20 1/28/21  George Gamez NP   QUEtiapine (SEROquel) 25 mg tablet Take 1 Tab by mouth nightly. 9/23/20 1/28/21  Annika Ward NP   alendronate (FOSAMAX) 35 mg tablet Take 1 Tab by mouth every Wednesday. 6/24/20 1/28/21  Nico Quintana MD   cyanocobalamin (VITAMIN B12) 500 mcg tablet Take 1 Tab by mouth daily. DC B12 1000- mcg 6/18/20 1/28/21  Nico Quintana MD   ALPRAZolam Unice Nigh) 0.25 mg tablet Take 1 Tab by mouth two (2) times daily as needed for Anxiety. Max Daily Amount: 0.5 mg. 6/9/20 1/28/21  Nico Quintana MD   atorvastatin (LIPITOR) 80 mg tablet Take 1 Tab by mouth daily. 5/22/20 1/28/21  Nico Quintana MD   magnesium oxide (MAG-OX) 400 mg tablet TAKE 1 TAB BY MOUTH DAILY. 11/19/19 1/28/21  Burgess Amanda NP   XARELTO 15 mg tab tablet  10/19/19 1/28/21  Provider, Historical   ferrous sulfate 325 mg (65 mg iron) tablet TAKE 1 TABLET BY MOUTH EVERY DAY BEFORE BREAKFAST 10/14/19 1/28/21  Nico Quintana MD   oxymetazoline HCl (AFRIN NA) 1 [place] by Both Nostrils route as needed (nose bleeding).   1/28/21  Provider, Historical     Past Medical History:   Diagnosis Date    Atrial fibrillation ([facility] 2007    Hypercholesterolemia     Hypertension     Osteoarthritis 2010    Stroke [facility]     Thromboembolus ([facility]     Thyroid disease        ROS negative    Objective:     Patient-Reported Vitals 1/28/2021   Patient-Reported Weight 165lb   Patient-Reported Height -          Constitutional: [x] Appears well-developed and well-nourished [x] No apparent distress      [] Abnormal -     Mental status: [x] Alert and awake  [x] Oriented to person/place/time [x] Able to follow commands    [] Abnormal -     HENT: [x] Normocephalic, atraumatic  [] Abnormal -   [x] Mouth/Throat: Mucous membranes are moist    External Ears [x] Normal  [] Abnormal -    Neck: [x] No visualized mass [] Abnormal -     Pulmonary/Chest: [x] Respiratory effort normal   [x] No visualized signs of difficulty breathing or respiratory distress        [] Abnormal -      Musculoskeletal:   [x] Normal gait with no signs of ataxia         [x] Normal range of motion of neck        [] Abnormal -     Neurological:        [x] No Facial Asymmetry (Cranial nerve 7 motor function) (limited exam due to video visit)          [x] No gaze palsy        [] Abnormal -          Skin:        [x] No significant exanthematous lesions or discoloration noted on facial skin         [] Abnormal -            Psychiatric:       [x] Normal Affect [] Abnormal -        [x] No Hallucinations    Other pertinent observable physical exam findings:-        We discussed the expected course, resolution and complications of the diagnosis(es) in detail. Medication risks, benefits, costs, interactions, and alternatives were discussed as indicated. I advised her to contact the office if her condition worsens, changes or fails to improve as anticipated. She expressed understanding with the diagnosis(es) and plan. Kimberley Delaney, who was evaluated through a patient-initiated, synchronous (real-time) audio-video encounter, and/or her healthcare decision maker, is aware that it is a billable service, with coverage as determined by her insurance carrier. She provided verbal consent to proceed: Yes, and patient identification was verified.  It was conducted pursuant to the emergency declaration under the [facility] waiver authority and the [employer] Appropriations Act. A caregiver was present when appropriate. Ability to conduct physical exam was limited. I was in the office. The patient was at home.       Tanvi Kaur MD

## 2021-01-28 NOTE — PROGRESS NOTES
Health Maintenance Due   Topic Date Due    COVID-19 Vaccine (1 of 2) 08/29/1940    DTaP/Tdap/Td series (1 - Tdap) 08/29/1945    Shingrix Vaccine Age 50> (1 of 2) 08/29/1974    GLAUCOMA SCREENING Q2Y  08/29/1989       Chief Complaint   Patient presents with    Hypertension    Cerebrovascular Accident    Hypothyroidism       1. Have you been to the ER, urgent care clinic since your last visit? Hospitalized since your last visit? No    2. Have you seen or consulted any other health care providers outside of the [address] [place] [address] since your last visit? Include any pap smears or colon screening. No    3) Do you have an Advance Directive on file? no    4) Are you interested in receiving information on Advance Directives? NO      Patient is accompanied by self I have received verbal consent from Silas Delacruz to discuss any/all medical information while they are present in the room.

## 2021-03-25 RX ORDER — QUETIAPINE FUMARATE 50 MG/1
TABLET, FILM COATED ORAL
Qty: 90 TAB | Refills: 0 | Status: SHIPPED | OUTPATIENT
Start: 2021-03-25 | End: 2021-06-28

## 2021-03-25 RX ORDER — MULTIVITAMIN
TABLET ORAL
Qty: 90 TAB | Refills: 0 | Status: SHIPPED | OUTPATIENT
Start: 2021-03-25 | End: 2021-06-28

## 2021-04-14 DIAGNOSIS — J30.1 ALLERGIC RHINITIS DUE TO POLLEN, UNSPECIFIED SEASONALITY: ICD-10-CM

## 2021-04-14 RX ORDER — LORATADINE 10 MG/1
TABLET ORAL
Qty: 30 TAB | Refills: 0 | Status: SHIPPED | OUTPATIENT
Start: 2021-04-14 | End: 2021-05-07

## 2021-04-24 DIAGNOSIS — I10 ESSENTIAL HYPERTENSION: ICD-10-CM

## 2021-04-26 RX ORDER — LOSARTAN POTASSIUM 25 MG/1
TABLET ORAL
Qty: 90 TAB | Refills: 0 | Status: SHIPPED | OUTPATIENT
Start: 2021-04-26 | End: 2021-06-28

## 2021-04-28 ENCOUNTER — OFFICE VISIT (OUTPATIENT)
Dept: INTERNAL MEDICINE CLINIC | Age: 86
End: 2021-04-28
Payer: MEDICARE

## 2021-04-28 VITALS
WEIGHT: 166 LBS | RESPIRATION RATE: 18 BRPM | HEART RATE: 68 BPM | HEIGHT: 66 IN | TEMPERATURE: 98.7 F | DIASTOLIC BLOOD PRESSURE: 70 MMHG | BODY MASS INDEX: 26.68 KG/M2 | OXYGEN SATURATION: 98 % | SYSTOLIC BLOOD PRESSURE: 122 MMHG

## 2021-04-28 DIAGNOSIS — F41.0 ANXIETY ATTACK: Primary | ICD-10-CM

## 2021-04-28 DIAGNOSIS — F43.21 GRIEF: ICD-10-CM

## 2021-04-28 DIAGNOSIS — N39.41 URGE INCONTINENCE OF URINE: ICD-10-CM

## 2021-04-28 DIAGNOSIS — I10 ESSENTIAL HYPERTENSION: ICD-10-CM

## 2021-04-28 PROCEDURE — G8536 NO DOC ELDER MAL SCRN: HCPCS | Performed by: INTERNAL MEDICINE

## 2021-04-28 PROCEDURE — 99214 OFFICE O/P EST MOD 30 MIN: CPT | Performed by: INTERNAL MEDICINE

## 2021-04-28 PROCEDURE — G8432 DEP SCR NOT DOC, RNG: HCPCS | Performed by: INTERNAL MEDICINE

## 2021-04-28 PROCEDURE — G8427 DOCREV CUR MEDS BY ELIG CLIN: HCPCS | Performed by: INTERNAL MEDICINE

## 2021-04-28 PROCEDURE — G8419 CALC BMI OUT NRM PARAM NOF/U: HCPCS | Performed by: INTERNAL MEDICINE

## 2021-04-28 PROCEDURE — 1101F PT FALLS ASSESS-DOCD LE1/YR: CPT | Performed by: INTERNAL MEDICINE

## 2021-04-28 PROCEDURE — 1090F PRES/ABSN URINE INCON ASSESS: CPT | Performed by: INTERNAL MEDICINE

## 2021-04-28 PROCEDURE — G0463 HOSPITAL OUTPT CLINIC VISIT: HCPCS | Performed by: INTERNAL MEDICINE

## 2021-04-28 RX ORDER — ALPRAZOLAM 0.25 MG/1
0.25 TABLET ORAL
Qty: 20 TAB | Refills: 0 | Status: SHIPPED | OUTPATIENT
Start: 2021-04-28 | End: 2021-07-19

## 2021-04-28 RX ORDER — SERTRALINE HYDROCHLORIDE 100 MG/1
100 TABLET, FILM COATED ORAL DAILY
Qty: 90 TAB | Refills: 0 | Status: SHIPPED | OUTPATIENT
Start: 2021-04-28 | End: 2021-07-23

## 2021-04-28 RX ORDER — SERTRALINE HYDROCHLORIDE 50 MG/1
50 TABLET, FILM COATED ORAL DAILY
Qty: 12 TAB | Refills: 0 | Status: SHIPPED | OUTPATIENT
Start: 2021-04-28 | End: 2021-07-19 | Stop reason: ALTCHOICE

## 2021-04-28 RX ORDER — POTASSIUM CHLORIDE 750 MG/1
TABLET, FILM COATED, EXTENDED RELEASE ORAL
COMMUNITY
End: 2021-07-22 | Stop reason: SDUPTHER

## 2021-04-28 NOTE — PROGRESS NOTES
HISTORY OF PRESENT ILLNESS  Can Sanchez is a 80 y.o. female. Patient is seen with her daughter. Both reports that her anxiety seems to be uncontrolled. Reports that she can get herself worked up and often thinks about a lot. Her son did past a few months back. Daughter also reports some incontinence. Is sometimes unable to make it to the bathroom completely. Reports a few accidents a weeks. Drinks more coffee than anything. States that she has some dizziness. No falls. But in elaine of all the medications she is on. Cardiology just increased her BB. No HA. Visit Vitals  /70 (BP 1 Location: Left upper arm, BP Patient Position: Sitting, BP Cuff Size: Adult)   Pulse 68   Temp 98.7 °F (37.1 °C) (Oral)   Resp 18   Ht 5' 6\" (1.676 m)   Wt 166 lb (75.3 kg)   SpO2 98%   BMI 26.79 kg/m²     Past Medical History:   Diagnosis Date    Atrial fibrillation ([address] 2007    Hypercholesterolemia     Hypertension     Osteoarthritis 2010    [facility]     Thromboembolus ([facility]     Thyroid disease      Past Surgical History:   Procedure Laterality Date    HX HEENT      cataract sx    HX HIP FRACTURE TX  2013    right hip 1989 left hip 2013    HX HIP REPLACEMENT  1989    HX PACEMAKER  2008    [place] CARDIAC SURG PROCEDURE UNLIST       Family History   Problem Relation Age of Onset    Hypertension Mother      Outpatient Encounter Medications as of 4/28/2021   Medication Sig Dispense Refill    potassium chloride SR (KLOR-CON 10) 10 mEq tablet Take  by mouth.  sertraline (ZOLOFT) 100 mg tablet Take 1 Tab by mouth daily. 90 Tab 0    sertraline (ZOLOFT) 50 mg tablet Take 1 Tab by mouth daily. 12 Tab 0    ALPRAZolam (XANAX) 0.25 mg tablet Take 1 Tab by mouth two (2) times daily as needed for Anxiety. Max Daily Amount: 0.5 mg. 20 Tab 0    losartan (COZAAR) 25 mg tablet Take 1 tablet by mouth daily. 90 Tab 0    loratadine (CLARITIN) 10 mg tablet Take 1 tablet by mouth daily.  30 Tab 0    QUEtiapine (SEROquel) 50 mg tablet Take 1 tablet by mouth daily. 90 Tab 0    calcium-cholecalciferol, D3, (CALTRATE 600+D) tablet Take 1 tablet by mouth daily. 90 Tab 0    atorvastatin (LIPITOR) 80 mg tablet Take 1 Tab by mouth daily. 90 Tab 1    levothyroxine (SYNTHROID) 25 mcg tablet 1 tab po am 90 Tab 1    Xarelto 15 mg tab tablet Take 1 Tab by mouth daily (with breakfast). 90 Tab 1    trospium (SANCTURA) 20 mg tablet Take 1 Tab by mouth two (2) times a day. 180 Tab 1    alendronate (FOSAMAX) 35 mg tablet Take 1 Tab by mouth every Wednesday. 12 Tab 3    magnesium oxide (MAG-OX) 400 mg tablet 1 tab po QD 90 Tab 1    ferrous sulfate 325 mg (65 mg iron) tablet Take 1 Tab by mouth Daily (before breakfast). 90 Tab 1    carvediloL (COREG) 6.25 mg tablet Take 1 Tab by mouth two (2) times daily (with meals). (Patient taking differently: Take 12.5 mg by mouth two (2) times daily (with meals). ) 180 Tab 1    multivitamin (ONE A DAY) tablet Take 1 tablet by mouth daily. 30 Tab 11    diclofenac (VOLTAREN) 1 % gel Apply 2 g to affected area two (2) times a day. 100 g 2    OTHER rollator walker for gait abnormality 1 Device 0    sodium chloride-aloe vera (AYR SALINE) topical gel Apply  to affected area once.  furosemide (LASIX) 40 mg tablet Take 40 mg by mouth two (2) times a day.  zinc oxide-cod liver oil (DESITIN) 40 % ointment Apply  to affected area as needed for Skin Irritation.  [DISCONTINUED] sertraline (ZOLOFT) 50 mg tablet Take 1 Tab by mouth daily. 50mg po every day 90 Tab 1    [DISCONTINUED] ALPRAZolam (XANAX) 0.25 mg tablet Take 1 Tab by mouth two (2) times daily as needed for Anxiety. Max Daily Amount: 0.5 mg. 20 Tab 0    OTHER Left wrist splint use everyday 1 Each 0    GAVILAX 17 gram/dose powder Take 17 g by mouth daily as needed (constipation). 714 g 4    albuterol (PROAIR HFA) 90 mcg/actuation inhaler Take 2 Puffs by inhalation every six (6) hours as needed for Wheezing.  1 Inhaler 2    docusate sodium (COLACE) 100 mg capsule Take 100 mg by mouth two (2) times daily as needed. No facility-administered encounter medications on file as of 4/28/2021. HPI    Review of Systems   Constitutional: Negative. Respiratory: Negative. Cardiovascular: Negative. Gastrointestinal: Negative. Genitourinary:        Incontinence    Musculoskeletal: Positive for joint pain. Negative for falls. Neurological: Positive for dizziness. Psychiatric/Behavioral: The patient is nervous/anxious. Physical Exam  Vitals signs and nursing note reviewed. Constitutional:       Appearance: She is not ill-appearing. Cardiovascular:      Rate and Rhythm: Normal rate and regular rhythm. Pulmonary:      Effort: Pulmonary effort is normal.      Breath sounds: Normal breath sounds. Abdominal:      Palpations: Abdomen is soft. Musculoskeletal: Normal range of motion. Skin:     General: Skin is warm. Neurological:      Mental Status: She is alert and oriented to person, place, and time. Psychiatric:         Mood and Affect: Mood is anxious. ASSESSMENT and PLAN  Diagnoses and all orders for this visit:    1. Anxiety attack  -     sertraline (ZOLOFT) 100 mg tablet; Take 1 Tab by mouth daily. -     sertraline (ZOLOFT) 50 mg tablet; Take 1 Tab by mouth daily.  -     ALPRAZolam (XANAX) 0.25 mg tablet; Take 1 Tab by mouth two (2) times daily as needed for Anxiety. Max Daily Amount: 0.5 mg.    2. Grief  -     ALPRAZolam (XANAX) 0.25 mg tablet; Take 1 Tab by mouth two (2) times daily as needed for Anxiety. Max Daily Amount: 0.5 mg.          3. Urge incontinence of urine        -     Does not want to do another medication. Discussed Kegel exercises, briefs and bathroom frequency    4.  Essential hypertension      Follow-up and Dispositions    · Return in about 3 months (around 7/28/2021), or if symptoms worsen or fail to improve, for Follow up.       lab results and schedule of future lab studies reviewed with patient  reviewed diet, exercise and weight control  reviewed medications and side effects in detail

## 2021-04-28 NOTE — PROGRESS NOTES
ADVISED PATIENT OF THE FOLLOWING HEALTH MAINTAINCE DUE  Health Maintenance Due   Topic Date Due    DTaP/Tdap/Td series (1 - Tdap) Never done    Shingrix Vaccine Age 50> (1 of 2) Never done      Chief Complaint   Patient presents with    Anxiety     nervous     Dizziness    Diarrhea     after taking her medications , cant hold when she has to go.  Medication Evaluation     possible to decrease the amount of meds she takes        1. Have you been to the ER, urgent care clinic since your last visit? Hospitalized since your last visit? No    2. Have you seen or consulted any other health care providers outside of the [address] [place] [address] since your last visit? Include any DEXA scan, mammography  or colon screening. No    3. Do you have an Advance Directive on file? no    4. Do you have a DNR on file? no    Patient is accompanied by self and daughter  I have received verbal consent from Nitin Roque to discuss any/all medical information while they are present in the room. Advance Care Planning 7/6/2019   Confirm Advance Directive Yes, on file   Patient Would Like to Complete Advance Directive -         [address]. \[facility].\"" by [address] [address]  Phone: [phone] Fax: [phone]    5 [facility] , [address] [place] [address]  [address] [place], [place] [address]  Phone: [phone] Fax: [phone]    [id number]- [address], [place] [address]  Phone: [phone] Fax: [phone]        Patient reminded during visit to bring all medication bottles, OTC medications to all appointments.

## 2021-04-30 ENCOUNTER — TELEPHONE (OUTPATIENT)
Dept: INTERNAL MEDICINE CLINIC | Age: 86
End: 2021-04-30

## 2021-04-30 NOTE — TELEPHONE ENCOUNTER
Call placed to daughter and clarified medication directions, daughter voiced understanding and appreciation

## 2021-04-30 NOTE — TELEPHONE ENCOUNTER
----- Message from [facility] sent at 4/30/2021  1:43 PM EDT -----  Regarding: EMERALD Gamez/Telephone  General Message/Vendor Calls    Caller's first and last name: Glenroy Vargas       Reason for call: Zoloft dose - please advise (see below)      Callback required yes/no and why: Yes, she would like a call back today so her mother doesn't have to wait all weekend not being sure of the doses. Best contact number(s): [phone]      Details to clarify the request: \"Zoloft 50 mg\" is given at 8am - is the pt supposed to take 2 of those? Is she to give her mother 100 mg (2 pills) or just 50 mg (one pill) once a day - then \"Xanax\" as needed? She just wants to makes sure she is giving her mother the correct doses. Please advise.       [facility]

## 2021-06-28 DIAGNOSIS — I10 ESSENTIAL HYPERTENSION: ICD-10-CM

## 2021-06-28 RX ORDER — QUETIAPINE FUMARATE 50 MG/1
TABLET, FILM COATED ORAL
Qty: 90 TABLET | Refills: 0 | Status: SHIPPED | OUTPATIENT
Start: 2021-06-28 | End: 2021-09-21

## 2021-06-28 RX ORDER — MULTIVITAMIN
TABLET ORAL
Qty: 90 TABLET | Refills: 0 | Status: SHIPPED | OUTPATIENT
Start: 2021-06-28 | End: 2021-09-21

## 2021-06-28 RX ORDER — LOSARTAN POTASSIUM 25 MG/1
TABLET ORAL
Qty: 90 TABLET | Refills: 0 | Status: SHIPPED | OUTPATIENT
Start: 2021-06-28 | End: 2021-10-21

## 2021-07-19 ENCOUNTER — OFFICE VISIT (OUTPATIENT)
Dept: INTERNAL MEDICINE CLINIC | Age: 86
End: 2021-07-19
Payer: MEDICARE

## 2021-07-19 VITALS
BODY MASS INDEX: 27 KG/M2 | HEIGHT: 66 IN | SYSTOLIC BLOOD PRESSURE: 122 MMHG | TEMPERATURE: 99.1 F | WEIGHT: 168 LBS | RESPIRATION RATE: 18 BRPM | OXYGEN SATURATION: 98 % | DIASTOLIC BLOOD PRESSURE: 68 MMHG | HEART RATE: 88 BPM

## 2021-07-19 DIAGNOSIS — Z00.00 MEDICARE ANNUAL WELLNESS VISIT, SUBSEQUENT: ICD-10-CM

## 2021-07-19 DIAGNOSIS — I10 ESSENTIAL HYPERTENSION: Primary | ICD-10-CM

## 2021-07-19 DIAGNOSIS — R29.6 FREQUENT FALLS: ICD-10-CM

## 2021-07-19 DIAGNOSIS — Z71.89 ACP (ADVANCE CARE PLANNING): ICD-10-CM

## 2021-07-19 DIAGNOSIS — M54.12 CERVICAL RADICULOPATHY: ICD-10-CM

## 2021-07-19 DIAGNOSIS — F43.21 GRIEF: ICD-10-CM

## 2021-07-19 DIAGNOSIS — N32.81 OAB (OVERACTIVE BLADDER): ICD-10-CM

## 2021-07-19 DIAGNOSIS — E03.9 HYPOTHYROIDISM, UNSPECIFIED TYPE: ICD-10-CM

## 2021-07-19 DIAGNOSIS — R26.9 GAIT ABNORMALITY: ICD-10-CM

## 2021-07-19 DIAGNOSIS — F41.0 ANXIETY ATTACK: ICD-10-CM

## 2021-07-19 DIAGNOSIS — I63.411 CEREBROVASCULAR ACCIDENT (CVA) DUE TO EMBOLISM OF RIGHT MIDDLE CEREBRAL ARTERY (HCC): ICD-10-CM

## 2021-07-19 DIAGNOSIS — I48.91 ATRIAL FIBRILLATION, UNSPECIFIED TYPE (HCC): ICD-10-CM

## 2021-07-19 DIAGNOSIS — E55.9 VITAMIN D DEFICIENCY: ICD-10-CM

## 2021-07-19 DIAGNOSIS — M81.0 OSTEOPOROSIS, UNSPECIFIED OSTEOPOROSIS TYPE, UNSPECIFIED PATHOLOGICAL FRACTURE PRESENCE: ICD-10-CM

## 2021-07-19 DIAGNOSIS — F41.0 PANIC ATTACK: ICD-10-CM

## 2021-07-19 DIAGNOSIS — E61.1 IRON DEFICIENCY: ICD-10-CM

## 2021-07-19 PROCEDURE — G8419 CALC BMI OUT NRM PARAM NOF/U: HCPCS | Performed by: INTERNAL MEDICINE

## 2021-07-19 PROCEDURE — 1090F PRES/ABSN URINE INCON ASSESS: CPT | Performed by: INTERNAL MEDICINE

## 2021-07-19 PROCEDURE — 99497 ADVNCD CARE PLAN 30 MIN: CPT | Performed by: INTERNAL MEDICINE

## 2021-07-19 PROCEDURE — G8536 NO DOC ELDER MAL SCRN: HCPCS | Performed by: INTERNAL MEDICINE

## 2021-07-19 PROCEDURE — G8427 DOCREV CUR MEDS BY ELIG CLIN: HCPCS | Performed by: INTERNAL MEDICINE

## 2021-07-19 PROCEDURE — 1101F PT FALLS ASSESS-DOCD LE1/YR: CPT | Performed by: INTERNAL MEDICINE

## 2021-07-19 PROCEDURE — 99214 OFFICE O/P EST MOD 30 MIN: CPT | Performed by: INTERNAL MEDICINE

## 2021-07-19 PROCEDURE — G8510 SCR DEP NEG, NO PLAN REQD: HCPCS | Performed by: INTERNAL MEDICINE

## 2021-07-19 PROCEDURE — G0439 PPPS, SUBSEQ VISIT: HCPCS | Performed by: INTERNAL MEDICINE

## 2021-07-19 RX ORDER — TROSPIUM CHLORIDE 20 MG/1
20 TABLET, FILM COATED ORAL 2 TIMES DAILY
Qty: 180 TABLET | Refills: 1 | Status: SHIPPED | OUTPATIENT
Start: 2021-07-19 | End: 2022-02-16

## 2021-07-19 RX ORDER — ALPRAZOLAM 0.25 MG/1
0.25 TABLET ORAL
Qty: 30 TABLET | Refills: 1 | Status: SHIPPED | OUTPATIENT
Start: 2021-07-19 | End: 2021-10-01

## 2021-07-19 RX ORDER — CARVEDILOL 12.5 MG/1
12.5 TABLET ORAL 2 TIMES DAILY WITH MEALS
Qty: 180 TABLET | Refills: 1 | Status: SHIPPED | OUTPATIENT
Start: 2021-07-19 | End: 2022-02-16 | Stop reason: SDUPTHER

## 2021-07-19 RX ORDER — ATORVASTATIN CALCIUM 80 MG/1
80 TABLET, FILM COATED ORAL DAILY
Qty: 90 TABLET | Refills: 1 | Status: SHIPPED | OUTPATIENT
Start: 2021-07-19 | End: 2022-03-14

## 2021-07-19 NOTE — PROGRESS NOTES
Health Maintenance Due   Topic Date Due    DTaP/Tdap/Td series (1 - Tdap) Never done    Shingrix Vaccine Age 50> (1 of 2) Never done    Medicare Yearly Exam  07/29/2021       Chief Complaint   Patient presents with    Annual Wellness Visit    Hand Pain    Medication Evaluation    Fall    Anxiety       1. Have you been to the ER, urgent care clinic since your last visit? Hospitalized since your last visit? No    2. Have you seen or consulted any other health care providers outside of the [address] [place] [address] since your last visit? Include any pap smears or colon screening. No    3) Do you have an Advance Directive on file? no    4) Are you interested in receiving information on Advance Directives? NO      Patient is accompanied by daughter I have received verbal consent from Raine Causey to discuss any/all medical information while they are present in the room. \

## 2021-07-19 NOTE — PROGRESS NOTES
This is the Subsequent Medicare Annual Wellness Exam, performed 12 months or more after the Initial AWV or the last Subsequent AWV    I have reviewed the patient's medical history in detail and updated the computerized patient record. Assessment/Plan   Education and counseling provided:  Are appropriate based on today's review and evaluation  End-of-Life planning (with patient's consent)  Pneumococcal Vaccine         Depression Risk Factor Screening     3 most recent PHQ Screens 7/19/2021   PHQ Not Done -   Little interest or pleasure in doing things Not at all   Feeling down, depressed, irritable, or hopeless Not at all   Total Score PHQ 2 0   Trouble falling or staying asleep, or sleeping too much -   Feeling tired or having little energy -   Poor appetite, weight loss, or overeating -   Feeling bad about yourself - or that you are a failure or have let yourself or your family down -   Trouble concentrating on things such as school, work, reading, or watching TV -   Moving or speaking so slowly that other people could have noticed; or the opposite being so fidgety that others notice -   Thoughts of being better off dead, or hurting yourself in some way -   PHQ 9 Score -   How difficult have these problems made it for you to do your work, take care of your home and get along with others -       Alcohol Risk Screen    Do you average more than 1 drink per night or more than 7 drinks a week:  No    On any one occasion in the past three months have you have had more than 3 drinks containing alcohol:  No        Functional Ability and Level of Safety    Hearing: Hearing is good. Activities of Daily Living: The home contains: walker  Patient needs help with:  transportation, shopping, preparing meals, laundry, housework and managing money      Ambulation: with mild difficulty     Fall Risk:  Fall Risk Assessment, last 12 mths 7/19/2021   Able to walk? Yes   Fall in past 12 months? 1   Do you feel unsteady?  1 Are you worried about falling 1   Is the gait abnormal? 1   Number of falls in past 12 months 1   Fall with injury? 0      Abuse Screen:  Patient is not abused       Cognitive Screening    Has your family/caregiver stated any concerns about your memory: no     Cognitive Screening: Normal - MMSE (Mini Mental Status Exam)    Health Maintenance Due     Health Maintenance Due   Topic Date Due    DTaP/Tdap/Td series (1 - Tdap) Never done    Shingrix Vaccine Age 49> (1 of 2) Never done       Patient Care Team   Patient Care Team:  Tashia Smith MD as PCP - General (Internal Medicine)  Tashia Smith MD as PCP - [facility] Empaneled Provider  Erick Ortiz MD (Orthopedic Surgery)  Diana Paz MD (Cardiology)    History     Patient Active Problem List   Diagnosis Code    DVT (deep venous thrombosis) ([facility] I82.409    Mixed hyperlipidemia E78.2    Atrial fibrillation ([facility] I48.91    HTN (hypertension) I10    DJD (degenerative joint disease) M19.90   Murrel Neither Hypothyroid E03.9    TIA (transient ischemic attack) G45.9    ACP (advance care planning) Z71.89    Gait instability R26.81    Chronic cough R05    Personal history of pulmonary embolism Z86.711    Acute CVA (cerebrovascular accident) ([facility] I63.9    Fall W19. Zeynep Kelch    CVA (cerebral vascular accident) ([facility] I63.9    Scalp laceration S01. 01XA    Gait abnormality R26.9    Anemia D64.9    Severe anemia D64.9     Past Medical History:   Diagnosis Date    Atrial fibrillation ([address] 2007    Hypercholesterolemia     Hypertension     Osteoarthritis 2010    Stroke ([address]     Thromboembolus ([facility]     Thyroid disease       Past Surgical History:   Procedure Laterality Date    HX HEENT      cataract sx    HX HIP FRACTURE TX  2013    right hip 1989 left hip 2013    HX HIP REPLACEMENT  1989    HX PACEMAKER  2008    [place] CARDIAC SURG PROCEDURE UNLIST       Current Outpatient Medications   Medication Sig Dispense Refill    calcium-cholecalciferol, D3, (CALTRATE 600+D) tablet Take 1 tablet by mouth daily. 90 Tablet 0    losartan (COZAAR) 25 mg tablet Take 1 tablet by mouth daily. 90 Tablet 0    QUEtiapine (SEROquel) 50 mg tablet Take 1 tablet by mouth daily. 90 Tablet 0    loratadine (CLARITIN) 10 mg tablet Take 1 tablet by mouth daily. 30 Tab 3    potassium chloride SR (KLOR-CON 10) 10 mEq tablet Take  by mouth.  sertraline (ZOLOFT) 100 mg tablet Take 1 Tab by mouth daily. 90 Tab 0    sertraline (ZOLOFT) 50 mg tablet Take 1 Tab by mouth daily. 12 Tab 0    ALPRAZolam (XANAX) 0.25 mg tablet Take 1 Tab by mouth two (2) times daily as needed for Anxiety. Max Daily Amount: 0.5 mg. 20 Tab 0    atorvastatin (LIPITOR) 80 mg tablet Take 1 Tab by mouth daily. 90 Tab 1    levothyroxine (SYNTHROID) 25 mcg tablet 1 tab po am 90 Tab 1    Xarelto 15 mg tab tablet Take 1 Tab by mouth daily (with breakfast). 90 Tab 1    trospium (SANCTURA) 20 mg tablet Take 1 Tab by mouth two (2) times a day. 180 Tab 1    alendronate (FOSAMAX) 35 mg tablet Take 1 Tab by mouth every Wednesday. 12 Tab 3    magnesium oxide (MAG-OX) 400 mg tablet 1 tab po QD 90 Tab 1    ferrous sulfate 325 mg (65 mg iron) tablet Take 1 Tab by mouth Daily (before breakfast). 90 Tab 1    carvediloL (COREG) 6.25 mg tablet Take 1 Tab by mouth two (2) times daily (with meals). (Patient taking differently: Take 12.5 mg by mouth two (2) times daily (with meals). ) 180 Tab 1    multivitamin (ONE A DAY) tablet Take 1 tablet by mouth daily. 30 Tab 11    diclofenac (VOLTAREN) 1 % gel Apply 2 g to affected area two (2) times a day. 100 g 2    OTHER Left wrist splint use everyday 1 Each 0    GAVILAX 17 gram/dose powder Take 17 g by mouth daily as needed (constipation). 714 g 4    OTHER rollator walker for gait abnormality 1 Device 0    sodium chloride-aloe vera (AYR SALINE) topical gel Apply  to affected area once.  furosemide (LASIX) 40 mg tablet Take 40 mg by mouth two (2) times a day.       albuterol (PROAIR HFA) 90 mcg/actuation inhaler Take 2 Puffs by inhalation every six (6) hours as needed for Wheezing. 1 Inhaler 2    zinc oxide-cod liver oil (DESITIN) 40 % ointment Apply  to affected area as needed for Skin Irritation.  docusate sodium (COLACE) 100 mg capsule Take 100 mg by mouth two (2) times daily as needed.        Allergies   Allergen Reactions    Ace Inhibitors Cough       Family History   Problem Relation Age of Onset    Hypertension Mother      Social History     Tobacco Use    Smoking status: Never Smoker    Smokeless tobacco: Never Used   Substance Use Topics    Alcohol use: No     Alcohol/week: 0.0 standard drinks         Raul Munguia MD

## 2021-07-19 NOTE — ACP (ADVANCE CARE PLANNING)

## 2021-07-19 NOTE — PROGRESS NOTES
HISTORY OF PRESENT ILLNESS  Rodger Contreras is a 80 y.o. female here accompanied by her daughter. Her daughter is her caregiver here. She is staying home most of the time. Report pain in the neck radiating to left side of the arm, left arm sometimes tingle and sometimes left thumb feels numb. X-ray of the neck showed multilevel DJD. She has frequent falls lately. Gait and balance is not great. Has hypertension, compliant with medication. Denies chest pain palpitation or shortness of breath. She is atrial fibrillation  , Coreg dosage was increased by cardiologist.  She is also on Xarelto. Had history of stroke, gait is not great, she is supposed to use walker, she is not using it. Anxiety and depression seems okay. Xanax is helping her with her panic attack and anxiety since her son has . Would like to get a refill. Here for Medicare wellness visit. Has no living will. HPI    Review of Systems   Constitutional: Negative. HENT: Negative. Eyes: Negative. Respiratory: Negative. Cardiovascular: Negative. Gastrointestinal: Negative. Genitourinary: Negative. Musculoskeletal: Positive for neck pain. Skin: Negative. Neurological: Positive for tingling and sensory change. Endo/Heme/Allergies: Negative. Psychiatric/Behavioral: Positive for memory loss. The patient is nervous/anxious. Physical Exam  Constitutional:       Appearance: Normal appearance. She is normal weight. HENT:      Head: Normocephalic. Cardiovascular:      Rate and Rhythm: Normal rate and regular rhythm. Pulses: Normal pulses. Heart sounds: Normal heart sounds. Pulmonary:      Effort: Pulmonary effort is normal.      Breath sounds: Normal breath sounds. Musculoskeletal:      Cervical back: Normal range of motion and neck supple. Comments: Neck: Tenderness in the paravertebral facet area. Range of motion restricted. Neurological:      General: No focal deficit present. Mental Status: She is alert and oriented to person, place, and time. Mental status is at baseline. Psychiatric:         Mood and Affect: Mood normal.         Behavior: Behavior normal.         Thought Content: Thought content normal.      Comments: Anxious person, mild memory loss. ASSESSMENT and PLAN  Diagnoses and all orders for this visit:    1. Essential hypertension    Stable blood pressure. On losartan and Coreg. Will check,  -     CBC WITH AUTOMATED DIFF  -     METABOLIC PANEL, COMPREHENSIVE    2. 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 Will refill,  -     atorvastatin (LIPITOR) 80 mg tablet; Take 1 Tablet by mouth daily.  -     LDL, DIRECT    3. Atrial fibrillation, unspecified type [facility])    Her cardiologist Dr. Monique Wilson has increase Coreg dosage to 12.5 mg twice a day. She is already on Xarelto. -     carvediloL (COREG) 12.5 mg tablet; Take 1 Tablet by mouth two (2) times daily (with meals). -     CBC WITH AUTOMATED DIFF  -     METABOLIC PANEL, COMPREHENSIVE    4. Osteoporosis, unspecified osteoporosis type, unspecified pathological fracture presence  Taking Fosamax. Also on calcium and vitamin D.  5. Hypothyroidism, unspecified type    On Synthroid. Will check,  -     TSH 3RD GENERATION    6. Medicare annual wellness visit, subsequent    7. ACP (advance care planning)    8. Gait abnormality    Has frequent falls. Will refer,  -     [address] [place] [address]    9. Frequent falls  -     REFERRAL TO HOME HEALTH    10. OAB (overactive bladder)  -     trospium (SANCTURA) 20 mg tablet; Take 1 Tablet by mouth two (2) times a day. 11. Anxiety attack    Since her son's death from Covid, she is more anxious. We will give,  -     ALPRAZolam (XANAX) 0.25 mg tablet; Take 1 Tablet by mouth daily as needed for Anxiety. 13. Panic attack  -     ALPRAZolam (XANAX) 0.25 mg tablet; Take 1 Tablet by mouth daily as needed for Anxiety.     14. Vitamin D deficiency  -     VITAMIN D, 25 HYDROXY    15. Iron deficiency  -     IRON  -     FERRITIN    16. Cervical radiculopathy    Has DJD of the neck multilevel. Need physical therapy.  -     REFERRAL TO HOME HEALTH    Discussed expected course/resolution/complications of diagnosis in detail with patient. Medication risks/benefits/costs/interactions/alternatives discussed with patient. Discussed COVID-19 infection precaution with patient. Pt was given an after visit summary which includes diagnoses, current medications & vitals. Pt expressed understanding with the diagnosis and plan.

## 2021-07-19 NOTE — PATIENT INSTRUCTIONS

## 2021-07-20 LAB
25(OH)D3+25(OH)D2 SERPL-MCNC: 31.6 NG/ML (ref 30–100)
ALBUMIN SERPL-MCNC: 3.7 G/DL (ref 3.5–4.6)
ALBUMIN/GLOB SERPL: 1.4 {RATIO} (ref 1.2–2.2)
ALP SERPL-CCNC: 85 IU/L (ref 48–121)
ALT SERPL-CCNC: 8 IU/L (ref 0–32)
AST SERPL-CCNC: 20 IU/L (ref 0–40)
BASOPHILS # BLD AUTO: 0 X10E3/UL (ref 0–0.2)
BASOPHILS NFR BLD AUTO: 0 %
BILIRUB SERPL-MCNC: 0.4 MG/DL (ref 0–1.2)
BUN SERPL-MCNC: 25 MG/DL (ref 10–36)
BUN/CREAT SERPL: 22 (ref 12–28)
CALCIUM SERPL-MCNC: 8.3 MG/DL (ref 8.7–10.3)
CHLORIDE SERPL-SCNC: 104 MMOL/L (ref 96–106)
CO2 SERPL-SCNC: 23 MMOL/L (ref 20–29)
CREAT SERPL-MCNC: 1.13 MG/DL (ref 0.57–1)
EOSINOPHIL # BLD AUTO: 0.1 X10E3/UL (ref 0–0.4)
EOSINOPHIL NFR BLD AUTO: 2 %
ERYTHROCYTE [DISTWIDTH] IN BLOOD BY AUTOMATED COUNT: 14.5 % (ref 11.7–15.4)
FERRITIN SERPL-MCNC: 137 NG/ML (ref 15–150)
GLOBULIN SER CALC-MCNC: 2.7 G/DL (ref 1.5–4.5)
GLUCOSE SERPL-MCNC: 104 MG/DL (ref 65–99)
HCT VFR BLD AUTO: 31.2 % (ref 34–46.6)
HGB BLD-MCNC: 10.3 G/DL (ref 11.1–15.9)
IMM GRANULOCYTES # BLD AUTO: 0 X10E3/UL (ref 0–0.1)
IMM GRANULOCYTES NFR BLD AUTO: 1 %
INTERPRETATION: NORMAL
IRON SERPL-MCNC: 58 UG/DL (ref 27–139)
LDLC SERPL DIRECT ASSAY-MCNC: 65 MG/DL (ref 0–99)
LYMPHOCYTES # BLD AUTO: 1 X10E3/UL (ref 0.7–3.1)
LYMPHOCYTES NFR BLD AUTO: 20 %
MCH RBC QN AUTO: 30.1 PG (ref 26.6–33)
MCHC RBC AUTO-ENTMCNC: 33 G/DL (ref 31.5–35.7)
MCV RBC AUTO: 91 FL (ref 79–97)
MONOCYTES # BLD AUTO: 0.4 X10E3/UL (ref 0.1–0.9)
MONOCYTES NFR BLD AUTO: 8 %
NEUTROPHILS # BLD AUTO: 3.6 X10E3/UL (ref 1.4–7)
NEUTROPHILS NFR BLD AUTO: 69 %
PLATELET # BLD AUTO: 207 X10E3/UL (ref 150–450)
POTASSIUM SERPL-SCNC: 3.4 MMOL/L (ref 3.5–5.2)
PROT SERPL-MCNC: 6.4 G/DL (ref 6–8.5)
RBC # BLD AUTO: 3.42 X10E6/UL (ref 3.77–5.28)
SODIUM SERPL-SCNC: 141 MMOL/L (ref 134–144)
TSH SERPL DL<=0.005 MIU/L-ACNC: 2.59 UIU/ML (ref 0.45–4.5)
WBC # BLD AUTO: 5.2 X10E3/UL (ref 3.4–10.8)

## 2021-07-21 DIAGNOSIS — E61.1 IRON DEFICIENCY: Primary | ICD-10-CM

## 2021-07-21 NOTE — PROGRESS NOTES
Hgb mildly low, 10.3. Notify of any s/sx of bleeding. Continue iron supplement daily. Repeat CBC in 6 weeks. Potassium mildly low. May increase KCl to 20 mEq daily x 3 days, then return to 10 mEq po daily. May increase potassium rich foods in diet such as bananas and citrus fruits.

## 2021-07-22 ENCOUNTER — TELEPHONE (OUTPATIENT)
Dept: INTERNAL MEDICINE CLINIC | Age: 86
End: 2021-07-22

## 2021-07-22 NOTE — TELEPHONE ENCOUNTER
Daughter called back and needs short fill since pt is using pill pack and will not have enough potassium.

## 2021-07-22 NOTE — TELEPHONE ENCOUNTER
----- Message from Raleigh Taylor NP sent at 7/21/2021  2:40 PM EDT -----  Hgb mildly low, 10.3. Notify of any s/sx of bleeding. Continue iron supplement daily. Repeat CBC in 6 weeks. Potassium mildly low. May increase KCl to 20 mEq daily x 3 days, then return to 10 mEq po daily. May increase potassium rich foods in diet such as bananas and citrus fruits.

## 2021-07-22 NOTE — TELEPHONE ENCOUNTER
Call placed to pt and spoke with daughter, Discussed lab results and recommendations. Asye voiced understanding.

## 2021-07-23 DIAGNOSIS — F41.0 ANXIETY ATTACK: ICD-10-CM

## 2021-07-23 DIAGNOSIS — E03.9 HYPOTHYROIDISM, UNSPECIFIED TYPE: ICD-10-CM

## 2021-07-23 DIAGNOSIS — D50.9 IRON DEFICIENCY ANEMIA, UNSPECIFIED IRON DEFICIENCY ANEMIA TYPE: ICD-10-CM

## 2021-07-23 RX ORDER — SERTRALINE HYDROCHLORIDE 100 MG/1
TABLET, FILM COATED ORAL
Qty: 90 TABLET | Refills: 0 | Status: SHIPPED | OUTPATIENT
Start: 2021-07-23 | End: 2021-10-21

## 2021-07-23 RX ORDER — FERROUS SULFATE TAB 325 MG (65 MG ELEMENTAL FE) 325 (65 FE) MG
TAB ORAL
Qty: 90 TABLET | Refills: 0 | Status: SHIPPED | OUTPATIENT
Start: 2021-07-23 | End: 2021-10-21

## 2021-07-23 RX ORDER — POTASSIUM CHLORIDE 750 MG/1
10 TABLET, FILM COATED, EXTENDED RELEASE ORAL DAILY
Qty: 5 TABLET | Refills: 0 | Status: SHIPPED | OUTPATIENT
Start: 2021-07-23 | End: 2022-02-16 | Stop reason: SDUPTHER

## 2021-07-23 RX ORDER — LEVOTHYROXINE SODIUM 25 UG/1
TABLET ORAL
Qty: 90 TABLET | Refills: 0 | Status: SHIPPED | OUTPATIENT
Start: 2021-07-23 | End: 2021-10-21

## 2021-08-03 PROBLEM — I63.9 CVA (CEREBRAL VASCULAR ACCIDENT) (HCC): Status: RESOLVED | Noted: 2019-05-06 | Resolved: 2021-08-03

## 2021-08-03 PROBLEM — D64.9 ANEMIA: Status: RESOLVED | Noted: 2019-07-06 | Resolved: 2021-08-03

## 2021-08-22 DIAGNOSIS — J30.1 ALLERGIC RHINITIS DUE TO POLLEN, UNSPECIFIED SEASONALITY: ICD-10-CM

## 2021-08-22 DIAGNOSIS — I48.91 ATRIAL FIBRILLATION, UNSPECIFIED TYPE (HCC): ICD-10-CM

## 2021-08-23 ENCOUNTER — TELEPHONE (OUTPATIENT)
Dept: INTERNAL MEDICINE CLINIC | Age: 86
End: 2021-08-23

## 2021-08-23 DIAGNOSIS — Z79.2 PROPHYLACTIC ANTIBIOTIC: Primary | ICD-10-CM

## 2021-08-23 RX ORDER — RIVAROXABAN 15 MG/1
TABLET, FILM COATED ORAL
Qty: 90 TABLET | Refills: 0 | Status: SHIPPED | OUTPATIENT
Start: 2021-08-23 | End: 2021-11-16

## 2021-08-23 RX ORDER — LORATADINE 10 MG/1
TABLET ORAL
Qty: 30 TABLET | Refills: 2 | Status: SHIPPED | OUTPATIENT
Start: 2021-08-23 | End: 2021-11-16

## 2021-08-23 NOTE — TELEPHONE ENCOUNTER
She is going to the dentist to get a tooth removed and the dentist suggest an antibiotic please calm [facility] on file

## 2021-08-27 RX ORDER — AMOXICILLIN 500 MG/1
CAPSULE ORAL
Qty: 4 CAPSULE | Refills: 0 | Status: SHIPPED | OUTPATIENT
Start: 2021-08-27 | End: 2021-09-16 | Stop reason: SDUPTHER

## 2021-08-27 NOTE — TELEPHONE ENCOUNTER
Call placed to daughter and she states her mother had bilateral hip replacement and her dentist appt is today, will confer with provider regarding prophylactic ABT

## 2021-08-27 NOTE — TELEPHONE ENCOUNTER
Requested Prescriptions     Signed Prescriptions Disp Refills    amoxicillin (AMOXIL) 500 mg capsule 4 Capsule 0     Sig: Take 4 caps prior to dental appt     Authorizing Provider: Rebecca Fisher     Ordering User: Antonette Baum, verbal order received.    Call placed to daughter and made aware and voiced understanding

## 2021-09-01 LAB
25(OH)D3+25(OH)D2 SERPL-MCNC: 39.3 NG/ML (ref 30–100)
ALBUMIN SERPL-MCNC: 3.5 G/DL (ref 3.5–4.6)
ALBUMIN/GLOB SERPL: 1.3 {RATIO} (ref 1.2–2.2)
ALP SERPL-CCNC: 75 IU/L (ref 48–121)
ALT SERPL-CCNC: 8 IU/L (ref 0–32)
AST SERPL-CCNC: 15 IU/L (ref 0–40)
BASOPHILS # BLD AUTO: 0 X10E3/UL (ref 0–0.2)
BASOPHILS NFR BLD AUTO: 1 %
BILIRUB SERPL-MCNC: 0.4 MG/DL (ref 0–1.2)
BUN SERPL-MCNC: 31 MG/DL (ref 10–36)
BUN/CREAT SERPL: 27 (ref 12–28)
CALCIUM SERPL-MCNC: 8.1 MG/DL (ref 8.7–10.3)
CHLORIDE SERPL-SCNC: 105 MMOL/L (ref 96–106)
CO2 SERPL-SCNC: 25 MMOL/L (ref 20–29)
CREAT SERPL-MCNC: 1.16 MG/DL (ref 0.57–1)
EOSINOPHIL # BLD AUTO: 0.1 X10E3/UL (ref 0–0.4)
EOSINOPHIL NFR BLD AUTO: 2 %
ERYTHROCYTE [DISTWIDTH] IN BLOOD BY AUTOMATED COUNT: 14.6 % (ref 11.7–15.4)
FERRITIN SERPL-MCNC: 80 NG/ML (ref 15–150)
GLOBULIN SER CALC-MCNC: 2.8 G/DL (ref 1.5–4.5)
GLUCOSE SERPL-MCNC: 90 MG/DL (ref 65–99)
HCT VFR BLD AUTO: 28.9 % (ref 34–46.6)
HGB BLD-MCNC: 9.6 G/DL (ref 11.1–15.9)
IMM GRANULOCYTES # BLD AUTO: 0 X10E3/UL (ref 0–0.1)
IMM GRANULOCYTES NFR BLD AUTO: 0 %
INTERPRETATION: NORMAL
IRON SERPL-MCNC: 96 UG/DL (ref 27–139)
LYMPHOCYTES # BLD AUTO: 0.9 X10E3/UL (ref 0.7–3.1)
LYMPHOCYTES NFR BLD AUTO: 24 %
MAGNESIUM SERPL-MCNC: 1.5 MG/DL (ref 1.6–2.3)
MCH RBC QN AUTO: 30.8 PG (ref 26.6–33)
MCHC RBC AUTO-ENTMCNC: 33.2 G/DL (ref 31.5–35.7)
MCV RBC AUTO: 93 FL (ref 79–97)
MONOCYTES # BLD AUTO: 0.3 X10E3/UL (ref 0.1–0.9)
MONOCYTES NFR BLD AUTO: 8 %
NEUTROPHILS # BLD AUTO: 2.3 X10E3/UL (ref 1.4–7)
NEUTROPHILS NFR BLD AUTO: 65 %
PLATELET # BLD AUTO: 198 X10E3/UL (ref 150–450)
POTASSIUM SERPL-SCNC: 3.7 MMOL/L (ref 3.5–5.2)
PROT SERPL-MCNC: 6.3 G/DL (ref 6–8.5)
RBC # BLD AUTO: 3.12 X10E6/UL (ref 3.77–5.28)
SODIUM SERPL-SCNC: 141 MMOL/L (ref 134–144)
TSH SERPL DL<=0.005 MIU/L-ACNC: 1.44 UIU/ML (ref 0.45–4.5)
VIT B12 SERPL-MCNC: 394 PG/ML (ref 232–1245)
WBC # BLD AUTO: 3.5 X10E3/UL (ref 3.4–10.8)

## 2021-09-07 DIAGNOSIS — D64.9 ANEMIA, UNSPECIFIED TYPE: Primary | ICD-10-CM

## 2021-09-07 NOTE — PROGRESS NOTES
Hgb decreased to 9.6. Notify of any s/sx of bleeding. Continue iron supplement. Repeat CBC in 6 weeks. Magnesium mildly low. Please ensure patient is taking Mag-Ox 400 mg po daily.

## 2021-09-16 ENCOUNTER — TELEPHONE (OUTPATIENT)
Dept: INTERNAL MEDICINE CLINIC | Age: 86
End: 2021-09-16

## 2021-09-16 DIAGNOSIS — Z79.2 PROPHYLACTIC ANTIBIOTIC: ICD-10-CM

## 2021-09-16 RX ORDER — AMOXICILLIN 500 MG/1
CAPSULE ORAL
Qty: 4 CAPSULE | Refills: 0 | Status: SHIPPED | OUTPATIENT
Start: 2021-09-16 | End: 2022-03-22 | Stop reason: RX

## 2021-09-16 NOTE — TELEPHONE ENCOUNTER
----- Message from [facility] sent at 9/16/2021  8:09 AM EDT -----  Regarding: Nino Salgado  General Message/Vendor Calls    Caller's first and last name: William Jc      Reason for call: Pt needs antibiotic because of Teeth Retraction       Callback required yes/no and why: Yes      Best contact number(s):[phone]      Details to clarify the request:       [facility]

## 2021-09-20 DIAGNOSIS — M19.042 OSTEOARTHRITIS OF BOTH HANDS, UNSPECIFIED OSTEOARTHRITIS TYPE: ICD-10-CM

## 2021-09-20 DIAGNOSIS — M19.041 OSTEOARTHRITIS OF BOTH HANDS, UNSPECIFIED OSTEOARTHRITIS TYPE: ICD-10-CM

## 2021-09-20 RX ORDER — DICLOFENAC SODIUM 10 MG/G
2 GEL TOPICAL 2 TIMES DAILY
Qty: 100 G | Refills: 2 | Status: SHIPPED | OUTPATIENT
Start: 2021-09-20 | End: 2021-12-13

## 2021-09-21 DIAGNOSIS — E83.42 HYPOMAGNESEMIA: ICD-10-CM

## 2021-09-21 RX ORDER — QUETIAPINE FUMARATE 50 MG/1
TABLET, FILM COATED ORAL
Qty: 90 TABLET | Refills: 0 | Status: SHIPPED | OUTPATIENT
Start: 2021-09-21 | End: 2021-11-22 | Stop reason: DRUGHIGH

## 2021-09-21 RX ORDER — MULTIVITAMIN
TABLET ORAL
Qty: 90 TABLET | Refills: 0 | Status: SHIPPED | OUTPATIENT
Start: 2021-09-21 | End: 2021-12-15

## 2021-09-21 RX ORDER — LANOLIN ALCOHOL/MO/W.PET/CERES
CREAM (GRAM) TOPICAL
Qty: 90 TABLET | Refills: 0 | Status: SHIPPED | OUTPATIENT
Start: 2021-09-21 | End: 2021-12-15

## 2021-09-30 DIAGNOSIS — F43.21 GRIEF: ICD-10-CM

## 2021-09-30 DIAGNOSIS — F41.0 PANIC ATTACK: ICD-10-CM

## 2021-09-30 DIAGNOSIS — F41.0 ANXIETY ATTACK: ICD-10-CM

## 2021-10-01 RX ORDER — ALPRAZOLAM 0.25 MG/1
TABLET ORAL
Qty: 20 TABLET | Refills: 0 | Status: SHIPPED | OUTPATIENT
Start: 2021-10-01 | End: 2022-02-16 | Stop reason: SDUPTHER

## 2021-10-21 DIAGNOSIS — E03.9 HYPOTHYROIDISM, UNSPECIFIED TYPE: Primary | ICD-10-CM

## 2021-10-21 DIAGNOSIS — E03.9 HYPOTHYROIDISM, UNSPECIFIED TYPE: ICD-10-CM

## 2021-10-21 DIAGNOSIS — I10 ESSENTIAL HYPERTENSION: ICD-10-CM

## 2021-10-21 DIAGNOSIS — D50.9 IRON DEFICIENCY ANEMIA, UNSPECIFIED IRON DEFICIENCY ANEMIA TYPE: ICD-10-CM

## 2021-10-21 DIAGNOSIS — F41.0 ANXIETY ATTACK: ICD-10-CM

## 2021-10-21 RX ORDER — LOSARTAN POTASSIUM 25 MG/1
TABLET ORAL
Qty: 90 TABLET | Refills: 0 | Status: SHIPPED | OUTPATIENT
Start: 2021-10-21 | End: 2022-01-14

## 2021-10-21 RX ORDER — LEVOTHYROXINE SODIUM 25 UG/1
TABLET ORAL
Qty: 90 TABLET | Refills: 0 | Status: SHIPPED | OUTPATIENT
Start: 2021-10-21 | End: 2022-01-14

## 2021-10-21 RX ORDER — FERROUS SULFATE TAB 325 MG (65 MG ELEMENTAL FE) 325 (65 FE) MG
TAB ORAL
Qty: 90 TABLET | Refills: 0 | Status: SHIPPED | OUTPATIENT
Start: 2021-10-21 | End: 2022-01-14

## 2021-10-21 RX ORDER — LEVOTHYROXINE SODIUM 25 UG/1
25 TABLET ORAL
Qty: 14 TABLET | Refills: 0 | OUTPATIENT
Start: 2021-10-21

## 2021-10-21 RX ORDER — SERTRALINE HYDROCHLORIDE 100 MG/1
TABLET, FILM COATED ORAL
Qty: 90 TABLET | Refills: 0 | Status: SHIPPED | OUTPATIENT
Start: 2021-10-21 | End: 2022-01-14

## 2021-10-21 NOTE — TELEPHONE ENCOUNTER
Pt asking [facility] , She is aware that insurance [employer] not pay.

## 2021-10-26 NOTE — TELEPHONE ENCOUNTER
Call placed to [facility] and they state they have placed levothyroxine in her pill pack and advised that writer call patient and have them call the pharmacy    Call placed to daughter and advised to call pillpack for resolution, daughter voiced understanding

## 2021-10-26 NOTE — TELEPHONE ENCOUNTER
Pts daughter Iggy Albarran) stated that her mothers prescription wasn't packed correctly and they missed the Levothyroxine. Saulo Alves is requesting that a script be sent to [facility] pharmacy on [address] since they never received it. Pt is completely out.

## 2021-11-12 ENCOUNTER — TELEPHONE (OUTPATIENT)
Dept: INTERNAL MEDICINE CLINIC | Age: 86
End: 2021-11-12

## 2021-11-12 NOTE — TELEPHONE ENCOUNTER
----- Message from Yaniradeidra Pascual sent at 11/12/2021 10:55 AM EST -----  Subject: Message to Provider    QUESTIONS  Information for Provider? PT has 2 pressure area in her mouth and wanted   to see if [facility] could come out and check on her. Also, daughter Geo Carrillo think   that she is declining and needs additional assistance/care and would like   [facility] to come out for a few hrs. Please contact Louisa galarza with any   questions or info. Thank you.   ---------------------------------------------------------------------------  --------------  CALL BACK INFO  What is the best way for the office to contact you? OK to leave message on   voicemail  Preferred Call Back Phone Number? [phone]  ---------------------------------------------------------------------------  --------------  SCRIPT ANSWERS  Relationship to Patient? Other  Representative Name? daughter   Is the Representative on the appropriate HIPAA document in Epic?  Yes

## 2021-11-16 DIAGNOSIS — I48.91 ATRIAL FIBRILLATION, UNSPECIFIED TYPE (HCC): ICD-10-CM

## 2021-11-16 DIAGNOSIS — J30.1 ALLERGIC RHINITIS DUE TO POLLEN, UNSPECIFIED SEASONALITY: ICD-10-CM

## 2021-11-16 RX ORDER — RIVAROXABAN 15 MG/1
TABLET, FILM COATED ORAL
Qty: 90 TABLET | Refills: 0 | Status: SHIPPED | OUTPATIENT
Start: 2021-11-16 | End: 2022-02-16

## 2021-11-16 RX ORDER — LORATADINE 10 MG/1
TABLET ORAL
Qty: 30 TABLET | Refills: 1 | Status: SHIPPED | OUTPATIENT
Start: 2021-11-16 | End: 2022-01-14

## 2021-11-19 ENCOUNTER — TELEPHONE (OUTPATIENT)
Dept: INTERNAL MEDICINE CLINIC | Age: 86
End: 2021-11-19

## 2021-11-19 NOTE — TELEPHONE ENCOUNTER
Returned call and left VM that pt has appt on Monday, Daughter can talk with provider at that time if she would like

## 2021-11-19 NOTE — TELEPHONE ENCOUNTER
----- Message from Ana Nobles sent at 11/19/2021  1:53 PM EST -----  Subject: Message to Provider    QUESTIONS  Information for Provider? Patient's daughter would like to purchase a   plane ticket for her mother to travel to her granddaughters home for the   holiday. Please call Dagmar to let her know [phone]  ---------------------------------------------------------------------------  --------------  CALL BACK INFO  What is the best way for the office to contact you? OK to leave message on   voicemail  Preferred Call Back Phone Number? [phone]  ---------------------------------------------------------------------------  --------------  SCRIPT ANSWERS  Relationship to Patient? Other  Representative Name? Amy Welch daughter  Is the Representative on the appropriate HIPAA document in Epic?  Yes

## 2021-11-22 ENCOUNTER — OFFICE VISIT (OUTPATIENT)
Dept: INTERNAL MEDICINE CLINIC | Age: 86
End: 2021-11-22
Payer: MEDICARE

## 2021-11-22 VITALS
SYSTOLIC BLOOD PRESSURE: 122 MMHG | TEMPERATURE: 98.9 F | OXYGEN SATURATION: 98 % | RESPIRATION RATE: 18 BRPM | HEIGHT: 66 IN | DIASTOLIC BLOOD PRESSURE: 72 MMHG | HEART RATE: 99 BPM | BODY MASS INDEX: 25.23 KG/M2 | WEIGHT: 157 LBS

## 2021-11-22 DIAGNOSIS — F02.80 LATE ONSET ALZHEIMER'S DISEASE WITHOUT BEHAVIORAL DISTURBANCE (HCC): ICD-10-CM

## 2021-11-22 DIAGNOSIS — G30.1 LATE ONSET ALZHEIMER'S DISEASE WITHOUT BEHAVIORAL DISTURBANCE (HCC): ICD-10-CM

## 2021-11-22 DIAGNOSIS — R26.9 GAIT ABNORMALITY: ICD-10-CM

## 2021-11-22 DIAGNOSIS — I48.91 ATRIAL FIBRILLATION, UNSPECIFIED TYPE (HCC): ICD-10-CM

## 2021-11-22 DIAGNOSIS — D64.9 ANEMIA, UNSPECIFIED TYPE: ICD-10-CM

## 2021-11-22 DIAGNOSIS — E78.2 MIXED HYPERLIPIDEMIA: ICD-10-CM

## 2021-11-22 DIAGNOSIS — I50.9 CHRONIC CONGESTIVE HEART FAILURE, UNSPECIFIED HEART FAILURE TYPE (HCC): ICD-10-CM

## 2021-11-22 DIAGNOSIS — L89.92 PRESSURE INJURY, STAGE 2, UNSPECIFIED LOCATION (HCC): ICD-10-CM

## 2021-11-22 DIAGNOSIS — Z23 NEEDS FLU SHOT: ICD-10-CM

## 2021-11-22 DIAGNOSIS — E03.9 HYPOTHYROIDISM, UNSPECIFIED TYPE: ICD-10-CM

## 2021-11-22 DIAGNOSIS — G30.1 LATE ONSET ALZHEIMER'S DEMENTIA WITH BEHAVIORAL DISTURBANCE (HCC): ICD-10-CM

## 2021-11-22 DIAGNOSIS — I10 PRIMARY HYPERTENSION: Primary | ICD-10-CM

## 2021-11-22 DIAGNOSIS — F02.818 LATE ONSET ALZHEIMER'S DEMENTIA WITH BEHAVIORAL DISTURBANCE (HCC): ICD-10-CM

## 2021-11-22 PROCEDURE — G8419 CALC BMI OUT NRM PARAM NOF/U: HCPCS | Performed by: INTERNAL MEDICINE

## 2021-11-22 PROCEDURE — 1090F PRES/ABSN URINE INCON ASSESS: CPT | Performed by: INTERNAL MEDICINE

## 2021-11-22 PROCEDURE — G0008 ADMIN INFLUENZA VIRUS VAC: HCPCS | Performed by: INTERNAL MEDICINE

## 2021-11-22 PROCEDURE — 90694 VACC AIIV4 NO PRSRV 0.5ML IM: CPT | Performed by: INTERNAL MEDICINE

## 2021-11-22 PROCEDURE — 1101F PT FALLS ASSESS-DOCD LE1/YR: CPT | Performed by: INTERNAL MEDICINE

## 2021-11-22 PROCEDURE — G8536 NO DOC ELDER MAL SCRN: HCPCS | Performed by: INTERNAL MEDICINE

## 2021-11-22 PROCEDURE — 99214 OFFICE O/P EST MOD 30 MIN: CPT | Performed by: INTERNAL MEDICINE

## 2021-11-22 PROCEDURE — G8510 SCR DEP NEG, NO PLAN REQD: HCPCS | Performed by: INTERNAL MEDICINE

## 2021-11-22 PROCEDURE — G8427 DOCREV CUR MEDS BY ELIG CLIN: HCPCS | Performed by: INTERNAL MEDICINE

## 2021-11-22 RX ORDER — QUETIAPINE FUMARATE 25 MG/1
25 TABLET, FILM COATED ORAL
Qty: 90 TABLET | Refills: 1 | Status: SHIPPED | OUTPATIENT
Start: 2021-11-22 | End: 2021-12-27 | Stop reason: SDUPTHER

## 2021-11-22 RX ORDER — MENTHOL AND ZINC OXIDE .44; 20.625 G/100G; G/100G
OINTMENT TOPICAL
Qty: 71 G | Refills: 1 | Status: SHIPPED | OUTPATIENT
Start: 2021-11-22 | End: 2022-07-08 | Stop reason: SDUPTHER

## 2021-11-22 NOTE — PROGRESS NOTES
Health Maintenance Due   Topic Date Due    DTaP/Tdap/Td series (1 - Tdap) Never done    Shingrix Vaccine Age 50> (1 of 2) Never done    Lipid Screen  08/13/2021    Flu Vaccine (1) 09/01/2021    COVID-19 Vaccine (3 - Booster for Angle Crouch series) 10/05/2021       Chief Complaint   Patient presents with    Hip Pain    Wound Check     [facility]    Home Health    Fatigue       1. Have you been to the ER, urgent care clinic since your last visit? Hospitalized since your last visit? No    2. Have you seen or consulted any other health care providers outside of the [address] [place] [address] since your last visit? Include any pap smears or colon screening. No    3) Do you have an Advance Directive on file? no    4) Are you interested in receiving information on Advance Directives? NO      Patient is accompanied by daughter I have received verbal consent from Edie Gowers to discuss any/all medical information while they are present in the room. Edie Gowers is a 80 y.o. female  who presents for routine immunization(s). Patient denies any symptoms , reactions or allergies that would exclude them from being immunized today. Risks and adverse reactions were discussed. The patient/caregiver was provided the VIS and allotted time to read and ask questions prior to administration of vaccine. Patient voiced full understanding and signed Adult Immunization Consent form. All questions were addressed. Patient was observed for 10 min post injection. There were no reactions observed.

## 2021-11-22 NOTE — PATIENT INSTRUCTIONS
Vaccine Information Statement    Influenza (Flu) Vaccine (Inactivated or Recombinant): What You Need to Know    Many vaccine information statements are available in [place] and other languages. See www.immunize.org/vis. Hojas de información sobre vacunas están disponibles en español y en muchos otros idiomas. Visite www.immunize.org/vis. 1. Why get vaccinated? Influenza vaccine can prevent influenza (flu). Flu is a contagious disease that spreads around the [facility] every year, usually between October and May. Anyone can get the flu, but it is more dangerous for some people. Infants and young children, people 72 years and older, pregnant people, and people with certain health conditions or a weakened immune system are at greatest risk of flu complications. Pneumonia, bronchitis, sinus infections, and ear infections are examples of flu-related complications. If you have a medical condition, such as heart disease, cancer, or diabetes, flu can make it worse. Flu can cause fever and chills, sore throat, muscle aches, fatigue, cough, headache, and runny or stuffy nose. Some people may have vomiting and diarrhea, though this is more common in children than adults. In an average year, thousands of people in the [facility] die from flu, and many more are hospitalized. Flu vaccine prevents millions of illnesses and flu-related visits to the doctor each year. 2. Influenza vaccines     CDC recommends everyone 6 months and older get vaccinated every flu season. Children 6 months through 6years of age may need 2 doses during a single flu season. Everyone else needs only 1 dose each flu season. It takes about 2 weeks for protection to develop after vaccination. There are many flu viruses, and they are always changing. Each year a new flu vaccine is made to protect against the influenza viruses believed to be likely to cause disease in the upcoming flu season.  Even when the vaccine doesnt exactly match these viruses, it may still provide some protection. Influenza vaccine does not cause flu. Influenza vaccine may be given at the same time as other vaccines. 3. Talk with your health care provider    Tell your vaccination provider if the person getting the vaccine:   Has had an allergic reaction after a previous dose of influenza vaccine, or has any severe, life-threatening allergies    Has ever had Guillain-Barré Syndrome (also called GBS)    In some cases, your health care provider may decide to postpone influenza vaccination until a future visit. Influenza vaccine can be administered at any time during pregnancy. People who are or will be pregnant during influenza season should receive inactivated influenza vaccine. People with minor illnesses, such as a cold, may be vaccinated. People who are moderately or severely ill should usually wait until they recover before getting influenza vaccine. Your health care provider can give you more information. 4. Risks of a vaccine reaction     Soreness, redness, and swelling where the shot is given, fever, muscle aches, and headache can happen after influenza vaccination.  There may be a very small increased risk of Guillain-Barré Syndrome (GBS) after inactivated influenza vaccine (the flu shot). Venessa Lambert children who get the flu shot along with pneumococcal vaccine (PCV13) and/or DTaP vaccine at the same time might be slightly more likely to have a seizure caused by fever. Tell your health care provider if a child who is getting flu vaccine has ever had a seizure. People sometimes faint after medical procedures, including vaccination. Tell your provider if you feel dizzy or have vision changes or ringing in the ears. As with any medicine, there is a very remote chance of a vaccine causing a severe allergic reaction, other serious injury, or death. 5. What if there is a serious problem?     An allergic reaction could occur after the vaccinated person leaves the clinic. If you see signs of a severe allergic reaction (hives, swelling of the face and throat, difficulty breathing, a fast heartbeat, dizziness, or weakness), call 9-1-1 and get the person to the nearest hospital.    For other signs that concern you, call your health care provider. Adverse reactions should be reported to the Vaccine Adverse Event Reporting System (VAERS). Your health care provider will usually file this report, or you can do it yourself. Visit the VAERS website at www.vaers. [facility].gov or call 6-745.361.3255. VAERS is only for reporting reactions, and VAERS staff members do not give medical advice. 6. The National Vaccine Injury Compensation Program    The [facility] Vaccine Injury Compensation Program (VICP) is a federal program that was created to compensate people who may have been injured by certain vaccines. Claims regarding alleged injury or death due to vaccination have a time limit for filing, which may be as short as two years. Visit the VICP website at www.[facility].gov/vaccinecompensation or call 5-845.195.9854 to learn about the program and about filing a claim. 7. How can I learn more?  Ask your health care provider.  Call your local or state health department.  Visit the website of the Food and Drug Administration (FDA) for vaccine package inserts and additional information at www.fda.gov/vaccines-blood-biologics/vaccines.  Contact the Centers for Disease Control and Prevention (CDC):  - Call 6-515.246.1802 (1-800-CDC-INFO) or  - Visit CDCs influenza website at www.cdc.gov/flu. Vaccine Information Statement   Inactivated Influenza Vaccine   8/6/2021  42 GABBY Chatman 276UX-97   Department of Health and Human Services  Centers for Disease Control and Prevention    Office Use Only

## 2021-11-22 NOTE — PROGRESS NOTES
HISTORY OF PRESENT ILLNESS  Tee Valverde is a 80 y.o. female here accompanied by her daughter. She has mild dementia, staying with her daughter. Able to ambulate but needs some help. Has some gait and balance issues. Would like to get home health. Need a raised chair commode. Noticed to have a small sore on the buttock area. Wanted me to get a check. Not complaining of pain. Report pain on the left hip radiating to thigh. No fall or injury. Sometimes she get constipated. Taking MiraLAX as needed. Has history of TIA, on high-dose of statin. No myalgia. LDL normal.  According to daughter, she has been sleeping too much. Almost keeping breakfast.  Eating only 2 meals a day. Has lost weight. Taking Seroquel 50 mg, need to reduce the dosage. Labs reviewed, need new lab. Need Covid booster and flu shot. HPI    Review of Systems   Constitutional: Negative. HENT: Negative. Eyes: Negative. Respiratory: Negative. Cardiovascular: Negative. Gastrointestinal: Negative. Genitourinary: Negative. Musculoskeletal: Negative. Skin: Negative. Neurological: Negative. Endo/Heme/Allergies: Negative. Psychiatric/Behavioral: Negative. Physical Exam  Constitutional:       Appearance: Normal appearance. She is normal weight. Cardiovascular:      Rate and Rhythm: Normal rate and regular rhythm. Pulses: Normal pulses. Heart sounds: Normal heart sounds. Pulmonary:      Effort: Pulmonary effort is normal.      Breath sounds: Normal breath sounds. Abdominal:      General: Abdomen is flat. Bowel sounds are normal.      Palpations: Abdomen is soft. Musculoskeletal:         General: Tenderness present. Cervical back: Normal range of motion and neck supple. Comments: Left hip: Mild tenderness present. Range of motion restricted. Skin:     Comments: Buttock: Small 3 x 4 cm area stage II ulcer on tailbone. Look like it is healing. No sign of infection. Neurological:      General: No focal deficit present. Mental Status: She is alert and oriented to person, place, and time. Mental status is at baseline. Psychiatric:         Mood and Affect: Mood normal.         Behavior: Behavior normal.         Thought Content: Thought content normal.      Comments: Demented. Occasional confusion and sundowning present. ASSESSMENT and PLAN  Diagnoses and all orders for this visit:    1. Primary hypertension    Stable blood pressure. On losartan and Coreg. Will check,  -     METABOLIC PANEL, COMPREHENSIVE    2. Needs flu shot  We will give,  -     FLU (FLUAD QUAD INFLUENZA VACCINE,QUAD,ADJUVANTED)    3. Hypothyroidism, unspecified type    On Synthroid. Will check,  -     TSH 3RD GENERATION    4. Mixed hyperlipidemia  On high-dose of statin. LDL stable. 5. Atrial fibrillation, unspecified type ([facility]  Rate and rhythm control. On Coreg and Xarelto. 6. Anemia, unspecified type  Still on Vitron. Will repeat hemoglobin, if a hemoglobin normal, will discontinue Vitron. -     HGB & HCT    7. Late onset Alzheimer's disease without behavioral disturbance (HCC)    Mild dementia. Not on medication. Checking Seroquel for slight agitation and confusion at night. Will reduce dosage of Seroquel since she is sleeping too much. 8. Chronic congestive heart failure, unspecified heart failure type ([facility]    9. Late onset Alzheimer's dementia with behavioral disturbance (HCC)  -     QUEtiapine (SEROquel) 25 mg tablet; Take 1 Tablet by mouth nightly. DC seroquel 50 mg    10. Gait abnormality  We will refer,  -     [address]; Raised toilet seat    11. Pressure injury, stage 2, unspecified location ([facility]  -     menthol-zinc oxide (Calmoseptine) 0.44-20.6 % oint; Top BID  -     OTHER; Donut cushion to seat  12. 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 Advised to eat take Tylenol as needed. Will do physical therapy.   Discussed expected course/resolution/complications of diagnosis in detail with patient. Medication risks/benefits/costs/interactions/alternatives discussed with patient. Discussed COVID-19 infection precaution with patient. Pt was given an after visit summary which includes diagnoses, current medications & vitals. Pt expressed understanding with the diagnosis and plan.

## 2021-11-23 LAB
ALBUMIN SERPL-MCNC: 3.6 G/DL (ref 3.5–4.6)
ALBUMIN/GLOB SERPL: 1.3 {RATIO} (ref 1.2–2.2)
ALP SERPL-CCNC: 71 IU/L (ref 44–121)
ALT SERPL-CCNC: 5 IU/L (ref 0–32)
AST SERPL-CCNC: 16 IU/L (ref 0–40)
BILIRUB SERPL-MCNC: 0.5 MG/DL (ref 0–1.2)
BUN SERPL-MCNC: 23 MG/DL (ref 10–36)
BUN/CREAT SERPL: 21 (ref 12–28)
CALCIUM SERPL-MCNC: 8.5 MG/DL (ref 8.7–10.3)
CHLORIDE SERPL-SCNC: 103 MMOL/L (ref 96–106)
CO2 SERPL-SCNC: 25 MMOL/L (ref 20–29)
CREAT SERPL-MCNC: 1.1 MG/DL (ref 0.57–1)
GLOBULIN SER CALC-MCNC: 2.7 G/DL (ref 1.5–4.5)
GLUCOSE SERPL-MCNC: 111 MG/DL (ref 65–99)
HCT VFR BLD AUTO: 31.1 % (ref 34–46.6)
HGB BLD-MCNC: 9.9 G/DL (ref 11.1–15.9)
INTERPRETATION: NORMAL
POTASSIUM SERPL-SCNC: 3.7 MMOL/L (ref 3.5–5.2)
PROT SERPL-MCNC: 6.3 G/DL (ref 6–8.5)
SODIUM SERPL-SCNC: 143 MMOL/L (ref 134–144)
TSH SERPL DL<=0.005 MIU/L-ACNC: 1.95 UIU/ML (ref 0.45–4.5)

## 2021-11-24 ENCOUNTER — TELEPHONE (OUTPATIENT)
Dept: INTERNAL MEDICINE CLINIC | Age: 86
End: 2021-11-24

## 2021-11-24 NOTE — TELEPHONE ENCOUNTER
S/w Kaern @(home care)she stated due to the pt:insurance they can't accept her refer for home care visited.

## 2021-11-26 ENCOUNTER — TELEPHONE (OUTPATIENT)
Dept: INTERNAL MEDICINE CLINIC | Age: 86
End: 2021-11-26

## 2021-11-26 DIAGNOSIS — R26.81 GAIT INSTABILITY: ICD-10-CM

## 2021-11-26 DIAGNOSIS — N39.41 URGE INCONTINENCE OF URINE: ICD-10-CM

## 2021-11-26 DIAGNOSIS — M81.0 OSTEOPOROSIS, UNSPECIFIED OSTEOPOROSIS TYPE, UNSPECIFIED PATHOLOGICAL FRACTURE PRESENCE: Primary | ICD-10-CM

## 2021-11-26 DIAGNOSIS — R26.9 GAIT ABNORMALITY: ICD-10-CM

## 2021-11-26 NOTE — TELEPHONE ENCOUNTER
QUESTIONS   Information for Provider? Ms. Ish Casarez is a [facility] patient, and [facility] [place] is not in network with [facility], [phone]; so we   will have to refer another group, please   ---------------------------------------------------------------------------   --------------   [address]   What is the best way for the office to contact you? OK to leave message on   voicemail   Preferred Call Back Phone Number? [phone]   ---------------------------------------------------------------------------   --------------   SCRIPT ANSWERS   Relationship to Patient? Third Party   Representative Name?  [facility]

## 2021-11-29 ENCOUNTER — TELEPHONE (OUTPATIENT)
Dept: INTERNAL MEDICINE CLINIC | Age: 86
End: 2021-11-29

## 2021-11-29 DIAGNOSIS — M25.559 HIP PAIN: ICD-10-CM

## 2021-11-29 DIAGNOSIS — R26.9 GAIT ABNORMALITY: Primary | ICD-10-CM

## 2021-11-29 NOTE — TELEPHONE ENCOUNTER
Pt has been complaining of pain in   her L hip and has vomited and is losing weight. Pt daughter would like mom   to have an x ray to check to see about hip pain. Please call pt to advise.

## 2021-11-30 NOTE — TELEPHONE ENCOUNTER
Order placed for hip x ray per provider, Daughter advised we will call once we get the results back and also with lab results

## 2021-12-01 ENCOUNTER — TELEPHONE (OUTPATIENT)
Dept: INTERNAL MEDICINE CLINIC | Age: 86
End: 2021-12-01

## 2021-12-01 NOTE — TELEPHONE ENCOUNTER
Called and spoke with daughter, and advised of lab results and recommendations, She voiced understanding and she states she is taking pt to get her x ray on Friday.

## 2021-12-01 NOTE — TELEPHONE ENCOUNTER
----- Message from Pushpa Bundy NP sent at 11/30/2021  1:28 PM EST -----  Hgb 9.9. Continue iron supplement.     Otherwise stable labs

## 2021-12-03 ENCOUNTER — HOSPITAL ENCOUNTER (OUTPATIENT)
Dept: GENERAL RADIOLOGY | Age: 86
Discharge: HOME | End: 2021-12-03
Payer: MEDICARE

## 2021-12-03 DIAGNOSIS — R26.9 GAIT ABNORMALITY: ICD-10-CM

## 2021-12-03 DIAGNOSIS — M25.559 HIP PAIN: ICD-10-CM

## 2021-12-03 PROCEDURE — 73502 X-RAY EXAM HIP UNI 2-3 VIEWS: CPT

## 2021-12-07 NOTE — TELEPHONE ENCOUNTER
Called and spoke with pts daughter. I advised her of imaging results and recommendations. I also advised that [address] does not work with [facility] more insurance. [facility] referral was sent to [facility]. Awaiting approval from [facility] [employer].  Will send order for commode to [facility]

## 2021-12-07 NOTE — TELEPHONE ENCOUNTER
S/w Daughter: Estefania Waggoner [phone] wants to discuss xray-LF hip done -12/3/21 along w/Home Health Services,)PT) & a Potty seat. [facility]

## 2021-12-08 ENCOUNTER — TELEPHONE (OUTPATIENT)
Dept: INTERNAL MEDICINE CLINIC | Age: 86
End: 2021-12-08

## 2021-12-08 NOTE — TELEPHONE ENCOUNTER
----- Message from Elias Nicole sent at 12/8/2021 10:29 AM EST -----  Subject: Message to Provider    QUESTIONS  Information for Provider? Eleonora at [address] [facility] [address]) called. Says patient has [employer] and will need preauthorization to be   processed by YOUR OFFICE for the home health care. She is NOT able to do   the preauthorization and the process needs to be started by the PCP. If   you need to reach her, her number is [phone]  ---------------------------------------------------------------------------  --------------  CALL BACK INFO  What is the best way for the office to contact you? OK to leave message on   voicemail  Preferred Call Back Phone Number? [phone]  ---------------------------------------------------------------------------  --------------  SCRIPT ANSWERS  Relationship to Patient? Third Party  Representative Name?  Roxana Calle at [address] [facility] [address]) [phone]

## 2021-12-10 ENCOUNTER — DOCUMENTATION ONLY (OUTPATIENT)
Dept: INTERNAL MEDICINE CLINIC | Age: 86
End: 2021-12-10

## 2021-12-11 DIAGNOSIS — M19.041 OSTEOARTHRITIS OF BOTH HANDS, UNSPECIFIED OSTEOARTHRITIS TYPE: ICD-10-CM

## 2021-12-11 DIAGNOSIS — M19.042 OSTEOARTHRITIS OF BOTH HANDS, UNSPECIFIED OSTEOARTHRITIS TYPE: ICD-10-CM

## 2021-12-13 RX ORDER — DICLOFENAC SODIUM 10 MG/G
GEL TOPICAL
Qty: 100 G | Refills: 2 | Status: SHIPPED | OUTPATIENT
Start: 2021-12-13 | End: 2021-12-27 | Stop reason: SDUPTHER

## 2021-12-15 DIAGNOSIS — M81.0 OSTEOPOROSIS, UNSPECIFIED OSTEOPOROSIS TYPE, UNSPECIFIED PATHOLOGICAL FRACTURE PRESENCE: ICD-10-CM

## 2021-12-15 DIAGNOSIS — E83.42 HYPOMAGNESEMIA: ICD-10-CM

## 2021-12-15 RX ORDER — LANOLIN ALCOHOL/MO/W.PET/CERES
CREAM (GRAM) TOPICAL
Qty: 90 TABLET | Refills: 0 | Status: SHIPPED | OUTPATIENT
Start: 2021-12-15 | End: 2022-03-14

## 2021-12-15 RX ORDER — MULTIVITAMIN
TABLET ORAL
Qty: 90 TABLET | Refills: 0 | Status: SHIPPED | OUTPATIENT
Start: 2021-12-15 | End: 2022-03-14

## 2021-12-15 RX ORDER — ALENDRONATE SODIUM 35 MG/1
TABLET ORAL
Qty: 12 TABLET | Refills: 2 | Status: SHIPPED | OUTPATIENT
Start: 2021-12-15 | End: 2022-08-12

## 2021-12-16 RX ORDER — BISMUTH SUBSALICYLATE 262 MG
TABLET,CHEWABLE ORAL
Qty: 30 TABLET | Refills: 10 | Status: SHIPPED | OUTPATIENT
Start: 2021-12-16

## 2021-12-27 DIAGNOSIS — M19.042 OSTEOARTHRITIS OF BOTH HANDS, UNSPECIFIED OSTEOARTHRITIS TYPE: ICD-10-CM

## 2021-12-27 DIAGNOSIS — M19.041 OSTEOARTHRITIS OF BOTH HANDS, UNSPECIFIED OSTEOARTHRITIS TYPE: ICD-10-CM

## 2021-12-27 DIAGNOSIS — G30.1 LATE ONSET ALZHEIMER'S DEMENTIA WITH BEHAVIORAL DISTURBANCE (HCC): ICD-10-CM

## 2021-12-27 DIAGNOSIS — F02.818 LATE ONSET ALZHEIMER'S DEMENTIA WITH BEHAVIORAL DISTURBANCE (HCC): ICD-10-CM

## 2021-12-27 RX ORDER — QUETIAPINE FUMARATE 25 MG/1
25 TABLET, FILM COATED ORAL
Qty: 90 TABLET | Refills: 0 | Status: SHIPPED | OUTPATIENT
Start: 2021-12-27 | End: 2022-03-14

## 2021-12-27 RX ORDER — DICLOFENAC SODIUM 10 MG/G
GEL TOPICAL
Qty: 100 G | Refills: 2 | Status: SHIPPED | OUTPATIENT
Start: 2021-12-27 | End: 2022-07-08 | Stop reason: SDUPTHER

## 2021-12-27 NOTE — TELEPHONE ENCOUNTER
Has the order been written for the 3 in 1 commode chair?   Daughter is also requesting home health for a pressure area on her sacrum

## 2022-01-14 DIAGNOSIS — E03.9 HYPOTHYROIDISM, UNSPECIFIED TYPE: ICD-10-CM

## 2022-01-14 DIAGNOSIS — F41.0 ANXIETY ATTACK: ICD-10-CM

## 2022-01-14 DIAGNOSIS — I10 ESSENTIAL HYPERTENSION: ICD-10-CM

## 2022-01-14 DIAGNOSIS — D50.9 IRON DEFICIENCY ANEMIA, UNSPECIFIED IRON DEFICIENCY ANEMIA TYPE: ICD-10-CM

## 2022-01-14 RX ORDER — SERTRALINE HYDROCHLORIDE 100 MG/1
TABLET, FILM COATED ORAL
Qty: 90 TABLET | Refills: 0 | Status: SHIPPED | OUTPATIENT
Start: 2022-01-14 | End: 2022-04-15

## 2022-01-14 RX ORDER — LOSARTAN POTASSIUM 25 MG/1
TABLET ORAL
Qty: 90 TABLET | Refills: 0 | Status: SHIPPED | OUTPATIENT
Start: 2022-01-14 | End: 2022-04-15

## 2022-01-14 RX ORDER — LEVOTHYROXINE SODIUM 25 UG/1
TABLET ORAL
Qty: 90 TABLET | Refills: 0 | Status: SHIPPED | OUTPATIENT
Start: 2022-01-14 | End: 2022-04-15

## 2022-01-14 RX ORDER — FERROUS SULFATE TAB 325 MG (65 MG ELEMENTAL FE) 325 (65 FE) MG
TAB ORAL
Qty: 90 TABLET | Refills: 0 | Status: SHIPPED | OUTPATIENT
Start: 2022-01-14 | End: 2022-04-15

## 2022-02-08 ENCOUNTER — TELEPHONE (OUTPATIENT)
Dept: INTERNAL MEDICINE CLINIC | Age: 87
End: 2022-02-08

## 2022-02-08 NOTE — TELEPHONE ENCOUNTER
Spoke w/ pt daughter demetrius and she states pt ins. Company is now covering [facility] and not [facility] anymore so the referral for [facility] needs to be resent but to [facility] fax: [phone] # [phone].   Daughter advised

## 2022-02-08 NOTE — TELEPHONE ENCOUNTER
Pt daughter requesting  new order for Wound care through [facility] [phone].  Please call to update when done

## 2022-02-16 ENCOUNTER — TELEPHONE (OUTPATIENT)
Dept: INTERNAL MEDICINE CLINIC | Age: 87
End: 2022-02-16

## 2022-02-16 ENCOUNTER — VIRTUAL VISIT (OUTPATIENT)
Dept: INTERNAL MEDICINE CLINIC | Age: 87
End: 2022-02-16
Payer: COMMERCIAL

## 2022-02-16 DIAGNOSIS — I48.91 ATRIAL FIBRILLATION, UNSPECIFIED TYPE (HCC): ICD-10-CM

## 2022-02-16 DIAGNOSIS — R26.9 GAIT ABNORMALITY: ICD-10-CM

## 2022-02-16 DIAGNOSIS — I50.9 CHRONIC CONGESTIVE HEART FAILURE, UNSPECIFIED HEART FAILURE TYPE (HCC): ICD-10-CM

## 2022-02-16 DIAGNOSIS — R53.83 FATIGUE, UNSPECIFIED TYPE: ICD-10-CM

## 2022-02-16 DIAGNOSIS — I10 ESSENTIAL HYPERTENSION: ICD-10-CM

## 2022-02-16 DIAGNOSIS — L89.152 PRESSURE INJURY OF SACRAL REGION, STAGE 2 (HCC): Primary | ICD-10-CM

## 2022-02-16 DIAGNOSIS — F43.21 GRIEF: ICD-10-CM

## 2022-02-16 DIAGNOSIS — N32.81 OAB (OVERACTIVE BLADDER): ICD-10-CM

## 2022-02-16 DIAGNOSIS — E55.9 VITAMIN D DEFICIENCY: ICD-10-CM

## 2022-02-16 DIAGNOSIS — F41.0 PANIC ATTACK: ICD-10-CM

## 2022-02-16 DIAGNOSIS — E03.9 HYPOTHYROIDISM, UNSPECIFIED TYPE: ICD-10-CM

## 2022-02-16 DIAGNOSIS — F41.0 ANXIETY ATTACK: ICD-10-CM

## 2022-02-16 DIAGNOSIS — D50.9 IRON DEFICIENCY ANEMIA, UNSPECIFIED IRON DEFICIENCY ANEMIA TYPE: ICD-10-CM

## 2022-02-16 PROCEDURE — 99214 OFFICE O/P EST MOD 30 MIN: CPT | Performed by: INTERNAL MEDICINE

## 2022-02-16 RX ORDER — FUROSEMIDE 40 MG/1
40 TABLET ORAL 2 TIMES DAILY
Qty: 180 TABLET | Refills: 1 | Status: SHIPPED | OUTPATIENT
Start: 2022-02-16 | End: 2022-09-14 | Stop reason: ALTCHOICE

## 2022-02-16 RX ORDER — TROSPIUM CHLORIDE ER 60 MG/1
60 CAPSULE ORAL
Qty: 30 CAPSULE | Refills: 3 | Status: SHIPPED | OUTPATIENT
Start: 2022-02-16 | End: 2022-06-20

## 2022-02-16 RX ORDER — RIVAROXABAN 15 MG/1
TABLET, FILM COATED ORAL
Qty: 90 TABLET | Refills: 0 | Status: SHIPPED | OUTPATIENT
Start: 2022-02-16 | End: 2022-05-16

## 2022-02-16 RX ORDER — ASCORBIC ACID 500 MG
500 TABLET ORAL 2 TIMES DAILY
Qty: 60 TABLET | Refills: 2 | Status: SHIPPED | OUTPATIENT
Start: 2022-02-16 | End: 2022-05-16

## 2022-02-16 RX ORDER — ALPRAZOLAM 0.25 MG/1
0.25 TABLET ORAL
Qty: 30 TABLET | Refills: 0 | Status: SHIPPED | OUTPATIENT
Start: 2022-02-16 | End: 2022-05-27

## 2022-02-16 RX ORDER — CARVEDILOL 12.5 MG/1
12.5 TABLET ORAL 2 TIMES DAILY WITH MEALS
Qty: 180 TABLET | Refills: 1 | Status: SHIPPED | OUTPATIENT
Start: 2022-02-16 | End: 2022-09-14 | Stop reason: SDUPTHER

## 2022-02-16 RX ORDER — TROSPIUM CHLORIDE 20 MG/1
TABLET, FILM COATED ORAL
Qty: 180 TABLET | Refills: 0 | Status: SHIPPED | OUTPATIENT
Start: 2022-02-16 | End: 2022-02-16 | Stop reason: ALTCHOICE

## 2022-02-16 RX ORDER — POTASSIUM CHLORIDE 750 MG/1
10 TABLET, FILM COATED, EXTENDED RELEASE ORAL DAILY
Qty: 90 TABLET | Refills: 1 | Status: SHIPPED | OUTPATIENT
Start: 2022-02-16 | End: 2022-08-12

## 2022-02-16 NOTE — PROGRESS NOTES
Health Maintenance Due   Topic Date Due    DTaP/Tdap/Td series (1 - Tdap) Never done    Shingrix Vaccine Age 50> (1 of 2) Never done    Lipid Screen  08/13/2021       Chief Complaint   Patient presents with    Decubitus Ulcer    Hand Pain    Anxiety    Medication Refill       1. Have you been to the ER, urgent care clinic since your last visit? Hospitalized since your last visit? No    2. Have you seen or consulted any other health care providers outside of the [address] [place] [address] since your last visit? Include any pap smears or colon screening. No    3) Do you have an Advance Directive on file? no    4) Are you interested in receiving information on Advance Directives? NO      Patient is accompanied by daughter I have received verbal consent from Chad Pathak to discuss any/all medical information while they are present in the room.

## 2022-02-16 NOTE — PROGRESS NOTES
Cris Kimball is a 80 y.o. female who was seen by synchronous (real-time) audio-video technology on 2/16/2022 for Decubitus Ulcer, Hand Pain, Anxiety, Shortness of Breath, and Fatigue        Assessment & Plan:   Diagnoses and all orders for this visit:    1. Pressure injury of sacral region, stage 2 ([facility]    Need to air mattress. Advised to change position every 2 hours. Will have home health nurse to evaluate her home situation and decided whether she would benefit from hospital bed. Will refer,  -     [address] [facility]  -     ascorbic acid, vitamin C, (VITAMIN C) 500 mg tablet; Take 1 Tablet by mouth two (2) times a day. 2. Gait abnormality    Has gait and balance issue, she has been weak. Will order home health nurse for PT OT.  -     [address] [place] [address]    3. Anxiety attack  -     ALPRAZolam (XANAX) 0.25 mg tablet; Take 1 Tablet by mouth two (2) times daily as needed for Anxiety. MAX DAILY AMOUNT: 0.5 MG.      5. Panic attack    We will give,  -     ALPRAZolam (XANAX) 0.25 mg tablet; Take 1 Tablet by mouth two (2) times daily as needed for Anxiety. MAX DAILY AMOUNT: 0.5 MG. 6. Atrial fibrillation, unspecified type ([facility])  -     carvediloL (COREG) 12.5 mg tablet; Take 1 Tablet by mouth two (2) times daily (with meals). -     LIPID PANEL    7. Essential hypertension  Stable blood pressure. On Coreg and losartan. 8. Hypothyroidism, unspecified type  On Synthroid. Doing well. 9. Chronic congestive heart failure, unspecified heart failure type ([facility]    Compensated. Will refill,  -     furosemide (LASIX) 40 mg tablet; Take 1 Tablet by mouth two (2) times a day. -     potassium chloride SR (KLOR-CON 10) 10 mEq tablet; Take 1 Tablet by mouth daily. 10. OAB (overactive bladder)  -     trospium (SANCTURA XL) 60 mg capsule; Take 1 Capsule by mouth Daily (before breakfast). DC sanctura 20 mg    11.  Fatigue, unspecified type  -     CBC WITH AUTOMATED DIFF  -     METABOLIC PANEL, COMPREHENSIVE    12. Iron deficiency anemia, unspecified iron deficiency anemia type  -     CBC WITH AUTOMATED DIFF  -     IRON  -     FERRITIN    13. Vitamin D deficiency  -     VITAMIN D, 25 HYDROXY        I spent at least 30 minutes on this visit with this established patient. Subjective:   Ms. EVAN BLOCK [facility] is here with her daughter. She is staying at home. Daughter mentioned that she has a decubitus ulcer on the sacral area which is getting date. The topical cream is not helping her. She is on regular mattress. Not helping her. Also she has been feeling very tired and fatigued. Has gait and balance issue. Need physical therapy. Has congestive heart failure. On diuretics. Need refill on medication. Need lab work. No shortness of breath or orthopnea. Has hypertension, compliant with medication. Denies chest pain palpitation shortness of breath. Has low thyroid, on Synthroid. TSH normal.  Has mild dementia but has more anxiety problems since she has lost her son. Would like to get a refill on Xanax. Labs reviewed, annual lab work ordered. Prior to Admission medications    Medication Sig Start Date End Date Taking? Authorizing Provider   Xarelto 15 mg tab tablet Take 1 tablet by mouth daily with breakfast. 2/16/22  Yes Hudson Schaeffer MD   ascorbic acid, vitamin C, (VITAMIN C) 500 mg tablet Take 1 Tablet by mouth two (2) times a day. 2/16/22  Yes Hudson Schaeffer MD   ALPRAZolam Karina Sos) 0.25 mg tablet Take 1 Tablet by mouth two (2) times daily as needed for Anxiety. MAX DAILY AMOUNT: 0.5 MG. 2/16/22  Yes Hudson Schaeffer MD   carvediloL (COREG) 12.5 mg tablet Take 1 Tablet by mouth two (2) times daily (with meals). 2/16/22  Yes Hudson Schaeffer MD   furosemide (LASIX) 40 mg tablet Take 1 Tablet by mouth two (2) times a day. 2/16/22  Yes Hudson Schaeffer MD   potassium chloride SR (KLOR-CON 10) 10 mEq tablet Take 1 Tablet by mouth daily.  2/16/22  Yes Hudson Schaeffer MD   trospium (SANCTURA XL) 60 mg capsule Take 1 Capsule by mouth Daily (before breakfast). DC sanctura 20 mg 2/16/22  Yes Jeanna Goldberg MD   FeroSuL 325 mg (65 mg iron) tablet Take 1 tablet by mouth daily at 9AM. 1/14/22  Yes Kari Gamez NP   losartan (COZAAR) 25 mg tablet Take 1 tablet by mouth daily. 1/14/22  Yes Jeanna Goldberg MD   sertraline (ZOLOFT) 100 mg tablet Take 1 tablet by mouth daily. 1/14/22  Yes Kari Gamez NP   levothyroxine (SYNTHROID) 25 mcg tablet Take 1 tablet by mouth every morning. 1/14/22  Yes Kari Gamez NP   loratadine (CLARITIN) 10 mg tablet Take 1 tablet by mouth daily. 1/14/22  Yes Kassi Bhandari NP   diclofenac (VOLTAREN) 1 % gel APPLY 2 GRAMS TO AFFECTED AREA TWICE DAILY 12/27/21  Yes Kassi Bhandari NP   QUEtiapine (SEROquel) 25 mg tablet Take 1 Tablet by mouth nightly. DC seroquel 50 mg 12/27/21  Yes Kassi Bhandari NP   multivitamin (ONE A DAY) tablet Take 1 tablet by mouth daily. 12/16/21  Yes Jeanna Goldberg MD   magnesium oxide (MAG-OX) 400 mg tablet Take 1 tablet by mouth daily. 12/15/21  Yes Jeanna Goldberg MD   calcium-cholecalciferol, D3, (CALTRATE 600+D) tablet Take 1 tablet by mouth daily. 12/15/21  Yes Jeanna Goldberg MD   alendronate (FOSAMAX) 35 mg tablet Take 1 tablet by mouth every Wednesday. 12/15/21  Yes Jeanna Goldberg MD   OTHER Raised toilet seat 12/7/21  Yes Jeanna Goldberg MD   menthol-zinc oxide (Calmoseptine) 0.44-20.6 % oint Top BID 11/22/21  Yes Jeanna Goldberg MD   OTHER Donut cushion to seat 11/22/21  Yes Jeanna Goldberg MD   amoxicillin (AMOXIL) 500 mg capsule Take 4 caps prior to dental appt 9/16/21  Yes Jeanna Goldberg MD   atorvastatin (LIPITOR) 80 mg tablet Take 1 Tablet by mouth daily. 7/19/21  Yes Jeanna Goldberg MD   OTHER Left wrist splint use everyday 6/17/20  Yes Jeanna Goldberg MD   GAVILAX 17 gram/dose powder Take 17 g by mouth daily as needed (constipation).  2/19/20  Yes Jeanna Goldberg MD   OTHER rollator walker for gait abnormality 10/23/19  Yes Kari Gamez, NP   sodium chloride-aloe vera (AYR SALINE) topical gel Apply  to affected area once. Yes Provider, Historical   albuterol (PROAIR HFA) 90 mcg/actuation inhaler Take 2 Puffs by inhalation every six (6) hours as needed for Wheezing. 8/14/18  Yes Stephanie Funk,    zinc oxide-cod liver oil (DESITIN) 40 % ointment Apply  to affected area as needed for Skin Irritation. Yes Provider, Historical   docusate sodium (COLACE) 100 mg capsule Take 100 mg by mouth two (2) times daily as needed. Yes Provider, Historical   trospium (SANCTURA) 20 mg tablet Take 1 tablet by mouth twice daily. 2/16/22 2/16/22  Jennifer Davila MD   Xarelto 15 mg tab tablet Take 1 tablet by mouth daily with breakfast. 11/16/21 2/16/22  Jennifer Davila MD   ALPRAZolam Juwan Roller) 0.25 mg tablet TAKE 1 TAB BY MOUTH TWO (2) TIMES DAILY AS NEEDED FOR ANXIETY. MAX DAILY AMOUNT: 0.5 MG. 10/1/21 2/16/22  Luis E Gamez NP   potassium chloride SR (KLOR-CON 10) 10 mEq tablet Take 1 Tablet by mouth daily. 7/23/21 2/16/22  Bertha Wolf NP   carvediloL (COREG) 12.5 mg tablet Take 1 Tablet by mouth two (2) times daily (with meals). 7/19/21 2/16/22  Jennifer Davila MD   trospium (SANCTURA) 20 mg tablet Take 1 Tablet by mouth two (2) times a day. 7/19/21 2/16/22  Jennifer Davila MD   furosemide (LASIX) 40 mg tablet Take 40 mg by mouth two (2) times a day.   2/16/22  Provider, Historical     Past Medical History:   Diagnosis Date    Atrial fibrillation ([facility] 2007    Hypercholesterolemia     Hypertension     Osteoarthritis 2010    Stroke ([facility]     Thromboembolus ([facility]     Thyroid disease        ROS    Objective:     Patient-Reported Vitals 2/16/2022   Patient-Reported Weight 157 lbs   Patient-Reported Height -   Patient-Reported LMP Menopause          Constitutional: [x] Appears well-developed and well-nourished [x] No apparent distress      [] Abnormal -     Mental status: [x] Alert and awake  [x] Oriented to person/place/time [x] Able to follow commands    [] Abnormal -     Eyes: EOM    [x]  Normal    [] Abnormal -   Sclera  [x]  Normal    [] Abnormal -          Discharge [x]  None visible   [] Abnormal -     HENT: [x] Normocephalic, atraumatic  [] Abnormal -   [x] Mouth/Throat: Mucous membranes are moist    External Ears [x] Normal  [] Abnormal -    Neck: [x] No visualized mass [] Abnormal -     Pulmonary/Chest: [x] Respiratory effort normal   [x] No visualized signs of difficulty breathing or respiratory distress        [] Abnormal -      Musculoskeletal:   [x] Normal gait with no signs of ataxia         [x] Normal range of motion of neck        [] Abnormal -     Neurological:        [x] No Facial Asymmetry (Cranial nerve 7 motor function) (limited exam due to video visit)          [x] No gaze palsy        [] Abnormal -          Skin:      Sacrum: Approximately 1 cm diameter wound on the sacrum according to patient daughter at Q-tip goes down to the hole. Probable stage II or III. Psychiatric:       [x] Normal Affect [] Abnormal -        [x] No Hallucinations    Other pertinent observable physical exam findings:-        We discussed the expected course, resolution and complications of the diagnosis(es) in detail. Medication risks, benefits, costs, interactions, and alternatives were discussed as indicated. I advised her to contact the office if her condition worsens, changes or fails to improve as anticipated. She expressed understanding with the diagnosis(es) and plan. Елена Bam, was evaluated through a synchronous (real-time) audio-video encounter. The patient (or guardian if applicable) is aware that this is a billable service, which includes applicable co-pays. Verbal consent to proceed has been obtained. The visit was conducted pursuant to the emergency declaration under the [facility] 2[facility], [address] [place] [address] authority and the [employer] Act.   Patient identification was verified, and a caregiver was present when appropriate. The patient was located at home in a state where the provider was licensed to provide care.       Miriam Fox MD

## 2022-02-16 NOTE — TELEPHONE ENCOUNTER
[facility] [phone].

## 2022-02-17 ENCOUNTER — TELEPHONE (OUTPATIENT)
Dept: INTERNAL MEDICINE CLINIC | Age: 87
End: 2022-02-17

## 2022-02-17 NOTE — TELEPHONE ENCOUNTER
Wesley from [address] [place] [address] received the referral sent in but unfortunately not able to accept it because they aren't in network with patients insurance.

## 2022-02-28 ENCOUNTER — TELEPHONE (OUTPATIENT)
Dept: INTERNAL MEDICINE CLINIC | Age: 87
End: 2022-02-28

## 2022-02-28 DIAGNOSIS — L89.152 PRESSURE INJURY OF SACRAL REGION, STAGE 2 (HCC): Primary | ICD-10-CM

## 2022-02-28 DIAGNOSIS — R26.9 GAIT ABNORMALITY: ICD-10-CM

## 2022-02-28 DIAGNOSIS — R53.83 FATIGUE, UNSPECIFIED TYPE: ICD-10-CM

## 2022-02-28 DIAGNOSIS — I10 PRIMARY HYPERTENSION: ICD-10-CM

## 2022-02-28 NOTE — TELEPHONE ENCOUNTER
Pt daughter wants to speak with nurse regarding new order for home health . Pt daughter under the impression [phone] would reach out to schedule her.     Gave her # for [phone] and stated I will have nurse follow up later

## 2022-03-01 ENCOUNTER — HOME HEALTH ADMISSION (OUTPATIENT)
Dept: HOME HEALTH SERVICES | Facility: HOME HEALTH | Age: 87
End: 2022-03-01
Payer: MEDICARE

## 2022-03-01 ENCOUNTER — TELEPHONE (OUTPATIENT)
Dept: INTERNAL MEDICINE CLINIC | Age: 87
End: 2022-03-01

## 2022-03-01 DIAGNOSIS — G30.1 LATE ONSET ALZHEIMER'S DEMENTIA WITH BEHAVIORAL DISTURBANCE (HCC): ICD-10-CM
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
## 2022-03-01 DIAGNOSIS — R26.9 GAIT ABNORMALITY: ICD-10-CM

## 2022-03-01 DIAGNOSIS — R53.83 FATIGUE, UNSPECIFIED TYPE: ICD-10-CM

## 2022-03-01 DIAGNOSIS — R26.81 GAIT INSTABILITY: ICD-10-CM

## 2022-03-01 DIAGNOSIS — L89.152 PRESSURE INJURY OF SACRAL REGION, STAGE 2 (HCC): Primary | ICD-10-CM

## 2022-03-01 DIAGNOSIS — F02.818 LATE ONSET ALZHEIMER'S DEMENTIA WITH BEHAVIORAL DISTURBANCE (HCC): ICD-10-CM

## 2022-03-01 NOTE — TELEPHONE ENCOUNTER
[employer] [facility] referral put in for [facility] per pt daughter and [address] w/ [address] [facility] [address]

## 2022-03-01 NOTE — TELEPHONE ENCOUNTER
----- Message from Ag Casas RN sent at 3/1/2022  1:15 PM EST -----  Regarding: RE: [facility] referral  Hello,  The notes indicate PT OT but the original order is for SN for the decub. Please let us know what services are ordered. Thank you  ----- Message -----  From: Mckayla Wilson  Sent: 3/1/2022   1:04 PM EST  To: Ag Casas RN  Subject: FW:  referral                                  Ok .  I placed the referral so everything should be good now. Thanks!  ----- Message -----  From: Liz Sauceda RN  Sent: 3/1/2022   1:02 PM EST  To: Noel Lema  Subject: RE: [facility] referral                                  Yes, you want to be sure to select in \"department\" Kari Serrano  ----- Message -----  From: Duglas Payer: 3/1/2022  12:57 PM EST  To: Ag Casas RN  Subject: FW: HH referral                                  Good Afternoon,  The Referral was put in on 2/26/22 and sent to other [facility] outside of [facility] that's why it's blank. I will put in another order but do I put [facility] in directly for [facility]? Thanks!  ----- Message -----  From: Liz Sauceda RN  Sent: 2/28/2022   1:59 PM EST  To: Isadora Orr MD, Noel Else  Subject: [facility] referral                                      Good afternoon   The patients daughter called inquiring about a [facility] referral. I see in CC that an order was in the patients chart without a department. The order is too old per our policy, I have had to cancel. Please resubmit for review.  Thank you

## 2022-03-01 NOTE — TELEPHONE ENCOUNTER
Call placed to patient's daughter, Lester Garcia. Left detailed msg. HH order previously sent to [facility] and they were not within network. HH orders were resent to [facility]. Was calling to inquire if they would like [facility] instead?

## 2022-03-01 NOTE — TELEPHONE ENCOUNTER
Spoke w/ Claude Salk at [address] [place] [address] and advised of correct referral order that was just put in for pt.

## 2022-03-02 ENCOUNTER — HOME CARE VISIT (OUTPATIENT)
Dept: SCHEDULING | Facility: HOME HEALTH | Age: 87
End: 2022-03-02
Payer: MEDICARE

## 2022-03-02 ENCOUNTER — TELEPHONE (OUTPATIENT)
Dept: INTERNAL MEDICINE CLINIC | Age: 87
End: 2022-03-02

## 2022-03-02 PROCEDURE — 400013 HH SOC

## 2022-03-02 PROCEDURE — G0299 HHS/HOSPICE OF RN EA 15 MIN: HCPCS

## 2022-03-02 NOTE — TELEPHONE ENCOUNTER
Spoke w/ Mariel Diaz from [facility] and advised her to draw pt labs on 3/21/22 prior to nov visit on 4/6/22

## 2022-03-03 ENCOUNTER — HOME CARE VISIT (OUTPATIENT)
Dept: SCHEDULING | Facility: HOME HEALTH | Age: 87
End: 2022-03-03
Payer: MEDICARE

## 2022-03-03 VITALS
OXYGEN SATURATION: 98 % | DIASTOLIC BLOOD PRESSURE: 70 MMHG | TEMPERATURE: 98.2 F | RESPIRATION RATE: 18 BRPM | HEART RATE: 70 BPM | SYSTOLIC BLOOD PRESSURE: 126 MMHG

## 2022-03-03 VITALS
HEART RATE: 81 BPM | RESPIRATION RATE: 16 BRPM | DIASTOLIC BLOOD PRESSURE: 76 MMHG | TEMPERATURE: 97 F | OXYGEN SATURATION: 99 % | SYSTOLIC BLOOD PRESSURE: 120 MMHG

## 2022-03-03 LAB
25(OH)D3+25(OH)D2 SERPL-MCNC: 35.5 NG/ML (ref 30–100)
ALBUMIN SERPL-MCNC: 3.6 G/DL (ref 3.5–4.6)
ALBUMIN/GLOB SERPL: 1.2 {RATIO} (ref 1.2–2.2)
ALP SERPL-CCNC: 78 IU/L (ref 44–121)
ALT SERPL-CCNC: 10 IU/L (ref 0–32)
AST SERPL-CCNC: 19 IU/L (ref 0–40)
BASOPHILS # BLD AUTO: 0 X10E3/UL (ref 0–0.2)
BASOPHILS NFR BLD AUTO: 0 %
BILIRUB SERPL-MCNC: 0.5 MG/DL (ref 0–1.2)
BUN SERPL-MCNC: 21 MG/DL (ref 10–36)
BUN/CREAT SERPL: 19 (ref 12–28)
CALCIUM SERPL-MCNC: 8.4 MG/DL (ref 8.7–10.3)
CHLORIDE SERPL-SCNC: 103 MMOL/L (ref 96–106)
CHOLEST SERPL-MCNC: 131 MG/DL (ref 100–199)
CO2 SERPL-SCNC: 22 MMOL/L (ref 20–29)
CREAT SERPL-MCNC: 1.12 MG/DL (ref 0.57–1)
EGFR: 45 ML/MIN/1.73
EOSINOPHIL # BLD AUTO: 0.1 X10E3/UL (ref 0–0.4)
EOSINOPHIL NFR BLD AUTO: 2 %
ERYTHROCYTE [DISTWIDTH] IN BLOOD BY AUTOMATED COUNT: 15 % (ref 11.7–15.4)
FERRITIN SERPL-MCNC: 84 NG/ML (ref 15–150)
GLOBULIN SER CALC-MCNC: 2.9 G/DL (ref 1.5–4.5)
GLUCOSE SERPL-MCNC: 99 MG/DL (ref 65–99)
HCT VFR BLD AUTO: 28.6 % (ref 34–46.6)
HDLC SERPL-MCNC: 61 MG/DL
HGB BLD-MCNC: 9.7 G/DL (ref 11.1–15.9)
IMM GRANULOCYTES # BLD AUTO: 0 X10E3/UL (ref 0–0.1)
IMM GRANULOCYTES NFR BLD AUTO: 0 %
IRON SATN MFR SERPL: 31 % (ref 15–55)
IRON SERPL-MCNC: 68 UG/DL (ref 27–139)
LDLC SERPL CALC-MCNC: 56 MG/DL (ref 0–99)
LYMPHOCYTES # BLD AUTO: 0.9 X10E3/UL (ref 0.7–3.1)
LYMPHOCYTES NFR BLD AUTO: 19 %
MCH RBC QN AUTO: 29.8 PG (ref 26.6–33)
MCHC RBC AUTO-ENTMCNC: 33.9 G/DL (ref 31.5–35.7)
MCV RBC AUTO: 88 FL (ref 79–97)
MONOCYTES # BLD AUTO: 0.5 X10E3/UL (ref 0.1–0.9)
MONOCYTES NFR BLD AUTO: 9 %
NEUTROPHILS # BLD AUTO: 3.4 X10E3/UL (ref 1.4–7)
NEUTROPHILS NFR BLD AUTO: 70 %
PLATELET # BLD AUTO: 220 X10E3/UL (ref 150–450)
POTASSIUM SERPL-SCNC: 3.5 MMOL/L (ref 3.5–5.2)
PROT SERPL-MCNC: 6.5 G/DL (ref 6–8.5)
RBC # BLD AUTO: 3.25 X10E6/UL (ref 3.77–5.28)
SODIUM SERPL-SCNC: 143 MMOL/L (ref 134–144)
TIBC SERPL-MCNC: 220 UG/DL (ref 250–450)
TRIGL SERPL-MCNC: 68 MG/DL (ref 0–149)
UIBC SERPL-MCNC: 152 UG/DL (ref 118–369)
VLDLC SERPL CALC-MCNC: 14 MG/DL (ref 5–40)
WBC # BLD AUTO: 4.9 X10E3/UL (ref 3.4–10.8)

## 2022-03-03 PROCEDURE — MED10950 OINTMENT,CALMOSEPTINE,TUBE,4 OZ

## 2022-03-03 PROCEDURE — G0151 HHCP-SERV OF PT,EA 15 MIN: HCPCS

## 2022-03-07 ENCOUNTER — HOME CARE VISIT (OUTPATIENT)
Dept: SCHEDULING | Facility: HOME HEALTH | Age: 87
End: 2022-03-07
Payer: MEDICARE

## 2022-03-07 VITALS
TEMPERATURE: 97 F | RESPIRATION RATE: 16 BRPM | SYSTOLIC BLOOD PRESSURE: 128 MMHG | HEART RATE: 76 BPM | OXYGEN SATURATION: 99 % | DIASTOLIC BLOOD PRESSURE: 70 MMHG

## 2022-03-07 PROCEDURE — G0152 HHCP-SERV OF OT,EA 15 MIN: HCPCS

## 2022-03-07 PROCEDURE — G0151 HHCP-SERV OF PT,EA 15 MIN: HCPCS

## 2022-03-09 ENCOUNTER — HOME CARE VISIT (OUTPATIENT)
Dept: SCHEDULING | Facility: HOME HEALTH | Age: 87
End: 2022-03-09
Payer: MEDICARE

## 2022-03-09 VITALS
SYSTOLIC BLOOD PRESSURE: 130 MMHG | RESPIRATION RATE: 16 BRPM | DIASTOLIC BLOOD PRESSURE: 64 MMHG | OXYGEN SATURATION: 99 % | HEART RATE: 90 BPM | TEMPERATURE: 97.3 F

## 2022-03-09 PROCEDURE — G0300 HHS/HOSPICE OF LPN EA 15 MIN: HCPCS

## 2022-03-09 PROCEDURE — G0151 HHCP-SERV OF PT,EA 15 MIN: HCPCS

## 2022-03-10 ENCOUNTER — TELEPHONE (OUTPATIENT)
Dept: INTERNAL MEDICINE CLINIC | Age: 87
End: 2022-03-10

## 2022-03-10 VITALS
SYSTOLIC BLOOD PRESSURE: 130 MMHG | DIASTOLIC BLOOD PRESSURE: 64 MMHG | RESPIRATION RATE: 18 BRPM | TEMPERATURE: 97.3 F | OXYGEN SATURATION: 99 % | HEART RATE: 90 BPM

## 2022-03-10 VITALS
SYSTOLIC BLOOD PRESSURE: 129 MMHG | OXYGEN SATURATION: 97 % | DIASTOLIC BLOOD PRESSURE: 75 MMHG | HEART RATE: 75 BPM | TEMPERATURE: 98 F

## 2022-03-10 PROCEDURE — A6212 FOAM DRG <=16 SQ IN W/BORDER: HCPCS

## 2022-03-11 ENCOUNTER — HOME CARE VISIT (OUTPATIENT)
Dept: SCHEDULING | Facility: HOME HEALTH | Age: 87
End: 2022-03-11
Payer: MEDICARE

## 2022-03-11 PROCEDURE — G0300 HHS/HOSPICE OF LPN EA 15 MIN: HCPCS

## 2022-03-13 VITALS
DIASTOLIC BLOOD PRESSURE: 78 MMHG | TEMPERATURE: 97.2 F | OXYGEN SATURATION: 98 % | RESPIRATION RATE: 18 BRPM | HEART RATE: 99 BPM | SYSTOLIC BLOOD PRESSURE: 122 MMHG

## 2022-03-13 DIAGNOSIS — E83.42 HYPOMAGNESEMIA: ICD-10-CM

## 2022-03-13 DIAGNOSIS — I63.411 CEREBROVASCULAR ACCIDENT (CVA) DUE TO EMBOLISM OF RIGHT MIDDLE CEREBRAL ARTERY (HCC): ICD-10-CM

## 2022-03-13 DIAGNOSIS — G30.1 LATE ONSET ALZHEIMER'S DEMENTIA WITH BEHAVIORAL DISTURBANCE (HCC): ICD-10-CM

## 2022-03-13 DIAGNOSIS — F02.818 LATE ONSET ALZHEIMER'S DEMENTIA WITH BEHAVIORAL DISTURBANCE (HCC): ICD-10-CM

## 2022-03-14 ENCOUNTER — HOME CARE VISIT (OUTPATIENT)
Dept: SCHEDULING | Facility: HOME HEALTH | Age: 87
End: 2022-03-14
Payer: MEDICARE

## 2022-03-14 VITALS
RESPIRATION RATE: 16 BRPM | TEMPERATURE: 97.4 F | OXYGEN SATURATION: 98 % | HEART RATE: 87 BPM | SYSTOLIC BLOOD PRESSURE: 126 MMHG | DIASTOLIC BLOOD PRESSURE: 86 MMHG

## 2022-03-14 PROCEDURE — G0151 HHCP-SERV OF PT,EA 15 MIN: HCPCS

## 2022-03-14 RX ORDER — QUETIAPINE FUMARATE 25 MG/1
TABLET, FILM COATED ORAL
Qty: 90 TABLET | Refills: 0 | Status: SHIPPED | OUTPATIENT
Start: 2022-03-14 | End: 2022-06-20

## 2022-03-14 RX ORDER — LANOLIN ALCOHOL/MO/W.PET/CERES
CREAM (GRAM) TOPICAL
Qty: 90 TABLET | Refills: 0 | Status: SHIPPED | OUTPATIENT
Start: 2022-03-14 | End: 2022-06-20

## 2022-03-14 RX ORDER — ATORVASTATIN CALCIUM 80 MG/1
80 TABLET, FILM COATED ORAL DAILY
Qty: 90 TABLET | Refills: 0 | Status: SHIPPED | OUTPATIENT
Start: 2022-03-14 | End: 2022-06-20

## 2022-03-14 RX ORDER — MULTIVITAMIN
TABLET ORAL
Qty: 90 TABLET | Refills: 0 | Status: SHIPPED | OUTPATIENT
Start: 2022-03-14 | End: 2022-04-06 | Stop reason: DRUGHIGH

## 2022-03-15 ENCOUNTER — HOME CARE VISIT (OUTPATIENT)
Dept: SCHEDULING | Facility: HOME HEALTH | Age: 87
End: 2022-03-15
Payer: MEDICARE

## 2022-03-15 VITALS
DIASTOLIC BLOOD PRESSURE: 68 MMHG | RESPIRATION RATE: 18 BRPM | TEMPERATURE: 97.4 F | SYSTOLIC BLOOD PRESSURE: 128 MMHG | HEART RATE: 79 BPM | OXYGEN SATURATION: 99 %

## 2022-03-15 PROCEDURE — G0300 HHS/HOSPICE OF LPN EA 15 MIN: HCPCS

## 2022-03-16 ENCOUNTER — HOME CARE VISIT (OUTPATIENT)
Dept: SCHEDULING | Facility: HOME HEALTH | Age: 87
End: 2022-03-16
Payer: MEDICARE

## 2022-03-16 VITALS
RESPIRATION RATE: 16 BRPM | HEART RATE: 95 BPM | SYSTOLIC BLOOD PRESSURE: 130 MMHG | TEMPERATURE: 97.1 F | DIASTOLIC BLOOD PRESSURE: 70 MMHG | OXYGEN SATURATION: 99 %

## 2022-03-16 PROCEDURE — G0151 HHCP-SERV OF PT,EA 15 MIN: HCPCS

## 2022-03-18 ENCOUNTER — HOME CARE VISIT (OUTPATIENT)
Dept: SCHEDULING | Facility: HOME HEALTH | Age: 87
End: 2022-03-18
Payer: MEDICARE

## 2022-03-18 PROBLEM — Z71.89 ACP (ADVANCE CARE PLANNING): Status: ACTIVE | Noted: 2017-03-30
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
## 2022-03-18 PROCEDURE — G0300 HHS/HOSPICE OF LPN EA 15 MIN: HCPCS

## 2022-03-19 PROBLEM — R05.3 CHRONIC COUGH: Status: ACTIVE | Noted: 2018-08-14

## 2022-03-19 PROBLEM — I63.9 ACUTE CVA (CEREBROVASCULAR ACCIDENT) (HCC): Status: ACTIVE | Noted: 2019-05-06

## 2022-03-19 PROBLEM — Z86.711 PERSONAL HISTORY OF PULMONARY EMBOLISM: Status: ACTIVE | Noted: 2018-08-14

## 2022-03-19 PROBLEM — R26.81 GAIT INSTABILITY: Status: ACTIVE | Noted: 2018-04-06

## 2022-03-19 PROBLEM — D64.9 SEVERE ANEMIA: Status: ACTIVE | Noted: 2019-07-08

## 2022-03-19 PROBLEM — R26.9 GAIT ABNORMALITY: Status: ACTIVE | Noted: 2019-06-18

## 2022-03-19 PROBLEM — S01.01XA SCALP LACERATION: Status: ACTIVE | Noted: 2019-05-06

## 2022-03-21 ENCOUNTER — HOME CARE VISIT (OUTPATIENT)
Dept: SCHEDULING | Facility: HOME HEALTH | Age: 87
End: 2022-03-21
Payer: MEDICARE

## 2022-03-21 VITALS
HEART RATE: 80 BPM | DIASTOLIC BLOOD PRESSURE: 60 MMHG | TEMPERATURE: 97.2 F | RESPIRATION RATE: 16 BRPM | OXYGEN SATURATION: 99 % | SYSTOLIC BLOOD PRESSURE: 120 MMHG

## 2022-03-21 VITALS
HEART RATE: 72 BPM | OXYGEN SATURATION: 100 % | SYSTOLIC BLOOD PRESSURE: 132 MMHG | TEMPERATURE: 98.5 F | DIASTOLIC BLOOD PRESSURE: 64 MMHG | RESPIRATION RATE: 18 BRPM

## 2022-03-21 PROCEDURE — G0151 HHCP-SERV OF PT,EA 15 MIN: HCPCS

## 2022-03-22 ENCOUNTER — HOME CARE VISIT (OUTPATIENT)
Dept: SCHEDULING | Facility: HOME HEALTH | Age: 87
End: 2022-03-22
Payer: MEDICARE

## 2022-03-22 ENCOUNTER — HOME CARE VISIT (OUTPATIENT)
Dept: HOME HEALTH SERVICES | Facility: HOME HEALTH | Age: 87
End: 2022-03-22
Payer: MEDICARE

## 2022-03-22 VITALS
SYSTOLIC BLOOD PRESSURE: 138 MMHG | DIASTOLIC BLOOD PRESSURE: 78 MMHG | RESPIRATION RATE: 16 BRPM | TEMPERATURE: 97.8 F | OXYGEN SATURATION: 96 % | HEART RATE: 83 BPM

## 2022-03-22 PROCEDURE — G0300 HHS/HOSPICE OF LPN EA 15 MIN: HCPCS

## 2022-03-23 ENCOUNTER — HOME CARE VISIT (OUTPATIENT)
Dept: HOME HEALTH SERVICES | Facility: HOME HEALTH | Age: 87
End: 2022-03-23
Payer: MEDICARE

## 2022-03-25 ENCOUNTER — HOME CARE VISIT (OUTPATIENT)
Dept: HOME HEALTH SERVICES | Facility: HOME HEALTH | Age: 87
End: 2022-03-25
Payer: MEDICARE

## 2022-03-25 DIAGNOSIS — E87.6 LOW BLOOD POTASSIUM: Primary | ICD-10-CM

## 2022-03-25 RX ORDER — POTASSIUM CHLORIDE 750 MG/1
TABLET, EXTENDED RELEASE ORAL
Qty: 7 TABLET | Refills: 0 | Status: SHIPPED | OUTPATIENT
Start: 2022-03-25 | End: 2022-03-25 | Stop reason: CLARIF

## 2022-03-25 RX ORDER — POTASSIUM CHLORIDE 750 MG/1
TABLET, EXTENDED RELEASE ORAL
Qty: 7 TABLET | Refills: 0 | Status: SHIPPED | OUTPATIENT
Start: 2022-03-25 | End: 2022-09-14 | Stop reason: ALTCHOICE

## 2022-03-25 NOTE — TELEPHONE ENCOUNTER
Advised pt daughter of recent labs  HGB low- start Vitron-c OTC and Low Potasium - sent to pharm. Pt daughter states  states PT and OT may no longer be covered but will call next week to update us .

## 2022-03-28 ENCOUNTER — TELEPHONE (OUTPATIENT)
Dept: INTERNAL MEDICINE CLINIC | Age: 87
End: 2022-03-28

## 2022-03-28 NOTE — TELEPHONE ENCOUNTER
Ticket # [id number]  Pharmacy calling to clarify new script written for potassium 7 day supply. Is this in addition to current 90 day script for potassium?  Please clarify with pharmacy

## 2022-03-28 NOTE — TELEPHONE ENCOUNTER
Amrit from [address] [place] [address] to confirm cancellation of Potassium that was accidentally sent on 3/25/22

## 2022-03-29 ENCOUNTER — HOME CARE VISIT (OUTPATIENT)
Dept: SCHEDULING | Facility: HOME HEALTH | Age: 87
End: 2022-03-29
Payer: MEDICARE

## 2022-03-29 PROCEDURE — A5120 SKIN BARRIER, WIPE OR SWAB: HCPCS

## 2022-03-29 PROCEDURE — G0300 HHS/HOSPICE OF LPN EA 15 MIN: HCPCS

## 2022-03-29 PROCEDURE — A6212 FOAM DRG <=16 SQ IN W/BORDER: HCPCS

## 2022-03-31 VITALS
SYSTOLIC BLOOD PRESSURE: 138 MMHG | HEART RATE: 103 BPM | OXYGEN SATURATION: 100 % | DIASTOLIC BLOOD PRESSURE: 75 MMHG | RESPIRATION RATE: 18 BRPM

## 2022-04-01 ENCOUNTER — HOME CARE VISIT (OUTPATIENT)
Dept: HOME HEALTH SERVICES | Facility: HOME HEALTH | Age: 87
End: 2022-04-01
Payer: MEDICARE

## 2022-04-05 ENCOUNTER — HOME CARE VISIT (OUTPATIENT)
Dept: SCHEDULING | Facility: HOME HEALTH | Age: 87
End: 2022-04-05
Payer: MEDICARE

## 2022-04-05 PROCEDURE — 400013 HH SOC

## 2022-04-05 PROCEDURE — G0300 HHS/HOSPICE OF LPN EA 15 MIN: HCPCS

## 2022-04-06 ENCOUNTER — OFFICE VISIT (OUTPATIENT)
Dept: INTERNAL MEDICINE CLINIC | Age: 87
End: 2022-04-06
Payer: MEDICARE

## 2022-04-06 VITALS
TEMPERATURE: 98 F | DIASTOLIC BLOOD PRESSURE: 72 MMHG | WEIGHT: 154 LBS | HEART RATE: 90 BPM | HEIGHT: 66 IN | BODY MASS INDEX: 24.75 KG/M2 | RESPIRATION RATE: 18 BRPM | OXYGEN SATURATION: 99 % | SYSTOLIC BLOOD PRESSURE: 124 MMHG

## 2022-04-06 VITALS
RESPIRATION RATE: 18 BRPM | OXYGEN SATURATION: 98 % | SYSTOLIC BLOOD PRESSURE: 110 MMHG | HEART RATE: 103 BPM | DIASTOLIC BLOOD PRESSURE: 58 MMHG | TEMPERATURE: 99.1 F

## 2022-04-06 DIAGNOSIS — G30.1 LATE ONSET ALZHEIMER'S DEMENTIA WITH BEHAVIORAL DISTURBANCE (HCC): ICD-10-CM

## 2022-04-06 DIAGNOSIS — F02.818 LATE ONSET ALZHEIMER'S DEMENTIA WITH BEHAVIORAL DISTURBANCE (HCC): ICD-10-CM

## 2022-04-06 DIAGNOSIS — E83.51 HYPOCALCEMIA: ICD-10-CM

## 2022-04-06 DIAGNOSIS — I48.91 ATRIAL FIBRILLATION, UNSPECIFIED TYPE (HCC): ICD-10-CM

## 2022-04-06 DIAGNOSIS — R26.9 GAIT ABNORMALITY: Primary | ICD-10-CM

## 2022-04-06 DIAGNOSIS — L89.152 PRESSURE INJURY OF SACRAL REGION, STAGE 2 (HCC): ICD-10-CM

## 2022-04-06 DIAGNOSIS — I63.411 CEREBROVASCULAR ACCIDENT (CVA) DUE TO EMBOLISM OF RIGHT MIDDLE CEREBRAL ARTERY (HCC): ICD-10-CM

## 2022-04-06 PROCEDURE — 1090F PRES/ABSN URINE INCON ASSESS: CPT | Performed by: INTERNAL MEDICINE

## 2022-04-06 PROCEDURE — 1101F PT FALLS ASSESS-DOCD LE1/YR: CPT | Performed by: INTERNAL MEDICINE

## 2022-04-06 PROCEDURE — G8420 CALC BMI NORM PARAMETERS: HCPCS | Performed by: INTERNAL MEDICINE

## 2022-04-06 PROCEDURE — G8536 NO DOC ELDER MAL SCRN: HCPCS | Performed by: INTERNAL MEDICINE

## 2022-04-06 PROCEDURE — G8510 SCR DEP NEG, NO PLAN REQD: HCPCS | Performed by: INTERNAL MEDICINE

## 2022-04-06 PROCEDURE — G8427 DOCREV CUR MEDS BY ELIG CLIN: HCPCS | Performed by: INTERNAL MEDICINE

## 2022-04-06 PROCEDURE — 99214 OFFICE O/P EST MOD 30 MIN: CPT | Performed by: INTERNAL MEDICINE

## 2022-04-06 RX ORDER — CALCIUM CARBONATE 600 MG
600 TABLET ORAL 2 TIMES DAILY
Qty: 60 TABLET | Refills: 11 | Status: SHIPPED | OUTPATIENT
Start: 2022-04-06 | End: 2022-09-14 | Stop reason: SDUPTHER

## 2022-04-06 NOTE — PROGRESS NOTES
Health Maintenance Due   Topic Date Due    DTaP/Tdap/Td series (1 - Tdap) Never done    Shingrix Vaccine Age 50> (1 of 2) Never done       Chief Complaint   Patient presents with    Hypertension    Anxiety    Decubitus Ulcer    Pain (Chronic)    Dizziness       1. Have you been to the ER, urgent care clinic since your last visit? Hospitalized since your last visit? No    2. Have you seen or consulted any other health care providers outside of the [address] [place] [address] since your last visit? Include any pap smears or colon screening. No    3) Do you have an Advance Directive on file? no    4) Are you interested in receiving information on Advance Directives? NO      Patient is accompanied by daughter I have received verbal consent from Lennie Bagley to discuss any/all medical information while they are present in the room.

## 2022-04-06 NOTE — PROGRESS NOTES
HISTORY OF PRESENT ILLNESS  Fabian Powell is a 80 y.o. female here for follow-up. She is accompanied by her daughter. She is very frail, did not want to come to the office since she is not able to ambulate well. Gait and balance is not great. She is using wheelchair to come here. She is getting physical therapy and occupational therapy via home health but not enough. Still not able to transfer. Had pressure ulcer which almost healed. She she reports pain in any of the pressure ulcer area in the lower back. She did not like hospital bed, she got her new bed and mattress, she is using it. Not able to move every 2 hours at night. Has A. fib, on Xarelto and Coreg. Doing well, no palpitation. Has dementia, had sundowning, taking Seroquel at night, able to sleep well. Has depression, seems stable. Has low thyroid, on Synthroid. TSH normal.  Has hypertension, compliant with medication. Denies chest reverberation or shortness of breath. Has osteoporosis, on Fosamax and calcium. Need more calcium. Labs reviewed with her. HPI    Review of Systems   Constitutional: Negative. HENT: Negative. Eyes: Negative. Respiratory: Negative. Cardiovascular: Negative. Gastrointestinal: Negative. Genitourinary: Negative. Musculoskeletal: Positive for back pain. Skin: Negative. Neurological: Negative. Endo/Heme/Allergies: Negative. Psychiatric/Behavioral: Positive for memory loss. Physical Exam  Constitutional:       Appearance: Normal appearance. Cardiovascular:      Rate and Rhythm: Normal rate and regular rhythm. Pulses: Normal pulses. Heart sounds: Normal heart sounds. Pulmonary:      Effort: Pulmonary effort is normal.      Breath sounds: Normal breath sounds. Musculoskeletal:      Cervical back: Normal range of motion and neck supple. Skin:     Comments: Sacrum: Small 1 mm sized decubiti ulcer almost healed up.    Neurological:      General: No focal deficit present. Mental Status: She is alert. Mental status is at baseline. Motor: Weakness present. Coordination: Coordination abnormal.      Gait: Gait abnormal.      Deep Tendon Reflexes: Reflexes abnormal.   Psychiatric:         Mood and Affect: Mood normal.         Behavior: Behavior normal.         Thought Content: Thought content normal.      Comments: Demented. Depression seems stable. ASSESSMENT and PLAN  Diagnoses and all orders for this visit:    1. Gait abnormality    She is still frail, not able to ambulate, not able to transfer well. She is getting home health therapy for PT and OT. Need more physical therapy for gait and balance. -     [address]    2. Late onset Alzheimer's dementia with behavioral disturbance (HCC)    Stable. -     [address]    3. Cerebrovascular accident (CVA) due to embolism of right middle cerebral artery ([address]    Doing well. On Xarelto and statin. 4. Pressure injury of sacral region, stage 2 [facility])    Patient is almost healed up. Still hurting. Advised her to use calmoseptine barrier cream.  As she is still to change her position every 2 hours. Talk to her daughter, advised her to have an air mattress. She tried hospital but she did not like it.  -     [address] [place]    5. Atrial fibrillation, unspecified type ([address]  Rate and rhythm control. On Xarelto and Coreg. 6. Hypocalcemia    We will start,  -     calcium carbonate (CALTREX) 600 mg calcium (1,500 mg) tablet; Take 1 Tablet by mouth two (2) times a day. Discussed expected course/resolution/complications of diagnosis in detail with patient. Medication risks/benefits/costs/interactions/alternatives discussed with patient. Discussed COVID-19 infection precaution with patient. Pt was given an after visit summary which includes diagnoses, current medications & vitals. Pt expressed understanding with the diagnosis and plan.

## 2022-04-07 ENCOUNTER — HOME CARE VISIT (OUTPATIENT)
Dept: HOME HEALTH SERVICES | Facility: HOME HEALTH | Age: 87
End: 2022-04-07
Payer: MEDICARE

## 2022-04-12 ENCOUNTER — HOME CARE VISIT (OUTPATIENT)
Dept: SCHEDULING | Facility: HOME HEALTH | Age: 87
End: 2022-04-12
Payer: MEDICARE

## 2022-04-12 PROCEDURE — G0300 HHS/HOSPICE OF LPN EA 15 MIN: HCPCS

## 2022-04-12 PROCEDURE — A6212 FOAM DRG <=16 SQ IN W/BORDER: HCPCS

## 2022-04-13 ENCOUNTER — TELEPHONE (OUTPATIENT)
Dept: INTERNAL MEDICINE CLINIC | Age: 87
End: 2022-04-13

## 2022-04-13 ENCOUNTER — HOME CARE VISIT (OUTPATIENT)
Dept: SCHEDULING | Facility: HOME HEALTH | Age: 87
End: 2022-04-13
Payer: MEDICARE

## 2022-04-13 VITALS
SYSTOLIC BLOOD PRESSURE: 120 MMHG | TEMPERATURE: 98.3 F | OXYGEN SATURATION: 98 % | DIASTOLIC BLOOD PRESSURE: 60 MMHG | RESPIRATION RATE: 16 BRPM | HEART RATE: 98 BPM

## 2022-04-13 PROCEDURE — G0152 HHCP-SERV OF OT,EA 15 MIN: HCPCS

## 2022-04-13 NOTE — TELEPHONE ENCOUNTER
Physical therapist from [facility] had last visit today. Pt has been denied for more visits. She needs insurance authorization for more visits. Pt has [employer]99 PPO plan.  Please contact daughter with update

## 2022-04-14 VITALS
HEART RATE: 99 BPM | OXYGEN SATURATION: 99 % | DIASTOLIC BLOOD PRESSURE: 58 MMHG | SYSTOLIC BLOOD PRESSURE: 129 MMHG | RESPIRATION RATE: 18 BRPM | TEMPERATURE: 97.5 F

## 2022-04-15 DIAGNOSIS — D50.9 IRON DEFICIENCY ANEMIA, UNSPECIFIED IRON DEFICIENCY ANEMIA TYPE: ICD-10-CM

## 2022-04-15 DIAGNOSIS — I10 ESSENTIAL HYPERTENSION: ICD-10-CM

## 2022-04-15 DIAGNOSIS — E03.9 HYPOTHYROIDISM, UNSPECIFIED TYPE: ICD-10-CM

## 2022-04-15 DIAGNOSIS — F41.0 ANXIETY ATTACK: ICD-10-CM

## 2022-04-15 RX ORDER — SERTRALINE HYDROCHLORIDE 100 MG/1
TABLET, FILM COATED ORAL
Qty: 90 TABLET | Refills: 0 | Status: SHIPPED | OUTPATIENT
Start: 2022-04-15 | End: 2022-07-15

## 2022-04-15 RX ORDER — FERROUS SULFATE TAB 325 MG (65 MG ELEMENTAL FE) 325 (65 FE) MG
TAB ORAL
Qty: 90 TABLET | Refills: 0 | Status: SHIPPED | OUTPATIENT
Start: 2022-04-15 | End: 2022-07-15

## 2022-04-15 RX ORDER — LEVOTHYROXINE SODIUM 25 UG/1
TABLET ORAL
Qty: 90 TABLET | Refills: 0 | Status: SHIPPED | OUTPATIENT
Start: 2022-04-15 | End: 2022-07-15

## 2022-04-15 RX ORDER — LOSARTAN POTASSIUM 25 MG/1
TABLET ORAL
Qty: 90 TABLET | Refills: 0 | Status: SHIPPED | OUTPATIENT
Start: 2022-04-15 | End: 2022-07-15

## 2022-04-19 ENCOUNTER — HOME CARE VISIT (OUTPATIENT)
Dept: SCHEDULING | Facility: HOME HEALTH | Age: 87
End: 2022-04-19
Payer: MEDICARE

## 2022-04-19 PROCEDURE — G0300 HHS/HOSPICE OF LPN EA 15 MIN: HCPCS

## 2022-04-19 NOTE — TELEPHONE ENCOUNTER
Dagmar is calling for an update on the appeal process for the [facility] Denial.

## 2022-04-20 ENCOUNTER — HOME CARE VISIT (OUTPATIENT)
Dept: HOME HEALTH SERVICES | Facility: HOME HEALTH | Age: 87
End: 2022-04-20
Payer: MEDICARE

## 2022-04-20 VITALS
HEART RATE: 86 BPM | TEMPERATURE: 98.3 F | DIASTOLIC BLOOD PRESSURE: 70 MMHG | OXYGEN SATURATION: 99 % | SYSTOLIC BLOOD PRESSURE: 141 MMHG | RESPIRATION RATE: 18 BRPM

## 2022-04-21 NOTE — TELEPHONE ENCOUNTER
Called and spoke with [facility], unsure as to what we need to submit for an appeal. She will send a message to Perma Pastor to call me back and update me on what our office needs to do for a physical therapy appeal.     Call placed to daughter and update her with the above and advised once I hear back I will update her.

## 2022-04-22 ENCOUNTER — TELEPHONE (OUTPATIENT)
Dept: INTERNAL MEDICINE CLINIC | Age: 87
End: 2022-04-22

## 2022-04-22 NOTE — TELEPHONE ENCOUNTER
Italia Dodd from [facility] [place] was just returning call. No message to leave.

## 2022-04-25 NOTE — TELEPHONE ENCOUNTER
Daughter called back and I discussed how the appeal works , she states she did submit this just waiting to hear back

## 2022-04-25 NOTE — TELEPHONE ENCOUNTER
Called and spoke with Little Brisk from home health. She received a denial letter from pts insurance that they pt was denied for PT  . The pt or family must submit an appeal to insurance, as therapy has submitted all of their information.        Call placed to pts daughter to advise of the above and left VM for call back

## 2022-04-27 ENCOUNTER — HOME CARE VISIT (OUTPATIENT)
Dept: SCHEDULING | Facility: HOME HEALTH | Age: 87
End: 2022-04-27
Payer: MEDICARE

## 2022-04-27 VITALS
RESPIRATION RATE: 18 BRPM | DIASTOLIC BLOOD PRESSURE: 76 MMHG | HEART RATE: 73 BPM | TEMPERATURE: 98.3 F | OXYGEN SATURATION: 98 % | SYSTOLIC BLOOD PRESSURE: 120 MMHG

## 2022-04-27 PROCEDURE — G0299 HHS/HOSPICE OF RN EA 15 MIN: HCPCS

## 2022-05-12 NOTE — PROGRESS NOTES
Chief Complaint   Patient presents with    Other     DME         Orders faxed for DME raised toilet seat to [employer]. Oriented to time, place, person, situation

## 2022-05-14 DIAGNOSIS — I48.91 ATRIAL FIBRILLATION, UNSPECIFIED TYPE (HCC): ICD-10-CM

## 2022-05-16 DIAGNOSIS — L89.152 PRESSURE INJURY OF SACRAL REGION, STAGE 2 (HCC): ICD-10-CM

## 2022-05-16 RX ORDER — RIVAROXABAN 15 MG/1
TABLET, FILM COATED ORAL
Qty: 90 TABLET | Refills: 0 | Status: SHIPPED | OUTPATIENT
Start: 2022-05-16 | End: 2022-09-13

## 2022-05-16 RX ORDER — ASCORBIC ACID 500 MG
TABLET ORAL
Qty: 60 TABLET | Refills: 1 | Status: SHIPPED | OUTPATIENT
Start: 2022-05-16 | End: 2022-07-18

## 2022-05-23 ENCOUNTER — TELEPHONE (OUTPATIENT)
Dept: INTERNAL MEDICINE CLINIC | Age: 87
End: 2022-05-23

## 2022-05-23 NOTE — TELEPHONE ENCOUNTER
Needs a letter stating pt has some dementia and is unable to take care of banking that daughter is POA its on file 05/10/2019 and needs to make those decisions

## 2022-05-27 DIAGNOSIS — F41.0 PANIC ATTACK: ICD-10-CM

## 2022-05-27 DIAGNOSIS — F43.21 GRIEF: ICD-10-CM

## 2022-05-27 DIAGNOSIS — F41.0 ANXIETY ATTACK: ICD-10-CM

## 2022-05-27 RX ORDER — ALPRAZOLAM 0.25 MG/1
TABLET ORAL
Qty: 20 TABLET | Refills: 0 | Status: SHIPPED | OUTPATIENT
Start: 2022-05-27 | End: 2022-07-08 | Stop reason: SDUPTHER

## 2022-06-13 ENCOUNTER — DOCUMENTATION ONLY (OUTPATIENT)
Dept: INTERNAL MEDICINE CLINIC | Age: 87
End: 2022-06-13

## 2022-06-15 NOTE — ROUTINE PROCESS
TRANSFER - OUT REPORT:    Verbal report given to Rodger RN(name) on Allison Caruso  being transferred to ICU(unit) for routine progression of care       Report consisted of patients Situation, Background, Assessment and   Recommendations(SBAR). Information from the following report(s) SBAR, ED Summary, [facility] and Recent Results was reviewed with the receiving nurse. Lines:   Peripheral IV 05/06/19 Left Antecubital (Active)        Opportunity for questions and clarification was provided. Pt in NAD at time of transfer.     Patient transported with:   Registered Nurse Hydroquinone Pregnancy And Lactation Text: This medication has not been assigned a Pregnancy Risk Category but animal studies failed to show danger with the topical medication. It is unknown if the medication is excreted in breast milk.

## 2022-06-18 DIAGNOSIS — I63.411 CEREBROVASCULAR ACCIDENT (CVA) DUE TO EMBOLISM OF RIGHT MIDDLE CEREBRAL ARTERY (HCC): ICD-10-CM

## 2022-06-18 DIAGNOSIS — N32.81 OAB (OVERACTIVE BLADDER): ICD-10-CM

## 2022-06-18 DIAGNOSIS — E83.42 HYPOMAGNESEMIA: ICD-10-CM

## 2022-06-18 DIAGNOSIS — F02.818 LATE ONSET ALZHEIMER'S DEMENTIA WITH BEHAVIORAL DISTURBANCE (HCC): ICD-10-CM

## 2022-06-18 DIAGNOSIS — G30.1 LATE ONSET ALZHEIMER'S DEMENTIA WITH BEHAVIORAL DISTURBANCE (HCC): ICD-10-CM

## 2022-06-20 RX ORDER — QUETIAPINE FUMARATE 25 MG/1
TABLET, FILM COATED ORAL
Qty: 90 TABLET | Refills: 0 | Status: SHIPPED | OUTPATIENT
Start: 2022-06-20 | End: 2022-09-13

## 2022-06-20 RX ORDER — LANOLIN ALCOHOL/MO/W.PET/CERES
CREAM (GRAM) TOPICAL
Qty: 90 TABLET | Refills: 0 | Status: SHIPPED | OUTPATIENT
Start: 2022-06-20 | End: 2022-09-13

## 2022-06-20 RX ORDER — ATORVASTATIN CALCIUM 80 MG/1
80 TABLET, FILM COATED ORAL DAILY
Qty: 90 TABLET | Refills: 0 | Status: SHIPPED | OUTPATIENT
Start: 2022-06-20 | End: 2022-09-13

## 2022-06-20 RX ORDER — TROSPIUM CHLORIDE ER 60 MG/1
CAPSULE ORAL
Qty: 30 CAPSULE | Refills: 2 | Status: SHIPPED | OUTPATIENT
Start: 2022-06-20 | End: 2022-09-13

## 2022-06-21 ENCOUNTER — TELEPHONE (OUTPATIENT)
Dept: INTERNAL MEDICINE CLINIC | Age: 87
End: 2022-06-21

## 2022-06-21 NOTE — TELEPHONE ENCOUNTER
----- Message from Rubens Mueller sent at 6/21/2022 10:25 AM EDT -----  Subject: Message to Provider    QUESTIONS  Information for Provider? Pt's daughter called and stated that her mom   lose her best friend and they don't know how to tell her. Pt just lose her   son, Austin Daigle is requesting that Dr. Mónica Harvey please give her a call to discuss   this today.  ---------------------------------------------------------------------------  --------------  [address]  What is the best way for the office to contact you? OK to leave message on   voicemail  Preferred Call Back Phone Number? [phone]  ---------------------------------------------------------------------------  --------------  SCRIPT ANSWERS  Relationship to Patient? Other  Representative Name? Austin Daigle  Is the Representative on the appropriate HIPAA document in Epic?  Yes

## 2022-06-21 NOTE — TELEPHONE ENCOUNTER
Per Caitlyn Baez MD it may be best for family to not tell Shaun Perez that her friend dies, as this may outweigh any benefits. Daughter Leann Marroquin of Shaun Perez was called and verbalized understanding on note below. Leann Marroquin states that her sister had already told \[facility]\"" and she is upset. No follow up is needed at this time.

## 2022-07-08 ENCOUNTER — VIRTUAL VISIT (OUTPATIENT)
Dept: INTERNAL MEDICINE CLINIC | Age: 87
End: 2022-07-08
Payer: MEDICARE

## 2022-07-08 DIAGNOSIS — Z71.89 ACP (ADVANCE CARE PLANNING): ICD-10-CM

## 2022-07-08 DIAGNOSIS — E03.9 HYPOTHYROIDISM, UNSPECIFIED TYPE: ICD-10-CM

## 2022-07-08 DIAGNOSIS — F43.21 GRIEF: ICD-10-CM

## 2022-07-08 DIAGNOSIS — L89.92 PRESSURE INJURY, STAGE 2, UNSPECIFIED LOCATION (HCC): ICD-10-CM

## 2022-07-08 DIAGNOSIS — F41.0 ANXIETY ATTACK: ICD-10-CM

## 2022-07-08 DIAGNOSIS — I63.411 CEREBROVASCULAR ACCIDENT (CVA) DUE TO EMBOLISM OF RIGHT MIDDLE CEREBRAL ARTERY (HCC): ICD-10-CM

## 2022-07-08 DIAGNOSIS — Z00.00 MEDICARE ANNUAL WELLNESS VISIT, SUBSEQUENT: ICD-10-CM

## 2022-07-08 DIAGNOSIS — F02.818 LATE ONSET ALZHEIMER'S DEMENTIA WITH BEHAVIORAL DISTURBANCE (HCC): Primary | ICD-10-CM

## 2022-07-08 DIAGNOSIS — I10 ESSENTIAL HYPERTENSION: ICD-10-CM

## 2022-07-08 DIAGNOSIS — M19.041 OSTEOARTHRITIS OF BOTH HANDS, UNSPECIFIED OSTEOARTHRITIS TYPE: ICD-10-CM

## 2022-07-08 DIAGNOSIS — M19.042 OSTEOARTHRITIS OF BOTH HANDS, UNSPECIFIED OSTEOARTHRITIS TYPE: ICD-10-CM

## 2022-07-08 DIAGNOSIS — R42 DIZZINESS: ICD-10-CM

## 2022-07-08 DIAGNOSIS — L89.152 PRESSURE INJURY OF SACRAL REGION, STAGE 2 (HCC): ICD-10-CM

## 2022-07-08 DIAGNOSIS — F41.0 PANIC ATTACK: ICD-10-CM

## 2022-07-08 DIAGNOSIS — G30.1 LATE ONSET ALZHEIMER'S DEMENTIA WITH BEHAVIORAL DISTURBANCE (HCC): Primary | ICD-10-CM

## 2022-07-08 PROBLEM — N18.30 CHRONIC RENAL DISEASE, STAGE III (HCC): Status: ACTIVE | Noted: 2022-07-08

## 2022-07-08 PROCEDURE — 99214 OFFICE O/P EST MOD 30 MIN: CPT | Performed by: INTERNAL MEDICINE

## 2022-07-08 PROCEDURE — G8420 CALC BMI NORM PARAMETERS: HCPCS | Performed by: INTERNAL MEDICINE

## 2022-07-08 PROCEDURE — G8427 DOCREV CUR MEDS BY ELIG CLIN: HCPCS | Performed by: INTERNAL MEDICINE

## 2022-07-08 PROCEDURE — G8432 DEP SCR NOT DOC, RNG: HCPCS | Performed by: INTERNAL MEDICINE

## 2022-07-08 PROCEDURE — 1101F PT FALLS ASSESS-DOCD LE1/YR: CPT | Performed by: INTERNAL MEDICINE

## 2022-07-08 PROCEDURE — 1123F ACP DISCUSS/DSCN MKR DOCD: CPT | Performed by: INTERNAL MEDICINE

## 2022-07-08 PROCEDURE — 99497 ADVNCD CARE PLAN 30 MIN: CPT | Performed by: INTERNAL MEDICINE

## 2022-07-08 PROCEDURE — G8536 NO DOC ELDER MAL SCRN: HCPCS | Performed by: INTERNAL MEDICINE

## 2022-07-08 PROCEDURE — G0439 PPPS, SUBSEQ VISIT: HCPCS | Performed by: INTERNAL MEDICINE

## 2022-07-08 PROCEDURE — 1090F PRES/ABSN URINE INCON ASSESS: CPT | Performed by: INTERNAL MEDICINE

## 2022-07-08 RX ORDER — MENTHOL AND ZINC OXIDE .44; 20.625 G/100G; G/100G
OINTMENT TOPICAL
Qty: 71 G | Refills: 2 | Status: SHIPPED | OUTPATIENT
Start: 2022-07-08 | End: 2022-09-14 | Stop reason: SDUPTHER

## 2022-07-08 RX ORDER — ALPRAZOLAM 0.25 MG/1
0.25 TABLET ORAL
Qty: 20 TABLET | Refills: 0 | Status: SHIPPED | OUTPATIENT
Start: 2022-07-08 | End: 2022-09-14 | Stop reason: SDUPTHER

## 2022-07-08 RX ORDER — DICLOFENAC SODIUM 10 MG/G
GEL TOPICAL
Qty: 100 G | Refills: 2 | Status: SHIPPED | OUTPATIENT
Start: 2022-07-08

## 2022-07-08 NOTE — PROGRESS NOTES
This is the Subsequent Medicare Annual Wellness Exam, performed 12 months or more after the Initial AWV or the last Subsequent AWV    I have reviewed the patient's medical history in detail and updated the computerized patient record. Assessment/Plan   Education and counseling provided:  Are appropriate based on today's review and evaluation  End-of-Life planning (with patient's consent)  Pneumococcal Vaccine  Bone mass measurement (DEXA)  Screening for glaucoma         Depression Risk Factor Screening     3 most recent PHQ Screens 4/6/2022   PHQ Not Done -   Little interest or pleasure in doing things Not at all   Feeling down, depressed, irritable, or hopeless Not at all   Total Score PHQ 2 0   Trouble falling or staying asleep, or sleeping too much -   Feeling tired or having little energy -   Poor appetite, weight loss, or overeating -   Feeling bad about yourself - or that you are a failure or have let yourself or your family down -   Trouble concentrating on things such as school, work, reading, or watching TV -   Moving or speaking so slowly that other people could have noticed; or the opposite being so fidgety that others notice -   Thoughts of being better off dead, or hurting yourself in some way -   PHQ 9 Score -   How difficult have these problems made it for you to do your work, take care of your home and get along with others -       Alcohol & Drug Abuse Risk Screen    Do you average more than 1 drink per night or more than 7 drinks a week:  No    On any one occasion in the past three months have you have had more than 3 drinks containing alcohol:  No          Functional Ability and Level of Safety    Hearing: The patient needs further evaluation. Activities of Daily Living:   The home contains: can  Patient needs help with:  transportation, shopping, preparing meals, laundry, housework, managing medications and managing money      Ambulation: with mild difficulty     Fall Risk:  Fall Risk Assessment, last 12 mths 4/6/2022   Able to walk? Yes   Fall in past 12 months? 0   Do you feel unsteady? 0   Are you worried about falling 0   Is the gait abnormal? -   Number of falls in past 12 months -   Fall with injury? -      Abuse Screen:  Patient is not abused       Cognitive Screening    Has your family/caregiver stated any concerns about your memory: yes - dementia     Cognitive Screening: demented    Health Maintenance Due     Health Maintenance Due   Topic Date Due    DTaP/Tdap/Td series (1 - Tdap) Never done    Shingrix Vaccine Age 49> (1 of 2) Never done       Patient Care Team   Patient Care Team:  Katharina Matta MD as PCP - General (Internal Medicine Physician)  Katharina Matta MD as PCP - [facility]  Alis Darnell MD (Orthopedic Surgery)  Irineo Bee MD (Cardiovascular Disease Physician)    History     Patient Active Problem List   Diagnosis Code    Mixed hyperlipidemia E78.2    Atrial fibrillation ([address] I48.91    HTN (hypertension) I10    DJD (degenerative joint disease) M19.90   Jamesetta Medal Hypothyroid E03.9    TIA (transient ischemic attack) G45.9    ACP (advance care planning) Z71.89    Gait instability R26.81    Chronic cough R05.3    Personal history of pulmonary embolism Z86.711    Acute CVA (cerebrovascular accident) ([address] I63.9    Fall W19. XXXA    Scalp laceration S01. 01XA    Gait abnormality R26.9    Severe anemia D64.9     Past Medical History:   Diagnosis Date    Atrial fibrillation ([address] 2007    Hypercholesterolemia     Hypertension     Osteoarthritis 2010    Stroke ([address]     Thromboembolus ([address]     Thyroid disease       Past Surgical History:   Procedure Laterality Date    HX HEENT      cataract sx    HX HIP FRACTURE TX  2013    right hip 1989 left hip 2013    HX HIP REPLACEMENT  1989    HX PACEMAKER  2008    OK CARDIAC SURG PROCEDURE UNLIST       Current Outpatient Medications   Medication Sig Dispense Refill    trospium (SANCTURA XL) 60 mg capsule Take 1 capsule by mouth once daily before breakfast. **Discontinue Trospium 20 mg.** 30 Capsule 2    atorvastatin (LIPITOR) 80 mg tablet Take 1 tablet by mouth daily. 90 Tablet 0    magnesium oxide (MAG-OX) 400 mg tablet Take 1 tablet by mouth daily. 90 Tablet 0    QUEtiapine (SEROquel) 25 mg tablet Take 1 tablet by mouth nightly. **Discontinue Seroquel 50 mg.** 90 Tablet 0    ALPRAZolam (XANAX) 0.25 mg tablet TAKE 1 TAB BY MOUTH TWO (2) TIMES DAILY AS NEEDED FOR ANXIETY. MAX DAILY AMOUNT: 0.5 MG. 20 Tablet 0    Xarelto 15 mg tab tablet Take 1 tablet by mouth daily with breakfast. 90 Tablet 0    ascorbic acid, vitamin C, (VITAMIN C) 500 mg tablet Take 1 tablet by mouth twice daily. 60 Tablet 1    sertraline (ZOLOFT) 100 mg tablet Take 1 tablet by mouth daily. 90 Tablet 0    levothyroxine (SYNTHROID) 25 mcg tablet Take 1 tablet by mouth every morning. 90 Tablet 0    losartan (COZAAR) 25 mg tablet Take 1 tablet by mouth daily. 90 Tablet 0    FeroSuL 325 mg (65 mg iron) tablet Take 1 tablet by mouth daily at 9AM. 90 Tablet 0    calcium carbonate (CALTREX) 600 mg calcium (1,500 mg) tablet Take 1 Tablet by mouth two (2) times a day. 60 Tablet 11    potassium chloride (KLOR-CON M10) 10 mEq tablet For patient to take 1 tablet daily for 7 days 7 Tablet 0    lidocaine (XYLOCAINE) 2 % solution Take 10 mL by mouth as needed for Pain.  carvediloL (COREG) 12.5 mg tablet Take 1 Tablet by mouth two (2) times daily (with meals). 180 Tablet 1    furosemide (LASIX) 40 mg tablet Take 1 Tablet by mouth two (2) times a day. (Patient taking differently: Take 40 mg by mouth two (2) times a day. 1 tablet in the AM, 0.5 tablet in the AM for 3 days (until 7/8/22, then back to 1 tablet BID) 180 Tablet 1    potassium chloride SR (KLOR-CON 10) 10 mEq tablet Take 1 Tablet by mouth daily. 90 Tablet 1    loratadine (CLARITIN) 10 mg tablet Take 1 tablet by mouth daily.  90 Tablet 1    diclofenac (VOLTAREN) 1 % gel APPLY 2 GRAMS TO AFFECTED AREA TWICE DAILY 100 g 2    multivitamin (ONE A DAY) tablet Take 1 tablet by mouth daily. 30 Tablet 10    alendronate (FOSAMAX) 35 mg tablet Take 1 tablet by mouth every Wednesday. 12 Tablet 2    OTHER Raised toilet seat 1 Each 0    menthol-zinc oxide (Calmoseptine) 0.44-20.6 % oint Top BID 71 g 1    OTHER Donut cushion to seat 1 Each 0    OTHER Left wrist splint use everyday 1 Each 0    GAVILAX 17 gram/dose powder Take 17 g by mouth daily as needed (constipation). 714 g 4    OTHER rollator walker for gait abnormality 1 Device 0    sodium chloride-aloe vera (AYR SALINE) topical gel Apply  to affected area once.  albuterol (PROAIR HFA) 90 mcg/actuation inhaler Take 2 Puffs by inhalation every six (6) hours as needed for Wheezing. 1 Inhaler 2    zinc oxide-cod liver oil (DESITIN) 40 % ointment Apply  to affected area as needed for Skin Irritation.  docusate sodium (COLACE) 100 mg capsule Take 100 mg by mouth two (2) times daily as needed.        Allergies   Allergen Reactions    Ace Inhibitors Cough       Family History   Problem Relation Age of Onset    Hypertension Mother      Social History     Tobacco Use    Smoking status: Never Smoker    Smokeless tobacco: Never Used   Substance Use Topics    Alcohol use: No     Alcohol/week: 0.0 standard drinks         Young Caal MD

## 2022-07-08 NOTE — PATIENT INSTRUCTIONS

## 2022-07-08 NOTE — PROGRESS NOTES
Dustin Ortiz is a 80 y.o. female who was seen by synchronous (real-time) audio-video technology on 7/8/2022 for Follow Up Chronic Condition, Annual Wellness Visit, and Dementia        Assessment & Plan:   Diagnoses and all orders for this visit:    1. Late onset Alzheimer's dementia with behavioral disturbance ([facility]    Doing well, staying with her daughter. 2. Essential hypertension  Stable blood pressure. On losartan. 3. Hypothyroidism, unspecified type  On Synthroid, TSH normal.  4. Cerebrovascular accident (CVA) due to embolism of right middle cerebral artery (HCC)  Taking Xarelto and statin. Doing well. 5. Medicare annual wellness visit, subsequent    6. ACP (advance care planning)    7. Dizziness    No hypotension. Dizziness most likely vertigo, need vestibular therapy. Will refer,  -     [address] [place] [address]    8. Pressure injury of sacral region, stage 2 (HCC)  We will order hospital bed with air mattress, she needs to change position 2-3 times a day. Will have home health nurse to take care of her pressure ulcer. .  9. Pressure injury, stage 2, unspecified location ([facility]  -     menthol-zinc oxide (Calmoseptine) 0.44-20.6 % oint; Top BID  -     OTHER; Hospital bed with air mattress. Dx:sacral pressure ulcer    10. Anxiety attack  Will refill,  -     ALPRAZolam (XANAX) 0.25 mg tablet; Take 1 Tablet by mouth two (2) times daily as needed for Anxiety. MAX DAILY AMOUNT: 0.5 MG.      13. Osteoarthritis of both hands, unspecified osteoarthritis type    We will give,  -     diclofenac (VOLTAREN) 1 % gel; APPLY 2 GRAMS TO AFFECTED AREA TWICE DAILY        I spent at least 35 minutes on this visit with this established patient. Subjective: Arthur Badillo is here with her daughter. As she is staying with her daughter, has severe dementia. Has some agitation and anxiety sometimes. Need refill on Xanax again. Noticed to have dizziness on and off.   She mentioned she felt dizzy in the morning when she move her neck and when she went to bathroom dizziness subsided. Her daughter has monitor blood pressure, blood pressure been okay with dizziness. She has issues with leg edema and congestive heart failure. Ejection fraction was low. Seeing cardiologist Dr. Enrique Almeida. He is is readjusting her Lasix dosage. We will have an echocardiogram soon. Noticed to have sacral decub a day which is coming up again. She is not changing her position at bed, not willing to take hospital bed but I think that will help her. Reported right hand pain, would like to get refill on diclofenac gel. Gait and balance okay but using walker all the time. No recent fall. Here for Medicare wellness visit. Has living will. Prior to Admission medications    Medication Sig Start Date End Date Taking? Authorizing Provider   menthol-zinc oxide (Calmoseptine) 0.44-20.6 % oint Top BID 7/8/22  Yes Nathaly Hernandez MD   [facility] bed with air mattress. Dx:sacral pressure ulcer 7/8/22  Yes Nathaly Hernandez MD   ALPRAZolam Cindy Blas) 0.25 mg tablet Take 1 Tablet by mouth two (2) times daily as needed for Anxiety. MAX DAILY AMOUNT: 0.5 MG. 7/8/22  Yes Nathaly Hernandez MD   diclofenac (VOLTAREN) 1 % gel APPLY 2 GRAMS TO AFFECTED AREA TWICE DAILY 7/8/22  Yes Nathaly Hernandez MD   trospium (SANCTURA XL) 60 mg capsule Take 1 capsule by mouth once daily before breakfast. **Discontinue Trospium 20 mg.** 6/20/22  Yes Nathaly Hernandez MD   atorvastatin (LIPITOR) 80 mg tablet Take 1 tablet by mouth daily. 6/20/22  Yes Nathaly Hernandez MD   magnesium oxide (MAG-OX) 400 mg tablet Take 1 tablet by mouth daily. 6/20/22  Yes Nathaly Hernandez MD   QUEtiapine (SEROquel) 25 mg tablet Take 1 tablet by mouth nightly. **Discontinue Seroquel 50 mg.** 6/20/22  Yes Nathaly Hernandez MD   Xarelto 15 mg tab tablet Take 1 tablet by mouth daily with breakfast. 5/16/22  Yes Nathaly Hernandez MD   ascorbic acid, vitamin C, (VITAMIN C) 500 mg tablet Take 1 tablet by mouth twice daily.  5/16/22  Yes Nathaly Hernandez MD sertraline (ZOLOFT) 100 mg tablet Take 1 tablet by mouth daily. 4/15/22  Yes Cr Gamez NP   levothyroxine (SYNTHROID) 25 mcg tablet Take 1 tablet by mouth every morning. 4/15/22  Yes Cr Gamez NP   losartan (COZAAR) 25 mg tablet Take 1 tablet by mouth daily. 4/15/22  Yes Rigoberto Dunn MD   FeroSuL 325 mg (65 mg iron) tablet Take 1 tablet by mouth daily at 9AM. 4/15/22  Yes Cr Gamez NP   calcium carbonate (CALTREX) 600 mg calcium (1,500 mg) tablet Take 1 Tablet by mouth two (2) times a day. 4/6/22  Yes Rigoberto Dunn MD   potassium chloride (KLOR-CON M10) 10 mEq tablet For patient to take 1 tablet daily for 7 days 3/25/22  Yes Rigoberto Dunn MD   lidocaine (XYLOCAINE) 2 % solution Take 10 mL by mouth as needed for Pain. Yes Provider, Historical   carvediloL (COREG) 12.5 mg tablet Take 1 Tablet by mouth two (2) times daily (with meals). 2/16/22  Yes Rigoberto Dunn MD   furosemide (LASIX) 40 mg tablet Take 1 Tablet by mouth two (2) times a day. Patient taking differently: Take 40 mg by mouth two (2) times a day. 1 tablet in the AM, 0.5 tablet in the AM for 3 days (until 7/8/22, then back to 1 tablet BID 2/16/22  Yes Rigoberto Dunn MD   potassium chloride SR (KLOR-CON 10) 10 mEq tablet Take 1 Tablet by mouth daily. 2/16/22  Yes Rigoberto Dunn MD   loratadine (CLARITIN) 10 mg tablet Take 1 tablet by mouth daily. 1/14/22  Yes Minor Ditch, NP   multivitamin (ONE A DAY) tablet Take 1 tablet by mouth daily. 12/16/21  Yes Rigoberto Dunn MD   alendronate (FOSAMAX) 35 mg tablet Take 1 tablet by mouth every Wednesday. 12/15/21  Yes Rigoberto Dunn MD   OTHER Raised toilet seat 12/7/21  Yes Rigoberto Dunn MD   OTHER Donut cushion to seat 11/22/21  Yes Rigoberto Dunn MD   OTHER Left wrist splint use everyday 6/17/20  Yes Rigoberto Dunn MD   GAVILAX 17 gram/dose powder Take 17 g by mouth daily as needed (constipation).  2/19/20  Yes Rigoberto Dunn MD   OTHER rollator walker for gait abnormality 10/23/19  Yes Vera Pablito Loges, NP   sodium chloride-aloe vera (AYR SALINE) topical gel Apply  to affected area once. Yes Provider, Historical   albuterol (PROAIR HFA) 90 mcg/actuation inhaler Take 2 Puffs by inhalation every six (6) hours as needed for Wheezing. 8/14/18  Yes Heladio Funk,    zinc oxide-cod liver oil (DESITIN) 40 % ointment Apply  to affected area as needed for Skin Irritation. Yes Provider, Historical   docusate sodium (COLACE) 100 mg capsule Take 100 mg by mouth two (2) times daily as needed. Yes Provider, Historical   ALPRAZolam (XANAX) 0.25 mg tablet TAKE 1 TAB BY MOUTH TWO (2) TIMES DAILY AS NEEDED FOR ANXIETY. MAX DAILY AMOUNT: 0.5 MG. 5/27/22 7/8/22  Buzz Alert, NP   diclofenac (VOLTAREN) 1 % gel APPLY 2 GRAMS TO AFFECTED AREA TWICE DAILY 12/27/21 7/8/22  Arlon Smoker, NP   menthol-zinc oxide (Calmoseptine) 0.44-20.6 % oint Top BID 11/22/21 7/8/22  Ayala Hussein MD     Past Medical History:   Diagnosis Date    Atrial fibrillation [facility]) 2007    Hypercholesterolemia     Hypertension     Osteoarthritis 2010    Stroke ([facility]     Thromboembolus [facility])     Thyroid disease        ROS significant for anxiety, decubitus ulcer and dizziness.     Objective:     Patient-Reported Vitals 7/8/2022   Patient-Reported Weight 151lb   Patient-Reported Height -   Patient-Reported LMP -            Constitutional: [x] Appears well-developed and well-nourished [x] No apparent distress      [] Abnormal -     Mental status: [x] Alert and awake  [x] Oriented to person/place/time [x] Able to follow commands    [] Abnormal -     Eyes:   EOM    [x]  Normal    [] Abnormal -   Sclera  [x]  Normal    [] Abnormal -          Discharge [x]  None visible   [] Abnormal -     HENT: [x] Normocephalic, atraumatic  [] Abnormal -   [x] Mouth/Throat: Mucous membranes are moist    External Ears [x] Normal  [] Abnormal -    Neck: [x] No visualized mass [] Abnormal -     Pulmonary/Chest: [x] Respiratory effort normal   [x] No visualized signs of difficulty breathing or respiratory distress        [] Abnormal -      Musculoskeletal:   [x] Normal gait with no signs of ataxia         [x] Normal range of motion of neck  1+ leg edema present. [] Abnormal -     Neurological:        [x] No Facial Asymmetry (Cranial nerve 7 motor function) (limited exam due to video visit)          [x] No gaze palsy        [] Abnormal -          Skin:        [x] No significant exanthematous lesions or discoloration noted on facial skin    Sacrum:'s approximately 3 x 7 cm area of elongated stage II sacral decubitus ulcer present. [] Abnormal -            Psychiatric:       [x] Normal Affect [] Abnormal -   Mild dementia, anxiety present. [x] No Hallucinations    Other pertinent observable physical exam findings:-        We discussed the expected course, resolution and complications of the diagnosis(es) in detail. Medication risks, benefits, costs, interactions, and alternatives were discussed as indicated. I advised her to contact the office if her condition worsens, changes or fails to improve as anticipated. She expressed understanding with the diagnosis(es) and plan. Muna Vargas, was evaluated through a synchronous (real-time) audio-video encounter. The patient (or guardian if applicable) is aware that this is a billable service, which includes applicable co-pays. This Virtual Visit was conducted with patient's (and/or legal guardian's) consent. The visit was conducted pursuant to the emergency declaration under the [facility], [address] [place] [address] authority and the [employer] General Act. Patient identification was verified, and a caregiver was present when appropriate.   The patient was located at: Home: [id number] [place], [place] [address]  [facility]  The provider was located at: Home:         Stefanie Liu MD

## 2022-07-08 NOTE — ACP (ADVANCE CARE PLANNING)

## 2022-07-15 DIAGNOSIS — F41.0 ANXIETY ATTACK: ICD-10-CM

## 2022-07-15 DIAGNOSIS — I10 ESSENTIAL HYPERTENSION: ICD-10-CM

## 2022-07-15 DIAGNOSIS — J30.1 ALLERGIC RHINITIS DUE TO POLLEN, UNSPECIFIED SEASONALITY: ICD-10-CM

## 2022-07-15 DIAGNOSIS — E03.9 HYPOTHYROIDISM, UNSPECIFIED TYPE: ICD-10-CM

## 2022-07-15 DIAGNOSIS — D50.9 IRON DEFICIENCY ANEMIA, UNSPECIFIED IRON DEFICIENCY ANEMIA TYPE: ICD-10-CM

## 2022-07-15 RX ORDER — LORATADINE 10 MG/1
TABLET ORAL
Qty: 90 TABLET | Refills: 0 | Status: SHIPPED | OUTPATIENT
Start: 2022-07-15 | End: 2022-10-11

## 2022-07-15 RX ORDER — LEVOTHYROXINE SODIUM 25 UG/1
TABLET ORAL
Qty: 90 TABLET | Refills: 0 | Status: SHIPPED | OUTPATIENT
Start: 2022-07-15 | End: 2022-10-11

## 2022-07-15 RX ORDER — LOSARTAN POTASSIUM 25 MG/1
TABLET ORAL
Qty: 90 TABLET | Refills: 0 | Status: SHIPPED | OUTPATIENT
Start: 2022-07-15 | End: 2022-09-14 | Stop reason: SDUPTHER

## 2022-07-15 RX ORDER — FERROUS SULFATE TAB 325 MG (65 MG ELEMENTAL FE) 325 (65 FE) MG
TAB ORAL
Qty: 90 TABLET | Refills: 0 | Status: SHIPPED | OUTPATIENT
Start: 2022-07-15 | End: 2022-10-11

## 2022-07-15 RX ORDER — SERTRALINE HYDROCHLORIDE 100 MG/1
TABLET, FILM COATED ORAL
Qty: 90 TABLET | Refills: 0 | Status: SHIPPED | OUTPATIENT
Start: 2022-07-15 | End: 2022-10-11

## 2022-07-18 DIAGNOSIS — L89.152 PRESSURE INJURY OF SACRAL REGION, STAGE 2 (HCC): ICD-10-CM

## 2022-07-18 RX ORDER — ASCORBIC ACID 500 MG
TABLET ORAL
Qty: 60 TABLET | Refills: 0 | Status: SHIPPED | OUTPATIENT
Start: 2022-07-18 | End: 2022-08-12

## 2022-08-12 DIAGNOSIS — M81.0 OSTEOPOROSIS, UNSPECIFIED OSTEOPOROSIS TYPE, UNSPECIFIED PATHOLOGICAL FRACTURE PRESENCE: ICD-10-CM

## 2022-08-12 DIAGNOSIS — L89.152 PRESSURE INJURY OF SACRAL REGION, STAGE 2 (HCC): ICD-10-CM

## 2022-08-12 DIAGNOSIS — I50.9 CHRONIC CONGESTIVE HEART FAILURE, UNSPECIFIED HEART FAILURE TYPE (HCC): ICD-10-CM

## 2022-08-12 RX ORDER — ALENDRONATE SODIUM 35 MG/1
TABLET ORAL
Qty: 12 TABLET | Refills: 1 | Status: SHIPPED | OUTPATIENT
Start: 2022-08-12

## 2022-08-12 RX ORDER — ASCORBIC ACID 500 MG
TABLET ORAL
Qty: 60 TABLET | Refills: 0 | Status: SHIPPED | OUTPATIENT
Start: 2022-08-12 | End: 2022-09-13

## 2022-08-12 RX ORDER — POTASSIUM CHLORIDE 750 MG/1
TABLET, FILM COATED, EXTENDED RELEASE ORAL
Qty: 90 TABLET | Refills: 0 | Status: SHIPPED | OUTPATIENT
Start: 2022-08-12 | End: 2022-09-14 | Stop reason: ALTCHOICE

## 2022-09-13 DIAGNOSIS — N32.81 OAB (OVERACTIVE BLADDER): ICD-10-CM

## 2022-09-13 DIAGNOSIS — I48.91 ATRIAL FIBRILLATION, UNSPECIFIED TYPE (HCC): ICD-10-CM

## 2022-09-13 DIAGNOSIS — F02.818 LATE ONSET ALZHEIMER'S DEMENTIA WITH BEHAVIORAL DISTURBANCE (HCC): ICD-10-CM

## 2022-09-13 DIAGNOSIS — I63.411 CEREBROVASCULAR ACCIDENT (CVA) DUE TO EMBOLISM OF RIGHT MIDDLE CEREBRAL ARTERY (HCC): ICD-10-CM

## 2022-09-13 DIAGNOSIS — E83.42 HYPOMAGNESEMIA: ICD-10-CM

## 2022-09-13 DIAGNOSIS — L89.152 PRESSURE INJURY OF SACRAL REGION, STAGE 2 (HCC): ICD-10-CM

## 2022-09-13 DIAGNOSIS — G30.1 LATE ONSET ALZHEIMER'S DEMENTIA WITH BEHAVIORAL DISTURBANCE (HCC): ICD-10-CM

## 2022-09-13 RX ORDER — RIVAROXABAN 15 MG/1
TABLET, FILM COATED ORAL
Qty: 90 TABLET | Refills: 0 | Status: SHIPPED | OUTPATIENT
Start: 2022-09-13

## 2022-09-13 RX ORDER — QUETIAPINE FUMARATE 25 MG/1
TABLET, FILM COATED ORAL
Qty: 90 TABLET | Refills: 0 | Status: SHIPPED | OUTPATIENT
Start: 2022-09-13

## 2022-09-13 RX ORDER — TROSPIUM CHLORIDE ER 60 MG/1
CAPSULE ORAL
Qty: 30 CAPSULE | Refills: 1 | Status: SHIPPED | OUTPATIENT
Start: 2022-09-13

## 2022-09-13 RX ORDER — ASCORBIC ACID 500 MG
TABLET ORAL
Qty: 60 TABLET | Refills: 0 | Status: SHIPPED | OUTPATIENT
Start: 2022-09-13 | End: 2022-10-11 | Stop reason: SDUPTHER

## 2022-09-13 RX ORDER — LANOLIN ALCOHOL/MO/W.PET/CERES
CREAM (GRAM) TOPICAL
Qty: 90 TABLET | Refills: 0 | Status: SHIPPED | OUTPATIENT
Start: 2022-09-13

## 2022-09-13 RX ORDER — ATORVASTATIN CALCIUM 80 MG/1
80 TABLET, FILM COATED ORAL DAILY
Qty: 90 TABLET | Refills: 0 | Status: SHIPPED | OUTPATIENT
Start: 2022-09-13 | End: 2022-09-14 | Stop reason: SDUPTHER

## 2022-09-14 ENCOUNTER — OFFICE VISIT (OUTPATIENT)
Dept: INTERNAL MEDICINE CLINIC | Age: 87
End: 2022-09-14
Payer: MEDICARE

## 2022-09-14 VITALS
TEMPERATURE: 98.2 F | OXYGEN SATURATION: 98 % | HEIGHT: 66 IN | DIASTOLIC BLOOD PRESSURE: 78 MMHG | WEIGHT: 150.7 LBS | RESPIRATION RATE: 16 BRPM | HEART RATE: 82 BPM | SYSTOLIC BLOOD PRESSURE: 120 MMHG | BODY MASS INDEX: 24.22 KG/M2

## 2022-09-14 DIAGNOSIS — I10 ESSENTIAL HYPERTENSION: ICD-10-CM

## 2022-09-14 DIAGNOSIS — I50.20 SYSTOLIC CONGESTIVE HEART FAILURE, UNSPECIFIED HF CHRONICITY (HCC): Primary | ICD-10-CM

## 2022-09-14 DIAGNOSIS — F41.0 ANXIETY ATTACK: ICD-10-CM

## 2022-09-14 DIAGNOSIS — D64.9 ANEMIA, UNSPECIFIED TYPE: ICD-10-CM

## 2022-09-14 DIAGNOSIS — E83.51 HYPOCALCEMIA: ICD-10-CM

## 2022-09-14 DIAGNOSIS — N18.31 STAGE 3A CHRONIC KIDNEY DISEASE (HCC): ICD-10-CM

## 2022-09-14 DIAGNOSIS — I48.91 ATRIAL FIBRILLATION, UNSPECIFIED TYPE (HCC): ICD-10-CM

## 2022-09-14 DIAGNOSIS — L89.92 PRESSURE INJURY, STAGE 2, UNSPECIFIED LOCATION (HCC): ICD-10-CM

## 2022-09-14 DIAGNOSIS — I63.411 CEREBROVASCULAR ACCIDENT (CVA) DUE TO EMBOLISM OF RIGHT MIDDLE CEREBRAL ARTERY (HCC): ICD-10-CM

## 2022-09-14 PROCEDURE — G8420 CALC BMI NORM PARAMETERS: HCPCS | Performed by: INTERNAL MEDICINE

## 2022-09-14 PROCEDURE — 1101F PT FALLS ASSESS-DOCD LE1/YR: CPT | Performed by: INTERNAL MEDICINE

## 2022-09-14 PROCEDURE — G8427 DOCREV CUR MEDS BY ELIG CLIN: HCPCS | Performed by: INTERNAL MEDICINE

## 2022-09-14 PROCEDURE — 99214 OFFICE O/P EST MOD 30 MIN: CPT | Performed by: INTERNAL MEDICINE

## 2022-09-14 PROCEDURE — G8536 NO DOC ELDER MAL SCRN: HCPCS | Performed by: INTERNAL MEDICINE

## 2022-09-14 PROCEDURE — 1090F PRES/ABSN URINE INCON ASSESS: CPT | Performed by: INTERNAL MEDICINE

## 2022-09-14 PROCEDURE — G8510 SCR DEP NEG, NO PLAN REQD: HCPCS | Performed by: INTERNAL MEDICINE

## 2022-09-14 RX ORDER — MENTHOL AND ZINC OXIDE .44; 20.625 G/100G; G/100G
OINTMENT TOPICAL
Qty: 71 G | Refills: 2 | Status: SHIPPED | OUTPATIENT
Start: 2022-09-14

## 2022-09-14 RX ORDER — FUROSEMIDE 80 MG/1
1 TABLET ORAL 2 TIMES DAILY
COMMUNITY

## 2022-09-14 RX ORDER — CARVEDILOL 12.5 MG/1
12.5 TABLET ORAL 2 TIMES DAILY WITH MEALS
Qty: 180 TABLET | Refills: 1 | Status: SHIPPED | OUTPATIENT
Start: 2022-09-14 | End: 2022-09-14 | Stop reason: SDUPTHER

## 2022-09-14 RX ORDER — LOSARTAN POTASSIUM 25 MG/1
25 TABLET ORAL 2 TIMES DAILY
Qty: 180 TABLET | Refills: 1 | Status: SHIPPED | OUTPATIENT
Start: 2022-09-14

## 2022-09-14 RX ORDER — POTASSIUM CHLORIDE 750 MG/1
10 TABLET, EXTENDED RELEASE ORAL 2 TIMES DAILY
Qty: 60 TABLET | Refills: 5 | Status: SHIPPED | OUTPATIENT
Start: 2022-09-14 | End: 2022-10-24 | Stop reason: SDUPTHER

## 2022-09-14 RX ORDER — ATORVASTATIN CALCIUM 80 MG/1
80 TABLET, FILM COATED ORAL DAILY
Qty: 90 TABLET | Refills: 1 | Status: SHIPPED | OUTPATIENT
Start: 2022-09-14

## 2022-09-14 RX ORDER — CALCIUM CARBONATE 600 MG
600 TABLET ORAL 2 TIMES DAILY
Qty: 60 TABLET | Refills: 11 | Status: SHIPPED | OUTPATIENT
Start: 2022-09-14

## 2022-09-14 RX ORDER — CARVEDILOL 12.5 MG/1
12.5 TABLET ORAL 2 TIMES DAILY WITH MEALS
Qty: 180 TABLET | Refills: 1 | Status: SHIPPED | OUTPATIENT
Start: 2022-09-14

## 2022-09-14 RX ORDER — ALPRAZOLAM 0.25 MG/1
0.25 TABLET ORAL
Qty: 20 TABLET | Refills: 0 | Status: SHIPPED | OUTPATIENT
Start: 2022-09-14

## 2022-09-14 RX ORDER — POTASSIUM CHLORIDE 750 MG/1
10 TABLET, EXTENDED RELEASE ORAL 2 TIMES DAILY
Qty: 60 TABLET | Refills: 5 | Status: SHIPPED | OUTPATIENT
Start: 2022-09-14 | End: 2022-09-14 | Stop reason: SDUPTHER

## 2022-09-14 NOTE — PROGRESS NOTES
HISTORY OF PRESENT ILLNESS  Tone Hendrickson is a 80 y.o. female here for follow-up. She is accompanied by her daughter. Has hypertension, compliant medication. Denies chest palpitation shortness of breath. Anxiety and depression seems under control. She has lost her son. Taking Xanax as needed for anxiety attack. Needed refill. Had pressure ulcer which almost healed. She she reports pain in any of the pressure ulcer area in the lower back. Use Calmoseptine as needed. Need refill. Has dementia, had sundowning, taking Seroquel at night, able to sleep well. She has seen a cardiologist.  Has leg edema and weight gain. Lasix dosage was doubled, also losartan dose was increased. Potassium dosage was doubled also. Repeat level done by cardiologist.  Was stable. We will do lab work again in a month. Has depression, seems stable. Has low thyroid, on Synthroid. TSH normal.  Has hypertension, compliant with medication. Denies chest reverberation or shortness of breath. Has osteoporosis, on Fosamax and calcium. Last will density shows osteopenia. Calcium level was low. Need calcium supplement. Labs reviewed. HPI    Review of Systems   Constitutional: Negative. HENT: Negative. Eyes: Negative. Respiratory: Negative. Cardiovascular: Negative. Gastrointestinal: Negative. Genitourinary: Negative. Musculoskeletal:  Positive for back pain. Skin: Negative. Neurological: Negative. Endo/Heme/Allergies: Negative. Psychiatric/Behavioral:  Positive for memory loss. Physical Exam  Constitutional:       Appearance: Normal appearance. Cardiovascular:      Rate and Rhythm: Normal rate and regular rhythm. Pulses: Normal pulses. Heart sounds: Normal heart sounds. Pulmonary:      Effort: Pulmonary effort is normal.      Breath sounds: Normal breath sounds. Musculoskeletal:      Cervical back: Normal range of motion and neck supple.    Skin:     Comments: Sacrum: Small 1 mm sized decubiti ulcer almost healed up. Neurological:      General: No focal deficit present. Mental Status: She is alert. Mental status is at baseline. Motor: Weakness present. Coordination: Coordination abnormal.      Gait: Gait abnormal.      Deep Tendon Reflexes: Reflexes abnormal.   Psychiatric:         Mood and Affect: Mood normal.         Behavior: Behavior normal.         Thought Content: Thought content normal.      Comments: Demented. Depression seems stable. ASSESSMENT and PLAN    Diagnoses and all orders for this visit:    1. Systolic congestive heart failure, unspecified HF chronicity ([facility]    She is seen by cardiologist Dr. Yoselin Coats. Lasix dosage was increased to 80 mg twice a day along with potassium. Her ejection fraction has improved from 35 to 40%. Since she is too old, medical management is only option. Will give new prescription.  -     potassium chloride (KLOR-CON M10) 10 mEq tablet; Take 1 Tablet by mouth two (2) times a day. -     losartan (COZAAR) 25 mg tablet; Take 1 Tablet by mouth two (2) times a day. -     METABOLIC PANEL, COMPREHENSIVE    2. Cerebrovascular accident (CVA) due to embolism of right middle cerebral artery ([facility]    Taking Xarelto. We refill,  -     atorvastatin (LIPITOR) 80 mg tablet; Take 1 Tablet by mouth daily. 3. Stage 3a chronic kidney disease (HCC)  Stable kidney function. 4. Anxiety attack    Doing well. Will refill,  -     ALPRAZolam (XANAX) 0.25 mg tablet; Take 1 Tablet by mouth two (2) times daily as needed for Anxiety. MAX DAILY AMOUNT: 0.5 MG. 5. Atrial fibrillation, unspecified type ([facility]    Rate and rhythm control. On Xarelto. Will refill,  -     carvediloL (COREG) 12.5 mg tablet; Take 1 Tablet by mouth two (2) times daily (with meals). -     METABOLIC PANEL, COMPREHENSIVE    6. Essential hypertension    losartan dosage was increased by Dr. Jhoana Lopez  We will give,  -     losartan (COZAAR) 25 mg tablet;  Take 1 Tablet by mouth two (2) times a day. -     METABOLIC PANEL, COMPREHENSIVE    7. Anemia, unspecified type    Has iron deficiency. Taking iron supplement. We will repeat,  -     CBC WITH AUTOMATED DIFF    8. Pressure injury, stage 2, unspecified location ([facility]    Need to change position. We will give,  -     menthol-zinc oxide (Calmoseptine) 0.44-20.6 % oint; Top BID    9. Hypocalcemia    Last labs shows low calcium. We will give,  -     calcium carbonate (CALTREX) 600 mg calcium (1,500 mg) tablet; Take 1 Tablet by mouth two (2) times a day. Discussed expected course/resolution/complications of diagnosis in detail with patient. Medication risks/benefits/costs/interactions/alternatives discussed with patient. Discussed COVID-19 infection precaution with patient. Pt was given an after visit summary which includes diagnoses, current medications & vitals. Pt expressed understanding with the diagnosis and plan.

## 2022-09-14 NOTE — PROGRESS NOTES
Nursing staff confirmed patient with full name and . Prepared patient for visit today by obtaining vitals, verifying medication list and allergies, and briefly discussing reason for visit. Chief Complaint   Patient presents with    Irregular Heart Beat    Hypothyroidism    Chronic Kidney Disease    Cerebrovascular Accident    TIA    Hypertension       Current Outpatient Medications   Medication Sig    Xarelto 15 mg tab tablet Take 1 tablet by mouth daily with breakfast.    trospium (SANCTURA XL) 60 mg capsule Take 1 capsule by mouth once daily before breakfast. **Discontinue Trospium 20 mg.**    QUEtiapine (SEROquel) 25 mg tablet Take 1 tablet by mouth nightly. **Discontinue Seroquel 50 mg.**    atorvastatin (LIPITOR) 80 mg tablet Take 1 tablet by mouth daily. magnesium oxide (MAG-OX) 400 mg tablet Take 1 tablet by mouth daily. ascorbic acid, vitamin C, (VITAMIN C) 500 mg tablet Take 1 tablet by mouth twice daily. potassium chloride SR (KLOR-CON 10) 10 mEq tablet Take 1 tablet by mouth once daily. alendronate (FOSAMAX) 35 mg tablet Take 1 tablet by mouth every Wednesday. levothyroxine (SYNTHROID) 25 mcg tablet Take 1 tablet by mouth every morning. FeroSuL 325 mg (65 mg iron) tablet Take 1 tablet by mouth daily at 9AM.    sertraline (ZOLOFT) 100 mg tablet Take 1 tablet by mouth daily. loratadine (CLARITIN) 10 mg tablet Take 1 tablet by mouth daily. losartan (COZAAR) 25 mg tablet Take 1 tablet by mouth daily. menthol-zinc oxide (Calmoseptine) 0.44-20.6 % oint Top BID    OTHER Hospital bed with air mattress. Dx:sacral pressure ulcer    ALPRAZolam (XANAX) 0.25 mg tablet Take 1 Tablet by mouth two (2) times daily as needed for Anxiety. MAX DAILY AMOUNT: 0.5 MG. diclofenac (VOLTAREN) 1 % gel APPLY 2 GRAMS TO AFFECTED AREA TWICE DAILY    calcium carbonate (CALTREX) 600 mg calcium (1,500 mg) tablet Take 1 Tablet by mouth two (2) times a day.     potassium chloride (KLOR-CON M10) 10 mEq tablet For patient to take 1 tablet daily for 7 days    lidocaine (XYLOCAINE) 2 % solution Take 10 mL by mouth as needed for Pain. carvediloL (COREG) 12.5 mg tablet Take 1 Tablet by mouth two (2) times daily (with meals). furosemide (LASIX) 40 mg tablet Take 1 Tablet by mouth two (2) times a day. (Patient taking differently: Take 40 mg by mouth two (2) times a day. 1 tablet in the AM, 0.5 tablet in the AM for 3 days (until 7/8/22, then back to 1 tablet BID)    multivitamin (ONE A DAY) tablet Take 1 tablet by mouth daily. OTHER Raised toilet seat    OTHER Donut cushion to seat    OTHER Left wrist splint use everyday    GAVILAX 17 gram/dose powder Take 17 g by mouth daily as needed (constipation). OTHER rollator walker for gait abnormality    sodium chloride-aloe vera (AYR SALINE) topical gel Apply  to affected area once. albuterol (PROAIR HFA) 90 mcg/actuation inhaler Take 2 Puffs by inhalation every six (6) hours as needed for Wheezing. zinc oxide-cod liver oil (DESITIN) 40 % ointment Apply  to affected area as needed for Skin Irritation. docusate sodium (COLACE) 100 mg capsule Take 100 mg by mouth two (2) times daily as needed. No current facility-administered medications for this visit. 1. \"Have you been to the ER, urgent care clinic since your last visit? Hospitalized since your last visit? \" No    2. \"Have you seen or consulted any other health care providers outside of the [address] [place] [address] since your last visit? \" No     3. For patients aged 39-70: Has the patient had a colonoscopy / FIT/ Cologuard? NA - based on age      If the patient is female:    4. For patients aged 41-77: Has the patient had a mammogram within the past 2 years? NA - based on age or sex      11. For patients aged 21-65: Has the patient had a pap smear?  NA - based on age or sex

## 2022-10-11 DIAGNOSIS — F41.0 ANXIETY ATTACK: ICD-10-CM

## 2022-10-11 DIAGNOSIS — E03.9 HYPOTHYROIDISM, UNSPECIFIED TYPE: ICD-10-CM

## 2022-10-11 DIAGNOSIS — D50.9 IRON DEFICIENCY ANEMIA, UNSPECIFIED IRON DEFICIENCY ANEMIA TYPE: ICD-10-CM

## 2022-10-11 DIAGNOSIS — L89.152 PRESSURE INJURY OF SACRAL REGION, STAGE 2 (HCC): ICD-10-CM

## 2022-10-11 RX ORDER — LEVOTHYROXINE SODIUM 25 UG/1
TABLET ORAL
Qty: 90 TABLET | Refills: 0 | Status: SHIPPED | OUTPATIENT
Start: 2022-10-11

## 2022-10-11 RX ORDER — SERTRALINE HYDROCHLORIDE 100 MG/1
TABLET, FILM COATED ORAL
Qty: 90 TABLET | Refills: 0 | Status: SHIPPED | OUTPATIENT
Start: 2022-10-11

## 2022-10-11 RX ORDER — FERROUS SULFATE TAB 325 MG (65 MG ELEMENTAL FE) 325 (65 FE) MG
TAB ORAL
Qty: 90 TABLET | Refills: 0 | Status: SHIPPED | OUTPATIENT
Start: 2022-10-11

## 2022-10-11 RX ORDER — ASCORBIC ACID 500 MG
500 TABLET ORAL 2 TIMES DAILY
Qty: 60 TABLET | Refills: 2 | Status: SHIPPED | OUTPATIENT
Start: 2022-10-11

## 2022-10-24 DIAGNOSIS — I50.20 SYSTOLIC CONGESTIVE HEART FAILURE, UNSPECIFIED HF CHRONICITY (HCC): ICD-10-CM

## 2022-10-24 RX ORDER — POTASSIUM CHLORIDE 750 MG/1
10 TABLET, EXTENDED RELEASE ORAL 2 TIMES DAILY
Qty: 60 TABLET | Refills: 5 | Status: SHIPPED | OUTPATIENT
Start: 2022-10-24

## 2022-10-24 NOTE — TELEPHONE ENCOUNTER
Daughter wants to know if Potassium (pt now taking 2) medication can be included in pill pack?  Please contact daughter to let her know if  approves this

## 2022-11-13 DIAGNOSIS — N32.81 OAB (OVERACTIVE BLADDER): ICD-10-CM

## 2022-11-13 DIAGNOSIS — F02.818 LATE ONSET ALZHEIMER'S DEMENTIA WITH BEHAVIORAL DISTURBANCE (HCC): ICD-10-CM

## 2022-11-13 DIAGNOSIS — G30.1 LATE ONSET ALZHEIMER'S DEMENTIA WITH BEHAVIORAL DISTURBANCE (HCC): ICD-10-CM

## 2022-11-14 RX ORDER — BISMUTH SUBSALICYLATE 262 MG
TABLET,CHEWABLE ORAL
Qty: 30 TABLET | Refills: 9 | Status: SHIPPED | OUTPATIENT
Start: 2022-11-14

## 2022-11-14 RX ORDER — QUETIAPINE FUMARATE 25 MG/1
TABLET, FILM COATED ORAL
Qty: 90 TABLET | Refills: 0 | Status: SHIPPED | OUTPATIENT
Start: 2022-11-14

## 2022-11-14 RX ORDER — TROSPIUM CHLORIDE ER 60 MG/1
CAPSULE ORAL
Qty: 30 CAPSULE | Refills: 0 | Status: SHIPPED | OUTPATIENT
Start: 2022-11-14

## 2022-11-17 ENCOUNTER — TELEPHONE (OUTPATIENT)
Dept: INTERNAL MEDICINE CLINIC | Age: 87
End: 2022-11-17

## 2022-11-17 NOTE — TELEPHONE ENCOUNTER
Pt has been using calmoseptine for her bed sores. She keeps getting them and daughter wants to know if there is an alternative pt could try?

## 2022-11-17 NOTE — TELEPHONE ENCOUNTER
Spoke with Pt's daughter and offered other options and told her I will talk with  to see what other options will be beneficial for the Pt.

## 2022-12-08 DIAGNOSIS — N32.81 OAB (OVERACTIVE BLADDER): ICD-10-CM

## 2022-12-08 DIAGNOSIS — I48.91 ATRIAL FIBRILLATION, UNSPECIFIED TYPE (HCC): ICD-10-CM

## 2022-12-08 DIAGNOSIS — E83.42 HYPOMAGNESEMIA: ICD-10-CM

## 2022-12-08 RX ORDER — RIVAROXABAN 15 MG/1
TABLET, FILM COATED ORAL
Qty: 90 TABLET | Refills: 0 | Status: SHIPPED | OUTPATIENT
Start: 2022-12-08

## 2022-12-08 RX ORDER — TROSPIUM CHLORIDE ER 60 MG/1
CAPSULE ORAL
Qty: 90 CAPSULE | Refills: 0 | Status: SHIPPED | OUTPATIENT
Start: 2022-12-08

## 2022-12-08 RX ORDER — LANOLIN ALCOHOL/MO/W.PET/CERES
CREAM (GRAM) TOPICAL
Qty: 90 TABLET | Refills: 0 | Status: SHIPPED | OUTPATIENT
Start: 2022-12-08

## 2022-12-08 NOTE — TELEPHONE ENCOUNTER
[facility] requesting 90 day supply of   Requested Prescriptions     Pending Prescriptions Disp Refills    trospium (SANCTURA XL) 60 mg capsule 30 Capsule 0     09/14/2022 12/14/2022

## 2022-12-14 ENCOUNTER — VIRTUAL VISIT (OUTPATIENT)
Dept: INTERNAL MEDICINE CLINIC | Age: 87
End: 2022-12-14
Payer: MEDICARE

## 2022-12-14 DIAGNOSIS — I10 PRIMARY HYPERTENSION: Primary | ICD-10-CM

## 2022-12-14 DIAGNOSIS — R26.9 GAIT ABNORMALITY: ICD-10-CM

## 2022-12-14 DIAGNOSIS — I48.91 ATRIAL FIBRILLATION, UNSPECIFIED TYPE (HCC): ICD-10-CM

## 2022-12-14 DIAGNOSIS — M19.042 OSTEOARTHRITIS OF BOTH HANDS, UNSPECIFIED OSTEOARTHRITIS TYPE: ICD-10-CM

## 2022-12-14 DIAGNOSIS — F41.0 ANXIETY ATTACK: ICD-10-CM

## 2022-12-14 DIAGNOSIS — M19.041 OSTEOARTHRITIS OF BOTH HANDS, UNSPECIFIED OSTEOARTHRITIS TYPE: ICD-10-CM

## 2022-12-14 DIAGNOSIS — E55.9 VITAMIN D DEFICIENCY: ICD-10-CM

## 2022-12-14 DIAGNOSIS — L89.152 PRESSURE INJURY OF SACRAL REGION, STAGE 2 (HCC): ICD-10-CM

## 2022-12-14 PROCEDURE — 1090F PRES/ABSN URINE INCON ASSESS: CPT | Performed by: INTERNAL MEDICINE

## 2022-12-14 PROCEDURE — G8536 NO DOC ELDER MAL SCRN: HCPCS | Performed by: INTERNAL MEDICINE

## 2022-12-14 PROCEDURE — G8427 DOCREV CUR MEDS BY ELIG CLIN: HCPCS | Performed by: INTERNAL MEDICINE

## 2022-12-14 PROCEDURE — G8420 CALC BMI NORM PARAMETERS: HCPCS | Performed by: INTERNAL MEDICINE

## 2022-12-14 PROCEDURE — 1101F PT FALLS ASSESS-DOCD LE1/YR: CPT | Performed by: INTERNAL MEDICINE

## 2022-12-14 PROCEDURE — G8510 SCR DEP NEG, NO PLAN REQD: HCPCS | Performed by: INTERNAL MEDICINE

## 2022-12-14 PROCEDURE — 99214 OFFICE O/P EST MOD 30 MIN: CPT | Performed by: INTERNAL MEDICINE

## 2022-12-14 RX ORDER — DICLOFENAC SODIUM 10 MG/G
GEL TOPICAL
Qty: 100 G | Refills: 2 | Status: SHIPPED | OUTPATIENT
Start: 2022-12-14

## 2022-12-14 RX ORDER — ALPRAZOLAM 0.25 MG/1
0.25 TABLET ORAL
Qty: 20 TABLET | Refills: 0 | Status: SHIPPED | OUTPATIENT
Start: 2022-12-14

## 2022-12-14 NOTE — PROGRESS NOTES
Natalie Ly is a 80 y.o. female who was seen by synchronous (real-time) audio-video technology on 12/14/2022 for Irregular Heart Beat, Hypertension, Cholesterol Problem, TIA, Chronic Kidney Disease, and Hypothyroidism        Assessment & Plan:   Diagnoses and all orders for this visit:    1. Primary hypertension    Stable blood pressure. On Coreg. Will check,  -     CBC WITH AUTOMATED DIFF  -     METABOLIC PANEL, COMPREHENSIVE    2. Pressure injury of sacral region, stage 2 ([address]    Desitin is not helping her. Advised her not to. Position 1 to 2 hours. Need to get position change. We will try,  -     honey (MEDIHONEY) 100 % topical paste; Use top BID on sacral decub ulcer  If not better in 1 month. We will have home health nurse come and do dressing change. 3. Osteoarthritis of both hands, unspecified osteoarthritis type    Has DJD in multiple joints. We will give,  -     diclofenac (VOLTAREN) 1 % gel; APPLY 2 GRAMS TO AFFECTED AREA TWICE DAILY    4. Anxiety attack    We will give,  -     ALPRAZolam (XANAX) 0.25 mg tablet; Take 1 Tablet by mouth two (2) times daily as needed for Anxiety. MAX DAILY AMOUNT: 0.5 MG. 5. Vitamin D deficiency    Will check,  -     VITAMIN D, 25 HYDROXY    6. Atrial fibrillation    Rate and rhythm control. On Coreg and Xarelto. Will check,  -     CBC WITH AUTOMATED DIFF  -     METABOLIC PANEL, COMPREHENSIVE    7. Gait abnormality  She has mild gait and balance issue, had 1 fall but did not have any fracture. I have offered her to have home health nurse to get physical therapy and outpatient therapy, patient is not willing to do any therapy right now. Her daughter states cannot take her out for walk. I spent at least 32 minutes on this visit with this established patient. Subjective: Danielle Bonilla is here for follow-up. She is staying home, her 2 daughters are with her. Also taking care of her.   Has mild gait gait and balance issue, had recent fall, hip left side of the body but did not damage anything. Also had small bowel on her finger when she was too close to the heater. Doing well. She has a decubiti ulcer on the sacrum, using Desitin which is not healing so completely. She is now changing position every 2 hours. Her doctors would not want me to change to another topical preparation. Has atrial fibrillation, on Coreg and Xarelto, need refill. Has elevated lipids, on statin. No myalgia. Anxiety and depression seems under control. Taking Zoloft and Xanax, needs refill on Xanax. Labs reviewed, seems stable. Prior to Admission medications    Medication Sig Start Date End Date Taking? Authorizing Provider   honey (MEDIHONEY) 100 % topical paste Use top BID on sacral decub ulcer 12/14/22  Yes Tevin Medrano MD   diclofenac (VOLTAREN) 1 % gel APPLY 2 GRAMS TO AFFECTED AREA TWICE DAILY 12/14/22  Yes Tevin Medrano MD   ALPRAZolam Jackqueline Laz) 0.25 mg tablet Take 1 Tablet by mouth two (2) times daily as needed for Anxiety. MAX DAILY AMOUNT: 0.5 MG. 12/14/22  Yes Tevin Medrano MD   Xarelto 15 mg tab tablet Take 1 tablet by mouth daily with breakfast. 12/8/22  Yes Tevin Medrano MD   magnesium oxide (MAG-OX) 400 mg tablet Take 1 tablet by mouth daily. 12/8/22  Yes Tevin Medrano MD   trospium (SANCTURA XL) 60 mg capsule Take 1 capsule by mouth once daily before breakfast. **Discontinue Trospium 20 mg.** 12/8/22  Yes Tevin Medrano MD   QUEtiapine (SEROquel) 25 mg tablet Take 1 tablet by mouth nightly. **Discontinue Seroquel 50 mg.** 11/14/22  Yes Tevin Medrano MD   multivitamin (ONE A DAY) tablet Take 1 tablet by mouth daily. 11/14/22  Yes Tevin Medrano MD   potassium chloride (KLOR-CON M10) 10 mEq tablet Take 1 Tablet by mouth two (2) times a day. 10/24/22  Yes Tevin Medrano MD   sertraline (ZOLOFT) 100 mg tablet Take 1 tablet by mouth daily. 10/11/22  Yes Maddison Gamez NP   loratadine (CLARITIN) 10 mg tablet Take 1 tablet by mouth daily.  10/11/22  Yes Tyrone Monge NP FeroSuL 325 mg (65 mg iron) tablet Take 1 tablet by mouth daily at 9AM. 10/11/22  Yes Hopkins, Lenward Curling, NP   levothyroxine (SYNTHROID) 25 mcg tablet Take 1 tablet by mouth every morning. 10/11/22  Yes Hopkins, Lenward Curling, NP   ascorbic acid, vitamin C, (VITAMIN C) 500 mg tablet Take 1 Tablet by mouth two (2) times a day. 10/11/22  Yes Arminda Vogel NP   furosemide (LASIX) 80 mg tablet Take 1 Tablet by mouth two (2) times a day. Yes Provider, Historical   atorvastatin (LIPITOR) 80 mg tablet Take 1 Tablet by mouth daily. 9/14/22  Yes Cheryle Mix, MD   carvediloL (COREG) 12.5 mg tablet Take 1 Tablet by mouth two (2) times daily (with meals). 9/14/22  Yes Cheryle Mix, MD   losartan (COZAAR) 25 mg tablet Take 1 Tablet by mouth two (2) times a day. 9/14/22  Yes Cheryle Mix, MD   menthol-zinc oxide (Calmoseptine) 0.44-20.6 % oint Top BID 9/14/22  Yes Cheryle Mix, MD   calcium carbonate (CALTREX) 600 mg calcium (1,500 mg) tablet Take 1 Tablet by mouth two (2) times a day. 9/14/22  Yes Cheryle Mix, MD   alendronate (FOSAMAX) 35 mg tablet Take 1 tablet by mouth every Wednesday. 8/12/22  Yes Hopkins, Lenward Curling, NP   [facility] bed with air mattress. Dx:sacral pressure ulcer 7/8/22  Yes Cheryle Mix, MD   lidocaine (XYLOCAINE) 2 % solution Take 10 mL by mouth as needed for Pain. Yes Provider, Historical   OTHER Raised toilet seat 12/7/21  Yes Cheryle Mix, MD   OTHER Donut cushion to seat 11/22/21  Yes Cheryle Mix, MD OTHER Left wrist splint use everyday 6/17/20  Yes Cheryle Mix, MD   GAVILAX 17 gram/dose powder Take 17 g by mouth daily as needed (constipation). 2/19/20  Yes Cheryle Mix, MD   OTHER rollator walker for gait abnormality 10/23/19  Yes Hopkins, Lenward Curling, NP   sodium chloride-aloe vera (AYR) topical gel Apply  to affected area once. Yes Provider, Historical   albuterol (PROAIR HFA) 90 mcg/actuation inhaler Take 2 Puffs by inhalation every six (6) hours as needed for Wheezing.  8/14/18  Yes Emily Alonso, Antonio, DO   zinc oxide-cod liver oil (DESITIN) 40 % ointment Apply  to affected area as needed for Skin Irritation. Yes Provider, Historical   docusate sodium (COLACE) 100 mg capsule Take 100 mg by mouth two (2) times daily as needed. Yes Provider, Historical   ALPRAZolam (XANAX) 0.25 mg tablet Take 1 Tablet by mouth two (2) times daily as needed for Anxiety. MAX DAILY AMOUNT: 0.5 MG. 9/14/22 12/14/22  Lonnie De La Fuente MD   diclofenac (VOLTAREN) 1 % gel APPLY 2 GRAMS TO AFFECTED AREA TWICE DAILY 7/8/22 12/14/22  Lonnie De La Fuente MD     Past Medical History:   Diagnosis Date    Atrial fibrillation [facility]) 2007    Hypercholesterolemia     Hypertension     Osteoarthritis 2010    Stroke ([facility] [address]     Thromboembolus [facility])     Thyroid disease        ROS significant for anxiety and gait problem. Also decubiti ulcer.     Objective:     Patient-Reported Vitals 12/14/2022   Patient-Reported Weight 153lb   Patient-Reported Height -   Patient-Reported LMP -          Constitutional: [x] Appears well-developed and well-nourished [x] No apparent distress      [] Abnormal -     Mental status: [x] Alert and awake  [x] Oriented to person/place/time [x] Able to follow commands    [] Abnormal -     Eyes:   EOM    [x]  Normal    [] Abnormal -   Sclera  [x]  Normal    [] Abnormal -          Discharge [x]  None visible   [] Abnormal -     HENT: [x] Normocephalic, atraumatic  [] Abnormal -   [x] Mouth/Throat: Mucous membranes are moist    External Ears [x] Normal  [] Abnormal -    Neck: [x] No visualized mass [] Abnormal -     Pulmonary/Chest: [x] Respiratory effort normal   [x] No visualized signs of difficulty breathing or respiratory distress        [] Abnormal -      Musculoskeletal:   [x] Normal gait with no signs of ataxia         [x] Normal range of motion of neck        [] Abnormal -     Neurological:        [x] No Facial Asymmetry (Cranial nerve 7 motor function) (limited exam due to video visit)          [x] No gaze palsy        [] Abnormal -          Skin:        [x] No significant exanthematous lesions or discoloration noted on facial skin         [] Abnormal -    Small decubiti ulce debris also present on the sacrum. Psychiatric:       [x] Normal Affect [] Abnormal -        [x] No Hallucinations    Other pertinent observable physical exam findings:-        We discussed the expected course, resolution and complications of the diagnosis(es) in detail. Medication risks, benefits, costs, interactions, and alternatives were discussed as indicated. I advised her to contact the office if her condition worsens, changes or fails to improve as anticipated. She expressed understanding with the diagnosis(es) and plan. Saida Madrigal, was evaluated through a synchronous (real-time) audio-video encounter. The patient (or guardian if applicable) is aware that this is a billable service, which includes applicable co-pays. This Virtual Visit was conducted with patient's (and/or legal guardian's) consent. The visit was conducted pursuant to the emergency declaration under the [address] [place] [address] authority and the [email or web address] and [employer] General Act. Patient identification was verified, and a caregiver was present when appropriate. The patient was located at: Home: [address] [address]  [facility]  The provider was located at:  Facility (Appt Department): [address] [place] [address]        Olivia Cox MD

## 2022-12-14 NOTE — PROGRESS NOTES
Chief Complaint   Patient presents with    Irregular Heart Beat    Hypertension    Cholesterol Problem    TIA    Chronic Kidney Disease    Hypothyroidism

## 2022-12-28 ENCOUNTER — TELEPHONE (OUTPATIENT)
Dept: INTERNAL MEDICINE CLINIC | Age: 87
End: 2022-12-28

## 2022-12-28 DIAGNOSIS — L89.152 PRESSURE INJURY OF SACRAL REGION, STAGE 2 (HCC): Primary | ICD-10-CM

## 2022-12-28 DIAGNOSIS — I63.9 ACUTE CVA (CEREBROVASCULAR ACCIDENT) (HCC): ICD-10-CM

## 2022-12-28 DIAGNOSIS — R26.9 GAIT ABNORMALITY: ICD-10-CM

## 2022-12-28 DIAGNOSIS — N18.31 STAGE 3A CHRONIC KIDNEY DISEASE (HCC): ICD-10-CM

## 2022-12-28 NOTE — TELEPHONE ENCOUNTER
Spoke with Dagmar [facility] wound care has been ordered from [address] [place] [address]

## 2022-12-28 NOTE — TELEPHONE ENCOUNTER
The medihoney is still not avilable at the pharmacy is there something else she can get.     The Calmoseptine stopped working so they would like something different    Please call to the [facility] and [place] and [address]

## 2023-01-01 ENCOUNTER — HOME CARE VISIT (OUTPATIENT)
Age: 88
End: 2023-01-01
Payer: MEDICARE

## 2023-01-01 ENCOUNTER — HOME CARE VISIT (OUTPATIENT)
Facility: HOME HEALTH | Age: 88
End: 2023-01-01
Payer: MEDICARE

## 2023-01-01 ENCOUNTER — SOCIAL WORK (OUTPATIENT)
Age: 88
End: 2023-01-01

## 2023-01-01 VITALS
OXYGEN SATURATION: 93 % | HEART RATE: 89 BPM | DIASTOLIC BLOOD PRESSURE: 52 MMHG | TEMPERATURE: 97.6 F | SYSTOLIC BLOOD PRESSURE: 99 MMHG | RESPIRATION RATE: 14 BRPM

## 2023-01-01 VITALS
OXYGEN SATURATION: 94 % | DIASTOLIC BLOOD PRESSURE: 60 MMHG | RESPIRATION RATE: 14 BRPM | SYSTOLIC BLOOD PRESSURE: 125 MMHG | HEART RATE: 90 BPM

## 2023-01-01 VITALS
RESPIRATION RATE: 32 BRPM | DIASTOLIC BLOOD PRESSURE: 70 MMHG | SYSTOLIC BLOOD PRESSURE: 140 MMHG | HEART RATE: 117 BPM | OXYGEN SATURATION: 87 %

## 2023-01-01 VITALS
TEMPERATURE: 98.1 F | DIASTOLIC BLOOD PRESSURE: 76 MMHG | RESPIRATION RATE: 16 BRPM | HEART RATE: 108 BPM | SYSTOLIC BLOOD PRESSURE: 158 MMHG

## 2023-01-01 VITALS
HEART RATE: 114 BPM | DIASTOLIC BLOOD PRESSURE: 62 MMHG | TEMPERATURE: 97.8 F | SYSTOLIC BLOOD PRESSURE: 116 MMHG | RESPIRATION RATE: 16 BRPM

## 2023-01-01 DIAGNOSIS — K92.2 ACUTE UPPER GI BLEED: ICD-10-CM

## 2023-01-01 DIAGNOSIS — C18.7 MALIGNANT NEOPLASM OF SIGMOID COLON (HCC): ICD-10-CM

## 2023-01-01 DIAGNOSIS — K92.2 GASTROINTESTINAL HEMORRHAGE, UNSPECIFIED GASTROINTESTINAL HEMORRHAGE TYPE: ICD-10-CM

## 2023-01-01 PROCEDURE — G0299 HHS/HOSPICE OF RN EA 15 MIN: HCPCS

## 2023-01-01 PROCEDURE — G0300 HHS/HOSPICE OF LPN EA 15 MIN: HCPCS

## 2023-01-01 PROCEDURE — G0156 HHCP-SVS OF AIDE,EA 15 MIN: HCPCS

## 2023-01-01 PROCEDURE — G0155 HHCP-SVS OF CSW,EA 15 MIN: HCPCS

## 2023-01-01 RX ORDER — ALENDRONATE SODIUM 35 MG/1
TABLET ORAL
Qty: 12 TABLET | OUTPATIENT
Start: 2023-01-01

## 2023-01-01 RX ORDER — SERTRALINE HYDROCHLORIDE 100 MG/1
TABLET, FILM COATED ORAL DAILY
Qty: 90 TABLET | OUTPATIENT
Start: 2023-01-01

## 2023-01-01 RX ORDER — FERROUS SULFATE 325(65) MG
TABLET ORAL
Qty: 90 TABLET | OUTPATIENT
Start: 2023-01-01

## 2023-01-01 RX ORDER — LEVOTHYROXINE SODIUM 0.03 MG/1
TABLET ORAL EVERY MORNING
Qty: 90 TABLET | OUTPATIENT
Start: 2023-01-01

## 2023-01-01 ASSESSMENT — ENCOUNTER SYMPTOMS: PAIN LOCATION - PAIN QUALITY: UNABLE TO ASSESS

## 2023-01-05 ENCOUNTER — TELEPHONE (OUTPATIENT)
Dept: INTERNAL MEDICINE CLINIC | Age: 88
End: 2023-01-05

## 2023-01-05 DIAGNOSIS — L89.152 PRESSURE INJURY OF SACRAL REGION, STAGE 2 (HCC): Primary | ICD-10-CM

## 2023-01-05 NOTE — TELEPHONE ENCOUNTER
Spoke with daughter, gave info on wound care center [facility]) since [facility] has rejected her order (insurance). Referral to wound care entered.        [address], [facility] Tylor Simons.  Get Directions  Tel: [phone]  Fax: [phone]

## 2023-01-05 NOTE — TELEPHONE ENCOUNTER
Rosalba Hebert routed conversation to [facility] Int Nurse Pool 2 minutes ago (1:40 PM)     Rosalba Hebert 2 minutes ago (1:40 PM)     WB  Patient's daughter, sreedhar called stating that she was returning a call from [place].  Please call her back at [phone]

## 2023-01-05 NOTE — TELEPHONE ENCOUNTER
[facility] does not make the medication for bed sores -medihoney. Can something else be called into [facility]? Family has not seen home health for wound treatment. Bed sores are getting worse. She tried calmoseptine in the past but it does not seem to be effective any more.

## 2023-01-05 NOTE — TELEPHONE ENCOUNTER
Federico Tobias daughter called in and verbalized understanding on note below. They will also  Medihoney from a pharmacy.

## 2023-01-05 NOTE — TELEPHONE ENCOUNTER
Patient's daughter, sreedhar called stating that she was returning a call from [place].  Please call her back at [phone]

## 2023-01-07 DIAGNOSIS — D50.9 IRON DEFICIENCY ANEMIA, UNSPECIFIED IRON DEFICIENCY ANEMIA TYPE: ICD-10-CM

## 2023-01-07 DIAGNOSIS — F41.0 ANXIETY ATTACK: ICD-10-CM

## 2023-01-07 DIAGNOSIS — E03.9 HYPOTHYROIDISM, UNSPECIFIED TYPE: ICD-10-CM

## 2023-01-07 RX ORDER — LEVOTHYROXINE SODIUM 25 UG/1
TABLET ORAL
Qty: 90 TABLET | Refills: 0 | Status: SHIPPED | OUTPATIENT
Start: 2023-01-07

## 2023-01-07 RX ORDER — SERTRALINE HYDROCHLORIDE 100 MG/1
TABLET, FILM COATED ORAL
Qty: 90 TABLET | Refills: 0 | Status: SHIPPED | OUTPATIENT
Start: 2023-01-07

## 2023-01-07 RX ORDER — FERROUS SULFATE TAB 325 MG (65 MG ELEMENTAL FE) 325 (65 FE) MG
TAB ORAL
Qty: 90 TABLET | Refills: 0 | Status: SHIPPED | OUTPATIENT
Start: 2023-01-07

## 2023-01-09 DIAGNOSIS — L89.152 PRESSURE INJURY OF SACRAL REGION, STAGE 2 (HCC): ICD-10-CM

## 2023-01-09 RX ORDER — ASCORBIC ACID 500 MG
500 TABLET ORAL 2 TIMES DAILY
Qty: 60 TABLET | Refills: 2 | Status: SHIPPED | OUTPATIENT
Start: 2023-01-09

## 2023-01-10 ENCOUNTER — OFFICE VISIT (OUTPATIENT)
Dept: URGENT CARE | Age: 88
End: 2023-01-10
Payer: MEDICARE

## 2023-01-10 ENCOUNTER — NURSE TRIAGE (OUTPATIENT)
Dept: OTHER | Facility: CLINIC | Age: 88
End: 2023-01-10

## 2023-01-10 VITALS
RESPIRATION RATE: 16 BRPM | WEIGHT: 150 LBS | HEART RATE: 76 BPM | SYSTOLIC BLOOD PRESSURE: 136 MMHG | DIASTOLIC BLOOD PRESSURE: 72 MMHG | BODY MASS INDEX: 24.21 KG/M2 | TEMPERATURE: 97.1 F | OXYGEN SATURATION: 99 %

## 2023-01-10 DIAGNOSIS — M25.562 LEFT KNEE PAIN, UNSPECIFIED CHRONICITY: ICD-10-CM

## 2023-01-10 DIAGNOSIS — S42.291A HUMERAL HEAD FRACTURE, RIGHT, CLOSED, INITIAL ENCOUNTER: Primary | ICD-10-CM

## 2023-01-10 DIAGNOSIS — Z91.81 HISTORY OF RECENT FALL: ICD-10-CM

## 2023-01-10 DIAGNOSIS — M17.12 OSTEOARTHRITIS OF LEFT KNEE, UNSPECIFIED OSTEOARTHRITIS TYPE: ICD-10-CM

## 2023-01-10 LAB
BILIRUB UR QL STRIP: NEGATIVE
GLUCOSE UR-MCNC: NEGATIVE MG/DL
KETONES P FAST UR STRIP-MCNC: NEGATIVE MG/DL
PH UR STRIP: 5 [PH] (ref 4.6–8)
PROT UR QL STRIP: NEGATIVE
SP GR UR STRIP: 1.01 (ref 1–1.03)
UA UROBILINOGEN AMB POC: NORMAL (ref 0.2–1)
URINALYSIS CLARITY POC: NORMAL
URINALYSIS COLOR POC: NORMAL
URINE BLOOD POC: NEGATIVE
URINE LEUKOCYTES POC: NEGATIVE
URINE NITRITES POC: NEGATIVE

## 2023-01-10 PROCEDURE — 81003 URINALYSIS AUTO W/O SCOPE: CPT | Performed by: NURSE PRACTITIONER

## 2023-01-10 PROCEDURE — 99203 OFFICE O/P NEW LOW 30 MIN: CPT | Performed by: NURSE PRACTITIONER

## 2023-01-10 PROCEDURE — G8420 CALC BMI NORM PARAMETERS: HCPCS | Performed by: NURSE PRACTITIONER

## 2023-01-10 PROCEDURE — 1090F PRES/ABSN URINE INCON ASSESS: CPT | Performed by: NURSE PRACTITIONER

## 2023-01-10 PROCEDURE — G8536 NO DOC ELDER MAL SCRN: HCPCS | Performed by: NURSE PRACTITIONER

## 2023-01-10 PROCEDURE — G8427 DOCREV CUR MEDS BY ELIG CLIN: HCPCS | Performed by: NURSE PRACTITIONER

## 2023-01-10 PROCEDURE — G8432 DEP SCR NOT DOC, RNG: HCPCS | Performed by: NURSE PRACTITIONER

## 2023-01-10 PROCEDURE — 1101F PT FALLS ASSESS-DOCD LE1/YR: CPT | Performed by: NURSE PRACTITIONER

## 2023-01-10 PROCEDURE — 1123F ACP DISCUSS/DSCN MKR DOCD: CPT | Performed by: NURSE PRACTITIONER

## 2023-01-10 NOTE — TELEPHONE ENCOUNTER
Location of patient: [place]     Received call from [place] at [facility] with [employer]. Subjective: Caller (daughter)states mom is dizzy every morning when she gets up, takes meds that might be causing dizziness     Past Sunday she slipped out of chair reaching for clothes on carpeted floor, had to be picked up ,hurt her left knee ,was sore at the time, ended up going to [facility] later     Current Symptoms: walking fine no redness or swelling of left knee,knee feels sore,mild dizziness mostly in the morning     Onset: Sunday     Associated Symptoms: NA    Pain Severity: soreness left knee-does not rate    Temperature: Denies      What has been tried: Tylenol    LMP: NA Pregnant: No    Recommended disposition: See in Office Today    Care advice provided, patient verbalizes understanding; denies any other questions or concerns; instructed to call back for any new or worsening symptoms. Patient/Caller agrees with recommended disposition; writer provided warm transfer to [place] at [facility] for appointment scheduling    Attention Provider: Thank you for allowing me to participate in the care of your patient. The patient was connected to triage in response to information provided to the [facility]. Please do not respond through this encounter as the response is not directed to a shared pool.     Reason for Disposition   Patient wants to be seen    Protocols used: Knee Injury-ADULT-[place] Spoke to patient. Notified her of low risk stress test. She no longer has LE edema. She has been off the Furosemide. Advised to take Furosemide PRN for leg edema. No changes. Keep follow-up in October. She verbalized understanding and had no further questions.

## 2023-01-10 NOTE — PROGRESS NOTES
Knee Pain  Pertinent negatives include no chest pain and no shortness of breath. Pt fell in her home 2 days ago while dressing for [employer]. Her walker was across the room and she tripped and fell. Daughter assisted her up. She c/o bilateral knee pain and R shoulder pain but felt able to go to [employer]. Has c/o knees feeling sore (L > R) and right shoulder sore. Feeling a little anxious, heart racing. Feels little dizzy. Her daughter states these are not unusual complaints for her. Tylenol is helping pain. Past Medical History:   Diagnosis Date    Atrial fibrillation ([facility] [address] 2007    Hypercholesterolemia     Hypertension     Osteoarthritis 2010    Stroke ([facility] [address]     Thromboembolus [facility])     Thyroid disease         Past Surgical History:   Procedure Laterality Date    HX HEENT      cataract sx    HX HIP FRACTURE [place]  2013    right hip 1989 left hip 2013    HX HIP REPLACEMENT  1989    HX PACEMAKER  2008    [place] UNLISTED PROCEDURE CARDIAC SURGERY           Family History   Problem Relation Age of Onset    Hypertension Mother         Social History     Socioeconomic History    Marital status:      Spouse name: Not on file    Number of children: Not on file    Years of education: Not on file    Highest education level: Not on file   Occupational History    Not on file   Tobacco Use    Smoking status: Never    Smokeless tobacco: Never   Substance and Sexual Activity    Alcohol use: No     Alcohol/week: 0.0 standard drinks    Drug use: No    Sexual activity: Never     Birth control/protection: None     Comment: retired,living in independant living. Other Topics Concern    Not on file   Social History Narrative    11/4/16 Pt lives with son Judith Estes # [phone]. Dtr [address] [place] [address] #[phone]. Pt has both a rollator and a rolling walker in the home.      Social Determinants of Health     Financial Resource Strain: Low Risk     Difficulty of Paying Living Expenses: Not hard at all   Food Insecurity: No Food Insecurity    Worried About Running Out of Food in the Last Year: Never true    Ran Out of Food in the Last Year: Never true   Transportation Needs: Not on file   Physical Activity: Not on file   Stress: Not on file   Social Connections: Not on file   Intimate Partner Violence: Not on file   Housing Stability: Not on file                ALLERGIES: Ace inhibitors    Review of Systems   Constitutional:  Negative for activity change, appetite change, fatigue and fever. Respiratory:  Negative for shortness of breath. Cardiovascular:  Positive for palpitations. Negative for chest pain and leg swelling. Gastrointestinal:  Negative for diarrhea and vomiting. Genitourinary:  Negative for dysuria, frequency and urgency. Neurological:  Positive for dizziness. Vitals:    01/10/23 1627   BP: 136/72   Pulse: 76   Resp: 16   Temp: 97.1 °F (36.2 °C)   SpO2: 99%   Weight: 150 lb (68 kg)       Physical Exam  Constitutional:       General: She is not in acute distress. Appearance: Normal appearance. She is not ill-appearing or toxic-appearing. HENT:      Head: Normocephalic and atraumatic. Cardiovascular:      Rate and Rhythm: Normal rate and regular rhythm. Pulses: Normal pulses. Heart sounds: Normal heart sounds. Pulmonary:      Effort: Pulmonary effort is normal.      Breath sounds: Normal breath sounds. Musculoskeletal:      Right shoulder: Normal.      Right knee: Normal.      Left knee: Normal.      Right lower leg: Normal. No edema. Left lower leg: Normal. No edema. Right foot: Normal.      Left foot: Normal.   Skin:     General: Skin is warm and dry. Neurological:      Mental Status: She is alert.      Results for orders placed or performed in visit on 01/10/23   AMB POC URINALYSIS DIP STICK AUTO W/O MICRO   Result Value Ref Range    Color (UA POC)      Clarity (UA POC)      Glucose (UA POC) Negative Negative    Bilirubin (UA POC) Negative Negative    Ketones (UA POC) Negative Negative    Specific gravity (UA POC) 1.010 1.001 - 1.035    Blood (UA POC) Negative Negative    pH (UA POC) 5.0 4.6 - 8.0    Protein (UA POC) Negative Negative    Urobilinogen (UA POC) 0.2 mg/dL 0.2 - 1    Nitrites (UA POC) Negative Negative    Leukocyte esterase (UA POC) Negative Negative     XR Results (most recent):  Results from Appointment encounter on 01/10/23    XR KNEE LT 3 V    Narrative  EXAM: XR KNEE LT 3 V    INDICATION: History of recent fall. COMPARISON: None. FINDINGS: Three views of the left knee demonstrate no fracture or other acute  osseous or articular abnormality. There is a small joint effusion. Severe  tricompartmental degenerative disease. Osteopenia. Prominent atherosclerotic  vascular calcifications. Impression  1. Small joint effusion. No evidence of acute fracture. 2. Severe tricompartmental degenerative disease. EXAM: XR SHOULDER RT AP/LAT MIN 2 V     INDICATION: Recent fall. COMPARISON: None. FINDINGS: Three views of the right shoulder demonstrate an age-indeterminate  minimally displaced fracture of the humeral head. Severe degenerative disease of  the glenohumeral and AC joint. Partially visualized right chest cardiac device. IMPRESSION  1. Age-indeterminate minimally displaced fracture of the humeral head, which may  be acute to subacute. ICD-10-CM ICD-9-CM   1. Humeral head fracture, right, closed, initial encounter  S42.291A 812.09   2. Left knee pain, unspecified chronicity  M25.562 719.46   3. Osteoarthritis of left knee, unspecified osteoarthritis type  M17.12 715.96   4.  History of recent fall  Z91.81 V15.88       Orders Placed This Encounter    XR SHOULDER RT AP/LAT MIN 2 V     Standing Status:   Future     Number of Occurrences:   1     Standing Expiration Date:   1/11/2024    XR KNEE LT 3 V     Standing Status:   Future     Number of Occurrences:   1     Standing Expiration Date:   1/11/2024    AMB POC URINALYSIS DIP STICK AUTO W/O MICRO        The patient is to follow up with Ortho. Continue tylenol for pain. If signs and symptoms become worse the pt is to go to the ER.      Helena Pace NP       MDM    Procedures

## 2023-01-16 ENCOUNTER — HOSPITAL ENCOUNTER (OUTPATIENT)
Dept: WOUND CARE | Age: 88
Discharge: HOME | End: 2023-01-16
Payer: MEDICARE

## 2023-01-16 VITALS
DIASTOLIC BLOOD PRESSURE: 60 MMHG | SYSTOLIC BLOOD PRESSURE: 128 MMHG | TEMPERATURE: 98.7 F | HEART RATE: 75 BPM | RESPIRATION RATE: 16 BRPM

## 2023-01-16 PROBLEM — Z91.89 AT HIGH RISK FOR IMPAIRED SKIN INTEGRITY: Status: ACTIVE | Noted: 2023-01-16

## 2023-01-16 PROBLEM — Z91.89 AT HIGH RISK FOR IMPAIRED SKIN INTEGRITY: Status: ACTIVE | Noted: 2023-01-01

## 2023-01-16 PROCEDURE — 99213 OFFICE O/P EST LOW 20 MIN: CPT

## 2023-01-16 NOTE — DISCHARGE INSTRUCTIONS
Discharge Instructions/Wound [address] [facility]  [address]  MOB 1, [address] [facility] [place] [facility] [place], [id number]  Telephone: [phone]  WOUND CARE ORDERS:  Sacral skin :Cleanse with Soap and water , apply primary dressing calmoseptine,  Pt./pcg/HH nurse to change (freq) daily and as needed for compromise. No further follow-up needed. TREATMENT ORDERS:  Turn/reposition every 2 hours when in bed, avoid direct pressure on wound site. When sitting, shift position or do seat lifts every 15 minutes. Limit side lying to 30 degree tilt. Limit HOB elevation to 30 degrees. Use speciality pressure relief cushion, mattress as appropriate. Follow diet as prescribed:  [x] Diet as tolerated: [] Calorie Diabetic Diet:Low carb and no Sugar [] No Added Salt:[x] Increase Protein: [] Other:Limit the amount of liquid you are drinking and avoid drinking in between meals              Return Appointment:  [x] Return Appointment: No further follow-up needed. [] Ordered tests:    Electronically signed Vicente Laguerre RN on 1/16/2023 at [facility]: Should you experience any significant changes in your wound(s) or have questions about your wound care, please contact the [facility] at [address] [place] [address] 8:00 am - 4:30. If you need help with your wound outside these hours and cannot wait until we are again available, contact your PCP or go to the hospital emergency room. PLEASE NOTE: IF YOU ARE UNABLE TO OBTAIN WOUND SUPPLIES, CONTINUE TO USE THE SUPPLIES YOU HAVE AVAILABLE UNTIL YOU ARE ABLE TO REACH US. IT IS MOST IMPORTANT TO KEEP THE WOUND COVERED AT ALL TIMES.      Physician Signature:_______________________    Date: ___________ Time:  ____________

## 2023-01-16 NOTE — WOUND CARE
Calmoseptine applied to sacral area (no open wound). Daughter given info to purchase sacral cushion for patients wheelchair/lift chair.

## 2023-01-16 NOTE — CONSULTS
Wound Care Consult    Subjective: Shanice Sweeney is a 80 y.o.  female who is being seen for a wound on her sacral area, that has now healed. Her daughter is with her, and she states that the wound heals then recurs. She wonders if there is any cream we can recommend that will help. She sits in a chair most of the day, with no special padding. Referred by her primary care, Dr. Jeannie Colorado. Past Medical History:   Diagnosis Date    Atrial fibrillation ([facility] [address] 2007    Hypercholesterolemia     Hypertension     Osteoarthritis 2010    Stroke ([facility] [address]     Thromboembolus [facility])     Thyroid disease       Past Surgical History:   Procedure Laterality Date    HX HEENT      cataract sx    HX HIP FRACTURE TX  2013    right hip 1989 left hip 2013    HX HIP REPLACEMENT  1989    HX PACEMAKER  2008    [place] UNLISTED PROCEDURE CARDIAC SURGERY       Family History   Problem Relation Age of Onset    Hypertension Mother       Social History     Tobacco Use    Smoking status: Never    Smokeless tobacco: Never   Substance Use Topics    Alcohol use: No     Alcohol/week: 0.0 standard drinks       Current Outpatient Medications   Medication Sig Dispense Refill    ascorbic acid, vitamin C, (VITAMIN C) 500 mg tablet Take 1 Tablet by mouth two (2) times a day. 60 Tablet 2    levothyroxine (SYNTHROID) 25 mcg tablet Take 1 tablet by mouth every morning. 90 Tablet 0    FeroSuL 325 mg (65 mg iron) tablet Take 1 tablet by mouth daily at 9AM. 90 Tablet 0    sertraline (ZOLOFT) 100 mg tablet Take 1 tablet by mouth daily. 90 Tablet 0    honey (MEDIHONEY) 100 % topical paste Use top BID on sacral decub ulcer 103 mL 1    diclofenac (VOLTAREN) 1 % gel APPLY 2 GRAMS TO AFFECTED AREA TWICE DAILY 100 g 2    ALPRAZolam (XANAX) 0.25 mg tablet Take 1 Tablet by mouth two (2) times daily as needed for Anxiety.  MAX DAILY AMOUNT: 0.5 MG. 20 Tablet 0    Xarelto 15 mg tab tablet Take 1 tablet by mouth daily with breakfast. 90 Tablet 0    magnesium oxide (MAG-OX) 400 mg tablet Take 1 tablet by mouth daily. 90 Tablet 0    trospium (SANCTURA XL) 60 mg capsule Take 1 capsule by mouth once daily before breakfast. **Discontinue Trospium 20 mg.** 90 Capsule 0    QUEtiapine (SEROquel) 25 mg tablet Take 1 tablet by mouth nightly. **Discontinue Seroquel 50 mg.** 90 Tablet 0    multivitamin (ONE A DAY) tablet Take 1 tablet by mouth daily. 30 Tablet 9    potassium chloride (KLOR-CON M10) 10 mEq tablet Take 1 Tablet by mouth two (2) times a day. 60 Tablet 5    loratadine (CLARITIN) 10 mg tablet Take 1 tablet by mouth daily. 90 Tablet 1    furosemide (LASIX) 80 mg tablet Take 1 Tablet by mouth two (2) times a day. atorvastatin (LIPITOR) 80 mg tablet Take 1 Tablet by mouth daily. 90 Tablet 1    carvediloL (COREG) 12.5 mg tablet Take 1 Tablet by mouth two (2) times daily (with meals). 180 Tablet 1    losartan (COZAAR) 25 mg tablet Take 1 Tablet by mouth two (2) times a day. 180 Tablet 1    calcium carbonate (CALTREX) 600 mg calcium (1,500 mg) tablet Take 1 Tablet by mouth two (2) times a day. 60 Tablet 11    alendronate (FOSAMAX) 35 mg tablet Take 1 tablet by mouth every Wednesday. 12 Tablet 1    OTHER Raised toilet seat 1 Each 0    OTHER Donut cushion to seat 1 Each 0    OTHER Left wrist splint use everyday 1 Each 0    OTHER rollator walker for gait abnormality 1 Device 0    GAVILAX 17 gram/dose powder Take 17 g by mouth daily as needed (constipation). (Patient not taking: Reported on 1/16/2023) 714 g 4    sodium chloride-aloe vera (AYR) topical gel Apply  to affected area once. (Patient not taking: Reported on 1/16/2023)      albuterol (PROAIR HFA) 90 mcg/actuation inhaler Take 2 Puffs by inhalation every six (6) hours as needed for Wheezing. (Patient not taking: Reported on 1/16/2023) 1 Inhaler 2    docusate sodium (COLACE) 100 mg capsule Take 100 mg by mouth two (2) times daily as needed.  (Patient not taking: Reported on 1/16/2023)          Allergies   Allergen Reactions Ace Inhibitors Cough        Review of Systems:  Review of systems negative except for those items cited in the HPI and past medical history. Objective:         Physical Exam: Patient is a 80y.o. year old Female in no distress. There is a sacral area with new skin, but no open wound. Data/Lab/X-ray Review:     No results found for this or any previous visit (from the past 24 hour(s)). Assessment:     Active Problems:    * No active hospital problems. *      Potential skin breakdown on sacrum. Plan:   Had a long talk with the patient and her daughter. Told them that this was a pressure problem, not something that a cream could take care of. We recommended a sacral pillow, which they can buy on [email or web address]. She should try and get up and take a walk every 1.5 hours during the day. She will return if she develops an open wound.     Thank you for the courtesy of this consultation,    Signed By: Steph Lauren MD     January 16, 2023

## 2023-02-06 DIAGNOSIS — M81.0 OSTEOPOROSIS, UNSPECIFIED OSTEOPOROSIS TYPE, UNSPECIFIED PATHOLOGICAL FRACTURE PRESENCE: ICD-10-CM

## 2023-02-06 RX ORDER — ALENDRONATE SODIUM 35 MG/1
TABLET ORAL
Qty: 12 TABLET | Refills: 0 | Status: SHIPPED | OUTPATIENT
Start: 2023-02-06

## 2023-02-15 ENCOUNTER — NURSE TRIAGE (OUTPATIENT)
Dept: OTHER | Facility: CLINIC | Age: 88
End: 2023-02-15

## 2023-02-15 NOTE — TELEPHONE ENCOUNTER
Location of patient: [place]    Daughter Dagmar on line, \[facility]\"" not nearby- limited triage    Received call from Annamarie Rust at [address] with [employer]. Subjective: Caller states \"diagnosed with dementia, symptoms are increasing. Throwing away medication\"    Current Symptoms: \[facility] [place] [facility]\"" had dementia diagnosis and recently left news paper on heater on and went to sleep. this scared daughter when she came home she smelled burning. Has someone to come in and assist a few days a week but is finding that they need more help to care for \[facility]\"". Daughter states that it seems like Dalia's memory is getting progressively worse. Says mom is always complaining about hand pain which she uses prescribed cream for. \[facility]\"" is tossing medication out and wont comply with treatment. She has been found wondering around the house at night. She rambles a lot and digs through [employer]. Cannot keep days and nights straight. Getting more bedsores because she isnt moving as much. Onset: a few weeks ago; worsening    Associated Symptoms: NA    Pain Severity: pain in hand    Temperature: denies     What has been tried: diclofinac, tylenol    LMP: NA Pregnant: NA    Recommended disposition: See in Office Today or Tomorrow    Care advice provided, patient verbalizes understanding; denies any other questions or concerns; instructed to call back for any new or worsening symptoms. Patient/Caller agrees with recommended disposition; writer provided warm transfer to [place] at [address] for appointment scheduling    Attention Provider: Thank you for allowing me to participate in the care of your patient. The patient was connected to triage in response to information provided to the [facility]. Please do not respond through this encounter as the response is not directed to a shared pool.     Reason for Disposition   Longstanding confusion (e.g., dementia, stroke) and worsening    Protocols used: Confusion - Delirium-ADULT-[place]

## 2023-02-24 ENCOUNTER — VIRTUAL VISIT (OUTPATIENT)
Dept: INTERNAL MEDICINE CLINIC | Age: 88
End: 2023-02-24
Payer: MEDICARE

## 2023-02-24 DIAGNOSIS — I50.20 SYSTOLIC CONGESTIVE HEART FAILURE, UNSPECIFIED HF CHRONICITY (HCC): ICD-10-CM

## 2023-02-24 DIAGNOSIS — I48.91 ATRIAL FIBRILLATION, UNSPECIFIED TYPE (HCC): ICD-10-CM

## 2023-02-24 DIAGNOSIS — N18.31 STAGE 3A CHRONIC KIDNEY DISEASE (HCC): ICD-10-CM

## 2023-02-24 DIAGNOSIS — L89.152 PRESSURE INJURY OF SACRAL REGION, STAGE 2 (HCC): Primary | ICD-10-CM

## 2023-02-24 DIAGNOSIS — F41.0 ANXIETY ATTACK: ICD-10-CM

## 2023-02-24 DIAGNOSIS — F02.818 LATE ONSET ALZHEIMER'S DEMENTIA WITH BEHAVIORAL DISTURBANCE (HCC): ICD-10-CM

## 2023-02-24 DIAGNOSIS — G30.1 LATE ONSET ALZHEIMER'S DEMENTIA WITH BEHAVIORAL DISTURBANCE (HCC): ICD-10-CM

## 2023-02-24 RX ORDER — MENTHOL AND ZINC OXIDE .44; 20.625 G/100G; G/100G
1 OINTMENT TOPICAL 2 TIMES DAILY
COMMUNITY

## 2023-02-24 RX ORDER — ALPRAZOLAM 0.25 MG/1
0.25 TABLET ORAL
Qty: 60 TABLET | Refills: 1 | Status: SHIPPED | OUTPATIENT
Start: 2023-02-24

## 2023-02-24 NOTE — PROGRESS NOTES
Chief Complaint   Patient presents with    Dementia    Irregular Heart Beat    Memory Loss    Hypertension

## 2023-02-24 NOTE — PROGRESS NOTES
Sathish Nunez is a 80 y.o. female who was seen by synchronous (real-time) audio-video technology on 2/24/2023 for Dementia, Irregular Heart Beat, Memory Loss, Hypertension, and Agitation        Assessment & Plan:   Diagnoses and all orders for this visit:    1. Pressure injury of sacral region, stage 2 ([address]    Almost healed up, she had seen by a wound care specialist.  She was advised to use Calmoseptine. Advised her to change position every 2 hours. 2. Anxiety attack    She is more agitated nowadays. Has more sundowning. Advised her daughter to give her Xanax 6 PM every night. If needed and 1 Xanax does not help, she may take 2 tablet. -     ALPRAZolam (XANAX) 0.25 mg tablet; Take 1 Tablet by mouth two (2) times daily as needed for Anxiety. MAX DAILY AMOUNT: 0.5 MG.    3. Systolic congestive heart failure, unspecified HF chronicity (HCC)    Compensated. Taking Lasix 80 mg twice a day. Ejection fraction very low. She is very annoyed that she has to urinate all the time. But I explained to her that without it she is going to have shortness of breath and she will feel better. She verbalized understanding and willing to take medication. 4. Atrial fibrillation, unspecified type ([address]  No palpitation right now. Taking Xarelto and Coreg. 5. Stage 3a chronic kidney disease (HCC)  Stable kidney function. 6.  Late onset Alzheimer's dementia with agitation  She has more sundowning. Advised her to continue Seroquel at night and scheduled Xanax every night. Daughter is her caregiver, she gets burnt out. I spent at least 32 minutes on this visit with this established patient. Subjective: Lisa Gerardo is here for follow-up. She is living with her daughter who is her caregiver. Her dementia is acting up more. She is having more sundowning at night, she threw all her medication and do not want to take it. She mentioned that she is taking doing medications and she generally goes.   She has congestive heart failure. Taking Lasix doses twice a day, Lasix making her urinate and she is not liking it either. Her daughter mentioned that she is getting agitated almost every night. Taking Seroquel but not helping her. She is also on Zoloft. Sometimes she just took all her medication and not willing to take it. Has atrial fibrillation. And for it. No palpitation. Refused to take shingles vaccine. Has decubiti ulcer and diabetic, seen by wound care specialist.  Ulcers almost healed up. Labs reviewed with her. Stable. Prior to Admission medications    Medication Sig Start Date End Date Taking? Authorizing Provider   menthol-zinc oxide (CALMOSEPTINE) 0.44-20.6 % oint Apply 1 Each to affected area two (2) times a day. Yes Provider, Historical   ALPRAZolam (XANAX) 0.25 mg tablet Take 1 Tablet by mouth two (2) times daily as needed for Anxiety. MAX DAILY AMOUNT: 0.5 MG. 2/24/23  Yes Yumiko Gutiérrez MD   alendronate (FOSAMAX) 35 mg tablet Take 1 tablet by mouth every Wednesday. 2/6/23  Yes Danny Gamez NP   ascorbic acid, vitamin C, (VITAMIN C) 500 mg tablet Take 1 Tablet by mouth two (2) times a day. 1/9/23  Yes Danny Gamez NP   levothyroxine (SYNTHROID) 25 mcg tablet Take 1 tablet by mouth every morning. 1/7/23  Yes Danny Gamez NP   FeroSuL 325 mg (65 mg iron) tablet Take 1 tablet by mouth daily at 9AM. 1/7/23  Yes Danny Gamez NP   sertraline (ZOLOFT) 100 mg tablet Take 1 tablet by mouth daily. 1/7/23  Yes Danny Gamez NP   diclofenac (VOLTAREN) 1 % gel APPLY 2 GRAMS TO AFFECTED AREA TWICE DAILY 12/14/22  Yes Yumiko Gutiérrez MD   Xarelto 15 mg tab tablet Take 1 tablet by mouth daily with breakfast. 12/8/22  Yes Yumiko Gutiérrez MD   magnesium oxide (MAG-OX) 400 mg tablet Take 1 tablet by mouth daily.  12/8/22  Yes Yumiko Gutiérrez MD   trospium (SANCTURA XL) 60 mg capsule Take 1 capsule by mouth once daily before breakfast. **Discontinue Trospium 20 mg.** 12/8/22  Yes Yumiko Gutiérrez MD   QUEtiapine (SEROquel) 25 mg tablet Take 1 tablet by mouth nightly. **Discontinue Seroquel 50 mg.** 11/14/22  Yes Serene Wallace MD   multivitamin (ONE A DAY) tablet Take 1 tablet by mouth daily. 11/14/22  Yes Serene Wallace MD   potassium chloride (KLOR-CON M10) 10 mEq tablet Take 1 Tablet by mouth two (2) times a day. 10/24/22  Yes Serene Wallace MD   loratadine (CLARITIN) 10 mg tablet Take 1 tablet by mouth daily. 10/11/22  Yes Marylee Becket, NP   furosemide (LASIX) 80 mg tablet Take 1 Tablet by mouth two (2) times a day. Yes Provider, Historical   atorvastatin (LIPITOR) 80 mg tablet Take 1 Tablet by mouth daily. 9/14/22  Yes Serene Wallace MD   carvediloL (COREG) 12.5 mg tablet Take 1 Tablet by mouth two (2) times daily (with meals). 9/14/22  Yes Serene Wallace MD   losartan (COZAAR) 25 mg tablet Take 1 Tablet by mouth two (2) times a day. 9/14/22  Yes Serene Wallace MD   calcium carbonate (CALTREX) 600 mg calcium (1,500 mg) tablet Take 1 Tablet by mouth two (2) times a day. 9/14/22  Yes Serene Wallace MD   OTHER Raised toilet seat 12/7/21  Yes Serene Wallace MD   OTHER Donut cushion to seat 11/22/21  Yes Serene Wallace MD   OTHER Left wrist splint use everyday 6/17/20  Yes Serene Wallace MD   GAVILAX 17 gram/dose powder Take 17 g by mouth daily as needed (constipation). 2/19/20  Yes Serene Wallace MD   OTHER rollator walker for gait abnormality 10/23/19  Yes Roseanne Gamez, NP   sodium chloride-aloe vera (AYR) topical gel Apply  to affected area once. Yes Provider, Historical   albuterol (PROAIR HFA) 90 mcg/actuation inhaler Take 2 Puffs by inhalation every six (6) hours as needed for Wheezing. 8/14/18  Yes Antonio Funk,    docusate sodium (COLACE) 100 mg capsule Take 100 mg by mouth two (2) times daily as needed.    Yes Provider, Historical   honey (MEDIHONEY) 100 % topical paste Use top BID on sacral decub ulcer 12/14/22 2/24/23  Serene Wallace MD   ALPRAZolam Ebb Upson) 0.25 mg tablet Take 1 Tablet by mouth two (2) times daily as needed for Anxiety. MAX DAILY AMOUNT: 0.5 MG. 12/14/22 2/24/23  Victoria Black MD     Past Medical History:   Diagnosis Date    Atrial fibrillation [facility]) 2007    Hypercholesterolemia     Hypertension     Osteoarthritis 2010    Stroke ([facility] [address]     Thromboembolus [facility])     Thyroid disease        ROS significant for leg swelling, agitation and dementia. Objective:     Patient-Reported Vitals 12/14/2022   Patient-Reported Weight 153lb   Patient-Reported Height -   Patient-Reported LMP -          Constitutional: [x] Appears well-developed and well-nourished [x] No apparent distress      [] Abnormal -     Mental status: [x] Alert and awake  [x] Oriented to person/place/time [x] Able to follow commands    [] Abnormal -     Eyes:   EOM    [x]  Normal    [] Abnormal -   Sclera  [x]  Normal    [] Abnormal -          Discharge [x]  None visible   [] Abnormal -     HENT: [x] Normocephalic, atraumatic  [] Abnormal -   [x] Mouth/Throat: Mucous membranes are moist    External Ears [x] Normal  [] Abnormal -    Neck: [x] No visualized mass [] Abnormal -     Pulmonary/Chest: [x] Respiratory effort normal   [x] No visualized signs of difficulty breathing or respiratory distress        [] Abnormal -      Musculoskeletal:   [x] Normal gait with no signs of ataxia         [x] Normal range of motion of neck  Mild leg edema. [] Abnormal -     Neurological:        [x] No Facial Asymmetry (Cranial nerve 7 motor function) (limited exam due to video visit)          [x] No gaze palsy        [] Abnormal -          Skin:        [x] No significant exanthematous lesions or discoloration noted on facial skin         [] Abnormal -            Psychiatric:       [x] Normal Affect [] Abnormal -   More agitated. Has dementia. [x] No Hallucinations    Other pertinent observable physical exam findings:-        We discussed the expected course, resolution and complications of the diagnosis(es) in detail.   Medication risks, benefits, costs, interactions, and alternatives were discussed as indicated. I advised her to contact the office if her condition worsens, changes or fails to improve as anticipated. She expressed understanding with the diagnosis(es) and plan. Jimena Gallego, was evaluated through a synchronous (real-time) audio-video encounter. The patient (or guardian if applicable) is aware that this is a billable service, which includes applicable co-pays. This Virtual Visit was conducted with patient's (and/or legal guardian's) consent. The visit was conducted pursuant to the emergency declaration under the [facility], [address] [place] [address] authority and the [employer] General Act. Patient identification was verified, and a caregiver was present when appropriate. The patient was located at: Home: [address] [address]  [place] [address].  The provider was located at:  Facility (Appt Department): [address] [place] [id number]  Kelby Potts MD

## 2023-03-06 ENCOUNTER — OFFICE VISIT (OUTPATIENT)
Dept: URGENT CARE | Age: 88
End: 2023-03-06
Payer: MEDICARE

## 2023-03-06 ENCOUNTER — NURSE TRIAGE (OUTPATIENT)
Dept: OTHER | Facility: CLINIC | Age: 88
End: 2023-03-06

## 2023-03-06 VITALS
RESPIRATION RATE: 16 BRPM | OXYGEN SATURATION: 100 % | DIASTOLIC BLOOD PRESSURE: 67 MMHG | HEART RATE: 76 BPM | SYSTOLIC BLOOD PRESSURE: 122 MMHG | TEMPERATURE: 98.4 F | HEIGHT: 66 IN | WEIGHT: 150 LBS | BODY MASS INDEX: 24.11 KG/M2

## 2023-03-06 DIAGNOSIS — M25.551 RIGHT HIP PAIN: Primary | ICD-10-CM
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
## 2023-03-06 PROCEDURE — 1101F PT FALLS ASSESS-DOCD LE1/YR: CPT | Performed by: NURSE PRACTITIONER

## 2023-03-06 PROCEDURE — 1123F ACP DISCUSS/DSCN MKR DOCD: CPT | Performed by: NURSE PRACTITIONER

## 2023-03-06 PROCEDURE — G8536 NO DOC ELDER MAL SCRN: HCPCS | Performed by: NURSE PRACTITIONER

## 2023-03-06 PROCEDURE — G8420 CALC BMI NORM PARAMETERS: HCPCS | Performed by: NURSE PRACTITIONER

## 2023-03-06 PROCEDURE — 99213 OFFICE O/P EST LOW 20 MIN: CPT | Performed by: NURSE PRACTITIONER

## 2023-03-06 PROCEDURE — G8432 DEP SCR NOT DOC, RNG: HCPCS | Performed by: NURSE PRACTITIONER

## 2023-03-06 PROCEDURE — 1090F PRES/ABSN URINE INCON ASSESS: CPT | Performed by: NURSE PRACTITIONER

## 2023-03-06 PROCEDURE — G8427 DOCREV CUR MEDS BY ELIG CLIN: HCPCS | Performed by: NURSE PRACTITIONER

## 2023-03-06 NOTE — PROGRESS NOTES
The history is provided by the patient and a relative. Fall  This is a new (occurred in the bathroom around 3:00pm on Friday.) problem. The current episode started 2 days ago (Pt's daughter reports that she thinks the brakes on the rollator were not set). Pertinent negatives include no chest pain, no abdominal pain, no headaches and no shortness of breath. Associated symptoms comments: Right hip pain, bruising of right hip. Exacerbated by: with change of position. The symptoms are relieved by rest. She has tried acetaminophen for the symptoms. The treatment provided moderate relief. Hip Injury  This is a new problem. The current episode started 2 days ago. The problem has not changed since onset. Pertinent negatives include no chest pain, no abdominal pain, no headaches and no shortness of breath. Post fall. PMHx: right hip replacement > 10 years ago.      Past Medical History:   Diagnosis Date    Atrial fibrillation ([facility] [address] 2007    Hypercholesterolemia     Hypertension     Osteoarthritis 2010    Stroke ([facility] [address]     Thromboembolus [facility])     Thyroid disease         Past Surgical History:   Procedure Laterality Date    HX HEENT      cataract sx    HX HIP FRACTURE TX  2013    right hip 1989 left hip 2013    HX HIP REPLACEMENT  1989    HX PACEMAKER  2008    [place] UNLISTED PROCEDURE CARDIAC SURGERY           Family History   Problem Relation Age of Onset    Hypertension Mother         Social History     Socioeconomic History    Marital status:      Spouse name: Not on file    Number of children: Not on file    Years of education: Not on file    Highest education level: Not on file   Occupational History    Not on file   Tobacco Use    Smoking status: Never    Smokeless tobacco: Never   Vaping Use    Vaping Use: Never used   Substance and Sexual Activity    Alcohol use: No     Alcohol/week: 0.0 standard drinks    Drug use: No    Sexual activity: Never     Birth control/protection: None     Comment: retired,living in independant living. Other Topics Concern    Not on file   Social History Narrative    11/4/16 Pt lives with son Nimo Greenwood # [phone]. Dtr [address] [place] [address] #[phone]. Pt has both a rollator and a rolling walker in the home. Social Determinants of Health     Financial Resource Strain: Low Risk     Difficulty of Paying Living Expenses: Not hard at all   Food Insecurity: No Food Insecurity    Worried About Running Out of Food in the Last Year: Never true    Ran Out of Food in the Last Year: Never true   Transportation Needs: Not on file   Physical Activity: Not on file   Stress: Not on file   Social Connections: Not on file   Intimate Partner Violence: Not on file   Housing Stability: Not on file                ALLERGIES: Ace inhibitors    Review of Systems   Respiratory:  Negative for cough and shortness of breath. Cardiovascular:  Negative for chest pain and palpitations. Gastrointestinal:  Negative for abdominal pain. Musculoskeletal:  Positive for arthralgias. Negative for joint swelling. Right hip   Skin:  Positive for color change. bruising   Neurological:  Negative for dizziness, weakness, light-headedness, numbness and headaches. Hx of dementia     Vitals:    03/06/23 1443   BP: 122/67   Pulse: 76   Resp: 16   Temp: 98.4 °F (36.9 °C)   SpO2: 100%   Weight: 150 lb (68 kg)   Height: 5' 6\" (1.676 m)       Physical Exam  Constitutional:       Appearance: Normal appearance. Cardiovascular:      Rate and Rhythm: Normal rate and regular rhythm. Heart sounds: Normal heart sounds. Pulmonary:      Breath sounds: Normal breath sounds. Musculoskeletal:         General: Tenderness (right lateral hip) present. No swelling. Normal range of motion. Right lower leg: No edema. Skin:     General: Skin is warm. Capillary Refill: Capillary refill takes less than 2 seconds. Findings: Bruising (right lateral hip) present.    Neurological: Mental Status: She is alert. Mental status is at baseline. Psychiatric:         Mood and Affect: Mood normal.         Behavior: Behavior normal.       MDM     Differential Diagnosis; Clinical Impression; Plan:     (M25.109) Right hip pain  (primary encounter diagnosis)  (R91.APOB) Fall, initial encounter      Procedures    1. Right hip pain  Rest, ice. Otc Tylenol and or Ibuprofen  F/U: as needed for new or worsening symptoms   - XR HIP RT W OR WO PELV 2-3 VWS; Future    2.  Fall, initial encounter  Discussed with patient, and daughter  - XR HIP RT W OR WO PELV 2-3 VWS; Future

## 2023-03-06 NOTE — TELEPHONE ENCOUNTER
Location of [place]: [place]  Limited triage as caller is not with patient    Received call from [address] [facility] at [facility] with Red Flag Complaint. Subjective: Caller states \"she fell onto her right bruise\"     Current Symptoms: Tory Jurist and her right hip was injured. She is walking fine. She has a small bruise that appeared today. No numbness, tingling or weakness in right leg. No evidence of misalignment/leg shortening. Onset: 3 days ago; sudden    Associated Symptoms: NA    Pain Severity:    constant, mild    Temperature: no fever     What has been tried: tylenol twice a day. LMP: NA Pregnant: NA    Recommended disposition: See in Office Today or Tomorrow    Care advice provided, patient verbalizes understanding; denies any other questions or concerns; instructed to call back for any new or worsening symptoms. Patient/Caller agrees with recommended disposition; writer provided warm transfer to [facility] at [facility] for appointment scheduling    Attention Provider: Thank you for allowing me to participate in the care of your patient. The patient was connected to triage in response to information provided to the [facility]. Please do not respond through this encounter as the response is not directed to a shared pool.         Reason for Disposition   High-risk adult (e.g., age > 61 years, osteoporosis, chronic steroid use)    Protocols used: Hip Injury-ADULT-[place]

## 2023-03-08 DIAGNOSIS — G30.1 LATE ONSET ALZHEIMER'S DEMENTIA WITH BEHAVIORAL DISTURBANCE (HCC): ICD-10-CM

## 2023-03-08 DIAGNOSIS — N32.81 OAB (OVERACTIVE BLADDER): ICD-10-CM

## 2023-03-08 DIAGNOSIS — I48.91 ATRIAL FIBRILLATION, UNSPECIFIED TYPE (HCC): ICD-10-CM

## 2023-03-08 DIAGNOSIS — E83.42 HYPOMAGNESEMIA: ICD-10-CM

## 2023-03-08 DIAGNOSIS — F02.818 LATE ONSET ALZHEIMER'S DEMENTIA WITH BEHAVIORAL DISTURBANCE (HCC): ICD-10-CM

## 2023-03-08 RX ORDER — TROSPIUM CHLORIDE ER 60 MG/1
CAPSULE ORAL
Qty: 90 CAPSULE | Refills: 0 | Status: SHIPPED | OUTPATIENT
Start: 2023-03-08

## 2023-03-08 RX ORDER — LANOLIN ALCOHOL/MO/W.PET/CERES
400 CREAM (GRAM) TOPICAL DAILY
Qty: 90 TABLET | Refills: 0 | Status: SHIPPED | OUTPATIENT
Start: 2023-03-08

## 2023-03-08 RX ORDER — QUETIAPINE FUMARATE 25 MG/1
TABLET, FILM COATED ORAL
Qty: 90 TABLET | Refills: 0 | Status: SHIPPED | OUTPATIENT
Start: 2023-03-08

## 2023-03-08 RX ORDER — RIVAROXABAN 15 MG/1
TABLET, FILM COATED ORAL
Qty: 90 TABLET | Refills: 0 | Status: SHIPPED | OUTPATIENT
Start: 2023-03-08

## 2023-03-08 NOTE — TELEPHONE ENCOUNTER
Requested Prescriptions     Pending Prescriptions Disp Refills    magnesium oxide (MAG-OX) 400 mg tablet 90 Tablet 0     Sig: Take 1 Tablet by mouth daily. QUEtiapine (SEROquel) 25 mg tablet 90 Tablet 0     Sig: Take 1 tablet by mouth nightly.  **Discontinue Seroquel 50 mg.**    Xarelto 15 mg tab tablet 90 Tablet 0     Sig: Take 1 tablet by mouth daily with breakfast.    trospium (SANCTURA XL) 60 mg capsule 90 Capsule 0     Sig: Take 1 capsule by mouth once daily before breakfast. **Discontinue Trospium 20 mg.**       [facility], [address] [place] [address]  Phone: [phone] Fax: [phone]     2/24/2023 is LAST OFFICE VISIT     Future Appointments   Date Time Provider Leo Serrano   4/20/2023  1:00 PM Alessandro Mireles MD [facility]

## 2023-03-11 DIAGNOSIS — M19.041 OSTEOARTHRITIS OF BOTH HANDS, UNSPECIFIED OSTEOARTHRITIS TYPE: ICD-10-CM

## 2023-03-11 DIAGNOSIS — M19.042 OSTEOARTHRITIS OF BOTH HANDS, UNSPECIFIED OSTEOARTHRITIS TYPE: ICD-10-CM

## 2023-03-12 RX ORDER — DICLOFENAC SODIUM 10 MG/G
GEL TOPICAL
Qty: 100 G | Refills: 2 | Status: SHIPPED | OUTPATIENT
Start: 2023-03-12

## 2023-04-20 ENCOUNTER — OFFICE VISIT (OUTPATIENT)
Dept: INTERNAL MEDICINE CLINIC | Age: 88
End: 2023-04-20
Payer: MEDICARE

## 2023-04-20 VITALS
BODY MASS INDEX: 24.51 KG/M2 | RESPIRATION RATE: 16 BRPM | HEIGHT: 66 IN | WEIGHT: 152.5 LBS | HEART RATE: 76 BPM | SYSTOLIC BLOOD PRESSURE: 150 MMHG | DIASTOLIC BLOOD PRESSURE: 70 MMHG | TEMPERATURE: 98.2 F | OXYGEN SATURATION: 98 %

## 2023-04-20 DIAGNOSIS — F03.911 AGITATION DUE TO DEMENTIA (HCC): Primary | ICD-10-CM

## 2023-04-20 DIAGNOSIS — I10 ESSENTIAL HYPERTENSION: ICD-10-CM

## 2023-04-20 DIAGNOSIS — E55.9 VITAMIN D DEFICIENCY: ICD-10-CM

## 2023-04-20 DIAGNOSIS — F02.818 LATE ONSET ALZHEIMER'S DEMENTIA WITH BEHAVIORAL DISTURBANCE (HCC): ICD-10-CM

## 2023-04-20 DIAGNOSIS — G30.1 LATE ONSET ALZHEIMER'S DEMENTIA WITH BEHAVIORAL DISTURBANCE (HCC): ICD-10-CM

## 2023-04-20 DIAGNOSIS — I50.20 SYSTOLIC CONGESTIVE HEART FAILURE, UNSPECIFIED HF CHRONICITY (HCC): ICD-10-CM

## 2023-04-20 DIAGNOSIS — F01.518 VASCULAR DEMENTIA WITH BEHAVIORAL DISTURBANCE (HCC): ICD-10-CM

## 2023-04-20 DIAGNOSIS — E03.9 ACQUIRED HYPOTHYROIDISM: ICD-10-CM

## 2023-04-20 DIAGNOSIS — I48.91 ATRIAL FIBRILLATION, UNSPECIFIED TYPE (HCC): ICD-10-CM

## 2023-04-20 DIAGNOSIS — I63.411 CEREBROVASCULAR ACCIDENT (CVA) DUE TO EMBOLISM OF RIGHT MIDDLE CEREBRAL ARTERY (HCC): ICD-10-CM

## 2023-04-20 PROCEDURE — G8511 SCR DEP POS, NO PLAN DOC RNG: HCPCS | Performed by: INTERNAL MEDICINE

## 2023-04-20 PROCEDURE — 99214 OFFICE O/P EST MOD 30 MIN: CPT | Performed by: INTERNAL MEDICINE

## 2023-04-20 PROCEDURE — G8427 DOCREV CUR MEDS BY ELIG CLIN: HCPCS | Performed by: INTERNAL MEDICINE

## 2023-04-20 PROCEDURE — 1090F PRES/ABSN URINE INCON ASSESS: CPT | Performed by: INTERNAL MEDICINE

## 2023-04-20 PROCEDURE — G8536 NO DOC ELDER MAL SCRN: HCPCS | Performed by: INTERNAL MEDICINE

## 2023-04-20 PROCEDURE — G8420 CALC BMI NORM PARAMETERS: HCPCS | Performed by: INTERNAL MEDICINE

## 2023-04-20 PROCEDURE — 1101F PT FALLS ASSESS-DOCD LE1/YR: CPT | Performed by: INTERNAL MEDICINE

## 2023-04-20 RX ORDER — LOSARTAN POTASSIUM 25 MG/1
25 TABLET ORAL 2 TIMES DAILY
Qty: 180 TABLET | Refills: 1 | Status: SHIPPED | OUTPATIENT
Start: 2023-04-20

## 2023-04-20 RX ORDER — QUETIAPINE FUMARATE 25 MG/1
TABLET, FILM COATED ORAL
Qty: 30 TABLET | Refills: 1 | Status: SHIPPED | OUTPATIENT
Start: 2023-04-20

## 2023-04-20 RX ORDER — CARVEDILOL 12.5 MG/1
12.5 TABLET ORAL 2 TIMES DAILY WITH MEALS
Qty: 180 TABLET | Refills: 1 | Status: SHIPPED | OUTPATIENT
Start: 2023-04-20

## 2023-04-20 RX ORDER — ATORVASTATIN CALCIUM 80 MG/1
80 TABLET, FILM COATED ORAL DAILY
Qty: 90 TABLET | Refills: 1 | Status: SHIPPED | OUTPATIENT
Start: 2023-04-20

## 2023-04-20 NOTE — PROGRESS NOTES
HISTORY OF PRESENT ILLNESS  Lis Gautam is a 80 y.o. female here accompanied by her daughter who is her caregiver. Her dementia is advanced and is progressively getting worse. She is she has sundowning at night and she is also agitated daytime 2. Sometimes she is putting her head or shopping close in her diaper. Her situation vaccine appointment. Taking Seroquel at night along with Xanax she is sleeping well. Need help during daytime. Has hypertension, compliant with medication. Denies chest pain palpitation or shortness of breath. He has a history of stroke. Using wheelchair now. Aspirin and statin. Depression seems under control. Taking Zoloft. Irregular Heart Beat   Her past medical history is significant for hypertension. Hypertension   Associated symptoms include palpitations. CHF    Chronic Kidney Disease    Fall    Dizziness   Associated symptoms include palpitations. Dementia   Functional status baseline:  [EPIC#1537^NOTE} Her past medical history is significant for hypertension. Review of Systems   Constitutional: Negative. HENT: Negative. Eyes: Negative. Respiratory: Negative. Cardiovascular:  Positive for palpitations. Gastrointestinal: Negative. Genitourinary: Negative. Musculoskeletal: Negative. Skin: Negative. Neurological: Negative. Endo/Heme/Allergies: Negative. Psychiatric/Behavioral:  Positive for depression and memory loss. The patient is nervous/anxious. Physical Exam  Constitutional:       Appearance: Normal appearance. She is normal weight. Cardiovascular:      Rate and Rhythm: Normal rate and regular rhythm. Pulses: Normal pulses. Pulmonary:      Effort: Pulmonary effort is normal.      Breath sounds: Normal breath sounds. Abdominal:      General: Abdomen is flat. Bowel sounds are normal.      Palpations: Abdomen is soft. Musculoskeletal:         General: Normal range of motion.       Cervical back: Normal range of motion and neck supple. Neurological:      General: No focal deficit present. Mental Status: She is alert and oriented to person, place, and time. Mental status is at baseline. Psychiatric:         Mood and Affect: Mood normal.         Behavior: Behavior normal.         Thought Content: Thought content normal.       ASSESSMENT and PLAN  Diagnoses and all orders for this visit:    1. Agitation due to dementia [facility])    She is agitated day and night. Her daughter about all the pictures of her activities including putting diaper on her scalp and putting and shoving extra close in her diaper. She had advanced dementia and she is agitated day and night. Will add Seroquel in the morning also. -     QUEtiapine (SEROquel) 25 mg tablet; Start half tab po QAM and if not drowsy,increase to 1tab po am  Follow-up in 6 weeks. 2. Atrial fibrillation, unspecified type ([facility] [address]    Stable. Rate and rhythm control. Will refill,  -     carvediloL (COREG) 12.5 mg tablet; Take 1 Tablet by mouth two (2) times daily (with meals). -     CBC WITH AUTOMATED DIFF  -     METABOLIC PANEL, COMPREHENSIVE    3. Cerebrovascular accident (CVA) due to embolism of right middle cerebral artery ([facility] [address]  Doing well. Will refill,    -     atorvastatin (LIPITOR) 80 mg tablet; Take 1 Tablet by mouth daily.  -     LDL, DIRECT    4. Systolic congestive heart failure, unspecified HF chronicity ([facility] [address]    Compensated. Will refill,  -     losartan (COZAAR) 25 mg tablet; Take 1 Tablet by mouth two (2) times a day. 5. Essential hypertension    We will refill,  -     losartan (COZAAR) 25 mg tablet; Take 1 Tablet by mouth two (2) times a day. -     CBC WITH AUTOMATED DIFF  -     METABOLIC PANEL, COMPREHENSIVE    6. Late onset Alzheimer's dementia with behavioral disturbance (HCC)  Advanced dementia. Not on any medication. Only on symptomatic relief. Daughter is her caregiver.   7. Vascular dementia with behavioral disturbance    We will check,  - URINALYSIS W/ REFLEX CULTURE    8. Vitamin D deficiency  -     VITAMIN D, 25 HYDROXY    9. Acquired hypothyroidism    On Synthroid. Will check,  -     TSH 3RD GENERATION     Discussed expected course/resolution/complications of diagnosis in detail with patient. Medication risks/benefits/costs/interactions/alternatives discussed with patient. Discussed COVID-19 infection precaution with patient. Pt was given an after visit summary which includes diagnoses, current medications & vitals. Pt expressed understanding with the diagnosis and plan.

## 2023-04-21 ENCOUNTER — TELEPHONE (OUTPATIENT)
Dept: INTERNAL MEDICINE CLINIC | Age: 88
End: 2023-04-21

## 2023-04-21 LAB
25(OH)D3+25(OH)D2 SERPL-MCNC: 29.3 NG/ML (ref 30–100)
ALBUMIN SERPL-MCNC: 3.6 G/DL (ref 3.5–4.6)
ALBUMIN/GLOB SERPL: 1.2 {RATIO} (ref 1.2–2.2)
ALP SERPL-CCNC: 85 IU/L (ref 44–121)
ALT SERPL-CCNC: 8 IU/L (ref 0–32)
APPEARANCE UR: CLEAR
AST SERPL-CCNC: 15 IU/L (ref 0–40)
BACTERIA #/AREA URNS HPF: NORMAL /[HPF]
BASOPHILS # BLD AUTO: 0 X10E3/UL (ref 0–0.2)
BASOPHILS NFR BLD AUTO: 1 %
BILIRUB SERPL-MCNC: 0.3 MG/DL (ref 0–1.2)
BILIRUB UR QL STRIP: NEGATIVE
BUN SERPL-MCNC: 44 MG/DL (ref 10–36)
BUN/CREAT SERPL: 32 (ref 12–28)
CALCIUM SERPL-MCNC: 8.5 MG/DL (ref 8.7–10.3)
CASTS URNS QL MICRO: NORMAL /LPF
CHLORIDE SERPL-SCNC: 106 MMOL/L (ref 96–106)
CO2 SERPL-SCNC: 19 MMOL/L (ref 20–29)
COLOR UR: YELLOW
CREAT SERPL-MCNC: 1.37 MG/DL (ref 0.57–1)
EGFRCR SERPLBLD CKD-EPI 2021: 35 ML/MIN/1.73
EOSINOPHIL # BLD AUTO: 0.1 X10E3/UL (ref 0–0.4)
EOSINOPHIL NFR BLD AUTO: 2 %
EPI CELLS #/AREA URNS HPF: NORMAL /HPF (ref 0–10)
ERYTHROCYTE [DISTWIDTH] IN BLOOD BY AUTOMATED COUNT: 14.9 % (ref 11.7–15.4)
GLOBULIN SER CALC-MCNC: 2.9 G/DL (ref 1.5–4.5)
GLUCOSE SERPL-MCNC: 111 MG/DL (ref 70–99)
GLUCOSE UR QL STRIP: NEGATIVE
HCT VFR BLD AUTO: 30.2 % (ref 34–46.6)
HGB BLD-MCNC: 9.7 G/DL (ref 11.1–15.9)
HGB UR QL STRIP: NEGATIVE
IMM GRANULOCYTES # BLD AUTO: 0 X10E3/UL (ref 0–0.1)
IMM GRANULOCYTES NFR BLD AUTO: 0 %
KETONES UR QL STRIP: NEGATIVE
LDLC SERPL DIRECT ASSAY-MCNC: 60 MG/DL (ref 0–99)
LEUKOCYTE ESTERASE UR QL STRIP: NEGATIVE
LYMPHOCYTES # BLD AUTO: 0.8 X10E3/UL (ref 0.7–3.1)
LYMPHOCYTES NFR BLD AUTO: 21 %
MCH RBC QN AUTO: 29.9 PG (ref 26.6–33)
MCHC RBC AUTO-ENTMCNC: 32.1 G/DL (ref 31.5–35.7)
MCV RBC AUTO: 93 FL (ref 79–97)
MICRO URNS: NORMAL
MICRO URNS: NORMAL
MONOCYTES # BLD AUTO: 0.3 X10E3/UL (ref 0.1–0.9)
MONOCYTES NFR BLD AUTO: 8 %
NEUTROPHILS # BLD AUTO: 2.6 X10E3/UL (ref 1.4–7)
NEUTROPHILS NFR BLD AUTO: 68 %
NITRITE UR QL STRIP: NEGATIVE
PH UR STRIP: 5.5 [PH] (ref 5–7.5)
PLATELET # BLD AUTO: 190 X10E3/UL (ref 150–450)
POTASSIUM SERPL-SCNC: 4 MMOL/L (ref 3.5–5.2)
PROT SERPL-MCNC: 6.5 G/DL (ref 6–8.5)
PROT UR QL STRIP: NEGATIVE
RBC # BLD AUTO: 3.24 X10E6/UL (ref 3.77–5.28)
RBC #/AREA URNS HPF: NORMAL /HPF (ref 0–2)
REPORT: NORMAL
SODIUM SERPL-SCNC: 144 MMOL/L (ref 134–144)
SP GR UR STRIP: 1.01 (ref 1–1.03)
TSH SERPL DL<=0.005 MIU/L-ACNC: 2.15 UIU/ML (ref 0.45–4.5)
URINALYSIS REFLEX, 377202: NORMAL
UROBILINOGEN UR STRIP-MCNC: 0.2 MG/DL (ref 0.2–1)
WBC # BLD AUTO: 3.8 X10E3/UL (ref 3.4–10.8)
WBC #/AREA URNS HPF: NORMAL /HPF (ref 0–5)

## 2023-04-25 NOTE — TELEPHONE ENCOUNTER
Armani Ennis MD  You 14 hours ago (5:49 PM)     SA  Xanax as needed. Continue Seroquel as I ordered.

## 2023-05-04 ENCOUNTER — TELEPHONE (OUTPATIENT)
Dept: INTERNAL MEDICINE CLINIC | Age: 88
End: 2023-05-04

## 2023-05-22 RX ORDER — ASCORBIC ACID 500 MG
500 TABLET ORAL 2 TIMES DAILY
Qty: 90 TABLET | Refills: 1 | Status: SHIPPED | OUTPATIENT
Start: 2023-05-22

## 2023-05-22 RX ORDER — QUETIAPINE FUMARATE 25 MG/1
TABLET, FILM COATED ORAL
Qty: 60 TABLET | Refills: 2 | Status: SHIPPED | OUTPATIENT
Start: 2023-05-22 | End: 2023-05-23 | Stop reason: SDUPTHER

## 2023-05-22 NOTE — TELEPHONE ENCOUNTER
ascorbic acid (VITAMIN C) 500 MG tablet    Last OV:  04/20/2023  Next OV: 05/26/2023    [facility] - [facility], [address] [phone] - F [phone]

## 2023-05-22 NOTE — TELEPHONE ENCOUNTER
QUEtiapine (SEROQUEL) 25 MG tablet   medication was not included in [address] [place] [address] Blvd. New directions needed. *please call*    Patient only has 8 pills left.     [place], [facility],2Nd Floor - [address] [place] [address] -  [phone]

## 2023-05-22 NOTE — TELEPHONE ENCOUNTER
Requested Prescriptions     Pending Prescriptions Disp Refills    ascorbic acid (VITAMIN C) 500 MG tablet 90 tablet 1     Sig: Take 1 tablet by mouth 2 times daily         Last appt 4/20/2023      Future Appointments   Date Time Provider Kalina Ewing   5/26/2023 10:45 AM Giselle Ascencio MD [facility] BS AMB       [facility] [place], [place] - [address] [phone] - F [phone]  [address] [place] [address]  Phone: [phone] Fax: [phone]

## 2023-05-23 ENCOUNTER — TELEPHONE (OUTPATIENT)
Age: 88
End: 2023-05-23

## 2023-05-23 DIAGNOSIS — G30.1 ALZHEIMER'S DISEASE WITH LATE ONSET (CODE) (HCC): Primary | ICD-10-CM

## 2023-05-23 DIAGNOSIS — R45.1 AGITATION: Primary | ICD-10-CM

## 2023-05-23 DIAGNOSIS — G30.1 ALZHEIMER'S DISEASE WITH LATE ONSET (CODE) (HCC): ICD-10-CM

## 2023-05-23 DIAGNOSIS — R45.1 AGITATION: ICD-10-CM

## 2023-05-23 RX ORDER — QUETIAPINE FUMARATE 25 MG/1
TABLET, FILM COATED ORAL
Qty: 60 TABLET | Refills: 2 | Status: SHIPPED | OUTPATIENT
Start: 2023-05-23

## 2023-05-23 RX ORDER — QUETIAPINE FUMARATE 25 MG/1
TABLET, FILM COATED ORAL
Qty: 90 TABLET | Refills: 0 | Status: CANCELLED | OUTPATIENT
Start: 2023-05-23

## 2023-05-23 NOTE — TELEPHONE ENCOUNTER
QUEtiapine (SEROQUEL) 25 MG tablet   medication was not included in [address] [place] [address] Blvd. New directions needed. *please call*     Patient only has 8 pills left. [facility] [phone]    Requested Prescriptions     Pending Prescriptions Disp Refills    QUEtiapine (SEROQUEL) 25 MG tablet 60 tablet 2     Sig: Take 1 tablet by mouth nightly.  **Discontinue Seroquel 50 mg.**         [facility] [place]. [facility] [phone] [facility] [phone]  [address] [place], [place] [id number]  Phone: [phone] Fax: [phone]       Last appt 4/20/2023      Future Appointments   Date Time Provider Kalina Ewing   5/26/2023 10:45 AM Sharlene Leal MD [facility] BS AMB

## 2023-05-23 NOTE — TELEPHONE ENCOUNTER
Pt caregiver states Pt is taking 2 -25mg tab per day of this med. 1x in am and 1x in pm   Quetiapine. Pt will be out soon since dose has changed.  [facility]

## 2023-05-23 NOTE — TELEPHONE ENCOUNTER
Requested Prescriptions     Pending Prescriptions Disp Refills    QUEtiapine (SEROQUEL) 25 MG tablet 60 tablet 2     Sig: Take 1 tablet by mouth every morning and one tablet nightly.  **Discontinue Seroquel 50 mg.**         [facility] [place] [phone] Jyothi Munoz [phone]  [address] [place] [address]  Phone: [phone] Fax: [phone]       Last appt 4/20/2023      Future Appointments   Date Time Provider Kalina Ewing   5/26/2023 10:45 AM Gavi Masters MD [facility] BS AMB

## 2023-05-23 NOTE — TELEPHONE ENCOUNTER
Spoke with Ash, had this corrected with the pharmacy      Talha Dennison's daughter Venancio Hammonds was calledand verbalized understanding on note above

## 2023-05-26 ENCOUNTER — TELEMEDICINE (OUTPATIENT)
Age: 88
End: 2023-05-26
Payer: MEDICARE

## 2023-05-26 DIAGNOSIS — R45.1 AGITATION: ICD-10-CM

## 2023-05-26 DIAGNOSIS — F05 SUNDOWNING: Primary | ICD-10-CM

## 2023-05-26 DIAGNOSIS — L89.90 PRESSURE INJURY OF SKIN, UNSPECIFIED INJURY STAGE, UNSPECIFIED LOCATION: ICD-10-CM

## 2023-05-26 DIAGNOSIS — I48.0 PAROXYSMAL ATRIAL FIBRILLATION (HCC): ICD-10-CM

## 2023-05-26 DIAGNOSIS — F01.518 VASCULAR DEMENTIA WITH BEHAVIORAL DISTURBANCE (HCC): ICD-10-CM

## 2023-05-26 DIAGNOSIS — M25.511 ACUTE PAIN OF RIGHT SHOULDER: ICD-10-CM

## 2023-05-26 PROCEDURE — 99214 OFFICE O/P EST MOD 30 MIN: CPT | Performed by: INTERNAL MEDICINE

## 2023-05-26 PROCEDURE — 1123F ACP DISCUSS/DSCN MKR DOCD: CPT | Performed by: INTERNAL MEDICINE

## 2023-05-26 RX ORDER — ALPRAZOLAM 0.25 MG/1
0.25 TABLET ORAL NIGHTLY
Qty: 30 TABLET | Refills: 1 | Status: SHIPPED | OUTPATIENT
Start: 2023-05-26 | End: 2023-06-25

## 2023-05-26 RX ORDER — PREDNISONE 10 MG/1
10 TABLET ORAL 2 TIMES DAILY
Qty: 10 TABLET | Refills: 0 | Status: SHIPPED | OUTPATIENT
Start: 2023-05-26 | End: 2023-05-31

## 2023-05-26 RX ORDER — ASCORBIC ACID 500 MG
TABLET ORAL
Qty: 180 TABLET | OUTPATIENT
Start: 2023-05-26

## 2023-05-26 RX ORDER — ASCORBIC ACID 500 MG
500 TABLET ORAL 2 TIMES DAILY
Qty: 60 TABLET | Refills: 11 | Status: SHIPPED | OUTPATIENT
Start: 2023-05-26

## 2023-05-26 SDOH — ECONOMIC STABILITY: HOUSING INSECURITY
IN THE LAST 12 MONTHS, WAS THERE A TIME WHEN YOU DID NOT HAVE A STEADY PLACE TO SLEEP OR SLEPT IN A SHELTER (INCLUDING NOW)?: NO

## 2023-05-26 SDOH — ECONOMIC STABILITY: FOOD INSECURITY: WITHIN THE PAST 12 MONTHS, THE FOOD YOU BOUGHT JUST DIDN'T LAST AND YOU DIDN'T HAVE MONEY TO GET MORE.: NEVER TRUE

## 2023-05-26 SDOH — ECONOMIC STABILITY: INCOME INSECURITY: HOW HARD IS IT FOR YOU TO PAY FOR THE VERY BASICS LIKE FOOD, HOUSING, MEDICAL CARE, AND HEATING?: NOT HARD AT ALL

## 2023-05-26 SDOH — ECONOMIC STABILITY: FOOD INSECURITY: WITHIN THE PAST 12 MONTHS, YOU WORRIED THAT YOUR FOOD WOULD RUN OUT BEFORE YOU GOT MONEY TO BUY MORE.: NEVER TRUE

## 2023-05-26 ASSESSMENT — PATIENT HEALTH QUESTIONNAIRE - PHQ9
5. POOR APPETITE OR OVEREATING: 0
7. TROUBLE CONCENTRATING ON THINGS, SUCH AS READING THE NEWSPAPER OR WATCHING TELEVISION: 2
9. THOUGHTS THAT YOU WOULD BE BETTER OFF DEAD, OR OF HURTING YOURSELF: 0
SUM OF ALL RESPONSES TO PHQ QUESTIONS 1-9: 14
SUM OF ALL RESPONSES TO PHQ QUESTIONS 1-9: 14
10. IF YOU CHECKED OFF ANY PROBLEMS, HOW DIFFICULT HAVE THESE PROBLEMS MADE IT FOR YOU TO DO YOUR WORK, TAKE CARE OF THINGS AT HOME, OR GET ALONG WITH OTHER PEOPLE: 2
SUM OF ALL RESPONSES TO PHQ QUESTIONS 1-9: 14
6. FEELING BAD ABOUT YOURSELF - OR THAT YOU ARE A FAILURE OR HAVE LET YOURSELF OR YOUR FAMILY DOWN: 0
4. FEELING TIRED OR HAVING LITTLE ENERGY: 3
8. MOVING OR SPEAKING SO SLOWLY THAT OTHER PEOPLE COULD HAVE NOTICED. OR THE OPPOSITE, BEING SO FIGETY OR RESTLESS THAT YOU HAVE BEEN MOVING AROUND A LOT MORE THAN USUAL: 0
3. TROUBLE FALLING OR STAYING ASLEEP: 3
SUM OF ALL RESPONSES TO PHQ QUESTIONS 1-9: 14
1. LITTLE INTEREST OR PLEASURE IN DOING THINGS: 3
2. FEELING DOWN, DEPRESSED OR HOPELESS: 3
SUM OF ALL RESPONSES TO PHQ9 QUESTIONS 1 & 2: 6

## 2023-05-26 NOTE — PROGRESS NOTES
Durga Wilson (:  1924) is a Established patient, presenting virtually for evaluation of the following:    Assessment & Plan   Below is the assessment and plan developed based on review of pertinent history, physical exam, labs, studies, and medications. 1.     She is medically stable at recommending twice a day. States she is number last night and running around. We will change his Seroquel timing to 6 PM night dosage and Seroquel 1 tablet in the morning and 830 she should take a Xanax at 1 tablet to go to bed. We will give,  -     ALPRAZolam (XANAX) 0.25 MG tablet; Take 1 tablet by mouth nightly for 30 days. Max Daily Amount: 0.25 mg, Disp-30 tablet, R-1Normal  2. Acute pain of right shoulder    Received intra-articular injection of the shoulder but still had a lot of pain. We will do,  -     predniSONE (DELTASONE) 10 MG tablet; Take 1 tablet by mouth 2 times daily for 5 days, Disp-10 tablet, R-0Normal  Continue diclofenac gel and Tylenol 2 tablet twice a day as needed. 3. Pressure injury of skin, unspecified injury stage, unspecified location    Still having bedsore, advised her to change position. We will continue vitamin C twice a day. 4. Agitation      -     ALPRAZolam (XANAX) 0.25 MG tablet; Take 1 tablet by mouth nightly for 30 days. Max Daily Amount: 0.25 mg, Disp-30 tablet, R-1Normal  5. Vascular dementia with behavioral disturbance ([facility] [address]  Advance dementia. Patient is strong family support. Staying with her daughter. 6. Paroxysmal atrial fibrillation (HCC)  Stable. On Coreg and aspirin. Follow-up in 1 month. Sabiha Roper is here for follow-up. She stays home with her daughter. Daughter mention she has been taking Seroquel 20 mg twice a day but still she is overriding it and she has been up in the last night and running around. She is taking a nap midday and since she is on Seroquel in the morning. Muscle tone is not quite better. Sometimes she gets agitated.

## 2023-05-31 RX ORDER — ASCORBIC ACID 500 MG
500 TABLET ORAL 2 TIMES DAILY
Qty: 90 TABLET | Refills: 1 | Status: SHIPPED | OUTPATIENT
Start: 2023-05-31

## 2023-05-31 NOTE — TELEPHONE ENCOUNTER
LOV: 05/26/2023  NOV: 06/28/2023    Medication: Vitamin C 500 MG tab  Quantity: 90  Directions:  Take 1 tablet twice daily

## 2023-05-31 NOTE — TELEPHONE ENCOUNTER
Requested Prescriptions     Pending Prescriptions Disp Refills    ascorbic acid (VITAMIN C) 500 MG tablet 90 tablet 1     Sig: Take 1 tablet by mouth 2 times daily           Last appt 5/26/2023      Future Appointments   Date Time Provider Kalina Ewing   6/28/2023  2:30 PM Bruna Bonilla MD HAR[facility] BS AMB       [facility] [address] [place] [address] [phone] - F [phone]  [address] [address]  Phone: [phone] Fax: [phone]

## 2023-06-01 DIAGNOSIS — R45.1 AGITATION: ICD-10-CM

## 2023-06-01 DIAGNOSIS — G30.1 ALZHEIMER'S DISEASE WITH LATE ONSET (CODE) (HCC): ICD-10-CM

## 2023-06-01 RX ORDER — QUETIAPINE FUMARATE 25 MG/1
TABLET, FILM COATED ORAL
Qty: 180 TABLET | Refills: 0 | Status: SHIPPED | OUTPATIENT
Start: 2023-06-01

## 2023-06-01 NOTE — TELEPHONE ENCOUNTER
Lov: 4/20/23  Nov: 6/28/23    [facility] [address] [facility]   90 day script request for Quetiapine Fumarate 25mg

## 2023-06-01 NOTE — TELEPHONE ENCOUNTER
Requested Prescriptions     Pending Prescriptions Disp Refills    QUEtiapine (SEROQUEL) 25 MG tablet 180 tablet 0     Sig: Take 1 tablet by mouth every morning and one tablet nightly.  **Discontinue Seroquel 50 mg.**         [facility]/pharmacy #[id number]- [place], [place] - [address] [facility] [phone] [address] [place] [phone]  [address]  [address] [facility] [address] [address]  Phone: [phone] Fax: [phone]       Last appt 5/26/2023      Future Appointments   Date Time Provider Kalina Ewing   6/28/2023  2:30 PM Nathan Aldana MD [facility] BS AMB

## 2023-06-02 ENCOUNTER — TELEPHONE (OUTPATIENT)
Age: 88
End: 2023-06-02

## 2023-06-02 DIAGNOSIS — R45.1 AGITATION: Primary | ICD-10-CM

## 2023-06-02 RX ORDER — ALPRAZOLAM 0.5 MG/1
TABLET ORAL
Qty: 30 TABLET | Refills: 0 | Status: SHIPPED | OUTPATIENT
Start: 2023-06-02 | End: 2023-07-01

## 2023-06-13 ENCOUNTER — HOSPITAL ENCOUNTER (INPATIENT)
Facility: HOSPITAL | Age: 88
LOS: 10 days | Discharge: HOME | DRG: 378 | End: 2023-06-23
Attending: EMERGENCY MEDICINE | Admitting: INTERNAL MEDICINE
Payer: MEDICARE

## 2023-06-13 DIAGNOSIS — I50.20 SYSTOLIC CONGESTIVE HEART FAILURE, UNSPECIFIED HF CHRONICITY (HCC): ICD-10-CM

## 2023-06-13 DIAGNOSIS — K92.2 ACUTE UPPER GI BLEED: Primary | ICD-10-CM

## 2023-06-13 DIAGNOSIS — D62 ACUTE BLOOD LOSS ANEMIA: ICD-10-CM

## 2023-06-13 LAB
ALBUMIN SERPL-MCNC: 3 G/DL (ref 3.5–5)
ALBUMIN/GLOB SERPL: 0.9 (ref 1.1–2.2)
ALP SERPL-CCNC: 79 U/L (ref 45–117)
ALT SERPL-CCNC: 14 U/L (ref 12–78)
ANION GAP SERPL CALC-SCNC: 9 MMOL/L (ref 5–15)
APPEARANCE UR: CLEAR
AST SERPL-CCNC: 14 U/L (ref 15–37)
BACTERIA URNS QL MICRO: NEGATIVE /HPF
BASOPHILS # BLD: 0 K/UL (ref 0–0.1)
BASOPHILS NFR BLD: 0 % (ref 0–1)
BILIRUB SERPL-MCNC: 0.2 MG/DL (ref 0.2–1)
BILIRUB UR QL: NEGATIVE
BUN SERPL-MCNC: 87 MG/DL (ref 6–20)
BUN/CREAT SERPL: 45 (ref 12–20)
CALCIUM SERPL-MCNC: 8 MG/DL (ref 8.5–10.1)
CHLORIDE SERPL-SCNC: 111 MMOL/L (ref 97–108)
CO2 SERPL-SCNC: 23 MMOL/L (ref 21–32)
COLOR UR: NORMAL
CREAT SERPL-MCNC: 1.95 MG/DL (ref 0.55–1.02)
DIFFERENTIAL METHOD BLD: ABNORMAL
EOSINOPHIL # BLD: 0.1 K/UL (ref 0–0.4)
EOSINOPHIL NFR BLD: 2 % (ref 0–7)
EPITH CASTS URNS QL MICRO: NORMAL /LPF
ERYTHROCYTE [DISTWIDTH] IN BLOOD BY AUTOMATED COUNT: 15.8 % (ref 11.5–14.5)
GLOBULIN SER CALC-MCNC: 3.3 G/DL (ref 2–4)
GLUCOSE SERPL-MCNC: 140 MG/DL (ref 65–100)
GLUCOSE UR STRIP.AUTO-MCNC: NEGATIVE MG/DL
HCT VFR BLD AUTO: 21.2 % (ref 35–47)
HGB BLD-MCNC: 6.5 G/DL (ref 11.5–16)
HGB UR QL STRIP: NEGATIVE
HISTORY CHECK: NORMAL
HYALINE CASTS URNS QL MICRO: NORMAL /LPF (ref 0–2)
IMM GRANULOCYTES # BLD AUTO: 0.1 K/UL (ref 0–0.04)
IMM GRANULOCYTES NFR BLD AUTO: 1 % (ref 0–0.5)
KETONES UR QL STRIP.AUTO: NEGATIVE MG/DL
LEUKOCYTE ESTERASE UR QL STRIP.AUTO: NEGATIVE
LYMPHOCYTES # BLD: 0.7 K/UL (ref 0.8–3.5)
LYMPHOCYTES NFR BLD: 14 % (ref 12–49)
MCH RBC QN AUTO: 29.4 PG (ref 26–34)
MCHC RBC AUTO-ENTMCNC: 30.7 G/DL (ref 30–36.5)
MCV RBC AUTO: 95.9 FL (ref 80–99)
MONOCYTES # BLD: 0.4 K/UL (ref 0–1)
MONOCYTES NFR BLD: 8 % (ref 5–13)
NEUTS SEG # BLD: 3.7 K/UL (ref 1.8–8)
NEUTS SEG NFR BLD: 75 % (ref 32–75)
NITRITE UR QL STRIP.AUTO: NEGATIVE
NRBC # BLD: 0 K/UL (ref 0–0.01)
NRBC BLD-RTO: 0 PER 100 WBC
PH UR STRIP: 5 (ref 5–8)
PLATELET # BLD AUTO: 155 K/UL (ref 150–400)
PMV BLD AUTO: 10.4 FL (ref 8.9–12.9)
POTASSIUM SERPL-SCNC: 3.7 MMOL/L (ref 3.5–5.1)
PROT SERPL-MCNC: 6.3 G/DL (ref 6.4–8.2)
PROT UR STRIP-MCNC: NEGATIVE MG/DL
RBC # BLD AUTO: 2.21 M/UL (ref 3.8–5.2)
RBC #/AREA URNS HPF: NORMAL /HPF (ref 0–5)
RBC MORPH BLD: ABNORMAL
SODIUM SERPL-SCNC: 143 MMOL/L (ref 136–145)
SP GR UR REFRACTOMETRY: 1.01
URINE CULTURE IF INDICATED: NORMAL
UROBILINOGEN UR QL STRIP.AUTO: 0.2 EU/DL (ref 0.2–1)
WBC # BLD AUTO: 5 K/UL (ref 3.6–11)
WBC URNS QL MICRO: NORMAL /HPF (ref 0–4)

## 2023-06-13 PROCEDURE — 86901 BLOOD TYPING SEROLOGIC RH(D): CPT

## 2023-06-13 PROCEDURE — 86900 BLOOD TYPING SEROLOGIC ABO: CPT

## 2023-06-13 PROCEDURE — 99285 EMERGENCY DEPT VISIT HI MDM: CPT

## 2023-06-13 PROCEDURE — 2580000003 HC RX 258: Performed by: INTERNAL MEDICINE

## 2023-06-13 PROCEDURE — 85025 COMPLETE CBC W/AUTO DIFF WBC: CPT

## 2023-06-13 PROCEDURE — 86923 COMPATIBILITY TEST ELECTRIC: CPT

## 2023-06-13 PROCEDURE — 1100000000 HC RM PRIVATE

## 2023-06-13 PROCEDURE — 2580000003 HC RX 258: Performed by: EMERGENCY MEDICINE

## 2023-06-13 PROCEDURE — 80053 COMPREHEN METABOLIC PANEL: CPT

## 2023-06-13 PROCEDURE — C9113 INJ PANTOPRAZOLE SODIUM, VIA: HCPCS | Performed by: INTERNAL MEDICINE

## 2023-06-13 PROCEDURE — 36415 COLL VENOUS BLD VENIPUNCTURE: CPT

## 2023-06-13 PROCEDURE — 81001 URINALYSIS AUTO W/SCOPE: CPT

## 2023-06-13 PROCEDURE — 6360000002 HC RX W HCPCS: Performed by: INTERNAL MEDICINE

## 2023-06-13 PROCEDURE — 86850 RBC ANTIBODY SCREEN: CPT

## 2023-06-13 RX ORDER — POLYETHYLENE GLYCOL 3350 17 G/17G
17 POWDER, FOR SOLUTION ORAL DAILY PRN
Status: DISCONTINUED | OUTPATIENT
Start: 2023-06-13 | End: 2023-06-23 | Stop reason: HOSPADM

## 2023-06-13 RX ORDER — SODIUM CHLORIDE 9 MG/ML
INJECTION, SOLUTION INTRAVENOUS CONTINUOUS
Status: DISCONTINUED | OUTPATIENT
Start: 2023-06-13 | End: 2023-06-17

## 2023-06-13 RX ORDER — SODIUM CHLORIDE 9 MG/ML
INJECTION, SOLUTION INTRAVENOUS PRN
Status: DISCONTINUED | OUTPATIENT
Start: 2023-06-13 | End: 2023-06-23 | Stop reason: HOSPADM

## 2023-06-13 RX ORDER — ACETAMINOPHEN 325 MG/1
650 TABLET ORAL EVERY 6 HOURS PRN
Status: DISCONTINUED | OUTPATIENT
Start: 2023-06-13 | End: 2023-06-23 | Stop reason: HOSPADM

## 2023-06-13 RX ORDER — MAGNESIUM SULFATE IN WATER 40 MG/ML
2000 INJECTION, SOLUTION INTRAVENOUS PRN
Status: DISCONTINUED | OUTPATIENT
Start: 2023-06-13 | End: 2023-06-23 | Stop reason: HOSPADM

## 2023-06-13 RX ORDER — SODIUM CHLORIDE 0.9 % (FLUSH) 0.9 %
5-40 SYRINGE (ML) INJECTION EVERY 12 HOURS SCHEDULED
Status: DISCONTINUED | OUTPATIENT
Start: 2023-06-13 | End: 2023-06-23 | Stop reason: HOSPADM

## 2023-06-13 RX ORDER — SODIUM CHLORIDE 9 MG/ML
INJECTION, SOLUTION INTRAVENOUS PRN
Status: DISCONTINUED | OUTPATIENT
Start: 2023-06-13 | End: 2023-06-14

## 2023-06-13 RX ORDER — POTASSIUM CHLORIDE 7.45 MG/ML
10 INJECTION INTRAVENOUS PRN
Status: DISCONTINUED | OUTPATIENT
Start: 2023-06-13 | End: 2023-06-23 | Stop reason: HOSPADM

## 2023-06-13 RX ORDER — SODIUM CHLORIDE 0.9 % (FLUSH) 0.9 %
5-40 SYRINGE (ML) INJECTION PRN
Status: DISCONTINUED | OUTPATIENT
Start: 2023-06-13 | End: 2023-06-19 | Stop reason: SDUPTHER

## 2023-06-13 RX ORDER — POTASSIUM CHLORIDE 20 MEQ/1
40 TABLET, EXTENDED RELEASE ORAL PRN
Status: DISCONTINUED | OUTPATIENT
Start: 2023-06-13 | End: 2023-06-23 | Stop reason: HOSPADM

## 2023-06-13 RX ORDER — ACETAMINOPHEN 650 MG/1
650 SUPPOSITORY RECTAL EVERY 6 HOURS PRN
Status: DISCONTINUED | OUTPATIENT
Start: 2023-06-13 | End: 2023-06-23 | Stop reason: HOSPADM

## 2023-06-13 RX ORDER — 0.9 % SODIUM CHLORIDE 0.9 %
1000 INTRAVENOUS SOLUTION INTRAVENOUS
Status: COMPLETED | OUTPATIENT
Start: 2023-06-13 | End: 2023-06-14

## 2023-06-13 RX ADMIN — SODIUM CHLORIDE 1000 ML: 9 INJECTION, SOLUTION INTRAVENOUS at 20:16

## 2023-06-13 RX ADMIN — SODIUM CHLORIDE 8 MG/HR: 9 INJECTION, SOLUTION INTRAVENOUS at 22:52

## 2023-06-13 ASSESSMENT — LIFESTYLE VARIABLES
HOW MANY STANDARD DRINKS CONTAINING ALCOHOL DO YOU HAVE ON A TYPICAL DAY: PATIENT DOES NOT DRINK
HOW OFTEN DO YOU HAVE A DRINK CONTAINING ALCOHOL: NEVER

## 2023-06-13 ASSESSMENT — ENCOUNTER SYMPTOMS: ABDOMINAL PAIN: 0

## 2023-06-13 ASSESSMENT — PAIN - FUNCTIONAL ASSESSMENT: PAIN_FUNCTIONAL_ASSESSMENT: NONE - DENIES PAIN

## 2023-06-14 LAB
ALBUMIN SERPL-MCNC: 2.9 G/DL (ref 3.5–5)
ALBUMIN/GLOB SERPL: 0.9 (ref 1.1–2.2)
ALP SERPL-CCNC: 69 U/L (ref 45–117)
ALT SERPL-CCNC: 12 U/L (ref 12–78)
ANION GAP SERPL CALC-SCNC: 8 MMOL/L (ref 5–15)
AST SERPL-CCNC: 16 U/L (ref 15–37)
BASOPHILS # BLD: 0 K/UL (ref 0–0.1)
BASOPHILS NFR BLD: 0 % (ref 0–1)
BILIRUB SERPL-MCNC: 0.5 MG/DL (ref 0.2–1)
BUN SERPL-MCNC: 79 MG/DL (ref 6–20)
BUN/CREAT SERPL: 48 (ref 12–20)
CALCIUM SERPL-MCNC: 7.8 MG/DL (ref 8.5–10.1)
CHLORIDE SERPL-SCNC: 114 MMOL/L (ref 97–108)
CO2 SERPL-SCNC: 23 MMOL/L (ref 21–32)
CREAT SERPL-MCNC: 1.63 MG/DL (ref 0.55–1.02)
DIFFERENTIAL METHOD BLD: ABNORMAL
EOSINOPHIL # BLD: 0.1 K/UL (ref 0–0.4)
EOSINOPHIL NFR BLD: 2 % (ref 0–7)
ERYTHROCYTE [DISTWIDTH] IN BLOOD BY AUTOMATED COUNT: 17.2 % (ref 11.5–14.5)
GLOBULIN SER CALC-MCNC: 3.2 G/DL (ref 2–4)
GLUCOSE BLD STRIP.AUTO-MCNC: 148 MG/DL (ref 65–117)
GLUCOSE SERPL-MCNC: 111 MG/DL (ref 65–100)
HCT VFR BLD AUTO: 23.9 % (ref 35–47)
HCT VFR BLD AUTO: 24.1 % (ref 35–47)
HCT VFR BLD AUTO: 25.4 % (ref 35–47)
HEMOCCULT STL QL: POSITIVE
HGB BLD-MCNC: 7.6 G/DL (ref 11.5–16)
HGB BLD-MCNC: 7.8 G/DL (ref 11.5–16)
HGB BLD-MCNC: 7.8 G/DL (ref 11.5–16)
IMM GRANULOCYTES # BLD AUTO: 0 K/UL (ref 0–0.04)
IMM GRANULOCYTES NFR BLD AUTO: 1 % (ref 0–0.5)
LYMPHOCYTES # BLD: 1.1 K/UL (ref 0.8–3.5)
LYMPHOCYTES NFR BLD: 19 % (ref 12–49)
MAGNESIUM SERPL-MCNC: 1.9 MG/DL (ref 1.6–2.4)
MCH RBC QN AUTO: 29.2 PG (ref 26–34)
MCHC RBC AUTO-ENTMCNC: 30.7 G/DL (ref 30–36.5)
MCV RBC AUTO: 95.1 FL (ref 80–99)
MONOCYTES # BLD: 0.4 K/UL (ref 0–1)
MONOCYTES NFR BLD: 8 % (ref 5–13)
NEUTS SEG # BLD: 4 K/UL (ref 1.8–8)
NEUTS SEG NFR BLD: 70 % (ref 32–75)
NRBC # BLD: 0 K/UL (ref 0–0.01)
NRBC BLD-RTO: 0 PER 100 WBC
PLATELET # BLD AUTO: 157 K/UL (ref 150–400)
PMV BLD AUTO: 11.2 FL (ref 8.9–12.9)
POTASSIUM SERPL-SCNC: 3.1 MMOL/L (ref 3.5–5.1)
PROT SERPL-MCNC: 6.1 G/DL (ref 6.4–8.2)
RBC # BLD AUTO: 2.67 M/UL (ref 3.8–5.2)
SERVICE CMNT-IMP: ABNORMAL
SODIUM SERPL-SCNC: 145 MMOL/L (ref 136–145)
WBC # BLD AUTO: 5.6 K/UL (ref 3.6–11)

## 2023-06-14 PROCEDURE — 2060000000 HC ICU INTERMEDIATE R&B

## 2023-06-14 PROCEDURE — 82962 GLUCOSE BLOOD TEST: CPT

## 2023-06-14 PROCEDURE — 2580000003 HC RX 258: Performed by: INTERNAL MEDICINE

## 2023-06-14 PROCEDURE — 93005 ELECTROCARDIOGRAM TRACING: CPT | Performed by: INTERNAL MEDICINE

## 2023-06-14 PROCEDURE — 6360000002 HC RX W HCPCS: Performed by: INTERNAL MEDICINE

## 2023-06-14 PROCEDURE — C9113 INJ PANTOPRAZOLE SODIUM, VIA: HCPCS | Performed by: INTERNAL MEDICINE

## 2023-06-14 PROCEDURE — 85018 HEMOGLOBIN: CPT

## 2023-06-14 PROCEDURE — 80053 COMPREHEN METABOLIC PANEL: CPT

## 2023-06-14 PROCEDURE — 82272 OCCULT BLD FECES 1-3 TESTS: CPT

## 2023-06-14 PROCEDURE — 6370000000 HC RX 637 (ALT 250 FOR IP): Performed by: INTERNAL MEDICINE

## 2023-06-14 PROCEDURE — 36415 COLL VENOUS BLD VENIPUNCTURE: CPT

## 2023-06-14 PROCEDURE — 85014 HEMATOCRIT: CPT

## 2023-06-14 PROCEDURE — 83735 ASSAY OF MAGNESIUM: CPT

## 2023-06-14 PROCEDURE — 36430 TRANSFUSION BLD/BLD COMPNT: CPT

## 2023-06-14 PROCEDURE — 85025 COMPLETE CBC W/AUTO DIFF WBC: CPT

## 2023-06-14 PROCEDURE — A4216 STERILE WATER/SALINE, 10 ML: HCPCS | Performed by: INTERNAL MEDICINE

## 2023-06-14 PROCEDURE — P9016 RBC LEUKOCYTES REDUCED: HCPCS

## 2023-06-14 RX ADMIN — SODIUM CHLORIDE: 9 INJECTION, SOLUTION INTRAVENOUS at 21:49

## 2023-06-14 RX ADMIN — SODIUM CHLORIDE 8 MG/HR: 9 INJECTION, SOLUTION INTRAVENOUS at 11:32

## 2023-06-14 RX ADMIN — SODIUM CHLORIDE, PRESERVATIVE FREE 10 ML: 5 INJECTION INTRAVENOUS at 20:21

## 2023-06-14 RX ADMIN — SODIUM CHLORIDE, PRESERVATIVE FREE 40 MG: 5 INJECTION INTRAVENOUS at 22:46

## 2023-06-14 RX ADMIN — Medication: at 20:21

## 2023-06-14 RX ADMIN — SODIUM CHLORIDE: 9 INJECTION, SOLUTION INTRAVENOUS at 11:28

## 2023-06-14 RX ADMIN — SODIUM CHLORIDE: 9 INJECTION, SOLUTION INTRAVENOUS at 01:38

## 2023-06-14 RX ADMIN — Medication: at 13:44

## 2023-06-14 RX ADMIN — SODIUM CHLORIDE 8 MG/HR: 900 INJECTION INTRAVENOUS at 21:49

## 2023-06-14 RX ADMIN — SODIUM CHLORIDE, PRESERVATIVE FREE 5 ML: 5 INJECTION INTRAVENOUS at 08:34

## 2023-06-15 ENCOUNTER — APPOINTMENT (OUTPATIENT)
Facility: HOSPITAL | Age: 88
DRG: 378 | End: 2023-06-15
Attending: INTERNAL MEDICINE
Payer: MEDICARE

## 2023-06-15 LAB
ALBUMIN SERPL-MCNC: 2.5 G/DL (ref 3.5–5)
ALBUMIN/GLOB SERPL: 0.9 (ref 1.1–2.2)
ALP SERPL-CCNC: 50 U/L (ref 45–117)
ALT SERPL-CCNC: 9 U/L (ref 12–78)
ANION GAP SERPL CALC-SCNC: 7 MMOL/L (ref 5–15)
AST SERPL-CCNC: 15 U/L (ref 15–37)
BASOPHILS # BLD: 0 K/UL (ref 0–0.1)
BASOPHILS NFR BLD: 0 % (ref 0–1)
BILIRUB SERPL-MCNC: 0.5 MG/DL (ref 0.2–1)
BUN SERPL-MCNC: 65 MG/DL (ref 6–20)
BUN/CREAT SERPL: 50 (ref 12–20)
CALCIUM SERPL-MCNC: 7.4 MG/DL (ref 8.5–10.1)
CHLORIDE SERPL-SCNC: 116 MMOL/L (ref 97–108)
CO2 SERPL-SCNC: 21 MMOL/L (ref 21–32)
CREAT SERPL-MCNC: 1.31 MG/DL (ref 0.55–1.02)
DIFFERENTIAL METHOD BLD: ABNORMAL
ECHO AO ROOT DIAM: 2.7 CM
ECHO AO ROOT INDEX: 1.51 CM/M2
ECHO AV AREA PLAN/BSA: 0.84 CM2/M2
ECHO AV AREA PLAN: 1.5 CM2
ECHO AV MEAN GRADIENT: 11 MMHG
ECHO AV MEAN VELOCITY: 1.6 M/S
ECHO AV PEAK GRADIENT: 21 MMHG
ECHO AV PEAK VELOCITY: 2.3 M/S
ECHO AV VTI: 50.6 CM
ECHO BSA: 1.79 M2
ECHO EST RA PRESSURE: 10 MMHG
ECHO LA DIAMETER INDEX: 2.96 CM/M2
ECHO LA DIAMETER: 5.3 CM
ECHO LA TO AORTIC ROOT RATIO: 1.96
ECHO LV EDV A4C: 96 ML
ECHO LV EDV INDEX A4C: 54 ML/M2
ECHO LV EJECTION FRACTION A4C: 61 %
ECHO LV ESV A4C: 37 ML
ECHO LV ESV INDEX A4C: 21 ML/M2
ECHO LV FRACTIONAL SHORTENING: 21 % (ref 28–44)
ECHO LV INTERNAL DIMENSION DIASTOLE INDEX: 2.63 CM/M2
ECHO LV INTERNAL DIMENSION DIASTOLIC: 4.7 CM (ref 3.9–5.3)
ECHO LV INTERNAL DIMENSION SYSTOLIC INDEX: 2.07 CM/M2
ECHO LV INTERNAL DIMENSION SYSTOLIC: 3.7 CM
ECHO LV IVSD: 1.3 CM (ref 0.6–0.9)
ECHO LV MASS 2D: 279.4 G (ref 67–162)
ECHO LV MASS INDEX 2D: 156.1 G/M2 (ref 43–95)
ECHO LV POSTERIOR WALL DIASTOLIC: 1.6 CM (ref 0.6–0.9)
ECHO LV RELATIVE WALL THICKNESS RATIO: 0.68
ECHO LVOT AREA: 3.1 CM2
ECHO LVOT DIAM: 2 CM
ECHO MV A VELOCITY: 0.41 M/S
ECHO MV E DECELERATION TIME (DT): 213 MS
ECHO MV E VELOCITY: 0.85 M/S
ECHO MV E/A RATIO: 2.07
ECHO MV MAX VELOCITY: 1.3 M/S
ECHO MV MEAN GRADIENT: 2 MMHG
ECHO MV MEAN VELOCITY: 0.6 M/S
ECHO MV PEAK GRADIENT: 6 MMHG
ECHO MV REGURGITANT PEAK GRADIENT: 112 MMHG
ECHO MV REGURGITANT PEAK VELOCITY: 5.3 M/S
ECHO MV VTI: 25.1 CM
ECHO PULMONARY ARTERY END DIASTOLIC PRESSURE: 8 MMHG
ECHO PV MAX VELOCITY: 0.9 M/S
ECHO PV PEAK GRADIENT: 3 MMHG
ECHO PV REGURGITANT MAX VELOCITY: 1.5 M/S
ECHO RIGHT VENTRICULAR SYSTOLIC PRESSURE (RVSP): 45 MMHG
ECHO TV REGURGITANT MAX VELOCITY: 2.97 M/S
ECHO TV REGURGITANT PEAK GRADIENT: 36 MMHG
EKG DIAGNOSIS: NORMAL
EKG Q-T INTERVAL: 398 MS
EKG QRS DURATION: 94 MS
EKG QTC CALCULATION (BAZETT): 462 MS
EKG R AXIS: -12 DEGREES
EKG T AXIS: 220 DEGREES
EKG VENTRICULAR RATE: 81 BPM
EOSINOPHIL # BLD: 0.1 K/UL (ref 0–0.4)
EOSINOPHIL NFR BLD: 2 % (ref 0–7)
ERYTHROCYTE [DISTWIDTH] IN BLOOD BY AUTOMATED COUNT: 18.2 % (ref 11.5–14.5)
GLOBULIN SER CALC-MCNC: 2.9 G/DL (ref 2–4)
GLUCOSE SERPL-MCNC: 107 MG/DL (ref 65–100)
HCT VFR BLD AUTO: 21.5 % (ref 35–47)
HCT VFR BLD AUTO: 22.5 % (ref 35–47)
HCT VFR BLD AUTO: 24.2 % (ref 35–47)
HGB BLD-MCNC: 6.9 G/DL (ref 11.5–16)
HGB BLD-MCNC: 7 G/DL (ref 11.5–16)
HGB BLD-MCNC: 7.3 G/DL (ref 11.5–16)
IMM GRANULOCYTES # BLD AUTO: 0 K/UL (ref 0–0.04)
IMM GRANULOCYTES NFR BLD AUTO: 0 % (ref 0–0.5)
LYMPHOCYTES # BLD: 0.8 K/UL (ref 0.8–3.5)
LYMPHOCYTES NFR BLD: 16 % (ref 12–49)
MAGNESIUM SERPL-MCNC: 1.7 MG/DL (ref 1.6–2.4)
MCH RBC QN AUTO: 30 PG (ref 26–34)
MCHC RBC AUTO-ENTMCNC: 32.1 G/DL (ref 30–36.5)
MCV RBC AUTO: 93.5 FL (ref 80–99)
MONOCYTES # BLD: 0.5 K/UL (ref 0–1)
MONOCYTES NFR BLD: 10 % (ref 5–13)
NEUTS SEG # BLD: 3.4 K/UL (ref 1.8–8)
NEUTS SEG NFR BLD: 72 % (ref 32–75)
NRBC # BLD: 0 K/UL (ref 0–0.01)
NRBC BLD-RTO: 0 PER 100 WBC
PLATELET # BLD AUTO: 150 K/UL (ref 150–400)
PMV BLD AUTO: 11.1 FL (ref 8.9–12.9)
POTASSIUM SERPL-SCNC: 2.9 MMOL/L (ref 3.5–5.1)
POTASSIUM SERPL-SCNC: 3.6 MMOL/L (ref 3.5–5.1)
PROT SERPL-MCNC: 5.4 G/DL (ref 6.4–8.2)
RBC # BLD AUTO: 2.3 M/UL (ref 3.8–5.2)
RBC MORPH BLD: ABNORMAL
SODIUM SERPL-SCNC: 144 MMOL/L (ref 136–145)
WBC # BLD AUTO: 4.8 K/UL (ref 3.6–11)

## 2023-06-15 PROCEDURE — 93306 TTE W/DOPPLER COMPLETE: CPT

## 2023-06-15 PROCEDURE — 85025 COMPLETE CBC W/AUTO DIFF WBC: CPT

## 2023-06-15 PROCEDURE — C9113 INJ PANTOPRAZOLE SODIUM, VIA: HCPCS | Performed by: INTERNAL MEDICINE

## 2023-06-15 PROCEDURE — 6370000000 HC RX 637 (ALT 250 FOR IP): Performed by: HOSPITALIST

## 2023-06-15 PROCEDURE — 6360000002 HC RX W HCPCS: Performed by: INTERNAL MEDICINE

## 2023-06-15 PROCEDURE — 2580000003 HC RX 258: Performed by: INTERNAL MEDICINE

## 2023-06-15 PROCEDURE — 83735 ASSAY OF MAGNESIUM: CPT

## 2023-06-15 PROCEDURE — 84132 ASSAY OF SERUM POTASSIUM: CPT

## 2023-06-15 PROCEDURE — 2060000000 HC ICU INTERMEDIATE R&B

## 2023-06-15 PROCEDURE — 85018 HEMOGLOBIN: CPT

## 2023-06-15 PROCEDURE — 6370000000 HC RX 637 (ALT 250 FOR IP): Performed by: PHYSICIAN ASSISTANT

## 2023-06-15 PROCEDURE — A4216 STERILE WATER/SALINE, 10 ML: HCPCS | Performed by: INTERNAL MEDICINE

## 2023-06-15 PROCEDURE — 36415 COLL VENOUS BLD VENIPUNCTURE: CPT

## 2023-06-15 PROCEDURE — 85014 HEMATOCRIT: CPT

## 2023-06-15 PROCEDURE — 80053 COMPREHEN METABOLIC PANEL: CPT

## 2023-06-15 RX ORDER — LANOLIN ALCOHOL/MO/W.PET/CERES
3 CREAM (GRAM) TOPICAL NIGHTLY PRN
Status: DISCONTINUED | OUTPATIENT
Start: 2023-06-16 | End: 2023-06-23 | Stop reason: HOSPADM

## 2023-06-15 RX ORDER — HYDROXYZINE HYDROCHLORIDE 10 MG/1
10 TABLET, FILM COATED ORAL ONCE
Status: COMPLETED | OUTPATIENT
Start: 2023-06-15 | End: 2023-06-15

## 2023-06-15 RX ADMIN — POTASSIUM CHLORIDE 10 MEQ: 7.46 INJECTION, SOLUTION INTRAVENOUS at 03:03

## 2023-06-15 RX ADMIN — POTASSIUM CHLORIDE 10 MEQ: 7.46 INJECTION, SOLUTION INTRAVENOUS at 06:15

## 2023-06-15 RX ADMIN — POTASSIUM CHLORIDE 10 MEQ: 7.46 INJECTION, SOLUTION INTRAVENOUS at 05:15

## 2023-06-15 RX ADMIN — SODIUM CHLORIDE, PRESERVATIVE FREE 40 MG: 5 INJECTION INTRAVENOUS at 09:25

## 2023-06-15 RX ADMIN — SODIUM CHLORIDE: 9 INJECTION, SOLUTION INTRAVENOUS at 06:07

## 2023-06-15 RX ADMIN — HYDROXYZINE HYDROCHLORIDE 10 MG: 10 TABLET ORAL at 23:04

## 2023-06-15 RX ADMIN — SODIUM CHLORIDE, PRESERVATIVE FREE 10 ML: 5 INJECTION INTRAVENOUS at 20:50

## 2023-06-15 RX ADMIN — Medication: at 20:50

## 2023-06-15 RX ADMIN — SODIUM CHLORIDE, PRESERVATIVE FREE 40 MG: 5 INJECTION INTRAVENOUS at 20:50

## 2023-06-15 RX ADMIN — Medication: at 15:00

## 2023-06-15 RX ADMIN — Medication: at 09:27

## 2023-06-15 RX ADMIN — POLYETHYLENE GLYCOL-3350 AND ELECTROLYTES 4000 ML: 236; 6.74; 5.86; 2.97; 22.74 POWDER, FOR SOLUTION ORAL at 16:30

## 2023-06-15 RX ADMIN — SODIUM CHLORIDE: 9 INJECTION, SOLUTION INTRAVENOUS at 18:11

## 2023-06-15 RX ADMIN — SODIUM CHLORIDE, PRESERVATIVE FREE 10 ML: 5 INJECTION INTRAVENOUS at 09:26

## 2023-06-15 RX ADMIN — POTASSIUM CHLORIDE 10 MEQ: 7.46 INJECTION, SOLUTION INTRAVENOUS at 04:16

## 2023-06-16 LAB
ALBUMIN SERPL-MCNC: 2.8 G/DL (ref 3.5–5)
ALBUMIN/GLOB SERPL: 0.9 (ref 1.1–2.2)
ALP SERPL-CCNC: 51 U/L (ref 45–117)
ALT SERPL-CCNC: 9 U/L (ref 12–78)
ANION GAP SERPL CALC-SCNC: 8 MMOL/L (ref 5–15)
ANION GAP SERPL CALC-SCNC: 9 MMOL/L (ref 5–15)
AST SERPL-CCNC: 17 U/L (ref 15–37)
BASOPHILS # BLD: 0 K/UL (ref 0–0.1)
BASOPHILS NFR BLD: 0 % (ref 0–1)
BILIRUB SERPL-MCNC: 0.6 MG/DL (ref 0.2–1)
BUN SERPL-MCNC: 44 MG/DL (ref 6–20)
BUN SERPL-MCNC: 44 MG/DL (ref 6–20)
BUN/CREAT SERPL: 40 (ref 12–20)
BUN/CREAT SERPL: 41 (ref 12–20)
CALCIUM SERPL-MCNC: 7.4 MG/DL (ref 8.5–10.1)
CALCIUM SERPL-MCNC: 7.7 MG/DL (ref 8.5–10.1)
CHLORIDE SERPL-SCNC: 118 MMOL/L (ref 97–108)
CHLORIDE SERPL-SCNC: 118 MMOL/L (ref 97–108)
CO2 SERPL-SCNC: 19 MMOL/L (ref 21–32)
CO2 SERPL-SCNC: 19 MMOL/L (ref 21–32)
CREAT SERPL-MCNC: 1.08 MG/DL (ref 0.55–1.02)
CREAT SERPL-MCNC: 1.09 MG/DL (ref 0.55–1.02)
DIFFERENTIAL METHOD BLD: ABNORMAL
EOSINOPHIL # BLD: 0.1 K/UL (ref 0–0.4)
EOSINOPHIL NFR BLD: 1 % (ref 0–7)
ERYTHROCYTE [DISTWIDTH] IN BLOOD BY AUTOMATED COUNT: 18 % (ref 11.5–14.5)
GLOBULIN SER CALC-MCNC: 3 G/DL (ref 2–4)
GLUCOSE SERPL-MCNC: 121 MG/DL (ref 65–100)
GLUCOSE SERPL-MCNC: 128 MG/DL (ref 65–100)
HCT VFR BLD AUTO: 22.3 % (ref 35–47)
HCT VFR BLD AUTO: 24.5 % (ref 35–47)
HGB BLD-MCNC: 6.8 G/DL (ref 11.5–16)
HGB BLD-MCNC: 7.7 G/DL (ref 11.5–16)
HISTORY CHECK: NORMAL
IMM GRANULOCYTES # BLD AUTO: 0 K/UL (ref 0–0.04)
IMM GRANULOCYTES NFR BLD AUTO: 1 % (ref 0–0.5)
LYMPHOCYTES # BLD: 1.1 K/UL (ref 0.8–3.5)
LYMPHOCYTES NFR BLD: 18 % (ref 12–49)
MCH RBC QN AUTO: 29.4 PG (ref 26–34)
MCHC RBC AUTO-ENTMCNC: 30.5 G/DL (ref 30–36.5)
MCV RBC AUTO: 96.5 FL (ref 80–99)
MONOCYTES # BLD: 0.7 K/UL (ref 0–1)
MONOCYTES NFR BLD: 11 % (ref 5–13)
NEUTS SEG # BLD: 4.4 K/UL (ref 1.8–8)
NEUTS SEG NFR BLD: 70 % (ref 32–75)
NRBC # BLD: 0 K/UL (ref 0–0.01)
NRBC BLD-RTO: 0 PER 100 WBC
PLATELET # BLD AUTO: 155 K/UL (ref 150–400)
PMV BLD AUTO: 10.7 FL (ref 8.9–12.9)
POTASSIUM SERPL-SCNC: 3.4 MMOL/L (ref 3.5–5.1)
POTASSIUM SERPL-SCNC: 3.6 MMOL/L (ref 3.5–5.1)
PROT SERPL-MCNC: 5.8 G/DL (ref 6.4–8.2)
RBC # BLD AUTO: 2.31 M/UL (ref 3.8–5.2)
SODIUM SERPL-SCNC: 145 MMOL/L (ref 136–145)
SODIUM SERPL-SCNC: 146 MMOL/L (ref 136–145)
WBC # BLD AUTO: 6.3 K/UL (ref 3.6–11)

## 2023-06-16 PROCEDURE — 2580000003 HC RX 258: Performed by: INTERNAL MEDICINE

## 2023-06-16 PROCEDURE — 3700000001 HC ADD 15 MINUTES (ANESTHESIA): Performed by: INTERNAL MEDICINE

## 2023-06-16 PROCEDURE — A4216 STERILE WATER/SALINE, 10 ML: HCPCS | Performed by: INTERNAL MEDICINE

## 2023-06-16 PROCEDURE — 85018 HEMOGLOBIN: CPT

## 2023-06-16 PROCEDURE — 6360000002 HC RX W HCPCS: Performed by: INTERNAL MEDICINE

## 2023-06-16 PROCEDURE — 3600007502: Performed by: INTERNAL MEDICINE

## 2023-06-16 PROCEDURE — 36430 TRANSFUSION BLD/BLD COMPNT: CPT

## 2023-06-16 PROCEDURE — 7100000010 HC PHASE II RECOVERY - FIRST 15 MIN: Performed by: INTERNAL MEDICINE

## 2023-06-16 PROCEDURE — 36415 COLL VENOUS BLD VENIPUNCTURE: CPT

## 2023-06-16 PROCEDURE — 3600007512: Performed by: INTERNAL MEDICINE

## 2023-06-16 PROCEDURE — 88342 IMHCHEM/IMCYTCHM 1ST ANTB: CPT

## 2023-06-16 PROCEDURE — 88305 TISSUE EXAM BY PATHOLOGIST: CPT

## 2023-06-16 PROCEDURE — C9113 INJ PANTOPRAZOLE SODIUM, VIA: HCPCS | Performed by: INTERNAL MEDICINE

## 2023-06-16 PROCEDURE — 6370000000 HC RX 637 (ALT 250 FOR IP): Performed by: INTERNAL MEDICINE

## 2023-06-16 PROCEDURE — 0DB68ZX EXCISION OF STOMACH, VIA NATURAL OR ARTIFICIAL OPENING ENDOSCOPIC, DIAGNOSTIC: ICD-10-PCS | Performed by: INTERNAL MEDICINE

## 2023-06-16 PROCEDURE — 85014 HEMATOCRIT: CPT

## 2023-06-16 PROCEDURE — P9016 RBC LEUKOCYTES REDUCED: HCPCS

## 2023-06-16 PROCEDURE — 2709999900 HC NON-CHARGEABLE SUPPLY: Performed by: INTERNAL MEDICINE

## 2023-06-16 PROCEDURE — 80053 COMPREHEN METABOLIC PANEL: CPT

## 2023-06-16 PROCEDURE — 2060000000 HC ICU INTERMEDIATE R&B

## 2023-06-16 PROCEDURE — 85025 COMPLETE CBC W/AUTO DIFF WBC: CPT

## 2023-06-16 PROCEDURE — 3700000000 HC ANESTHESIA ATTENDED CARE: Performed by: INTERNAL MEDICINE

## 2023-06-16 PROCEDURE — 7100000011 HC PHASE II RECOVERY - ADDTL 15 MIN: Performed by: INTERNAL MEDICINE

## 2023-06-16 RX ORDER — SODIUM CHLORIDE 9 MG/ML
INJECTION, SOLUTION INTRAVENOUS PRN
Status: DISCONTINUED | OUTPATIENT
Start: 2023-06-16 | End: 2023-06-17 | Stop reason: ALTCHOICE

## 2023-06-16 RX ADMIN — SODIUM CHLORIDE, PRESERVATIVE FREE 10 ML: 5 INJECTION INTRAVENOUS at 21:03

## 2023-06-16 RX ADMIN — SODIUM CHLORIDE, PRESERVATIVE FREE 40 MG: 5 INJECTION INTRAVENOUS at 21:03

## 2023-06-16 RX ADMIN — Medication: at 17:37

## 2023-06-16 RX ADMIN — Medication: at 21:03

## 2023-06-16 RX ADMIN — Medication: at 11:28

## 2023-06-16 RX ADMIN — SODIUM CHLORIDE, PRESERVATIVE FREE 10 ML: 5 INJECTION INTRAVENOUS at 11:28

## 2023-06-16 RX ADMIN — SODIUM CHLORIDE: 9 INJECTION, SOLUTION INTRAVENOUS at 20:14

## 2023-06-16 RX ADMIN — SODIUM CHLORIDE, PRESERVATIVE FREE 40 MG: 5 INJECTION INTRAVENOUS at 11:27

## 2023-06-16 RX ADMIN — SODIUM CHLORIDE: 9 INJECTION, SOLUTION INTRAVENOUS at 09:30

## 2023-06-16 RX ADMIN — SODIUM CHLORIDE: 9 INJECTION, SOLUTION INTRAVENOUS at 04:00

## 2023-06-16 RX ADMIN — ACETAMINOPHEN 650 MG: 325 TABLET ORAL at 20:13

## 2023-06-16 ASSESSMENT — PAIN SCALES - GENERAL
PAINLEVEL_OUTOF10: 0
PAINLEVEL_OUTOF10: 4

## 2023-06-16 ASSESSMENT — PAIN - FUNCTIONAL ASSESSMENT: PAIN_FUNCTIONAL_ASSESSMENT: NONE - DENIES PAIN

## 2023-06-16 ASSESSMENT — PAIN DESCRIPTION - ORIENTATION: ORIENTATION: MID;RIGHT;LEFT

## 2023-06-16 ASSESSMENT — PAIN DESCRIPTION - DESCRIPTORS: DESCRIPTORS: ACHING

## 2023-06-16 ASSESSMENT — PAIN DESCRIPTION - LOCATION: LOCATION: HEAD

## 2023-06-17 LAB
ABO + RH BLD: NORMAL
BLD PROD TYP BPU: NORMAL
BLD PROD TYP BPU: NORMAL
BLOOD BANK DISPENSE STATUS: NORMAL
BLOOD BANK DISPENSE STATUS: NORMAL
BLOOD GROUP ANTIBODIES SERPL: NORMAL
BPU ID: NORMAL
BPU ID: NORMAL
CROSSMATCH RESULT: NORMAL
CROSSMATCH RESULT: NORMAL
HCT VFR BLD AUTO: 22.5 % (ref 35–47)
HCT VFR BLD AUTO: 25 % (ref 35–47)
HGB BLD-MCNC: 7.2 G/DL (ref 11.5–16)
HGB BLD-MCNC: 7.9 G/DL (ref 11.5–16)
SPECIMEN EXP DATE BLD: NORMAL
UNIT DIVISION: 0
UNIT DIVISION: 0

## 2023-06-17 PROCEDURE — 85018 HEMOGLOBIN: CPT

## 2023-06-17 PROCEDURE — 85014 HEMATOCRIT: CPT

## 2023-06-17 PROCEDURE — 2580000003 HC RX 258: Performed by: INTERNAL MEDICINE

## 2023-06-17 PROCEDURE — C9113 INJ PANTOPRAZOLE SODIUM, VIA: HCPCS | Performed by: INTERNAL MEDICINE

## 2023-06-17 PROCEDURE — 36415 COLL VENOUS BLD VENIPUNCTURE: CPT

## 2023-06-17 PROCEDURE — 6370000000 HC RX 637 (ALT 250 FOR IP): Performed by: HOSPITALIST

## 2023-06-17 PROCEDURE — A4216 STERILE WATER/SALINE, 10 ML: HCPCS | Performed by: INTERNAL MEDICINE

## 2023-06-17 PROCEDURE — 94640 AIRWAY INHALATION TREATMENT: CPT

## 2023-06-17 PROCEDURE — 6360000002 HC RX W HCPCS: Performed by: INTERNAL MEDICINE

## 2023-06-17 PROCEDURE — 2060000000 HC ICU INTERMEDIATE R&B

## 2023-06-17 PROCEDURE — 6370000000 HC RX 637 (ALT 250 FOR IP): Performed by: INTERNAL MEDICINE

## 2023-06-17 RX ORDER — FUROSEMIDE 40 MG/1
40 TABLET ORAL 2 TIMES DAILY
Status: DISCONTINUED | OUTPATIENT
Start: 2023-06-17 | End: 2023-06-17

## 2023-06-17 RX ORDER — ARFORMOTEROL TARTRATE 15 UG/2ML
15 SOLUTION RESPIRATORY (INHALATION) 2 TIMES DAILY
Status: DISCONTINUED | OUTPATIENT
Start: 2023-06-17 | End: 2023-06-23 | Stop reason: HOSPADM

## 2023-06-17 RX ORDER — LOSARTAN POTASSIUM 25 MG/1
25 TABLET ORAL 2 TIMES DAILY
Status: DISCONTINUED | OUTPATIENT
Start: 2023-06-17 | End: 2023-06-23 | Stop reason: HOSPADM

## 2023-06-17 RX ORDER — FUROSEMIDE 80 MG
80 TABLET ORAL 2 TIMES DAILY
Status: DISCONTINUED | OUTPATIENT
Start: 2023-06-17 | End: 2023-06-17

## 2023-06-17 RX ORDER — CARVEDILOL 12.5 MG/1
12.5 TABLET ORAL 2 TIMES DAILY WITH MEALS
Status: DISCONTINUED | OUTPATIENT
Start: 2023-06-17 | End: 2023-06-23 | Stop reason: HOSPADM

## 2023-06-17 RX ORDER — LIDOCAINE 4 G/G
1 PATCH TOPICAL DAILY
Status: DISCONTINUED | OUTPATIENT
Start: 2023-06-17 | End: 2023-06-23 | Stop reason: HOSPADM

## 2023-06-17 RX ORDER — ALBUTEROL SULFATE 90 UG/1
2 AEROSOL, METERED RESPIRATORY (INHALATION) EVERY 6 HOURS PRN
Status: DISCONTINUED | OUTPATIENT
Start: 2023-06-17 | End: 2023-06-17

## 2023-06-17 RX ORDER — BUDESONIDE 0.5 MG/2ML
0.5 INHALANT ORAL 2 TIMES DAILY
Status: DISCONTINUED | OUTPATIENT
Start: 2023-06-17 | End: 2023-06-23 | Stop reason: HOSPADM

## 2023-06-17 RX ORDER — ALBUTEROL SULFATE 2.5 MG/3ML
2.5 SOLUTION RESPIRATORY (INHALATION) EVERY 6 HOURS PRN
Status: DISCONTINUED | OUTPATIENT
Start: 2023-06-17 | End: 2023-06-23 | Stop reason: HOSPADM

## 2023-06-17 RX ORDER — IPRATROPIUM BROMIDE AND ALBUTEROL SULFATE 2.5; .5 MG/3ML; MG/3ML
1 SOLUTION RESPIRATORY (INHALATION) ONCE
Status: COMPLETED | OUTPATIENT
Start: 2023-06-17 | End: 2023-06-17

## 2023-06-17 RX ADMIN — Medication: at 21:22

## 2023-06-17 RX ADMIN — SODIUM CHLORIDE, PRESERVATIVE FREE 10 ML: 5 INJECTION INTRAVENOUS at 21:16

## 2023-06-17 RX ADMIN — BUDESONIDE INHALATION 500 MCG: 0.5 SUSPENSION RESPIRATORY (INHALATION) at 22:22

## 2023-06-17 RX ADMIN — SODIUM CHLORIDE, PRESERVATIVE FREE 40 MG: 5 INJECTION INTRAVENOUS at 09:18

## 2023-06-17 RX ADMIN — SODIUM CHLORIDE: 9 INJECTION, SOLUTION INTRAVENOUS at 12:48

## 2023-06-17 RX ADMIN — LOSARTAN POTASSIUM 25 MG: 25 TABLET, FILM COATED ORAL at 21:15

## 2023-06-17 RX ADMIN — SODIUM CHLORIDE, PRESERVATIVE FREE 10 ML: 5 INJECTION INTRAVENOUS at 09:18

## 2023-06-17 RX ADMIN — Medication: at 14:50

## 2023-06-17 RX ADMIN — MELATONIN 3 MG: at 21:28

## 2023-06-17 RX ADMIN — SODIUM CHLORIDE: 9 INJECTION, SOLUTION INTRAVENOUS at 03:46

## 2023-06-17 RX ADMIN — ARFORMOTEROL TARTRATE 15 MCG: 15 SOLUTION RESPIRATORY (INHALATION) at 22:22

## 2023-06-17 RX ADMIN — CARVEDILOL 12.5 MG: 12.5 TABLET, FILM COATED ORAL at 16:52

## 2023-06-17 RX ADMIN — IPRATROPIUM BROMIDE AND ALBUTEROL SULFATE 1 DOSE: 2.5; .5 SOLUTION RESPIRATORY (INHALATION) at 22:17

## 2023-06-17 RX ADMIN — SODIUM CHLORIDE, PRESERVATIVE FREE 40 MG: 5 INJECTION INTRAVENOUS at 21:15

## 2023-06-17 RX ADMIN — Medication: at 09:18

## 2023-06-17 RX ADMIN — LOSARTAN POTASSIUM 25 MG: 25 TABLET, FILM COATED ORAL at 14:50

## 2023-06-17 ASSESSMENT — PAIN SCALES - GENERAL: PAINLEVEL_OUTOF10: 0

## 2023-06-17 ASSESSMENT — PAIN SCALES - WONG BAKER: WONGBAKER_NUMERICALRESPONSE: 2

## 2023-06-17 ASSESSMENT — PAIN DESCRIPTION - ORIENTATION: ORIENTATION: RIGHT

## 2023-06-17 ASSESSMENT — PAIN DESCRIPTION - DESCRIPTORS: DESCRIPTORS: ACHING

## 2023-06-17 ASSESSMENT — PAIN DESCRIPTION - LOCATION: LOCATION: SHOULDER

## 2023-06-18 LAB
ANION GAP SERPL CALC-SCNC: 6 MMOL/L (ref 5–15)
BASOPHILS # BLD: 0.1 K/UL (ref 0–0.1)
BASOPHILS NFR BLD: 1 % (ref 0–1)
BUN SERPL-MCNC: 22 MG/DL (ref 6–20)
BUN/CREAT SERPL: 20 (ref 12–20)
CALCIUM SERPL-MCNC: 7.4 MG/DL (ref 8.5–10.1)
CHLORIDE SERPL-SCNC: 118 MMOL/L (ref 97–108)
CO2 SERPL-SCNC: 20 MMOL/L (ref 21–32)
CREAT SERPL-MCNC: 1.08 MG/DL (ref 0.55–1.02)
DIFFERENTIAL METHOD BLD: ABNORMAL
EOSINOPHIL # BLD: 0.1 K/UL (ref 0–0.4)
EOSINOPHIL NFR BLD: 1 % (ref 0–7)
ERYTHROCYTE [DISTWIDTH] IN BLOOD BY AUTOMATED COUNT: 20.6 % (ref 11.5–14.5)
GLUCOSE SERPL-MCNC: 127 MG/DL (ref 65–100)
HCT VFR BLD AUTO: 22.9 % (ref 35–47)
HGB BLD-MCNC: 7.3 G/DL (ref 11.5–16)
IMM GRANULOCYTES # BLD AUTO: 0 K/UL (ref 0–0.04)
IMM GRANULOCYTES NFR BLD AUTO: 0 % (ref 0–0.5)
LYMPHOCYTES # BLD: 0.7 K/UL (ref 0.8–3.5)
LYMPHOCYTES NFR BLD: 14 % (ref 12–49)
MCH RBC QN AUTO: 29.6 PG (ref 26–34)
MCHC RBC AUTO-ENTMCNC: 31.9 G/DL (ref 30–36.5)
MCV RBC AUTO: 92.7 FL (ref 80–99)
MONOCYTES # BLD: 0.3 K/UL (ref 0–1)
MONOCYTES NFR BLD: 7 % (ref 5–13)
NEUTS SEG # BLD: 3.7 K/UL (ref 1.8–8)
NEUTS SEG NFR BLD: 77 % (ref 32–75)
NRBC # BLD: 0 K/UL (ref 0–0.01)
NRBC BLD-RTO: 0 PER 100 WBC
PLATELET # BLD AUTO: 151 K/UL (ref 150–400)
PMV BLD AUTO: 11.1 FL (ref 8.9–12.9)
POTASSIUM SERPL-SCNC: 3.6 MMOL/L (ref 3.5–5.1)
RBC # BLD AUTO: 2.47 M/UL (ref 3.8–5.2)
RBC MORPH BLD: ABNORMAL
SODIUM SERPL-SCNC: 144 MMOL/L (ref 136–145)
WBC # BLD AUTO: 4.9 K/UL (ref 3.6–11)

## 2023-06-18 PROCEDURE — 6370000000 HC RX 637 (ALT 250 FOR IP): Performed by: INTERNAL MEDICINE

## 2023-06-18 PROCEDURE — 6360000002 HC RX W HCPCS: Performed by: INTERNAL MEDICINE

## 2023-06-18 PROCEDURE — 2580000003 HC RX 258: Performed by: INTERNAL MEDICINE

## 2023-06-18 PROCEDURE — C9113 INJ PANTOPRAZOLE SODIUM, VIA: HCPCS | Performed by: INTERNAL MEDICINE

## 2023-06-18 PROCEDURE — 94640 AIRWAY INHALATION TREATMENT: CPT

## 2023-06-18 PROCEDURE — 80048 BASIC METABOLIC PNL TOTAL CA: CPT

## 2023-06-18 PROCEDURE — 6370000000 HC RX 637 (ALT 250 FOR IP): Performed by: PHYSICIAN ASSISTANT

## 2023-06-18 PROCEDURE — 2060000000 HC ICU INTERMEDIATE R&B

## 2023-06-18 PROCEDURE — A4216 STERILE WATER/SALINE, 10 ML: HCPCS | Performed by: INTERNAL MEDICINE

## 2023-06-18 PROCEDURE — 6370000000 HC RX 637 (ALT 250 FOR IP): Performed by: HOSPITALIST

## 2023-06-18 PROCEDURE — 85025 COMPLETE CBC W/AUTO DIFF WBC: CPT

## 2023-06-18 PROCEDURE — 36415 COLL VENOUS BLD VENIPUNCTURE: CPT

## 2023-06-18 RX ADMIN — ACETAMINOPHEN 650 MG: 325 TABLET ORAL at 08:09

## 2023-06-18 RX ADMIN — Medication: at 14:46

## 2023-06-18 RX ADMIN — ARFORMOTEROL TARTRATE 15 MCG: 15 SOLUTION RESPIRATORY (INHALATION) at 09:18

## 2023-06-18 RX ADMIN — LOSARTAN POTASSIUM 25 MG: 25 TABLET, FILM COATED ORAL at 20:43

## 2023-06-18 RX ADMIN — LOSARTAN POTASSIUM 25 MG: 25 TABLET, FILM COATED ORAL at 08:09

## 2023-06-18 RX ADMIN — CARVEDILOL 12.5 MG: 12.5 TABLET, FILM COATED ORAL at 17:05

## 2023-06-18 RX ADMIN — BUDESONIDE INHALATION 500 MCG: 0.5 SUSPENSION RESPIRATORY (INHALATION) at 09:18

## 2023-06-18 RX ADMIN — BUDESONIDE INHALATION 500 MCG: 0.5 SUSPENSION RESPIRATORY (INHALATION) at 18:33

## 2023-06-18 RX ADMIN — Medication: at 08:10

## 2023-06-18 RX ADMIN — SODIUM CHLORIDE, PRESERVATIVE FREE 10 ML: 5 INJECTION INTRAVENOUS at 20:42

## 2023-06-18 RX ADMIN — POLYETHYLENE GLYCOL-3350 AND ELECTROLYTES 4000 ML: 236; 6.74; 5.86; 2.97; 22.74 POWDER, FOR SOLUTION ORAL at 10:01

## 2023-06-18 RX ADMIN — MELATONIN 3 MG: at 21:51

## 2023-06-18 RX ADMIN — SODIUM CHLORIDE, PRESERVATIVE FREE 40 MG: 5 INJECTION INTRAVENOUS at 08:09

## 2023-06-18 RX ADMIN — SODIUM CHLORIDE, PRESERVATIVE FREE 10 ML: 5 INJECTION INTRAVENOUS at 08:10

## 2023-06-18 RX ADMIN — SODIUM CHLORIDE, PRESERVATIVE FREE 40 MG: 5 INJECTION INTRAVENOUS at 20:43

## 2023-06-18 RX ADMIN — Medication: at 20:45

## 2023-06-18 RX ADMIN — ARFORMOTEROL TARTRATE 15 MCG: 15 SOLUTION RESPIRATORY (INHALATION) at 18:33

## 2023-06-18 RX ADMIN — CARVEDILOL 12.5 MG: 12.5 TABLET, FILM COATED ORAL at 08:09

## 2023-06-18 ASSESSMENT — PAIN SCALES - WONG BAKER: WONGBAKER_NUMERICALRESPONSE: 2

## 2023-06-18 ASSESSMENT — PAIN DESCRIPTION - ORIENTATION: ORIENTATION: RIGHT

## 2023-06-18 ASSESSMENT — PAIN DESCRIPTION - DESCRIPTORS: DESCRIPTORS: ACHING

## 2023-06-18 ASSESSMENT — PAIN DESCRIPTION - LOCATION: LOCATION: SHOULDER

## 2023-06-19 ENCOUNTER — ANESTHESIA EVENT (OUTPATIENT)
Facility: HOSPITAL | Age: 88
End: 2023-06-19
Payer: MEDICARE

## 2023-06-19 ENCOUNTER — ANESTHESIA (OUTPATIENT)
Facility: HOSPITAL | Age: 88
End: 2023-06-19
Payer: MEDICARE

## 2023-06-19 LAB
ANION GAP SERPL CALC-SCNC: 7 MMOL/L (ref 5–15)
BUN SERPL-MCNC: 17 MG/DL (ref 6–20)
BUN/CREAT SERPL: 16 (ref 12–20)
CALCIUM SERPL-MCNC: 7.8 MG/DL (ref 8.5–10.1)
CHLORIDE SERPL-SCNC: 115 MMOL/L (ref 97–108)
CO2 SERPL-SCNC: 21 MMOL/L (ref 21–32)
CREAT SERPL-MCNC: 1.09 MG/DL (ref 0.55–1.02)
ERYTHROCYTE [DISTWIDTH] IN BLOOD BY AUTOMATED COUNT: 19.9 % (ref 11.5–14.5)
GLUCOSE SERPL-MCNC: 121 MG/DL (ref 65–100)
HCT VFR BLD AUTO: 22.8 % (ref 35–47)
HCT VFR BLD AUTO: 23.1 % (ref 35–47)
HGB BLD-MCNC: 6.9 G/DL (ref 11.5–16)
HGB BLD-MCNC: 7.1 G/DL (ref 11.5–16)
MCH RBC QN AUTO: 28.6 PG (ref 26–34)
MCHC RBC AUTO-ENTMCNC: 30.7 G/DL (ref 30–36.5)
MCV RBC AUTO: 93.1 FL (ref 80–99)
NRBC # BLD: 0 K/UL (ref 0–0.01)
NRBC BLD-RTO: 0 PER 100 WBC
PLATELET # BLD AUTO: 154 K/UL (ref 150–400)
PMV BLD AUTO: 9.9 FL (ref 8.9–12.9)
POTASSIUM SERPL-SCNC: 3.2 MMOL/L (ref 3.5–5.1)
RBC # BLD AUTO: 2.48 M/UL (ref 3.8–5.2)
SODIUM SERPL-SCNC: 143 MMOL/L (ref 136–145)
WBC # BLD AUTO: 4.5 K/UL (ref 3.6–11)

## 2023-06-19 PROCEDURE — 85027 COMPLETE CBC AUTOMATED: CPT

## 2023-06-19 PROCEDURE — 3700000000 HC ANESTHESIA ATTENDED CARE: Performed by: SPECIALIST

## 2023-06-19 PROCEDURE — A4216 STERILE WATER/SALINE, 10 ML: HCPCS | Performed by: INTERNAL MEDICINE

## 2023-06-19 PROCEDURE — 85014 HEMATOCRIT: CPT

## 2023-06-19 PROCEDURE — 97530 THERAPEUTIC ACTIVITIES: CPT

## 2023-06-19 PROCEDURE — 2700000000 HC OXYGEN THERAPY PER DAY

## 2023-06-19 PROCEDURE — 6370000000 HC RX 637 (ALT 250 FOR IP): Performed by: INTERNAL MEDICINE

## 2023-06-19 PROCEDURE — 2500000003 HC RX 250 WO HCPCS

## 2023-06-19 PROCEDURE — 3600007512: Performed by: SPECIALIST

## 2023-06-19 PROCEDURE — 7100000010 HC PHASE II RECOVERY - FIRST 15 MIN: Performed by: SPECIALIST

## 2023-06-19 PROCEDURE — 2060000000 HC ICU INTERMEDIATE R&B

## 2023-06-19 PROCEDURE — 88305 TISSUE EXAM BY PATHOLOGIST: CPT

## 2023-06-19 PROCEDURE — 6370000000 HC RX 637 (ALT 250 FOR IP): Performed by: STUDENT IN AN ORGANIZED HEALTH CARE EDUCATION/TRAINING PROGRAM

## 2023-06-19 PROCEDURE — 30233N1 TRANSFUSION OF NONAUTOLOGOUS RED BLOOD CELLS INTO PERIPHERAL VEIN, PERCUTANEOUS APPROACH: ICD-10-PCS | Performed by: INTERNAL MEDICINE

## 2023-06-19 PROCEDURE — 97165 OT EVAL LOW COMPLEX 30 MIN: CPT

## 2023-06-19 PROCEDURE — 0DBN8ZX EXCISION OF SIGMOID COLON, VIA NATURAL OR ARTIFICIAL OPENING ENDOSCOPIC, DIAGNOSTIC: ICD-10-PCS | Performed by: SPECIALIST

## 2023-06-19 PROCEDURE — 2709999900 HC NON-CHARGEABLE SUPPLY: Performed by: SPECIALIST

## 2023-06-19 PROCEDURE — 6360000002 HC RX W HCPCS: Performed by: HOSPITALIST

## 2023-06-19 PROCEDURE — 36415 COLL VENOUS BLD VENIPUNCTURE: CPT

## 2023-06-19 PROCEDURE — 2580000003 HC RX 258: Performed by: INTERNAL MEDICINE

## 2023-06-19 PROCEDURE — 94640 AIRWAY INHALATION TREATMENT: CPT

## 2023-06-19 PROCEDURE — 97535 SELF CARE MNGMENT TRAINING: CPT

## 2023-06-19 PROCEDURE — 0DBH8ZX EXCISION OF CECUM, VIA NATURAL OR ARTIFICIAL OPENING ENDOSCOPIC, DIAGNOSTIC: ICD-10-PCS | Performed by: SPECIALIST

## 2023-06-19 PROCEDURE — 80048 BASIC METABOLIC PNL TOTAL CA: CPT

## 2023-06-19 PROCEDURE — 6360000002 HC RX W HCPCS

## 2023-06-19 PROCEDURE — 3700000001 HC ADD 15 MINUTES (ANESTHESIA): Performed by: SPECIALIST

## 2023-06-19 PROCEDURE — C9113 INJ PANTOPRAZOLE SODIUM, VIA: HCPCS | Performed by: INTERNAL MEDICINE

## 2023-06-19 PROCEDURE — 85018 HEMOGLOBIN: CPT

## 2023-06-19 PROCEDURE — 3600007502: Performed by: SPECIALIST

## 2023-06-19 PROCEDURE — 6360000002 HC RX W HCPCS: Performed by: INTERNAL MEDICINE

## 2023-06-19 RX ORDER — QUETIAPINE FUMARATE 25 MG/1
25 TABLET, FILM COATED ORAL 2 TIMES DAILY
Status: COMPLETED | OUTPATIENT
Start: 2023-06-19 | End: 2023-06-19

## 2023-06-19 RX ORDER — FUROSEMIDE 80 MG
80 TABLET ORAL 2 TIMES DAILY
Status: DISCONTINUED | OUTPATIENT
Start: 2023-06-19 | End: 2023-06-19

## 2023-06-19 RX ORDER — PHENYLEPHRINE HCL IN 0.9% NACL 0.4MG/10ML
SYRINGE (ML) INTRAVENOUS PRN
Status: DISCONTINUED | OUTPATIENT
Start: 2023-06-19 | End: 2023-06-19 | Stop reason: SDUPTHER

## 2023-06-19 RX ORDER — POTASSIUM CHLORIDE 750 MG/1
20 TABLET, FILM COATED, EXTENDED RELEASE ORAL ONCE
Status: COMPLETED | OUTPATIENT
Start: 2023-06-19 | End: 2023-06-19

## 2023-06-19 RX ORDER — FUROSEMIDE 20 MG/1
20 TABLET ORAL 2 TIMES DAILY
Status: DISCONTINUED | OUTPATIENT
Start: 2023-06-19 | End: 2023-06-19

## 2023-06-19 RX ORDER — SODIUM CHLORIDE 9 MG/ML
25 INJECTION, SOLUTION INTRAVENOUS PRN
Status: DISCONTINUED | OUTPATIENT
Start: 2023-06-19 | End: 2023-06-19 | Stop reason: HOSPADM

## 2023-06-19 RX ORDER — LEVOTHYROXINE SODIUM 0.03 MG/1
25 TABLET ORAL EVERY MORNING
Status: DISCONTINUED | OUTPATIENT
Start: 2023-06-20 | End: 2023-06-23 | Stop reason: HOSPADM

## 2023-06-19 RX ORDER — SODIUM CHLORIDE 0.9 % (FLUSH) 0.9 %
5-40 SYRINGE (ML) INJECTION PRN
Status: DISCONTINUED | OUTPATIENT
Start: 2023-06-19 | End: 2023-06-19 | Stop reason: HOSPADM

## 2023-06-19 RX ORDER — FUROSEMIDE 10 MG/ML
20 INJECTION INTRAMUSCULAR; INTRAVENOUS ONCE
Status: COMPLETED | OUTPATIENT
Start: 2023-06-19 | End: 2023-06-19

## 2023-06-19 RX ORDER — ALBUTEROL SULFATE 90 UG/1
2 AEROSOL, METERED RESPIRATORY (INHALATION) EVERY 6 HOURS PRN
Status: DISCONTINUED | OUTPATIENT
Start: 2023-06-19 | End: 2023-06-19

## 2023-06-19 RX ORDER — FUROSEMIDE 80 MG
80 TABLET ORAL 2 TIMES DAILY
Status: DISCONTINUED | OUTPATIENT
Start: 2023-06-19 | End: 2023-06-23 | Stop reason: HOSPADM

## 2023-06-19 RX ORDER — ALBUTEROL SULFATE 2.5 MG/3ML
2.5 SOLUTION RESPIRATORY (INHALATION) EVERY 6 HOURS PRN
Status: DISCONTINUED | OUTPATIENT
Start: 2023-06-19 | End: 2023-06-23 | Stop reason: HOSPADM

## 2023-06-19 RX ORDER — LIDOCAINE 50 MG/G
1 PATCH TOPICAL DAILY
COMMUNITY

## 2023-06-19 RX ORDER — SODIUM CHLORIDE 0.9 % (FLUSH) 0.9 %
5-40 SYRINGE (ML) INJECTION EVERY 12 HOURS SCHEDULED
Status: DISCONTINUED | OUTPATIENT
Start: 2023-06-19 | End: 2023-06-19 | Stop reason: SDUPTHER

## 2023-06-19 RX ORDER — POTASSIUM CHLORIDE 20 MEQ/1
10 TABLET, EXTENDED RELEASE ORAL 2 TIMES DAILY
Status: DISCONTINUED | OUTPATIENT
Start: 2023-06-19 | End: 2023-06-23 | Stop reason: HOSPADM

## 2023-06-19 RX ADMIN — POTASSIUM CHLORIDE 10 MEQ: 20 TABLET, EXTENDED RELEASE ORAL at 23:20

## 2023-06-19 RX ADMIN — BUDESONIDE INHALATION 500 MCG: 0.5 SUSPENSION RESPIRATORY (INHALATION) at 18:34

## 2023-06-19 RX ADMIN — PROPOFOL 60 MG: 10 INJECTION, EMULSION INTRAVENOUS at 15:06

## 2023-06-19 RX ADMIN — ARFORMOTEROL TARTRATE 15 MCG: 15 SOLUTION RESPIRATORY (INHALATION) at 18:34

## 2023-06-19 RX ADMIN — CARVEDILOL 12.5 MG: 12.5 TABLET, FILM COATED ORAL at 09:15

## 2023-06-19 RX ADMIN — Medication 80 MCG: at 15:04

## 2023-06-19 RX ADMIN — FUROSEMIDE 20 MG: 20 TABLET ORAL at 16:43

## 2023-06-19 RX ADMIN — SODIUM CHLORIDE, PRESERVATIVE FREE 40 MG: 5 INJECTION INTRAVENOUS at 09:14

## 2023-06-19 RX ADMIN — LOSARTAN POTASSIUM 25 MG: 25 TABLET, FILM COATED ORAL at 09:15

## 2023-06-19 RX ADMIN — FUROSEMIDE 20 MG: 10 INJECTION, SOLUTION INTRAMUSCULAR; INTRAVENOUS at 04:50

## 2023-06-19 RX ADMIN — Medication: at 20:15

## 2023-06-19 RX ADMIN — SODIUM CHLORIDE, PRESERVATIVE FREE 10 ML: 5 INJECTION INTRAVENOUS at 20:15

## 2023-06-19 RX ADMIN — LOSARTAN POTASSIUM 25 MG: 25 TABLET, FILM COATED ORAL at 20:20

## 2023-06-19 RX ADMIN — PROPOFOL 40 MG: 10 INJECTION, EMULSION INTRAVENOUS at 14:34

## 2023-06-19 RX ADMIN — Medication: at 16:43

## 2023-06-19 RX ADMIN — Medication: at 09:16

## 2023-06-19 RX ADMIN — Medication 80 MCG: at 14:54

## 2023-06-19 RX ADMIN — BUDESONIDE INHALATION 500 MCG: 0.5 SUSPENSION RESPIRATORY (INHALATION) at 08:30

## 2023-06-19 RX ADMIN — SODIUM CHLORIDE, PRESERVATIVE FREE 10 ML: 5 INJECTION INTRAVENOUS at 09:15

## 2023-06-19 RX ADMIN — CARVEDILOL 12.5 MG: 12.5 TABLET, FILM COATED ORAL at 16:43

## 2023-06-19 RX ADMIN — ARFORMOTEROL TARTRATE 15 MCG: 15 SOLUTION RESPIRATORY (INHALATION) at 08:30

## 2023-06-19 RX ADMIN — FUROSEMIDE 80 MG: 80 TABLET ORAL at 20:20

## 2023-06-19 RX ADMIN — SODIUM CHLORIDE, PRESERVATIVE FREE 40 MG: 5 INJECTION INTRAVENOUS at 20:20

## 2023-06-19 RX ADMIN — POTASSIUM CHLORIDE 20 MEQ: 750 TABLET, FILM COATED, EXTENDED RELEASE ORAL at 16:43

## 2023-06-19 RX ADMIN — ALBUTEROL SULFATE 2.5 MG: 2.5 SOLUTION RESPIRATORY (INHALATION) at 18:30

## 2023-06-19 RX ADMIN — QUETIAPINE FUMARATE 25 MG: 25 TABLET ORAL at 23:19

## 2023-06-19 NOTE — PROCEDURES
and sent to pathology. Complications: None were encountered during the procedure. EBL: < 50 ml.     Recommendations:   -F/U Path to determine if lesion in sigmoid is malignant and based on this will need further treatment.  -do not resume xarelto for 7 days  Signed By: Karel Quiles MD                        June 19, 2023

## 2023-06-19 NOTE — ANESTHESIA PRE PROCEDURE
found for: COVID19        Anesthesia Evaluation  Patient summary reviewed and Nursing notes reviewed no history of anesthetic complications:   Airway: Mallampati: III          Dental:    (+) other and poor dentition  Comment: Upper right front incisor loose    Pulmonary:normal exam                               Cardiovascular:  Exercise tolerance: poor (<4 METS),   (+) hypertension:, pacemaker: pacemaker, dysrhythmias: atrial fibrillation, CHF:, hyperlipidemia      ECG reviewed  Rhythm: regular  Rate: normal  Echocardiogram reviewed         Beta Blocker:  Dose within 24 Hrs      ROS comment: Patient taking Xarelto    TTE (6/13/23):   Left Ventricle: Mildly reduced left ventricular systolic function with a visually estimated EF of 45 - 50%. Left ventricle size is normal. Normal wall thickness. Mild global hypokinesis present.   Aortic Valve: Mild stenosis of the aortic valve. AV mean gradient is 11 mmHg.   Mitral Valve: Mild to moderate regurgitation.   Tricuspid Valve: The estimated RVSP is 45 mmHg.   Left Atrium: Left atrium is dilated.     ECG (6/13/23): Atrial fibrillation with frequent ventricular-paced complexes   ST & T wave abnormality, consider anterolateral ischemia   When compared with ECG of 06-JUL-2019 20:58,   Electronic ventricular pacemaker has replaced Atrial fibrillation     Neuro/Psych:   (+) CVA:, TIA, psychiatric history:             ROS comment: Vascular dementia GI/Hepatic/Renal:   (+) renal disease (CRI, Stage III): CRI,          ROS comment: GI Bleed. Endo/Other:    (+) hypothyroidism, blood dyscrasia (Hgb = 7.3 on 6/18/23): anemia:., .                 Abdominal:             Vascular: Other Findings:             Anesthesia Plan      MAC     ASA 3       Induction: intravenous. Anesthetic plan and risks discussed with patient. Use of blood products discussed with patient whom. Plan discussed with CRNA.     Attending anesthesiologist reviewed and agrees with

## 2023-06-19 NOTE — H&P
Gastroenterology Preprocedural  History and Physical    Patient: Fabian Nathanael    Physician: Felix Padron MD    Vital Signs: Blood pressure (!) 171/60, pulse 77, temperature 98.3 °F (36.8 °C), temperature source Oral, resp. rate 13, height 1.702 m (5' 7\"), weight 68 kg (150 lb), SpO2 99 %. Allergies: Allergies   Allergen Reactions    Ace Inhibitors Cough       Chief Complaint: Severe anemia, dark stool on xarelto PTA    History of Present Illness: above    Justification for Procedure: above    History:  Past Medical History:   Diagnosis Date    Atrial fibrillation ([facility] [address] 2007    Hypercholesterolemia     Hypertension     Osteoarthritis 2010    [facility] ([facility]     Thromboembolus [facility])     Thyroid disease       Past Surgical History:   Procedure Laterality Date    HEENT      cataract sx    HIP FRACTURE SURGERY  2013    right hip 1989 left hip 2013    PACEMAKER  2008    [place] UNLISTED PROCEDURE CARDIAC SURGERY      TOTAL HIP ARTHROPLASTY  1989    UPPER GASTROINTESTINAL ENDOSCOPY N/A 6/16/2023    EGD ESOPHAGOGASTRODUODENOSCOPY WITH BXS performed by Izabella Gabriel MD at \[facility]\"" ENDOSCOPY      Social History     Socioeconomic History    Marital status:      Spouse name: None    Number of children: None    Years of education: None    Highest education level: None   Tobacco Use    Smoking status: Never    Smokeless tobacco: Never   Substance and Sexual Activity    Alcohol use: No     Alcohol/week: 0.0 standard drinks    Drug use: No   Social History Narrative    11/4/16 Pt lives with son Pancho Carlos # [phone]. Dtr [facility] #[phone]. Pt has both a rollator and a rolling walker in the home.      Social Determinants of Health     Financial Resource Strain: Low Risk     Difficulty of Paying Living Expenses: Not hard at all   Food Insecurity: No Food Insecurity    Worried About [facility] in the Last Year: Never true    Ran Out of Food in the Last Year: Never true

## 2023-06-19 NOTE — ANESTHESIA POSTPROCEDURE EVALUATION
Department of Anesthesiology  Postprocedure Note    Patient:  Valarie Landrum  MRN: [id number]  YOB: 1924  Date of evaluation: 6/19/2023      Procedure Summary     Date: 06/19/23 Room / Location: [facility] ENDO 03 / [facility] ENDOSCOPY    Anesthesia Start: 7181 Anesthesia Stop: 4238    Procedure: COLONOSCOPY DIAGNOSTIC/ biopsy /injection amber inkand poly removal Diagnosis:       Anemia, unspecified type      (Anemia, unspecified type [D64.9])    Surgeons: Francesco Garnica MD Responsible Provider: Beverley Munoz MD    Anesthesia Type: MAC ASA Status: 3          Anesthesia Type: MAC    Roxanne Phase I: Roxanne Score: 10    Roxanne Phase II: Roxanne Score: 10      Anesthesia Post Evaluation    Patient location during evaluation: PACU  Patient participation: complete - patient participated  Level of consciousness: sleepy but conscious and responsive to verbal stimuli  Airway patency: patent  Nausea & Vomiting: no vomiting and no nausea  Complications: no  Cardiovascular status: blood pressure returned to baseline and hemodynamically stable  Respiratory status: acceptable  Hydration status: stable

## 2023-06-20 LAB
ANION GAP SERPL CALC-SCNC: 7 MMOL/L (ref 5–15)
BUN SERPL-MCNC: 17 MG/DL (ref 6–20)
BUN/CREAT SERPL: 13 (ref 12–20)
CALCIUM SERPL-MCNC: 7.8 MG/DL (ref 8.5–10.1)
CHLORIDE SERPL-SCNC: 114 MMOL/L (ref 97–108)
CO2 SERPL-SCNC: 21 MMOL/L (ref 21–32)
CREAT SERPL-MCNC: 1.28 MG/DL (ref 0.55–1.02)
ERYTHROCYTE [DISTWIDTH] IN BLOOD BY AUTOMATED COUNT: 19.7 % (ref 11.5–14.5)
GLUCOSE SERPL-MCNC: 136 MG/DL (ref 65–100)
HCT VFR BLD AUTO: 22.2 % (ref 35–47)
HCT VFR BLD AUTO: 25.1 % (ref 35–47)
HGB BLD-MCNC: 6.7 G/DL (ref 11.5–16)
HGB BLD-MCNC: 8.1 G/DL (ref 11.5–16)
HISTORY CHECK: NORMAL
MCH RBC QN AUTO: 28.4 PG (ref 26–34)
MCHC RBC AUTO-ENTMCNC: 30.2 G/DL (ref 30–36.5)
MCV RBC AUTO: 94.1 FL (ref 80–99)
NRBC # BLD: 0 K/UL (ref 0–0.01)
NRBC BLD-RTO: 0 PER 100 WBC
PLATELET # BLD AUTO: 164 K/UL (ref 150–400)
PMV BLD AUTO: 10.2 FL (ref 8.9–12.9)
POTASSIUM SERPL-SCNC: 3.4 MMOL/L (ref 3.5–5.1)
RBC # BLD AUTO: 2.36 M/UL (ref 3.8–5.2)
SODIUM SERPL-SCNC: 142 MMOL/L (ref 136–145)
WBC # BLD AUTO: 5.1 K/UL (ref 3.6–11)

## 2023-06-20 PROCEDURE — 86900 BLOOD TYPING SEROLOGIC ABO: CPT

## 2023-06-20 PROCEDURE — 94640 AIRWAY INHALATION TREATMENT: CPT

## 2023-06-20 PROCEDURE — 1100000003 HC PRIVATE W/ TELEMETRY

## 2023-06-20 PROCEDURE — A4216 STERILE WATER/SALINE, 10 ML: HCPCS | Performed by: INTERNAL MEDICINE

## 2023-06-20 PROCEDURE — 36430 TRANSFUSION BLD/BLD COMPNT: CPT

## 2023-06-20 PROCEDURE — 36415 COLL VENOUS BLD VENIPUNCTURE: CPT

## 2023-06-20 PROCEDURE — C9113 INJ PANTOPRAZOLE SODIUM, VIA: HCPCS | Performed by: INTERNAL MEDICINE

## 2023-06-20 PROCEDURE — 6360000002 HC RX W HCPCS: Performed by: INTERNAL MEDICINE

## 2023-06-20 PROCEDURE — 2580000003 HC RX 258: Performed by: INTERNAL MEDICINE

## 2023-06-20 PROCEDURE — 6370000000 HC RX 637 (ALT 250 FOR IP): Performed by: HOSPITALIST

## 2023-06-20 PROCEDURE — 6370000000 HC RX 637 (ALT 250 FOR IP): Performed by: STUDENT IN AN ORGANIZED HEALTH CARE EDUCATION/TRAINING PROGRAM

## 2023-06-20 PROCEDURE — 86923 COMPATIBILITY TEST ELECTRIC: CPT

## 2023-06-20 PROCEDURE — 80048 BASIC METABOLIC PNL TOTAL CA: CPT

## 2023-06-20 PROCEDURE — 85018 HEMOGLOBIN: CPT

## 2023-06-20 PROCEDURE — P9016 RBC LEUKOCYTES REDUCED: HCPCS

## 2023-06-20 PROCEDURE — 85014 HEMATOCRIT: CPT

## 2023-06-20 PROCEDURE — 86850 RBC ANTIBODY SCREEN: CPT

## 2023-06-20 PROCEDURE — 85027 COMPLETE CBC AUTOMATED: CPT

## 2023-06-20 PROCEDURE — 6370000000 HC RX 637 (ALT 250 FOR IP): Performed by: INTERNAL MEDICINE

## 2023-06-20 PROCEDURE — 86901 BLOOD TYPING SEROLOGIC RH(D): CPT

## 2023-06-20 RX ORDER — SODIUM CHLORIDE 9 MG/ML
INJECTION, SOLUTION INTRAVENOUS PRN
Status: DISCONTINUED | OUTPATIENT
Start: 2023-06-20 | End: 2023-06-23 | Stop reason: HOSPADM

## 2023-06-20 RX ADMIN — CARVEDILOL 12.5 MG: 12.5 TABLET, FILM COATED ORAL at 08:43

## 2023-06-20 RX ADMIN — BUDESONIDE INHALATION 500 MCG: 0.5 SUSPENSION RESPIRATORY (INHALATION) at 07:21

## 2023-06-20 RX ADMIN — LOSARTAN POTASSIUM 25 MG: 25 TABLET, FILM COATED ORAL at 21:06

## 2023-06-20 RX ADMIN — POTASSIUM CHLORIDE 10 MEQ: 20 TABLET, EXTENDED RELEASE ORAL at 08:43

## 2023-06-20 RX ADMIN — ARFORMOTEROL TARTRATE 15 MCG: 15 SOLUTION RESPIRATORY (INHALATION) at 07:22

## 2023-06-20 RX ADMIN — CARVEDILOL 12.5 MG: 12.5 TABLET, FILM COATED ORAL at 17:47

## 2023-06-20 RX ADMIN — SODIUM CHLORIDE, PRESERVATIVE FREE 40 MG: 5 INJECTION INTRAVENOUS at 08:43

## 2023-06-20 RX ADMIN — SODIUM CHLORIDE, PRESERVATIVE FREE 10 ML: 5 INJECTION INTRAVENOUS at 08:44

## 2023-06-20 RX ADMIN — LOSARTAN POTASSIUM 25 MG: 25 TABLET, FILM COATED ORAL at 08:43

## 2023-06-20 RX ADMIN — FUROSEMIDE 80 MG: 80 TABLET ORAL at 08:43

## 2023-06-20 RX ADMIN — FUROSEMIDE 80 MG: 80 TABLET ORAL at 21:06

## 2023-06-20 RX ADMIN — POTASSIUM CHLORIDE 10 MEQ: 20 TABLET, EXTENDED RELEASE ORAL at 21:06

## 2023-06-20 RX ADMIN — SERTRALINE 100 MG: 50 TABLET, FILM COATED ORAL at 17:47

## 2023-06-20 RX ADMIN — SODIUM CHLORIDE, PRESERVATIVE FREE 40 MG: 5 INJECTION INTRAVENOUS at 21:06

## 2023-06-20 RX ADMIN — LEVOTHYROXINE SODIUM 25 MCG: 0.03 TABLET ORAL at 08:43

## 2023-06-20 RX ADMIN — BUDESONIDE INHALATION 500 MCG: 0.5 SUSPENSION RESPIRATORY (INHALATION) at 20:21

## 2023-06-20 RX ADMIN — Medication: at 21:07

## 2023-06-20 RX ADMIN — SODIUM CHLORIDE, PRESERVATIVE FREE 10 ML: 5 INJECTION INTRAVENOUS at 21:07

## 2023-06-20 RX ADMIN — MELATONIN 3 MG: at 22:29

## 2023-06-20 RX ADMIN — Medication: at 13:00

## 2023-06-20 RX ADMIN — ARFORMOTEROL TARTRATE 15 MCG: 15 SOLUTION RESPIRATORY (INHALATION) at 20:21

## 2023-06-20 RX ADMIN — Medication: at 08:44

## 2023-06-20 ASSESSMENT — PAIN SCALES - GENERAL
PAINLEVEL_OUTOF10: 0

## 2023-06-21 ENCOUNTER — TELEPHONE (OUTPATIENT)
Age: 88
End: 2023-06-21

## 2023-06-21 PROBLEM — C18.7 MALIGNANT NEOPLASM OF SIGMOID COLON (HCC): Status: ACTIVE | Noted: 2023-01-01

## 2023-06-21 PROBLEM — F02.811 LATE ONSET ALZHEIMER'S DEMENTIA WITH AGITATION (HCC): Status: ACTIVE | Noted: 2023-01-01

## 2023-06-21 PROBLEM — G30.1 LATE ONSET ALZHEIMER'S DEMENTIA WITH AGITATION (HCC): Status: ACTIVE | Noted: 2023-01-01

## 2023-06-21 PROBLEM — K92.2 ACUTE UPPER GI BLEED: Status: ACTIVE | Noted: 2023-06-21

## 2023-06-21 PROBLEM — D62 ACUTE BLOOD LOSS ANEMIA: Status: ACTIVE | Noted: 2023-06-21

## 2023-06-21 LAB
ABO + RH BLD: NORMAL
ANION GAP SERPL CALC-SCNC: 7 MMOL/L (ref 5–15)
BLD PROD TYP BPU: NORMAL
BLOOD BANK DISPENSE STATUS: NORMAL
BLOOD GROUP ANTIBODIES SERPL: NORMAL
BPU ID: NORMAL
BUN SERPL-MCNC: 21 MG/DL (ref 6–20)
BUN/CREAT SERPL: 15 (ref 12–20)
CALCIUM SERPL-MCNC: 7.7 MG/DL (ref 8.5–10.1)
CHLORIDE SERPL-SCNC: 116 MMOL/L (ref 97–108)
CO2 SERPL-SCNC: 21 MMOL/L (ref 21–32)
CREAT SERPL-MCNC: 1.36 MG/DL (ref 0.55–1.02)
CROSSMATCH RESULT: NORMAL
ERYTHROCYTE [DISTWIDTH] IN BLOOD BY AUTOMATED COUNT: 20.2 % (ref 11.5–14.5)
GLUCOSE SERPL-MCNC: 133 MG/DL (ref 65–100)
HCT VFR BLD AUTO: 25 % (ref 35–47)
HGB BLD-MCNC: 7.7 G/DL (ref 11.5–16)
MCH RBC QN AUTO: 28.1 PG (ref 26–34)
MCHC RBC AUTO-ENTMCNC: 30.8 G/DL (ref 30–36.5)
MCV RBC AUTO: 91.2 FL (ref 80–99)
NRBC # BLD: 0 K/UL (ref 0–0.01)
NRBC BLD-RTO: 0 PER 100 WBC
PLATELET # BLD AUTO: 152 K/UL (ref 150–400)
PMV BLD AUTO: 10 FL (ref 8.9–12.9)
POTASSIUM SERPL-SCNC: 3.1 MMOL/L (ref 3.5–5.1)
RBC # BLD AUTO: 2.74 M/UL (ref 3.8–5.2)
SODIUM SERPL-SCNC: 144 MMOL/L (ref 136–145)
SPECIMEN EXP DATE BLD: NORMAL
UNIT DIVISION: 0
WBC # BLD AUTO: 5.3 K/UL (ref 3.6–11)

## 2023-06-21 PROCEDURE — A4216 STERILE WATER/SALINE, 10 ML: HCPCS | Performed by: INTERNAL MEDICINE

## 2023-06-21 PROCEDURE — 80048 BASIC METABOLIC PNL TOTAL CA: CPT

## 2023-06-21 PROCEDURE — 97535 SELF CARE MNGMENT TRAINING: CPT

## 2023-06-21 PROCEDURE — 99221 1ST HOSP IP/OBS SF/LOW 40: CPT | Performed by: NURSE PRACTITIONER

## 2023-06-21 PROCEDURE — 2580000003 HC RX 258: Performed by: INTERNAL MEDICINE

## 2023-06-21 PROCEDURE — 6360000002 HC RX W HCPCS: Performed by: INTERNAL MEDICINE

## 2023-06-21 PROCEDURE — 6370000000 HC RX 637 (ALT 250 FOR IP): Performed by: INTERNAL MEDICINE

## 2023-06-21 PROCEDURE — 94640 AIRWAY INHALATION TREATMENT: CPT

## 2023-06-21 PROCEDURE — C9113 INJ PANTOPRAZOLE SODIUM, VIA: HCPCS | Performed by: INTERNAL MEDICINE

## 2023-06-21 PROCEDURE — 6370000000 HC RX 637 (ALT 250 FOR IP): Performed by: STUDENT IN AN ORGANIZED HEALTH CARE EDUCATION/TRAINING PROGRAM

## 2023-06-21 PROCEDURE — 36415 COLL VENOUS BLD VENIPUNCTURE: CPT

## 2023-06-21 PROCEDURE — 97161 PT EVAL LOW COMPLEX 20 MIN: CPT

## 2023-06-21 PROCEDURE — 6370000000 HC RX 637 (ALT 250 FOR IP): Performed by: HOSPITALIST

## 2023-06-21 PROCEDURE — 97116 GAIT TRAINING THERAPY: CPT

## 2023-06-21 PROCEDURE — 85027 COMPLETE CBC AUTOMATED: CPT

## 2023-06-21 PROCEDURE — 1100000003 HC PRIVATE W/ TELEMETRY

## 2023-06-21 RX ORDER — POTASSIUM CHLORIDE 20 MEQ/1
40 TABLET, EXTENDED RELEASE ORAL ONCE
Status: COMPLETED | OUTPATIENT
Start: 2023-06-21 | End: 2023-06-21

## 2023-06-21 RX ORDER — POTASSIUM CHLORIDE 750 MG/1
20 TABLET, FILM COATED, EXTENDED RELEASE ORAL ONCE
Status: DISCONTINUED | OUTPATIENT
Start: 2023-06-21 | End: 2023-06-21

## 2023-06-21 RX ADMIN — Medication: at 20:14

## 2023-06-21 RX ADMIN — SODIUM CHLORIDE, PRESERVATIVE FREE 40 MG: 5 INJECTION INTRAVENOUS at 08:32

## 2023-06-21 RX ADMIN — ARFORMOTEROL TARTRATE 15 MCG: 15 SOLUTION RESPIRATORY (INHALATION) at 07:42

## 2023-06-21 RX ADMIN — FUROSEMIDE 80 MG: 80 TABLET ORAL at 08:33

## 2023-06-21 RX ADMIN — SODIUM CHLORIDE, PRESERVATIVE FREE 40 MG: 5 INJECTION INTRAVENOUS at 20:15

## 2023-06-21 RX ADMIN — LOSARTAN POTASSIUM 25 MG: 25 TABLET, FILM COATED ORAL at 08:33

## 2023-06-21 RX ADMIN — Medication: at 13:49

## 2023-06-21 RX ADMIN — CARVEDILOL 12.5 MG: 12.5 TABLET, FILM COATED ORAL at 08:32

## 2023-06-21 RX ADMIN — BUDESONIDE INHALATION 500 MCG: 0.5 SUSPENSION RESPIRATORY (INHALATION) at 21:25

## 2023-06-21 RX ADMIN — POTASSIUM CHLORIDE 10 MEQ: 20 TABLET, EXTENDED RELEASE ORAL at 08:33

## 2023-06-21 RX ADMIN — POTASSIUM CHLORIDE 40 MEQ: 20 TABLET, EXTENDED RELEASE ORAL at 08:32

## 2023-06-21 RX ADMIN — ARFORMOTEROL TARTRATE 15 MCG: 15 SOLUTION RESPIRATORY (INHALATION) at 21:25

## 2023-06-21 RX ADMIN — LEVOTHYROXINE SODIUM 25 MCG: 0.03 TABLET ORAL at 08:32

## 2023-06-21 RX ADMIN — MELATONIN 3 MG: at 23:40

## 2023-06-21 RX ADMIN — FUROSEMIDE 80 MG: 80 TABLET ORAL at 20:15

## 2023-06-21 RX ADMIN — SODIUM CHLORIDE, PRESERVATIVE FREE 10 ML: 5 INJECTION INTRAVENOUS at 08:32

## 2023-06-21 RX ADMIN — LOSARTAN POTASSIUM 25 MG: 25 TABLET, FILM COATED ORAL at 20:15

## 2023-06-21 RX ADMIN — POTASSIUM CHLORIDE 10 MEQ: 20 TABLET, EXTENDED RELEASE ORAL at 20:15

## 2023-06-21 RX ADMIN — CARVEDILOL 12.5 MG: 12.5 TABLET, FILM COATED ORAL at 16:40

## 2023-06-21 RX ADMIN — BUDESONIDE INHALATION 500 MCG: 0.5 SUSPENSION RESPIRATORY (INHALATION) at 07:42

## 2023-06-21 RX ADMIN — SERTRALINE 100 MG: 50 TABLET, FILM COATED ORAL at 08:34

## 2023-06-21 RX ADMIN — Medication: at 08:32

## 2023-06-21 ASSESSMENT — PAIN SCALES - GENERAL
PAINLEVEL_OUTOF10: 0

## 2023-06-21 NOTE — TELEPHONE ENCOUNTER
Spoke with daughter Amando Tarango.     Sammie is anxious about the current plan, patient is in the hospital

## 2023-06-21 NOTE — TELEPHONE ENCOUNTER
Consult:    Name: Aramis Garcia    : 24    Provider: Tiki Mendez NP    Reason: Nex Dx Colon Cancer    Room: [place] [address]    Phone: [phone]    Nurse: Ricky Murillo

## 2023-06-21 NOTE — TELEPHONE ENCOUNTER
Spoke with Pt's Daughter Aminata Eisenberg) about rescheduling on (06/28/2023) and stated her Mom Marycarmen Oates) was currently in the Hospital and is not doing well. Ms. Kennard Collet would like to personally speak to Dr. Mary Hernandez and would like to get a detailed personal opinion on what to do and how to move forward with Ms. Pastranaha Millrift health/condition/treatment. Ms. Kennard Collet also stated the hospital staff was throwing a lot of information at her about her Mom and would just like to speak to Dr. Mary Hernandez personally.

## 2023-06-22 DIAGNOSIS — I48.0 PAROXYSMAL ATRIAL FIBRILLATION (HCC): Primary | ICD-10-CM

## 2023-06-22 DIAGNOSIS — I63.411 CEREBRAL INFARCTION DUE TO EMBOLISM OF RIGHT MIDDLE CEREBRAL ARTERY (HCC): ICD-10-CM

## 2023-06-22 LAB
ERYTHROCYTE [DISTWIDTH] IN BLOOD BY AUTOMATED COUNT: 19.6 % (ref 11.5–14.5)
HCT VFR BLD AUTO: 23.5 % (ref 35–47)
HGB BLD-MCNC: 7.5 G/DL (ref 11.5–16)
MCH RBC QN AUTO: 28.7 PG (ref 26–34)
MCHC RBC AUTO-ENTMCNC: 31.9 G/DL (ref 30–36.5)
MCV RBC AUTO: 90 FL (ref 80–99)
NRBC # BLD: 0 K/UL (ref 0–0.01)
NRBC BLD-RTO: 0 PER 100 WBC
PLATELET # BLD AUTO: 170 K/UL (ref 150–400)
PMV BLD AUTO: 10.1 FL (ref 8.9–12.9)
RBC # BLD AUTO: 2.61 M/UL (ref 3.8–5.2)
WBC # BLD AUTO: 5.6 K/UL (ref 3.6–11)

## 2023-06-22 PROCEDURE — 36415 COLL VENOUS BLD VENIPUNCTURE: CPT

## 2023-06-22 PROCEDURE — 6370000000 HC RX 637 (ALT 250 FOR IP): Performed by: STUDENT IN AN ORGANIZED HEALTH CARE EDUCATION/TRAINING PROGRAM

## 2023-06-22 PROCEDURE — 6360000002 HC RX W HCPCS: Performed by: INTERNAL MEDICINE

## 2023-06-22 PROCEDURE — 2580000003 HC RX 258: Performed by: INTERNAL MEDICINE

## 2023-06-22 PROCEDURE — A4216 STERILE WATER/SALINE, 10 ML: HCPCS | Performed by: INTERNAL MEDICINE

## 2023-06-22 PROCEDURE — 6370000000 HC RX 637 (ALT 250 FOR IP): Performed by: INTERNAL MEDICINE

## 2023-06-22 PROCEDURE — 85027 COMPLETE CBC AUTOMATED: CPT

## 2023-06-22 PROCEDURE — 94640 AIRWAY INHALATION TREATMENT: CPT

## 2023-06-22 PROCEDURE — C9113 INJ PANTOPRAZOLE SODIUM, VIA: HCPCS | Performed by: INTERNAL MEDICINE

## 2023-06-22 PROCEDURE — 1100000003 HC PRIVATE W/ TELEMETRY

## 2023-06-22 PROCEDURE — 99233 SBSQ HOSP IP/OBS HIGH 50: CPT | Performed by: NURSE PRACTITIONER

## 2023-06-22 RX ORDER — RIVAROXABAN 15 MG/1
TABLET, FILM COATED ORAL
Qty: 90 TABLET | Refills: 1 | Status: SHIPPED | OUTPATIENT
Start: 2023-06-22 | End: 2023-06-23 | Stop reason: HOSPADM

## 2023-06-22 RX ORDER — TROSPIUM CHLORIDE ER 60 MG/1
CAPSULE ORAL
Qty: 90 CAPSULE | Refills: 1 | Status: SHIPPED | OUTPATIENT
Start: 2023-06-22

## 2023-06-22 RX ORDER — ALPRAZOLAM 0.25 MG/1
TABLET ORAL NIGHTLY PRN
Status: CANCELLED | OUTPATIENT
Start: 2023-06-22

## 2023-06-22 RX ORDER — QUETIAPINE FUMARATE 25 MG/1
25 TABLET, FILM COATED ORAL 2 TIMES DAILY
Status: DISCONTINUED | OUTPATIENT
Start: 2023-06-22 | End: 2023-06-23 | Stop reason: HOSPADM

## 2023-06-22 RX ORDER — ALPRAZOLAM 0.25 MG/1
0.25 TABLET ORAL EVERY EVENING
Status: DISCONTINUED | OUTPATIENT
Start: 2023-06-22 | End: 2023-06-23 | Stop reason: HOSPADM

## 2023-06-22 RX ADMIN — Medication: at 21:00

## 2023-06-22 RX ADMIN — SODIUM CHLORIDE, PRESERVATIVE FREE 10 ML: 5 INJECTION INTRAVENOUS at 22:00

## 2023-06-22 RX ADMIN — SODIUM CHLORIDE, PRESERVATIVE FREE 40 MG: 5 INJECTION INTRAVENOUS at 10:10

## 2023-06-22 RX ADMIN — POTASSIUM CHLORIDE 10 MEQ: 20 TABLET, EXTENDED RELEASE ORAL at 21:58

## 2023-06-22 RX ADMIN — QUETIAPINE FUMARATE 25 MG: 25 TABLET ORAL at 18:35

## 2023-06-22 RX ADMIN — ARFORMOTEROL TARTRATE 15 MCG: 15 SOLUTION RESPIRATORY (INHALATION) at 09:30

## 2023-06-22 RX ADMIN — FUROSEMIDE 80 MG: 80 TABLET ORAL at 10:10

## 2023-06-22 RX ADMIN — BUDESONIDE INHALATION 500 MCG: 0.5 SUSPENSION RESPIRATORY (INHALATION) at 19:53

## 2023-06-22 RX ADMIN — SODIUM CHLORIDE, PRESERVATIVE FREE 10 ML: 5 INJECTION INTRAVENOUS at 10:14

## 2023-06-22 RX ADMIN — ALPRAZOLAM 0.25 MG: 0.25 TABLET ORAL at 18:35

## 2023-06-22 RX ADMIN — CARVEDILOL 12.5 MG: 12.5 TABLET, FILM COATED ORAL at 17:23

## 2023-06-22 RX ADMIN — Medication: at 10:10

## 2023-06-22 RX ADMIN — SERTRALINE 100 MG: 50 TABLET, FILM COATED ORAL at 10:09

## 2023-06-22 RX ADMIN — LOSARTAN POTASSIUM 25 MG: 25 TABLET, FILM COATED ORAL at 10:09

## 2023-06-22 RX ADMIN — LOSARTAN POTASSIUM 25 MG: 25 TABLET, FILM COATED ORAL at 21:59

## 2023-06-22 RX ADMIN — BUDESONIDE INHALATION 500 MCG: 0.5 SUSPENSION RESPIRATORY (INHALATION) at 09:30

## 2023-06-22 RX ADMIN — Medication: at 14:30

## 2023-06-22 RX ADMIN — POTASSIUM CHLORIDE 10 MEQ: 20 TABLET, EXTENDED RELEASE ORAL at 10:09

## 2023-06-22 RX ADMIN — CARVEDILOL 12.5 MG: 12.5 TABLET, FILM COATED ORAL at 10:10

## 2023-06-22 RX ADMIN — ARFORMOTEROL TARTRATE 15 MCG: 15 SOLUTION RESPIRATORY (INHALATION) at 19:53

## 2023-06-22 RX ADMIN — LEVOTHYROXINE SODIUM 25 MCG: 0.03 TABLET ORAL at 10:10

## 2023-06-22 RX ADMIN — FUROSEMIDE 80 MG: 80 TABLET ORAL at 21:59

## 2023-06-22 RX ADMIN — SODIUM CHLORIDE, PRESERVATIVE FREE 40 MG: 5 INJECTION INTRAVENOUS at 21:59

## 2023-06-22 ASSESSMENT — PAIN SCALES - GENERAL: PAINLEVEL_OUTOF10: 0

## 2023-06-22 NOTE — CARE COORDINATION
Transition of Care Plan:    RUR:20%  Disposition: home w/ home health vs. hospice  If SNF or IPR: Date FOC offered:  Date FOC received:  Date authorization started with reference number:  Date authorization received and expires:  Accepting facility:  Follow up appointments:  DME needed: none indicated at this time   Transportation at Discharge: BLS vs. Family   [place] or means to access home:   family has access      IM Medicare Letter:to be provided   Is patient a  and connected with the [place]? If yes, was [employer] transfer form completed and [facility] notified? Caregiver Contact:Sammie Campos (Child)   [phone]  Discharge Caregiver contacted prior to discharge? Care Conference needed?:  no                     Per chart review, palliative care meeting held at 1pm today. CM will await recommendations and continue to follow for discharge planning needs.        Bebeto Upton, PARAG Waddell

## 2023-06-22 NOTE — ACP (ADVANCE CARE PLANNING)
Advanced Care Planning       Met with the family of [facility]. The patient does not have capacity for decision making. Met with the three primary MPOAs:  Carolina Sanders and Jagdeep Vieyra (patient's daughters). Erin (dtr) also present. The family Marco Waddell was also present. Family wants to pursue work up for the patient's colon cancer  They feel they do not have enough information to make decisions about the patient's care without this information. The family would like patient to return home with home health.       Oncology informed about family wishing to pursue colon cancer workup    See full consult note

## 2023-06-22 NOTE — CONSULTS
[facility] [place] at [address], [place], [address] [facility]  W: [phone] F: [phone]  Hematology/ Oncology Consult Note      Reason for consult: Verenice Keene is a 80 y.o. female who we have been asked to see by Liudmila Esposito NP for new diagnosis of colon cancer. Subjective: Verenice Keene is a 80-year-old female patient admitted with upper GI bleed and acute blood loss anemia. She has a past medical history of paroxysmal atrial fibrillation, on chronic anticoagulation, vascular dementia, CHF, CKD stage III, CVA, late onset Alzheimer's dementia with behavioral disturbance, hypertension and failure to thrive. Per chart review primary care notes from April 2023 noted advanced dementia and increasing agitation at home. At baseline she uses a wheelchair. Patient seen briefly at bedside. Patient's family asked to speak with NP outside the room as they do not want the patient to know she has a new diagnosis of cancer. Daughter and son-in-law were present in the hallway with NP and conference called additional 2 daughters and via phone. Discussed that given patient's advanced age, poor functional status and advanced dementia she is a very poor candidate for any treatment for malignancy. Long discussion regarding advancement of dementia in recent months that would complicate any systemic treatment including radiation as the patient is unable to understand reasons for treatment and has a very poor short-term memory. Daughter present in the hallway shared that her mother becomes easily anxious and agitated. We discussed how this would be difficult to manage with multiple appointments with any malignancy treatment. Also patient may not be able to verbalize any symptoms that would be side effects from systemic therapy.   The family verbalized they are interested in quality of life and trying to make the best decision for
Palliative Medicine Consult  [place]: [phone] ([phone])    Patient Name: Noelle Wing  YOB: 1924    Date of Initial Consult: 6/21/2023  Date of Today's Visit: 6/21/2023  Reason for Consult: goals of care  Requesting Provider: Haile Gilman NP       SUMMARY:   Noelle Wing is a 80 y.o. presented on 6/13/2023 with dizziness, hypotension, fatigue, and dark stools. Hypotensive. Patient admitted with a diagnosis of acute blood loss anemia. 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 Repeat echo  6/15/2023:  EF 45-50%     PMH:  PAF (Xarelto), CVA, thromboembolism, HTN, pacemaker, advanced dementia with behavioral disturbance, 3 falls in the last 6 months, biventricular HF (EF 25% in 2019), CKD 3     5/26/2023 office visit note: sundowning and agitation. Patient on Zoloft 100 mg, ativan 0.5 mg 1/2 tablet at hs, Seroquel 25 mg -advised to take at 6 pm and q am     2/24/2023 office visit note:  agitation (worse at night) and not always taking her medications. Current medical issues leading to Palliative Medicine involvement include:newly diagnosed colon cancer in patient with advanced dementia     Social: lives with her dtr. Uses a rollator in the home- able to ambulate short distances as she becomes fatigued. Has had 3 falls in the last 6 months- forgets to locks brakes. Goes to [employer] caregiver - 4 hours/day x 2 days a week. AMD from April 2022 on file:  Lizandro Lara and Jenn Davis, patient's daughters, are the primary MPOA. Noted code status updated on 6/19/2023   partial code:  intubate, defib, and cardiac medications but no chest compressions      PALLIATIVE DIAGNOSES:   Palliative care encounter  Acute blood loss anemia  Dizziness   Newly dx colon CA  Dementia with behavioral disturbance      PLAN:   Chart reviewed.   Met with patient to introduce palliative
She now needs more help with toileting. Patient has been agitated at night and is up and rambling through closets and drawers all night. Patient has not been sleeping well. Family reports the patient has not been herself recently. Patient is followed by  her PCP Paulo Maradiaga. Dr. Snehal Cancino has recently adjusted her Quetiapine and alprazolam dosages and times of administration. AMD from April 2022 on file:  Cora Otto and To Garcia, patient's daughters, are the primary MPOA. Noted code status updated on 6/19/2023 by Dr. Nando Shaw:   partial code:  intubate, defib, and cardiac medications but no chest compressions      PALLIATIVE DIAGNOSES:   Palliative care encounter  Acute blood loss anemia- likely diverticular bleed  Dizziness r/t blood loss  Newly dx colon CA  Dementia with behavioral disturbance   Recent functional decline   Goals of care discussion   8. Caregiver stress- patient with nighttime agitation    PLAN:   Chart reviewed. Noted hemoglobin stable at 7.5 this am.  Patient sitting up in the chair - alert and pleasant and talking with family at the bedside. Patient unable to recall what she ate for lunch today. Met with the patient's MPOA: Osbaldo Pina and Austin Arita (patient's daughters). Erin (dtr) was present as well at the family's reverend. Introduced palliative care services. Family discussed the patient's functional status including her recent decline over the past few weeks (needing more help with toileting)  The family also shared the patient's agitation and not sleeping at night over the past few months. The patient's PCP has recently adjusted her Seroquel and Xanax dosages/times of administration. Patient also on Zoloft 100 mg daily. See above social and functional notes. We discussed the patient's recent diagnosis of colon CA in light of her dementia and recent functional decline.    We discussed options including a focus on comfort care/

## 2023-06-23 ENCOUNTER — TELEPHONE (OUTPATIENT)
Age: 88
End: 2023-06-23

## 2023-06-23 VITALS
HEIGHT: 67 IN | SYSTOLIC BLOOD PRESSURE: 111 MMHG | BODY MASS INDEX: 23.54 KG/M2 | HEART RATE: 76 BPM | WEIGHT: 150 LBS | DIASTOLIC BLOOD PRESSURE: 65 MMHG | OXYGEN SATURATION: 96 % | TEMPERATURE: 98.8 F | RESPIRATION RATE: 12 BRPM

## 2023-06-23 PROCEDURE — 6360000002 HC RX W HCPCS: Performed by: INTERNAL MEDICINE

## 2023-06-23 PROCEDURE — 6370000000 HC RX 637 (ALT 250 FOR IP): Performed by: INTERNAL MEDICINE

## 2023-06-23 PROCEDURE — C9113 INJ PANTOPRAZOLE SODIUM, VIA: HCPCS | Performed by: INTERNAL MEDICINE

## 2023-06-23 PROCEDURE — A4216 STERILE WATER/SALINE, 10 ML: HCPCS | Performed by: INTERNAL MEDICINE

## 2023-06-23 PROCEDURE — 2580000003 HC RX 258: Performed by: INTERNAL MEDICINE

## 2023-06-23 PROCEDURE — 6370000000 HC RX 637 (ALT 250 FOR IP): Performed by: STUDENT IN AN ORGANIZED HEALTH CARE EDUCATION/TRAINING PROGRAM

## 2023-06-23 PROCEDURE — 94640 AIRWAY INHALATION TREATMENT: CPT

## 2023-06-23 RX ADMIN — SODIUM CHLORIDE, PRESERVATIVE FREE 10 ML: 5 INJECTION INTRAVENOUS at 09:11

## 2023-06-23 RX ADMIN — SODIUM CHLORIDE, PRESERVATIVE FREE 40 MG: 5 INJECTION INTRAVENOUS at 09:12

## 2023-06-23 RX ADMIN — BUDESONIDE INHALATION 500 MCG: 0.5 SUSPENSION RESPIRATORY (INHALATION) at 07:54

## 2023-06-23 RX ADMIN — Medication: at 09:12

## 2023-06-23 RX ADMIN — QUETIAPINE FUMARATE 25 MG: 25 TABLET ORAL at 06:05

## 2023-06-23 RX ADMIN — LOSARTAN POTASSIUM 25 MG: 25 TABLET, FILM COATED ORAL at 09:10

## 2023-06-23 RX ADMIN — LEVOTHYROXINE SODIUM 25 MCG: 0.03 TABLET ORAL at 09:08

## 2023-06-23 RX ADMIN — POTASSIUM CHLORIDE 10 MEQ: 20 TABLET, EXTENDED RELEASE ORAL at 09:11

## 2023-06-23 RX ADMIN — ARFORMOTEROL TARTRATE 15 MCG: 15 SOLUTION RESPIRATORY (INHALATION) at 07:54

## 2023-06-23 RX ADMIN — FUROSEMIDE 80 MG: 80 TABLET ORAL at 09:08

## 2023-06-23 RX ADMIN — SERTRALINE 100 MG: 50 TABLET, FILM COATED ORAL at 09:07

## 2023-06-23 RX ADMIN — CARVEDILOL 12.5 MG: 12.5 TABLET, FILM COATED ORAL at 09:10

## 2023-06-23 RX ADMIN — Medication: at 13:43

## 2023-06-23 ASSESSMENT — PAIN SCALES - WONG BAKER: WONGBAKER_NUMERICALRESPONSE: 0

## 2023-06-23 ASSESSMENT — PAIN SCALES - GENERAL
PAINLEVEL_OUTOF10: 0

## 2023-06-23 NOTE — PLAN OF CARE
Problem: Discharge Planning  Goal: Discharge to home or other facility with appropriate resources  Outcome: Progressing     Problem: Safety - Adult  Goal: Free from fall injury  6/23/2023 1210 by Nohemy Ryan RN  Outcome: Progressing  6/22/2023 2330 by Nahomi Mayo RN  Outcome: Progressing     Problem: Skin/Tissue Integrity  Goal: Absence of new skin breakdown  Description: 1. Monitor for areas of redness and/or skin breakdown  2. Assess vascular access sites hourly  3. Every 4-6 hours minimum:  Change oxygen saturation probe site  4. Every 4-6 hours:  If on nasal continuous positive airway pressure, respiratory therapy assess nares and determine need for appliance change or resting period.   Outcome: Progressing     Problem: Pain  Goal: Verbalizes/displays adequate comfort level or baseline comfort level  Outcome: Progressing     Problem: Respiratory - Adult  Goal: Achieves optimal ventilation and oxygenation  6/23/2023 0755 by Wiliam Reyes, RT  Outcome: Progressing  Flowsheets  Taken 6/23/2023 0730 by Nohemy Ryan RN  Achieves optimal ventilation and oxygenation: Assess for changes in respiratory status  Taken 6/22/2023 2025 by Nahomi Mayo RN  Achieves optimal ventilation and oxygenation:   Assess for changes in respiratory status   Assess for changes in mentation and behavior   Position to facilitate oxygenation and minimize respiratory effort
Problem: Occupational Therapy - Adult  Goal: By Discharge: Performs self-care activities at highest level of function for planned discharge setting. See evaluation for individualized goals. Description: FUNCTIONAL STATUS PRIOR TO ADMISSION:  Patient was ambulatory using rollator for mobility, pt with multiple falls over last ~1 month per family member report   ,  ,  ,  ,  ,  ,  ,  ,  ,  , Active : No     HOME SUPPORT: Patient lived alone with daughter, SHADE, and grandson to provide assistance. Occupational Therapy Goals:  Initiated 6/19/2023  1. Patient will perform grooming seated EOB with Set-up and Supervision within 7 day(s). 2.  Patient will perform toileting with Stand by Assist within 7 day(s). 3.  Patient will perform toilet transfers with Stand by Assist  within 7 day(s). 4.  Patient will perform all aspects of toileting with Stand by Assist within 7 day(s). 5.  Patient will participate in upper extremity therapeutic exercise/activities with Stand by Assist and Set-up for 5 minutes within 7 day(s). 6.  Patient will utilize energy conservation techniques during functional activities with verbal and visual cues within 7 day(s). Outcome: Progressing   OCCUPATIONAL THERAPY EVALUATION    Patient: Pennie Weeks (1 y.o. female)  Date: 6/19/2023  Primary Diagnosis: Acute blood loss anemia [D62]  GI bleed [K92.2]  Acute upper GI bleed [K92.2]  Procedure(s) (LRB):  COLONOSCOPY DIAGNOSTIC/ biopsy /injection amber ink (N/A) Day of Surgery     Precautions: Fall    ASSESSMENT :  The patient is limited by decreased functional mobility, independence in ADLs, strength, activity tolerance, endurance, safety awareness, cognition, balance. Patient received with multiple family members at bedside to assist with history as patient AxOx2 on arrival. Endorses progressive weakness over last few weeks and 2-3 falls over the last month. Family reports pt with increased OH since admission.  Today pt BP stable
Problem: Physical Therapy - Adult  Goal: By Discharge: Performs mobility at highest level of function for planned discharge setting. See evaluation for individualized goals. Description: FUNCTIONAL STATUS PRIOR TO ADMISSION: The patient ambulated household distances with rollator but still required seated rest breaks due to fatigue. She leaves the house once a week to go to [employer]. HOME SUPPORT PRIOR TO ADMISSION: The patient lives with her daughter, son-in-law, and grown grandson. 2-story home but she stays on the 1st floor, 3 steps with B rails to enter from outside. She has a paid caregiver for 4 hours/day and 2 days/week to help with showering and cleaning. Physical Therapy Goals  Initiated 6/21/2023  1. Patient will move from supine to sit and sit to supine in bed with supervision/set-up within 7 day(s). 2.  Patient will perform sit to stand with supervision/set-up within 7 day(s). 3.  Patient will transfer from bed to chair and chair to bed with supervision/set-up using the least restrictive device within 7 day(s). 4.  Patient will ambulate with supervision/set-up for 40 feet with the least restrictive device within 7 day(s). 5.  Patient will ascend/descend 3 stairs with B handrail(s) with contact guard assist within 7 day(s). Outcome: Not Progressing    PHYSICAL THERAPY EVALUATION    Patient: Bubba Guidry (9 y.o. female)  Date: 6/21/2023  Primary Diagnosis: Acute blood loss anemia [D62]  GI bleed [K92.2]  Acute upper GI bleed [K92.2]  Procedure(s) (LRB):  COLONOSCOPY DIAGNOSTIC/ biopsy /injection amber inkand poly removal (N/A) 2 Days Post-Op   Precautions: Fall Risk                    ASSESSMENT :   DEFICITS/IMPAIRMENTS:   The patient is limited by decreased strength, activity tolerance, endurance, safety awareness, balance. Patient cleared by nursing to participate. She was sitting up in the chair, one of her daughter's sitting on the couch.  Patient eating breakfast, but agreeable to
Problem: Safety - Adult  Goal: Free from fall injury  6/20/2023 0226 by Yaakov Beasley RN  Outcome: Progressing  Flowsheets (Taken 6/19/2023 1930)  Free From Fall Injury: Instruct family/caregiver on patient safety  6/19/2023 1258 by Dee Arriaga RN  Outcome: Progressing     Problem: Skin/Tissue Integrity  Goal: Absence of new skin breakdown  Description: 1. Monitor for areas of redness and/or skin breakdown  2. Assess vascular access sites hourly  3. Every 4-6 hours minimum:  Change oxygen saturation probe site  4. Every 4-6 hours:  If on nasal continuous positive airway pressure, respiratory therapy assess nares and determine need for appliance change or resting period.   6/20/2023 0226 by Yaakov Beasley RN  Outcome: Progressing  6/19/2023 1258 by Dee Arriaga RN  Outcome: Progressing     Problem: ABCDS Injury Assessment  Goal: Absence of physical injury  Outcome: Progressing  Flowsheets (Taken 6/19/2023 1930)  Absence of Physical Injury: Implement safety measures based on patient assessment     Problem: Chronic Conditions and Co-morbidities  Goal: Patient's chronic conditions and co-morbidity symptoms are monitored and maintained or improved  Outcome: Progressing  Flowsheets (Taken 6/19/2023 1930)  Care Plan - Patient's Chronic Conditions and Co-Morbidity Symptoms are Monitored and Maintained or Improved: Monitor and assess patient's chronic conditions and comorbid symptoms for stability, deterioration, or improvement     Problem: Pain  Goal: Verbalizes/displays adequate comfort level or baseline comfort level  Recent Flowsheet Documentation  Taken 6/19/2023 1930 by Yaakov Beasley RN  Verbalizes/displays adequate comfort level or baseline comfort level:   Encourage patient to monitor pain and request assistance   Assess pain using appropriate pain scale   Administer analgesics based on type and severity of pain and evaluate response   Implement non-pharmacological measures as appropriate and evaluate response
Problem: Safety - Adult  Goal: Free from fall injury  6/20/2023 0752 by Maura Nunez RN  Outcome: Progressing  6/20/2023 0226 by Vandana Hwang RN  Outcome: Progressing  Flowsheets (Taken 6/19/2023 1930)  Free From Fall Injury: Instruct family/caregiver on patient safety     Problem: Skin/Tissue Integrity  Goal: Absence of new skin breakdown  Description: 1. Monitor for areas of redness and/or skin breakdown  2. Assess vascular access sites hourly  3. Every 4-6 hours minimum:  Change oxygen saturation probe site  4. Every 4-6 hours:  If on nasal continuous positive airway pressure, respiratory therapy assess nares and determine need for appliance change or resting period.   6/20/2023 0752 by Maura Nunez RN  Outcome: Progressing  6/20/2023 0226 by Vandana Hwang RN  Outcome: Progressing     Problem: ABCDS Injury Assessment  Goal: Absence of physical injury  6/20/2023 0752 by Maura Nunez RN  Outcome: Progressing  6/20/2023 0226 by Vandana Hwang RN  Outcome: Progressing  Flowsheets (Taken 6/19/2023 1930)  Absence of Physical Injury: Implement safety measures based on patient assessment     Problem: Chronic Conditions and Co-morbidities  Goal: Patient's chronic conditions and co-morbidity symptoms are monitored and maintained or improved  6/20/2023 0752 by Maura Nunez RN  Outcome: Progressing  6/20/2023 0226 by Vandana Hwang RN  Outcome: Progressing  Flowsheets (Taken 6/19/2023 1930)  Care Plan - Patient's Chronic Conditions and Co-Morbidity Symptoms are Monitored and Maintained or Improved: Monitor and assess patient's chronic conditions and comorbid symptoms for stability, deterioration, or improvement     Problem: Pain  Goal: Verbalizes/displays adequate comfort level or baseline comfort level  Outcome: Progressing  Flowsheets (Taken 6/19/2023 1930 by Vandana Hwang RN)  Verbalizes/displays adequate comfort level or baseline comfort level:   Encourage patient to monitor pain and request assistance
Problem: Safety - Adult  Goal: Free from fall injury  6/20/2023 1923 by Wilver Garrison RN  Outcome: Progressing  6/20/2023 0752 by Wilver Garrison RN  Outcome: Progressing     Problem: Skin/Tissue Integrity  Goal: Absence of new skin breakdown  Description: 1. Monitor for areas of redness and/or skin breakdown  2. Assess vascular access sites hourly  3. Every 4-6 hours minimum:  Change oxygen saturation probe site  4. Every 4-6 hours:  If on nasal continuous positive airway pressure, respiratory therapy assess nares and determine need for appliance change or resting period.   6/20/2023 1923 by Wilver Garrison RN  Outcome: Progressing  6/20/2023 0752 by Wilver Garrison RN  Outcome: Progressing     Problem: ABCDS Injury Assessment  Goal: Absence of physical injury  6/20/2023 1923 by Wilver Garrison RN  Outcome: Progressing  6/20/2023 0752 by Wilver Garrison RN  Outcome: Progressing     Problem: Chronic Conditions and Co-morbidities  Goal: Patient's chronic conditions and co-morbidity symptoms are monitored and maintained or improved  Outcome: Progressing     Problem: Pain  Goal: Verbalizes/displays adequate comfort level or baseline comfort level  Outcome: Progressing  Flowsheets (Taken 6/19/2023 1930 by Sherice Ruiz RN)  Verbalizes/displays adequate comfort level or baseline comfort level:   Encourage patient to monitor pain and request assistance   Assess pain using appropriate pain scale   Administer analgesics based on type and severity of pain and evaluate response   Implement non-pharmacological measures as appropriate and evaluate response   Notify Licensed Independent Practitioner if interventions unsuccessful or patient reports new pain     Problem: Respiratory - Adult  Goal: Achieves optimal ventilation and oxygenation  6/20/2023 0829 by Boubacar Manzano RT  Outcome: Progressing  6/20/2023 0752 by Wilver Garrison RN  Outcome: Progressing
Problem: Safety - Adult  Goal: Free from fall injury  6/21/2023 0237 by Gavino Lopez RN  Outcome: Progressing  6/20/2023 1923 by Mar Amor RN  Outcome: Progressing     Problem: Skin/Tissue Integrity  Goal: Absence of new skin breakdown  Description: 1. Monitor for areas of redness and/or skin breakdown  2. Assess vascular access sites hourly  3. Every 4-6 hours minimum:  Change oxygen saturation probe site  4. Every 4-6 hours:  If on nasal continuous positive airway pressure, respiratory therapy assess nares and determine need for appliance change or resting period.   6/21/2023 0237 by Gavino Lopez RN  Outcome: Progressing  6/20/2023 1923 by Mar Amor RN  Outcome: Progressing     Problem: ABCDS Injury Assessment  Goal: Absence of physical injury  6/20/2023 1923 by Mar Amor RN  Outcome: Progressing
Problem: Safety - Adult  Goal: Free from fall injury  6/21/2023 0237 by Manoj Melgar RN  Outcome: Progressing  6/20/2023 1923 by Coni Ni RN  Outcome: Progressing     Problem: Skin/Tissue Integrity  Goal: Absence of new skin breakdown  Description: 1. Monitor for areas of redness and/or skin breakdown  2. Assess vascular access sites hourly  3. Every 4-6 hours minimum:  Change oxygen saturation probe site  4. Every 4-6 hours:  If on nasal continuous positive airway pressure, respiratory therapy assess nares and determine need for appliance change or resting period.   6/21/2023 0735 by Coni Ni RN  Outcome: Progressing  6/21/2023 0237 by Manoj Melgar RN  Outcome: Progressing  6/20/2023 1923 by Coni Ni RN  Outcome: Progressing     Problem: ABCDS Injury Assessment  Goal: Absence of physical injury  6/21/2023 0735 by Coni Ni RN  Outcome: Progressing  6/20/2023 1923 by Coni Ni RN  Outcome: Progressing     Problem: Chronic Conditions and Co-morbidities  Goal: Patient's chronic conditions and co-morbidity symptoms are monitored and maintained or improved  Outcome: Progressing
Problem: Safety - Adult  Goal: Free from fall injury  Outcome: Progressing     Problem: ABCDS Injury Assessment  Goal: Absence of physical injury  Recent Flowsheet Documentation  Taken 6/22/2023 2025 by Jennifer Morataya RN  Absence of Physical Injury: Implement safety measures based on patient assessment    [id number] Bedside and Verbal shift change report given to [facility] RN (oncoming nurse) by Sofiya Rodrigez (offgoing nurse). Report included the following information Nurse Handoff Report, Intake/Output, MAR, Recent Results, Med Rec Status, and Cardiac Rhythm V-Paced .
Problem: Safety - Adult  Goal: Free from fall injury  Outcome: Progressing     Problem: Skin/Tissue Integrity  Goal: Absence of new skin breakdown  Description: 1. Monitor for areas of redness and/or skin breakdown  2. Assess vascular access sites hourly  3. Every 4-6 hours minimum:  Change oxygen saturation probe site  4. Every 4-6 hours:  If on nasal continuous positive airway pressure, respiratory therapy assess nares and determine need for appliance change or resting period.   Outcome: Progressing     Problem: Pain  Goal: Verbalizes/displays adequate comfort level or baseline comfort level  Outcome: Progressing     Problem: Respiratory - Adult  Goal: Achieves optimal ventilation and oxygenation  6/19/2023 0840 by Ludy Arciniega, RT  Outcome: Progressing
Able to ambulate into restroom and complete toileting and standing ADLs at sink. Encouraged use of RW vs rollator at home at DC as pt and family report pt frequently forgets to employ brakes on rollator causing it to \"get away from her\" endorsing multiple falls due to this. Continue to recommend [address] and 24/7 assist at medically stable DC. PLAN :  Patient continues to benefit from skilled intervention to address the above impairments. Continue treatment per established plan of care to address goals. Recommend with staff: OOB to recliner as tolerated. OOB to BSC/bathroom with assist x1 and RW for safety    Recommend next OT session: progress functional mobility, dressing ADLs    Recommendation for discharge: (in order for the patient to meet his/her long term goals): Therapy 2 days/week in the home and assist for safety    Other factors to consider for discharge: poor safety awareness, impaired cognition, high risk for falls, and not safe to be alone    IF patient discharges home will need the following DME: patient owns DME required for discharge       SUBJECTIVE:   Patient stated I've got to pee again I think.     OBJECTIVE DATA SUMMARY:   Cognitive/Behavioral Status:          Functional Mobility and Transfers for ADLs:  Bed Mobility:  Bed Mobility Training  Bed Mobility Training: No     Transfers:   Transfer Training  Transfer Training: Yes  Sit to Stand: Minimum assistance;Assist X1;Additional time; Adaptive equipment  Stand to Sit: Minimum assistance;Assist X1;Adaptive equipment; Additional time  Toilet Transfer: Minimum assistance;Assist X1;Additional time      Balance:  Standing: Impaired  Balance  Sitting: Impaired  Sitting - Static: Good (unsupported)  Sitting - Dynamic: Fair (occasional)  Standing: Impaired  Standing - Static: Fair;Constant support;Good  Standing - Dynamic: Fair;Constant support      ADL Intervention:         Feeding: Modified independent    Feeding Skilled Clinical Factors:

## 2023-06-23 NOTE — CARE COORDINATION
06/23/23 Cathleen 1163 Discharge   Transition of Care Consult (CM Consult) Discharge [facility]  ([address]: PT/OT and nursing)   Mode of Transport at Discharge Other (see comment)  (daughters to transport)   Confirm Follow Up Transport Family   Condition of Participation: Discharge Planning   The Patient and/or Patient Representative was provided with a Choice of Provider? Patient Representative   Name of the Patient Representative who was provided with the Choice of Provider and agrees with the Discharge Plan? Antwon Jones (Daughter)   The Patient and/Or Patient Representative agree with the Discharge Plan? Yes   Freedom of Choice list was provided with basic dialogue that supports the patient's individualized plan of care/goals, treatment preferences, and shares the quality data associated with the providers? Yes  (Home Health: [address])     CM met with Pt and daughter regarding discharge. Family receptive to Home health. Referrals sent to [facility]. CM provided Pt's daughter with Personal Care aide list as she expressed interest in arranging personal care services.        Bebeto Britt, PARAG Waddell

## 2023-06-23 NOTE — TELEPHONE ENCOUNTER
[employer] was called and verbalized understanding on note below.      Future Appointments   Date Time Provider K[place] Ewing   6/28/2023 12:45 PM Bruna Bonilla MD [facility]

## 2023-06-23 NOTE — DISCHARGE INSTRUCTIONS
HOSPITALIST DISCHARGE INSTRUCTIONS    NAME: Linda Knowles   :  1924   MRN:  [id number]     Date/Time:  2023 12:56 PM    ADMIT DATE: 2023     DISCHARGE DATE: 2023     DISCHARGE DIAGNOSIS:  Lower GI bleed POA- resolved  Causing Acute blood loss anemia POA- hb stable at 7.5   Colonic Malignancy POA- on biopsy during colonoscopy, family not ready for hospice yet, want to go home with [facility] today with OP followup with Oncology for further options when ready versus hospice in future, Taken off Eliquis for good  Paroxysmal atrial fibrillation on Chronic anticoagulation POA- taken off Eliquis on discharge as above  Vascular dementia  CHF  CKD  H/o CVA  DNR/Limited code per family    MEDICATIONS:  As per medication reconciliation  list  It is important that you take the medication exactly as they are prescribed. Keep your medication in the bottles provided by the pharmacist and keep a list of the medication names, dosages, and times to be taken in your wallet. Do not take other medications without consulting your doctor. Pain Management: per above medications    What to do at Home    Recommended diet:  regular diet    Recommended activity: activity as tolerated    If you have questions regarding the hospital related prescriptions or hospital related issues please call at [phone]. If you experience any of the following symptoms then please call your primary care physician or return to the emergency room if you cannot get hold of your doctor:  Fever, chills, nausea, vomiting, diarrhea, change in mentation, falling, bleeding, shortness of breath,     Follow Up:  PCP you are to call and set up an appointment to see them in 7-10 days. Oncology Dr Wilder Suarez --if desired treatment options for cancer when ready to tell patient & pursue   Hospice if desired      Information obtained by :  I understand that if any problems occur once I am at home I am to contact my physician.     I

## 2023-06-23 NOTE — PROGRESS NOTES
underlying AFib on tele. 1-assist to [facility]. OT eval completed. Awaiting PT eval. Family in room at all times. Colonoscopy completed. Awaiting biopsy results. Pt increasingly weak and unsteady since Saturday 6/17. Family expressing concern about pt being discharged too soon. Educated family that the pt is not yet stable to be discharged with the continuing rectal bleed and low hemoglobin, PT still needs to complete evaluation, and plan will be determined likely in the next couple days. Also explained care management will be following and assisting in discharge planning once therapy evals are completed. [facility], RN.
.. TRANSFER - IN REPORT:    Verbal report received from [facility] on Deberah [place]  being received from [phone] for ordered procedure      Report consisted of patient's Situation, Background, Assessment and   Recommendations(SBAR). Information from the following report(s) Intake/Output, MAR, Recent Results, and Med Rec Status was reviewed with the receiving nurse. Opportunity for questions and clarification was provided. Assessment completed upon patient's arrival to unit and care assumed.       Tubed consents to primary nurse to have daughter sign, POA
0700: Bedside shift change report given to Kori Mars RN (oncoming nurse) by Nancy Wilson RN (offgoing nurse). Report included the following information Nurse Handoff Report, Intake/Output, MAR, Recent Results, and Med Rec Status. 9278: HGB 6.7 this AM. Dr. Anna Iglesias to place orders for blood transfusion. 0915: Blood bank called at this time, they will run Type and Screen. I will await blood to be ready. 1000: GI NP at bedside assessing patient. Education given to sisters and patient. Will monitor Bms for any blood and will give PRBCs today. 1005: Patient had loose BM a this time with small amount of blood present, will monitor. 1245: Blood transfusion started at this time. VSS at start of transfusion. Dual sign off with Paula Ramirez RN. Consent on chart. 1500: Blood transfusion completed at this time. Patient tolerated well. Will repeat H&H in an hour per order. 2300: End of Shift Note    Bedside shift change report given to Santa Meredith RN (oncoming nurse) by Mar Amor RN (offgoing nurse). Report included the following information SBAR, Kardex, Intake/Output, MAR, and Recent Results    Shift worked:  4426-7106     Shift summary and any significant changes:     HGB to 8.1 after 1u PRBCs. Possible D/C tomorrow pending dispo. TX order     Concerns for physician to address:  PT needs to see patient for dispo     Zone phone for oncoming shift:   [phone]       Activity:     Number times ambulated in hallways past shift: 0  Number of times OOB to chair past shift: 3    Cardiac:   Cardiac Monitoring: Yes           Access:  Current line(s): PIV     Genitourinary:   Urinary status: voiding and external catheter    Respiratory:      Chronic home O2 use?: N/A  Incentive spirometer at bedside: NO       GI:     Current diet:  ADULT DIET;  Regular  Passing flatus: YES  Tolerating current diet: YES       Pain Management:   Patient states pain is manageable on current regimen: YES    Skin:     Interventions: turn team,
0700: Bedside shift change report given to Lisa Cruz RN (oncoming nurse) by Urban Smith RN (offgoing nurse). Report included the following information Nurse Handoff Report, Index, Intake/Output, MAR, Recent Results, and Med Rec Status. 0930: PT/OT at bedside working with patient. Patient able to ambulate to bathroom X1 assist and walker. Patient back in recliner at this time. Chair alarm on, call bell in reach. 1030: One time order of 20 meQ of potassium was ordered by Dr. Darcie Crane. Dr. Darcie Crane was made aware that patient got a total of 50 meQ of potassium this AM. He then gave verbal orders to D/C the additional dose of potassium. 1130: GI NP at bedside discussing plan of care with patient's family. Results discussed and oncology consult placed at this time. 1420: Mahesh Pollard, NP with oncology discussing care with family. See full note, will inquire palliative consult. 1900: End of Shift Note    Bedside shift change report given to Vladislav Newsome RN (oncoming nurse) by Jose Brown RN (offgoing nurse). Report included the following information SBAR, Kardex, Intake/Output, MAR, and Recent Results    Shift worked:  [id number]     Shift summary and any significant changes:     HGB stable 7.7, palliative meeting tomorrow at 1pm     Concerns for physician to address:  none     Zone phone for oncoming shift:   [phone]       Activity:     Number times ambulated in hallways past shift: 0  Number of times OOB to chair past shift: 3    Cardiac:   Cardiac Monitoring: Yes           Access:  Current line(s): PIV     Genitourinary:   Urinary status: voiding and external catheter    Respiratory:      Chronic home O2 use?: NO  Incentive spirometer at bedside: N/A       GI:     Current diet:  ADULT DIET;  Regular  Passing flatus: YES  Tolerating current diet: YES       Pain Management:   Patient states pain is manageable on current regimen: YES    Skin:     Interventions: turn team, specialty bed, float heels, increase time out of
1900 Bedside and Verbal shift change report given to [facility] (oncoming nurse) by Jerald Chandler RN (offgoing nurse). Report included the following information Nurse Handoff Report, Index, Intake/Output, MAR, Recent Results, and Cardiac Rhythm V-Paced/Afib . End of Shift Note    Bedside shift change report given to Kobe Walker RN (oncoming nurse) by Susan Gregg RN (offgoing nurse). Report included the following information SBAR, Kardex, Intake/Output, MAR, Recent Results, and Cardiac Rhythm V-Paced/Afib    Shift worked:  1900 - 0700     Shift summary and any significant changes:     2L NC worn overnight to maintain O2 sats >90%     Concerns for physician to address:  HGB 6.7. Zone phone for oncoming shift:          Activity:     Number times ambulated in hallways past shift: 0  Number of times OOB to chair past shift: 0    Cardiac:   Cardiac Monitoring: Yes           Access:  Current line(s): PIV     Genitourinary:   Urinary status: voiding    Respiratory:      Chronic home O2 use?: NO  Incentive spirometer at bedside: NO       GI:     Current diet:  ADULT DIET; Regular  Passing flatus: YES  Tolerating current diet: YES       Pain Management:   Patient states pain is manageable on current regimen: YES    Skin:     Interventions: float heels, increase time out of bed, PT/OT consult, internal/external urinary devices, and nutritional support    Patient Safety:  Fall Score:    Interventions: bed/chair alarm, assistive device (walker, cane.  etc), gripper socks, pt to call before getting OOB, and stay with me (per policy)       Length of Stay:  Expected LOS: 3  Actual LOS: 7      Susan Gregg RN
1945  End of Shift Note    Bedside shift change report given to Mikaela Yi (oncoming nurse) by Ricky Plaza RN (offgoing nurse). Report included the following information SBAR, Kardex, MAR, Recent Results, and Cardiac Rhythm A-Fib/V-Paced    Shift worked:  0182-7447     Shift summary and any significant changes:     Patient awake alert oriented to person and place only with short term memory loss hx dementia no complaints of pain able to ambulate to bedside commode 1 person assist  Family at bedside entire shift helping to commode and back to chair. In recliner [phone]. #22 left [facility]  Room air  no issues with eating/drinking  Orders to transfer to medical Oncology 1400  family upset patient is moving just had meeting with Palliative plan now is to let patient stay in PCU for night and will d/c tomorrow will follow up outpatient oncology. Concerns for physician to address:  N/a     Zone phone for oncoming shift:   N/a       Activity:     Number times ambulated in hallways past shift: 0  Number of times OOB to chair past shift: 1    Cardiac:   Cardiac Monitoring: Yes           Access:  Current line(s): PIV     Genitourinary:   Urinary status: voiding and incontinent    Respiratory:      Chronic home O2 use?: NO  Incentive spirometer at bedside: N/A       GI:     Current diet:  ADULT DIET;  Regular  Passing flatus: YES  Tolerating current diet: YES       Pain Management:   Patient states pain is manageable on current regimen: YES    Skin:     Interventions: float heels and increase time out of bed    Patient Safety:  Fall Score:    Interventions: bed/chair alarm and gripper socks       Length of Stay:  Expected LOS: 3  Actual LOS: [address] Alex Cheek RN
[id number] Notified SHIMON Marks that pt K 3.1 this AM.     PA instructed to place order for one time dose of Potassium 40meq PO. End of Shift Note    Bedside shift change report given to Chon Mcintosh RN (oncoming nurse) by Nguyen Borja RN (offgoing nurse).   Report included the following information SBAR, Kardex, ED Summary, Intake/Output, MAR, and Recent Results    Shift worked:  Night     Shift summary and any significant changes:     See above note      Concerns for physician to address:       Zone phone for oncoming shift:          Nguyen Borja RN
Bedside shift change report given to [facility], RN (oncoming nurse) by Catina Kamara RN (offgoing nurse). Report included the following information Nurse Handoff Report, Recent Results, Cardiac Rhythm V-paced, and Quality Measures. 0900--Messaged Dr. Corby Mercado as tele room called due to patient having 7 beats of V-tach. Patient back to being V-paced. 1340--Spoke to case management and she is working on getting home health set up so that patient can discharge.  to notify this nurse when set up.    1520--Spoke with case management and home health was approved. 1605--I have reviewed discharge instructions with the patient's daughters. The patient's daughters verbalized understanding. Discharge medications reviewed with daughters and appropriate educational materials and side effects teaching were provided. Follow-up appointments reviewed. Opportunity for questions and clarification was provided. Venous access removed without difficulty. Patient's belongings gathered and sent with patient. Patient is ready for discharge. Patient walked out to car with nurse and tech.     Zeny Leiva RN
Bowel prep completed. Patient's stool is bloody but clear.  NPO status will be initiated at midnight per orders
Brief Nutrition Note    Chart reviewed; family meeting held today with palliative to discuss [address]. Per chart review, family interested in pursuing workup for colon ca. RD will visit tomorrow to discuss nutrition/supplements as aligned with goals of care. Overall, would recommend regular/liberalized diet as tolerated given advanced age and dementia.      Electronically signed by Yasmin Grullon RD on 6/22/23 at 4:26 PM EDT    Contact: 
Comprehensive Nutrition Assessment    Type and Reason for Visit:  Reassess    Nutrition Recommendations/Plan:   Continue regular diet. Please document % meals and supplements consumed in flowsheet I/O's under intake. Malnutrition Assessment:  Malnutrition Status:  Insufficient data (06/15/23 1228)      Nutrition Assessment:    6/23: Chart reviewed; med noted for recent dc of colon ca. Discussed dc plan in IDNT rounds. RD visited with pt at bedside, sitting up eating lunch during visit. Consuming >75% of meals and pt reports good appetite which family also verified. No acute nutrition needs. Patient Vitals for the past 120 hrs:   PO Meals Eaten (%)   06/23/23 0912 76 - 100%   06/22/23 1800 76 - 100%   06/22/23 1200 51 - 75%   06/22/23 0830 76 - 100%   06/21/23 1640 76 - 100%   06/21/23 1230 76 - 100%   06/21/23 0730 76 - 100%   06/20/23 1747 76 - 100%   06/20/23 1115 51 - 75%   06/20/23 0843 76 - 100%     Nutrition Related Findings:    Labs: K+ 3.1; Meds: reviewed Wound Type:  (fissure to buttocks)       Current Nutrition Intake & Therapies:    Average Meal Intake: NPO     ADULT DIET; Regular    Anthropometric Measures:  Height: 170.2 cm (5' 7\")  Ideal Body Weight (IBW): 135 lbs (61 kg)       Current Body Weight: 149 lb 14.6 oz (68 kg),   IBW. Current BMI (kg/m2): 23.5                          BMI Categories: Normal Weight (BMI 18.5-24. 9)    Estimated Daily Nutrient Needs:  Energy Requirements Based On: Formula  Weight Used for Energy Requirements: Current  Energy (kcal/day): 1421 kcals (BMR x 1. 3AF)  Weight Used for Protein Requirements: Current  Protein (g/day): 68-82g (1.0-1.2 g/kg bw)  Method Used for Fluid Requirements: 1 ml/kcal  Fluid (ml/day): 1400 mL    Nutrition Diagnosis:   Inadequate protein-energy intake related to altered GI function as evidenced by NPO or clear liquid status due to medical condition    Nutrition Interventions:   Food and/or Nutrient Delivery: Continue Current
Endoscope was pre-cleaned at bedside immediately following procedure by Yessi Chavira returned to patient .
Endoscopy recovery  Patient returned to baseline, vital signs stable (see vital sign flowsheet). Respiratory status within defined limits. Abdomen soft not tender. Skin within defined limits.
Hospitalist Progress Note    NAME:   Atul Hernandez   : 1924   MRN: [id number]     Date/Time: 2023 3:29 PM  Patient PCP: Roxanna Slade MD    Estimated discharge date: ?-23  Barriers: Hemoglobin stability , Hospice? TBD, pending palliative consult      Assessment / Plan:  Lower GI bleed (diverticular likely) POA  Causing Acute blood loss anemia POA- hb stable at 7.7 today    S/p 1 unit PRBC yesterday  Cont Protonix   Avoid blood Thinners , no ASA. IP GI consult ed- s/p EGD + colonoscopy  DVT Prophylaxis given as SCD's  S/p EGD moderate congestion in the antrum  HG stable   S/p  colonoscopy  Findings:    Preparation only fair with scattered dark blood in the right colon and cecum. There was a sessile lesion : hemicircumferential with central ulceration/depression approximately 2.5 cm in size in the sigmoid colon (30 cm from anal verge) with friable appearance. Malignancy cannot be excluded s/p multiple biopsies of lesion followed by injection of amber ink (total 3 mL injected to farrukh site). Diffuse moderately severe diverticulosis.   + (2) sessile polyps in the cecum 7 mm in size s/p hot snare removal.     Clots of blood in R colon, cecum and transverse. Considered NM bleeding scan if patient develop bloody bowel movement  Colonic Biopsy came back malignant - IP Oncology consulted by GI today  IP palliative consulted by Oncology today--> await goal of care & ? Hospice transition once family decides in next 24 hrs  Follow H/H for now- transfuse as needed    Paroxysmal atrial fibrillation-Chronic anticoagulation  Stable we will resume home medications   Holding  Xarelto-- will likely DC in indefinitely       Vascular dementia    History of CHF  Stable we will resume home medications  Resumed lasix     Hypokalemia- 3.1 today  Chronic kidney disease  Stable we will resume home medications  Replenish K+ today     History of CVA-coronary artery disease/maker               Medical Decision
Hospitalist Progress Note    NAME:   Jame Mai   : 1924   MRN: [id number]     Date/Time: 2023 5:07 PM  Patient PCP: Kannan Busch MD    Estimated discharge date:  Barriers: GI clearance , colonoscopy Monday      Assessment / Plan:  Upper GI bleed        S/p Transfuse 1 unit of packed red blood cells ( PRBC's)  Monitor BP  Protonix gtt   Avoid blood Thinners , no ASA. GI consult   DVT Prophylaxis given as SCD's  S/p EGD moderate congestion in the antrum  HG stable   S/p  colonoscopy today   Findings:    Preparation only fair with scattered dark blood in the right colon and cecum. There was a sessile lesion : hemicircumferential with central ulceration/depression approximately 2.5 cm in size in the sigmoid colon (30 cm from anal verge) with friable appearance. Malignancy cannot be excluded s/p multiple biopsies of lesion followed by injection of amber ink (total 3 mL injected to farrukh site). Diffuse moderately severe diverticulosis.   + (2) sessile polyps in the cecum 7 mm in size s/p hot snare removal.     Clots of blood in R colon, cecum and transverse. Paroxysmal atrial fibrillation-Chronic anticoagulation  Stable we will resume home medications   hold Xarelto     Vascular dementia     History of CHF  Stable we will resume home medications   resume lasix   Chronic kidney disease  Stable we will resume home medications     History of CVA-coronary artery disease/maker               Medical Decision Making:   I personally reviewed labs:CBC and BMP   I personally reviewed imaging:  Toxic drug monitoring:   Discussed case with: family , nurse and patient         Code Status: Full   DVT Prophylaxis: SCD  GI Prophylaxis:    Subjective:     Chief Complaint / Reason for Physician Visit  \"\". Discussed with RN events overnight. Objective:     VITALS:   Last 24hrs VS reviewed since prior progress note.  Most recent are:  Patient Vitals for the past 24 hrs:   BP Temp Temp src Pulse Resp
Hospitalist Progress Note    NAME:   Jeannette Anderson   : 1924   MRN: [id number]     Date/Time: 2023 4:07 PM  Patient PCP: Harper Mclaughlin MD    Estimated discharge date:  Barriers: Hemoglobin stability       Assessment / Plan:  Upper GI bleed  Acute blood loss anemia      Hemoglobin 6.7, 1 unit PRBC was ordered  Protonix gtt   Avoid blood Thinners , no ASA. GI consult   DVT Prophylaxis given as SCD's  S/p EGD moderate congestion in the antrum  HG stable   S/p  colonoscopy today   Findings:    Preparation only fair with scattered dark blood in the right colon and cecum. There was a sessile lesion : hemicircumferential with central ulceration/depression approximately 2.5 cm in size in the sigmoid colon (30 cm from anal verge) with friable appearance. Malignancy cannot be excluded s/p multiple biopsies of lesion followed by injection of amber ink (total 3 mL injected to farrukh site). Diffuse moderately severe diverticulosis.   + (2) sessile polyps in the cecum 7 mm in size s/p hot snare removal.     Clots of blood in R colon, cecum and transverse. Considered NM bleeding scan if patient develop bloody bowel movement    Paroxysmal atrial fibrillation-Chronic anticoagulation  Stable we will resume home medications   hold Xarelto     Vascular dementia     History of CHF  Stable we will resume home medications   resume lasix   Chronic kidney disease  Stable we will resume home medications     History of CVA-coronary artery disease/maker               Medical Decision Making:   I personally reviewed labs:CBC and BMP   I personally reviewed imaging:  Toxic drug monitoring:   Discussed case with: family , nurse and patient         Code Status: Full   DVT Prophylaxis: SCD  GI Prophylaxis:    Subjective:     Chief Complaint / Reason for Physician Visit  \"\". Discussed with RN events overnight. Objective:     VITALS:   Last 24hrs VS reviewed since prior progress note.  Most recent are:  Patient
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 2023: S/P EGD 2023 and colonoscopy yesterday, findings below:    EGD 2023:  Findings:  Esophagus:Normal esophagus. EGJ at 42cm and regular. Gastric retroflexion intact GEFV. Stomach:moderate congestion and mild erythema in antrum, and diffuse loss of rugal folds in body, biopsied cold forceps with minimal bleeding antrum, incisura, body, cardia, to r/o H pylori and atrophic gastritis  Duodenum/jejunum: normal D1/D2, yellow bile      Colonoscopy 2023:  Findings:    Preparation only fair with scattered dark blood in the right colon and cecum. There was a sessile lesion : hemicircumferential with central ulceration/depression approximately 2.5 cm in size in the sigmoid colon (30 cm from anal verge) with friable appearance. Malignancy cannot be excluded s/p multiple biopsies of lesion followed by injection of amber ink (total 3 mL injected to farrukh site). Diffuse moderately severe diverticulosis.   + (2) sessile polyps in the cecum 7 mm in size s/p hot snare removal.     Clots of blood in R colon, cecum and transverse. Therapies:  see above    Pathology from EGD:  Stomach; biopsy:        Gastric antral type mucosa with mild reactive gastropathy, mild   chronic non-active gastritis and focal intestinal metaplasia; no dysplasia   identified. Pending staining for H. pylori type organisms. Bloody BM last night after colonoscopy. Hgb 6.7 today, to receive blood this morning.  No abdominal pain,
Occupational Therapy    Chart reviewed, noted patient with family meeting at 1pm, no update yet. Will follow for patient and family wishes prior to further OT treatment. Gloria Zarate.  MS Ajay OTR/L
Occupational Therapy Contact Note  06/20/23    Orders acknowledged. Chart reviewed. Pt hemoglobin currently 6.7 with planned transfusion today. Will defer and perform eval when pt is medically stable.     Thank you,  KARLA James, OTR/L
Patient transported to the endoscopy unit with glasses and tele box
Physical Therapy     Chart reviewed up to date and orders acknowledged. Attempted to see pt for PT evaluation this date, pt CHRISTOPHE for endoscopy. Will defer and follow up tomorrow for PT evaluation.     Sissy Necessary PT, DPT, NCS
Physical Therapy    Orders acknowledged. Chart reviewed. Pt hemoglobin currently 6.7 with planned transfusion today. Will defer and perform eval when pt is medically stable.     Kasi Chávez, PT, DPT
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 2023: S/P EGD 2023 and colonoscopy yesterday, findings below:    EGD 2023:  Findings:  Esophagus:Normal esophagus. EGJ at 42cm and regular. Gastric retroflexion intact GEFV. Stomach:moderate congestion and mild erythema in antrum, and diffuse loss of rugal folds in body, biopsied cold forceps with minimal bleeding antrum, incisura, body, cardia, to r/o H pylori and atrophic gastritis  Duodenum/jejunum: normal D1/D2, yellow bile      Colonoscopy 2023:  Findings:    Preparation only fair with scattered dark blood in the right colon and cecum. There was a sessile lesion : hemicircumferential with central ulceration/depression approximately 2.5 cm in size in the sigmoid colon (30 cm from anal verge) with friable appearance. Malignancy cannot be excluded s/p multiple biopsies of lesion followed by injection of amber ink (total 3 mL injected to farrukh site). Diffuse moderately severe diverticulosis.   + (2) sessile polyps in the cecum 7 mm in size s/p hot snare removal.     Clots of blood in R colon, cecum and transverse. Therapies:  see above    Pathology from EGD:  Stomach; biopsy:        Gastric antral type mucosa with mild reactive gastropathy, mild   chronic non-active gastritis and focal intestinal metaplasia; no dysplasia   identified. Pending staining for H. pylori type organisms. Bloody BM last night after colonoscopy. Hgb 6.7 today, to receive blood this morning.  No abdominal pain,
Report received from [facility].
The patient was experiencing some respiratory distress. SPO2 95% on 2L NC. She has scheduled and prn nebs, however; when reviewing her home medication list I found that she takes Lasix 80mg BID at home. She has been off of this medication since admission 6/13 given her acute volume loss and hemodynamic instability at that time. Her BP and HR are stable 148/59 HR 82 (afib).  Provider contacted for potential dose of Lasix
Identified  Food/Nutrient Intake Outcomes: Food and Nutrient Intake, Diet Advancement/Tolerance  Physical Signs/Symptoms Outcomes: Biochemical Data, Weight, GI Status    Discharge Planning:     (Regular diet as tolerated)     Alley Mathew RD  Contact: ext [phone]
change compared to H&P  ________________________________________________________________________  Care Plan discussed with:    Comments   Patient x    Family  x Daughters at bedside   RN x    Care Manager x    Consultant                       x Multidiciplinary team rounds were held today with , nursing, pharmacist and clinical coordinator. Patient's plan of care was discussed; medications were reviewed and discharge planning was addressed. ________________________________________________________________________  Total NON critical care TIME:  37  Minutes    Total CRITICAL CARE TIME Spent:   Minutes non procedure based      Comments   >50% of visit spent in counseling and coordination of care     ________________________________________________________________________  Susana Bardales MD     Procedures: see electronic medical records for all procedures/Xrays and details which were not copied into this note but were reviewed prior to creation of Plan. LABS:  I reviewed today's most current labs and imaging studies.   Pertinent labs include:  Recent Labs     06/20/23  0619 06/20/23  1748 06/21/23  0509 06/22/23  0609   WBC 5.1  --  5.3 5.6   HGB 6.7* 8.1* 7.7* 7.5*   HCT 22.2* 25.1* 25.0* 23.5*     --  152 170       Recent Labs     06/20/23  0619 06/21/23  0509    144   K 3.4* 3.1*   * 116*   CO2 21 21   GLUCOSE 136* 133*   BUN 17 21*   CREATININE 1.28* 1.36*   CALCIUM 7.8* 7.7*         Signed: Susana Bardales MD

## 2023-06-23 NOTE — TELEPHONE ENCOUNTER
Rodney Olson MD  You 14 hours ago (5:16 PM)     SA  She was hospitalized with GI bleeding. Need to be sent to hospitalist and once she is out, I will talk to them. She needs to follow-up with GI. Aster Lofton daughter Yudelka Mcdowell was called with no answer, message on  left to call back regarding note below.

## 2023-06-23 NOTE — DISCHARGE SUMMARY
Discharge Summary    Name: Juan Carlos Alegre  [id number]  YOB: 1924 (Age: 80 y.o.)   Date of Admission: 6/13/2023  Date of Discharge: 6/23/2023  Attending Physician: Joel Banegas MD    Discharge Diagnosis:   Lower GI bleed POA- resolved  Causing Acute blood loss anemia POA- hb stable at 7.5   Colonic Malignancy POA- on biopsy during colonoscopy, family not ready for hospice yet, [facility] to [facility] with [facility] today with OP followup with Oncology for further options when ready versus hospice in future, Taken off Eliquis for good  Paroxysmal atrial fibrillation on Chronic anticoagulation POA- taken off Eliquis on discharge as above  Vascular dementia  CHF  CKD  H/o CVA  DNR/Limited code per family      Consultations:  IP CONSULT TO GI  IP CONSULT TO HOSPITALIST  IP WOUND CARE NURSE CONSULT TO EVAL  IP CONSULT TO PHYSICAL THERAPY  IP CONSULT TO OCCUPATIONAL THERAPY  IP CONSULT TO ONCOLOGY  IP CONSULT TO [place] [address]      Brief Admission History/Reason for Admission Per Peri Rodriguez MD:   \"80year-old -American female with a past medical history of dementia, chronic kidney disease, atrial fibrillation, on chronic anticoagulation with Xarelto, history of CVA with no deficit, pacemaker, congestive heart failure, history of CVA comes in with an acute onset of generalized weakness and 1 large black-colored bowel movement, associated with presyncopal episode and generalized weakness. Patient states that at approximately 2:30 PM the day of the admission started complaining of lightheadedness, dizziness slightly slurred speech and was unable to stand up and was about to pass out. Daughter at the bedside states that they took her blood pressure at the time and it was very low and no signs of tachycardia. Shortly thereafter they called EMS and patient was brought to the ER. She was reportedly hypotensive at home 60s/40s, and per EMS she was 90s/30s.   She

## 2023-06-28 ENCOUNTER — OFFICE VISIT (OUTPATIENT)
Age: 88
End: 2023-06-28
Payer: MEDICARE

## 2023-06-28 VITALS
HEIGHT: 67 IN | HEART RATE: 90 BPM | OXYGEN SATURATION: 98 % | DIASTOLIC BLOOD PRESSURE: 66 MMHG | BODY MASS INDEX: 23.49 KG/M2 | RESPIRATION RATE: 16 BRPM | TEMPERATURE: 97.9 F | SYSTOLIC BLOOD PRESSURE: 122 MMHG

## 2023-06-28 DIAGNOSIS — R63.4 WEIGHT LOSS: ICD-10-CM

## 2023-06-28 DIAGNOSIS — K92.2 ACUTE GI BLEEDING: ICD-10-CM

## 2023-06-28 DIAGNOSIS — C18.7 MALIGNANT NEOPLASM OF SIGMOID COLON (HCC): ICD-10-CM

## 2023-06-28 DIAGNOSIS — R44.3 HALLUCINATION: ICD-10-CM

## 2023-06-28 DIAGNOSIS — R54 FRAIL ELDERLY: ICD-10-CM

## 2023-06-28 DIAGNOSIS — I48.0 PAROXYSMAL ATRIAL FIBRILLATION (HCC): ICD-10-CM

## 2023-06-28 DIAGNOSIS — G30.1 ALZHEIMER'S DISEASE WITH LATE ONSET (CODE) (HCC): ICD-10-CM

## 2023-06-28 DIAGNOSIS — R45.1 AGITATION: Primary | ICD-10-CM

## 2023-06-28 PROCEDURE — 99214 OFFICE O/P EST MOD 30 MIN: CPT | Performed by: INTERNAL MEDICINE

## 2023-06-28 PROCEDURE — 1123F ACP DISCUSS/DSCN MKR DOCD: CPT | Performed by: INTERNAL MEDICINE

## 2023-06-28 RX ORDER — ARIPIPRAZOLE 5 MG/1
5 TABLET ORAL NIGHTLY
Qty: 30 TABLET | Refills: 3 | Status: SHIPPED | OUTPATIENT
Start: 2023-06-28

## 2023-06-28 ASSESSMENT — PATIENT HEALTH QUESTIONNAIRE - PHQ9
SUM OF ALL RESPONSES TO PHQ QUESTIONS 1-9: 2
1. LITTLE INTEREST OR PLEASURE IN DOING THINGS: 1
2. FEELING DOWN, DEPRESSED OR HOPELESS: 1
SUM OF ALL RESPONSES TO PHQ QUESTIONS 1-9: 2
SUM OF ALL RESPONSES TO PHQ9 QUESTIONS 1 & 2: 2

## 2023-06-28 ASSESSMENT — ENCOUNTER SYMPTOMS
RESPIRATORY NEGATIVE: 1
ALLERGIC/IMMUNOLOGIC NEGATIVE: 1
EYES NEGATIVE: 1
ABDOMINAL PAIN: 1

## 2023-06-29 DIAGNOSIS — M19.041 PRIMARY OSTEOARTHRITIS, RIGHT HAND: ICD-10-CM

## 2023-06-29 LAB
ALBUMIN SERPL-MCNC: 3.4 G/DL (ref 3.5–4.6)
ALBUMIN/GLOB SERPL: 1.3 {RATIO} (ref 1.2–2.2)
ALP SERPL-CCNC: 72 IU/L (ref 44–121)
ALT SERPL-CCNC: 6 IU/L (ref 0–32)
AST SERPL-CCNC: 13 IU/L (ref 0–40)
BASOPHILS # BLD AUTO: 0 X10E3/UL (ref 0–0.2)
BASOPHILS NFR BLD AUTO: 0 %
BILIRUB SERPL-MCNC: 0.5 MG/DL (ref 0–1.2)
BUN SERPL-MCNC: 20 MG/DL (ref 10–36)
BUN/CREAT SERPL: 17 (ref 12–28)
CALCIUM SERPL-MCNC: 8.1 MG/DL (ref 8.7–10.3)
CHLORIDE SERPL-SCNC: 102 MMOL/L (ref 96–106)
CO2 SERPL-SCNC: 28 MMOL/L (ref 20–29)
CREAT SERPL-MCNC: 1.18 MG/DL (ref 0.57–1)
EGFRCR SERPLBLD CKD-EPI 2021: 42 ML/MIN/1.73
EOSINOPHIL # BLD AUTO: 0.1 X10E3/UL (ref 0–0.4)
EOSINOPHIL NFR BLD AUTO: 2 %
ERYTHROCYTE [DISTWIDTH] IN BLOOD BY AUTOMATED COUNT: 14.9 % (ref 11.7–15.4)
GLOBULIN SER CALC-MCNC: 2.6 G/DL (ref 1.5–4.5)
GLUCOSE SERPL-MCNC: 102 MG/DL (ref 70–99)
HCT VFR BLD AUTO: 23.8 % (ref 34–46.6)
HGB BLD-MCNC: 7.9 G/DL (ref 11.1–15.9)
IMM GRANULOCYTES # BLD AUTO: 0 X10E3/UL (ref 0–0.1)
IMM GRANULOCYTES NFR BLD AUTO: 0 %
LYMPHOCYTES # BLD AUTO: 0.6 X10E3/UL (ref 0.7–3.1)
LYMPHOCYTES NFR BLD AUTO: 12 %
MCH RBC QN AUTO: 27.8 PG (ref 26.6–33)
MCHC RBC AUTO-ENTMCNC: 33.2 G/DL (ref 31.5–35.7)
MCV RBC AUTO: 84 FL (ref 79–97)
MONOCYTES # BLD AUTO: 0.3 X10E3/UL (ref 0.1–0.9)
MONOCYTES NFR BLD AUTO: 7 %
NEUTROPHILS # BLD AUTO: 3.7 X10E3/UL (ref 1.4–7)
NEUTROPHILS NFR BLD AUTO: 79 %
PLATELET # BLD AUTO: 207 X10E3/UL (ref 150–450)
POTASSIUM SERPL-SCNC: 3.6 MMOL/L (ref 3.5–5.2)
PROT SERPL-MCNC: 6 G/DL (ref 6–8.5)
RBC # BLD AUTO: 2.84 X10E6/UL (ref 3.77–5.28)
REPORT: NORMAL
SODIUM SERPL-SCNC: 143 MMOL/L (ref 134–144)
WBC # BLD AUTO: 4.7 X10E3/UL (ref 3.4–10.8)

## 2023-06-30 ENCOUNTER — TELEPHONE (OUTPATIENT)
Age: 88
End: 2023-06-30

## 2023-07-05 ENCOUNTER — TELEPHONE (OUTPATIENT)
Age: 88
End: 2023-07-05

## 2023-07-05 DIAGNOSIS — K92.2 ACUTE UPPER GI BLEED: ICD-10-CM

## 2023-07-05 DIAGNOSIS — C18.7 MALIGNANT NEOPLASM OF SIGMOID COLON (HCC): ICD-10-CM

## 2023-07-05 DIAGNOSIS — K92.2 GASTROINTESTINAL HEMORRHAGE, UNSPECIFIED GASTROINTESTINAL HEMORRHAGE TYPE: Primary | ICD-10-CM

## 2023-07-05 NOTE — TELEPHONE ENCOUNTER
----- Message from [facility], JAY - NP sent at 7/3/2023  3:12 PM EDT -----  Cr better. Hbg still low but better. Repeat on next visit in a few weeks.  Look out for blood in the stool

## 2023-07-06 ENCOUNTER — OFFICE VISIT (OUTPATIENT)
Age: 88
End: 2023-07-06
Payer: MEDICARE

## 2023-07-06 VITALS
BODY MASS INDEX: 23.39 KG/M2 | SYSTOLIC BLOOD PRESSURE: 118 MMHG | HEIGHT: 67 IN | TEMPERATURE: 98.2 F | HEART RATE: 84 BPM | DIASTOLIC BLOOD PRESSURE: 65 MMHG | OXYGEN SATURATION: 94 % | WEIGHT: 149 LBS

## 2023-07-06 DIAGNOSIS — C18.7 MALIGNANT NEOPLASM OF SIGMOID COLON (HCC): Primary | ICD-10-CM

## 2023-07-06 PROCEDURE — 1123F ACP DISCUSS/DSCN MKR DOCD: CPT | Performed by: INTERNAL MEDICINE

## 2023-07-06 PROCEDURE — 99215 OFFICE O/P EST HI 40 MIN: CPT | Performed by: INTERNAL MEDICINE

## 2023-07-06 RX ORDER — LOSARTAN POTASSIUM 25 MG/1
TABLET ORAL
COMMUNITY
Start: 2023-07-06 | End: 2023-07-07

## 2023-07-06 NOTE — TELEPHONE ENCOUNTER
Daughter Davis Muller of [place] [facility] was called and verbalized understanding on note below.

## 2023-07-06 NOTE — PROGRESS NOTES
Yun Jimenes is a 80 y.o. female here for new patient appt for colon cancer. Pt here with POA/ , and two sisters. Pt has some pain in left shoulder. She has been weak and dizzy. Using wheelchair mostly these days. 1. Have you been to the ER, urgent care clinic since your last visit? Hospitalized since your last visit? New Pt    2. Have you seen or consulted any other health care providers outside of the [address] [place] [address] since your last visit? Include any pap smears or colon screening.  \New Pt Acute deep vein thrombosis (DVT) of femoral vein of left lower extremity

## 2023-07-07 ENCOUNTER — TELEMEDICINE (OUTPATIENT)
Age: 88
End: 2023-07-07
Payer: MEDICARE

## 2023-07-07 ENCOUNTER — CLINICAL DOCUMENTATION (OUTPATIENT)
Age: 88
End: 2023-07-07

## 2023-07-07 ENCOUNTER — TELEPHONE (OUTPATIENT)
Age: 88
End: 2023-07-07

## 2023-07-07 DIAGNOSIS — I10 ESSENTIAL (PRIMARY) HYPERTENSION: ICD-10-CM

## 2023-07-07 DIAGNOSIS — R54 FRAIL ELDERLY: ICD-10-CM

## 2023-07-07 DIAGNOSIS — G30.1 ALZHEIMER'S DISEASE WITH LATE ONSET (CODE) (HCC): ICD-10-CM

## 2023-07-07 DIAGNOSIS — Z71.89 GOALS OF CARE, COUNSELING/DISCUSSION: Primary | ICD-10-CM

## 2023-07-07 DIAGNOSIS — I48.0 PAROXYSMAL ATRIAL FIBRILLATION (HCC): ICD-10-CM

## 2023-07-07 DIAGNOSIS — C18.7 MALIGNANT NEOPLASM OF SIGMOID COLON (HCC): ICD-10-CM

## 2023-07-07 PROCEDURE — 99215 OFFICE O/P EST HI 40 MIN: CPT | Performed by: INTERNAL MEDICINE

## 2023-07-07 PROCEDURE — 1123F ACP DISCUSS/DSCN MKR DOCD: CPT | Performed by: INTERNAL MEDICINE

## 2023-07-07 ASSESSMENT — ENCOUNTER SYMPTOMS
RESPIRATORY NEGATIVE: 1
GASTROINTESTINAL NEGATIVE: 1

## 2023-07-07 NOTE — PROGRESS NOTES
Oncology Social Work  Psychosocial Assessment    Reason for Assessment:      [] Social Work Referral [x] Initial Assessment [x] New Diagnosis [] Other    Advance Care Planning:  Demographics 7/7/2023   Marital Status        Sources of Information:    [x]Patient  []Family  []Staff  []Medical Record    Mental Status:    [x]Alert  []Lethargic  []Unresponsive   [] Unable to assess   Oriented to:  [x]Person  [x]Place  [x]Time  [x]Situation      Relationship Status:  []Single  []  []Significant Other/Life Partner  []  []  [x]    Living Circumstances:  []Lives Alone  []Family/Significant Other in Household  []Roommates  []Children in the Home  []Paid Caregivers  []Assisted Living Facility/Group Home  [][employer]illed [employer]  []Homeless  []Incarcerated  []Environmental/Care Concerns  []Other:    Employment Status:  []Employed Full-time []Employed Part-time []Homemaker  [x] Retired [] Short-Term Disability [] Long-Term Disability  [] Unemployed   [] [address]   [] SSDI  [] Self-Employment    Barriers to Learning:    []Language  []Developmental  []Cognitive  []Altered Mental Status  []Visual/Hearing Impairment  []Unable to Read/Write  []Motivational  [] Challenges Understanding Medical Jargon [x]No Barriers Identified      Financial/Legal Concerns:    []Uninsured  []Limited Income/Resources  []Non-Citizen  []Food Insecurity [x]No Concerns Identified   []Other:    Caodaism/Spiritual/Existential:  Does patient have any spiritual or Islam beliefs? [x] Yes [] No  Is the patient involved in a spiritual, amaury or Islam community?  [] Yes [x] No  Patient expressing spiritual/existential angst? [] Yes [x] No  Notes:    Support System:    Identified Support Person/Group:  []Strong  [x]Fair  []Limited    Coping with Illness:   []  Coping Well  [] Challenges Coping with Serious Illness [x] Situational Depression [] Situational Anxiety [x] Anticipatory Grief  [] Recent Loss [] Caregiver [place]

## 2023-07-07 NOTE — PROGRESS NOTES
ADVISED PATIENT OF THE FOLLOWING HEALTH MAINTAINCE DUE  Health Maintenance Due   Topic Date Due    DTaP/Tdap/Td vaccine (1 - Tdap) Never done    Shingles vaccine (1 of 2) Never done    COVID-19 Vaccine (5 - Booster for Moderna series) 06/20/2022    Annual Wellness Visit (AWV)  07/09/2023    No chief complaint on file. 1. \"Have you been to the ER, urgent care clinic since your last visit? Hospitalized since your last visit? \" Yes, at [facility]     2. \"Have you seen or consulted any other health care providers outside of the [address] [place] [address] since your last visit? \" Oncologist  Dr Clement Craven     3. For patients aged 43-73: Has the patient had a colonoscopy / FIT/ Cologuard? If the patient is female:    4. For patients aged 43-66: Has the patient had a mammogram within the past 2 years? NA      5. For patients aged 21-65: Has the patient had a pap smear?  NA

## 2023-07-07 NOTE — TELEPHONE ENCOUNTER
Patient daughter called with questions and concerns.  She would like  to discuss Hospice vs palliative care

## 2023-07-08 NOTE — PROGRESS NOTES
Emily Breen (:  1924) is a Established patient, presenting virtually for evaluation of the following:    Assessment & Plan   Below is the assessment and plan developed based on review of pertinent history, physical exam, labs, studies, and medications. 1. Goals of care, counseling/discussion    2. Malignant neoplasm of sigmoid colon ([address])    3. Alzheimer's disease with late onset (CODE) ([address])    4. Frail elderly    5. Essential (primary) hypertension    6. Paroxysmal atrial fibrillation (HCC)      Patients daughters are to call back next week after they have a family meeting to discuss further. All questions answered and reviewed. Reviewed HH, palliative and hospice. Patient notes were reviewed and assessed to make decision and relay to family  Increased Seroquel to 25 mg in the AM and 50 mg at night. Stopped Abilify      Reviewed with patient medication side effects in detail   I answered all patient questions and concerns  Labs and testing done and/or upcoming labs/test were reviewed and discussed   Reviewed and discussed daily activity, exercise and diet      Return in about 1 week (around 2023) for follow up for routine visit or before if needed. Subjective patient was seen with all her daughters present via phone and video. Patients daughters have several questions about patients plan and goals. Is the last few weeks patient was admitted for a GI bleed. During colonoscopy and after the results of the biopsy patient was found to have invasive carcinoma and also tubulovillous adenoma polyp. She also received PRBC and the bleeding resolved. Patient was referred to palliative and which they did see her. Per the daughters they were still uncertain about the plan of care and what this would mean. Had a follow up with oncology in the last day. Was told there was not treatment and no plan to work up further. Daughters want to know what the options are.  In the last week I also have

## 2023-07-10 ENCOUNTER — HOSPICE ADMISSION (OUTPATIENT)
Age: 88
End: 2023-07-10
Payer: MEDICARE

## 2023-07-10 ENCOUNTER — TELEPHONE (OUTPATIENT)
Age: 88
End: 2023-07-10

## 2023-07-10 DIAGNOSIS — F01.518 VASCULAR DEMENTIA WITH BEHAVIORAL DISTURBANCE (HCC): ICD-10-CM

## 2023-07-10 DIAGNOSIS — K92.2 ACUTE UPPER GI BLEED: ICD-10-CM

## 2023-07-10 DIAGNOSIS — I50.20 SYSTOLIC CONGESTIVE HEART FAILURE, UNSPECIFIED HF CHRONICITY (HCC): ICD-10-CM

## 2023-07-10 DIAGNOSIS — F05 SUNDOWNING: ICD-10-CM

## 2023-07-10 DIAGNOSIS — G30.1 ALZHEIMER'S DISEASE WITH LATE ONSET (CODE) (HCC): ICD-10-CM

## 2023-07-10 DIAGNOSIS — C18.7 MALIGNANT NEOPLASM OF SIGMOID COLON (HCC): Primary | ICD-10-CM

## 2023-07-10 NOTE — TELEPHONE ENCOUNTER
Patient's daughter,Sammie, called wanting to let Sarai know that they are wanting to proceed with admitting patient to hospice as was discussed during her virtual visit on Friday.  Sammie's number,if needed,is [phone]

## 2023-07-12 ENCOUNTER — TELEMEDICINE (OUTPATIENT)
Age: 88
End: 2023-07-12
Payer: MEDICARE

## 2023-07-12 ENCOUNTER — TELEPHONE (OUTPATIENT)
Age: 88
End: 2023-07-12

## 2023-07-12 DIAGNOSIS — I48.0 PAROXYSMAL ATRIAL FIBRILLATION (HCC): ICD-10-CM

## 2023-07-12 DIAGNOSIS — J06.9 URTI (ACUTE UPPER RESPIRATORY INFECTION): Primary | ICD-10-CM

## 2023-07-12 DIAGNOSIS — Z71.89 ACP (ADVANCE CARE PLANNING): ICD-10-CM

## 2023-07-12 DIAGNOSIS — K92.2 ACUTE UPPER GI BLEED: Primary | ICD-10-CM

## 2023-07-12 DIAGNOSIS — E03.9 HYPOTHYROIDISM, UNSPECIFIED TYPE: ICD-10-CM

## 2023-07-12 DIAGNOSIS — Z00.00 MEDICARE ANNUAL WELLNESS VISIT, SUBSEQUENT: ICD-10-CM

## 2023-07-12 DIAGNOSIS — G30.1 ALZHEIMER'S DISEASE WITH LATE ONSET (CODE) (HCC): ICD-10-CM

## 2023-07-12 DIAGNOSIS — C18.7 MALIGNANT NEOPLASM OF SIGMOID COLON (HCC): ICD-10-CM

## 2023-07-12 DIAGNOSIS — R05.1 ACUTE COUGH: ICD-10-CM

## 2023-07-12 PROCEDURE — G8427 DOCREV CUR MEDS BY ELIG CLIN: HCPCS | Performed by: INTERNAL MEDICINE

## 2023-07-12 PROCEDURE — 1036F TOBACCO NON-USER: CPT | Performed by: INTERNAL MEDICINE

## 2023-07-12 PROCEDURE — G8420 CALC BMI NORM PARAMETERS: HCPCS | Performed by: INTERNAL MEDICINE

## 2023-07-12 PROCEDURE — 99214 OFFICE O/P EST MOD 30 MIN: CPT | Performed by: INTERNAL MEDICINE

## 2023-07-12 PROCEDURE — G0439 PPPS, SUBSEQ VISIT: HCPCS | Performed by: INTERNAL MEDICINE

## 2023-07-12 PROCEDURE — 99497 ADVNCD CARE PLAN 30 MIN: CPT | Performed by: INTERNAL MEDICINE

## 2023-07-12 PROCEDURE — 1090F PRES/ABSN URINE INCON ASSESS: CPT | Performed by: INTERNAL MEDICINE

## 2023-07-12 PROCEDURE — 1111F DSCHRG MED/CURRENT MED MERGE: CPT | Performed by: INTERNAL MEDICINE

## 2023-07-12 PROCEDURE — 1123F ACP DISCUSS/DSCN MKR DOCD: CPT | Performed by: INTERNAL MEDICINE

## 2023-07-12 RX ORDER — HYDROCODONE POLISTIREX AND CHLORPHENIRAMINE POLISTIREX 10; 8 MG/5ML; MG/5ML
5 SUSPENSION, EXTENDED RELEASE ORAL EVERY 12 HOURS PRN
Qty: 100 ML | Refills: 0 | Status: SHIPPED | OUTPATIENT
Start: 2023-07-12 | End: 2023-07-19

## 2023-07-12 RX ORDER — DOXYCYCLINE HYCLATE 100 MG
100 TABLET ORAL 2 TIMES DAILY
Qty: 14 TABLET | Refills: 0 | Status: SHIPPED | OUTPATIENT
Start: 2023-07-12 | End: 2023-07-19

## 2023-07-12 ASSESSMENT — PATIENT HEALTH QUESTIONNAIRE - PHQ9
SUM OF ALL RESPONSES TO PHQ QUESTIONS 1-9: 2
SUM OF ALL RESPONSES TO PHQ9 QUESTIONS 1 & 2: 2
SUM OF ALL RESPONSES TO PHQ QUESTIONS 1-9: 2
1. LITTLE INTEREST OR PLEASURE IN DOING THINGS: 1
2. FEELING DOWN, DEPRESSED OR HOPELESS: 1
SUM OF ALL RESPONSES TO PHQ QUESTIONS 1-9: 2
SUM OF ALL RESPONSES TO PHQ QUESTIONS 1-9: 2

## 2023-07-12 ASSESSMENT — ENCOUNTER SYMPTOMS
COUGH: 1
ALLERGIC/IMMUNOLOGIC NEGATIVE: 1
SHORTNESS OF BREATH: 0
GASTROINTESTINAL NEGATIVE: 1
EYES NEGATIVE: 1

## 2023-07-12 NOTE — PROGRESS NOTES
Subjective:      Patient ID: Shannon Dominguez is a 80 y.o. female here for follow-up. She is accompanied by her daughter. Daughter is her POA. She has been coughing for past 10 days. He has been taking Robitussin-DM which is not helping her. Cough with nasal congestion and postnasal drip. She is unable to sleep at night. Robitussin and DM and Mucinex not helping her. No fever. She is recently diagnosed with invasive adenocarcinoma of sigmoid colon. Seen by oncologist.  Oncology suggested her to be on hospice care and palliative care, no surgery is recommended. She will start hospice care for Next week. Has advanced dementia, has sundowning and agitation, Seroquel is not helping much. Has atrial fibrillation, on Coreg. No palpitation. Not able to take any blood thinners because of GI bleeding. Doing okay. She is more frail, need assistant 24-hour. No recent fall. URI   Associated symptoms include congestion and coughing. Review of Systems   Constitutional: Negative. HENT:  Positive for congestion. Eyes: Negative. Respiratory:  Positive for cough. Negative for shortness of breath. Cardiovascular: Negative. Gastrointestinal: Negative. Endocrine: Negative. Genitourinary: Negative. Musculoskeletal: Negative. Skin: Negative. Allergic/Immunologic: Negative. Neurological: Negative. Hematological: Negative. Psychiatric/Behavioral:  Positive for agitation and behavioral problems. Objective:   Physical Exam  Constitutional:       Appearance: Normal appearance. Cardiovascular:      Pulses: Normal pulses. Heart sounds: Normal heart sounds. Pulmonary:      Effort: Pulmonary effort is normal.      Breath sounds: Normal breath sounds. Abdominal:      General: Abdomen is flat. Bowel sounds are normal.      Palpations: Abdomen is soft. Musculoskeletal:         General: Normal range of motion. Skin:     General: Skin is warm.    Neurological:

## 2023-07-12 NOTE — ACP (ADVANCE CARE PLANNING)

## 2023-07-12 NOTE — TELEPHONE ENCOUNTER
Pts daughter is requesting a call back to discuss getting medication for pts cold that she's had for about 3 weeks since leaving the hospital.

## 2023-07-12 NOTE — TELEPHONE ENCOUNTER
[facility]  Sertraline 100 mg tab 90 day supply  Alendronate 35mg tab 90day supply  Levothyroxine 25 mcg tab 90 day supply  Ferrous sulfate 325 mg tab 90 day supply    06/28/2023 07/28/2023

## 2023-07-12 NOTE — TELEPHONE ENCOUNTER
Requested Prescriptions     Pending Prescriptions Disp Refills    sertraline (ZOLOFT) 100 MG tablet 90 tablet 0     Sig: Take 1 tablet by mouth daily    levothyroxine (SYNTHROID) 25 MCG tablet 90 tablet 0     Sig: Take 1 tablet by mouth every morning    ferrous sulfate (IRON 325) 325 (65 Fe) MG tablet 90 tablet 0     Sig: Take 1 tablet by mouth daily at [address] [place] [address] by [facility], [address] [place] [id number] [facility] [phone]  [address] [place] [address]  Phone: [phone] Fax: [phone]         Last appt 7/7/2023      Future Appointments   Date Time Provider [address] [place] [address]   7/28/2023 10:30 AM Nyla Friend MD [facility]

## 2023-07-12 NOTE — TELEPHONE ENCOUNTER
Future Appointments   Date Time Provider [address]   7/12/2023 11:00 AM Juan Davis MD [facility] AMB   7/28/2023 10:30 AM Juan Davis MD [facility] BS AMB

## 2023-07-12 NOTE — PROGRESS NOTES
Medicare Annual Wellness Visit    Renee Mccann is here for URI    Assessment & Plan   URTI (acute upper respiratory infection)  -     doxycycline hyclate (VIBRA-TABS) 100 MG tablet; Take 1 tablet by mouth 2 times daily for 7 days, Disp-14 tablet, R-0Normal  Acute cough  -     doxycycline hyclate (VIBRA-TABS) 100 MG tablet; Take 1 tablet by mouth 2 times daily for 7 days, Disp-14 tablet, R-0Normal  The following orders have not been finalized:  -     HYDROcodone-chlorpheniramine ([address] ER) 10-8 MG/5ML SUER  Malignant neoplasm of sigmoid colon ([address])  Alzheimer's disease with late onset (CODE) ([address])  Paroxysmal atrial fibrillation ([address])  Medicare annual wellness visit, subsequent  ACP (advance care planning)    Recommendations for Preventive Services Due: see orders and patient instructions/AVS.  Recommended screening schedule for the next 5-10 years is provided to the patient in written form: see Patient Instructions/AVS.     No follow-ups on file. Subjective     Ms. Camacho is here for Medicare wellness visit. She is frail, just diagnosed with probable invasive adenocarcinoma of the colon. Seen by oncologist and hospice service was recommended. She has been frail, need assistance with a gait and balance. No recent fall. Has dementia which is progressively getting worse. The following acute and/or chronic problems were also addressed today:      Patient's complete Health Risk Assessment and screening values have been reviewed and are found in Flowsheets. The following problems were reviewed today and where indicated follow up appointments were made and/or referrals ordered.     Positive Risk Factor Screenings with Interventions:    Fall Risk:        Interventions:    Patient advised to follow-up in this office for further evaluation and treatment                                    Objective      Patient-Reported Vitals  No data recorded          Allergies   Allergen Reactions    Ace

## 2023-07-13 RX ORDER — SERTRALINE HYDROCHLORIDE 100 MG/1
100 TABLET, FILM COATED ORAL DAILY
Qty: 90 TABLET | Refills: 0 | Status: SHIPPED | OUTPATIENT
Start: 2023-07-13

## 2023-07-13 RX ORDER — FERROUS SULFATE 325(65) MG
TABLET ORAL
Qty: 90 TABLET | Refills: 0 | Status: SHIPPED | OUTPATIENT
Start: 2023-07-13

## 2023-07-13 RX ORDER — LEVOTHYROXINE SODIUM 0.03 MG/1
25 TABLET ORAL EVERY MORNING
Qty: 90 TABLET | Refills: 0 | Status: SHIPPED | OUTPATIENT
Start: 2023-07-13

## 2023-07-17 ENCOUNTER — HOME CARE VISIT (OUTPATIENT)
Facility: HOME HEALTH | Age: 88
End: 2023-07-17
Payer: MEDICARE

## 2023-07-17 VITALS
HEART RATE: 64 BPM | RESPIRATION RATE: 16 BRPM | OXYGEN SATURATION: 98 % | DIASTOLIC BLOOD PRESSURE: 54 MMHG | SYSTOLIC BLOOD PRESSURE: 122 MMHG

## 2023-07-17 PROCEDURE — G0299 HHS/HOSPICE OF RN EA 15 MIN: HCPCS

## 2023-07-17 PROCEDURE — 2500000001 HSPC NON INJECTABLE MED

## 2023-07-17 PROCEDURE — 0651 HSPC ROUTINE HOME CARE

## 2023-07-17 ASSESSMENT — ENCOUNTER SYMPTOMS
BOWEL INCONTINENCE: 1
INDIGESTION: 1

## 2023-07-17 NOTE — HOSPICE
[facility]  8-  20                                                         Principle Hospice Diagnosis: adenocarcinoma of the sigmoid colon (declined treatment)  Diagnoses RELATED to the terminal prognosis: recent GI bleed  Other Diagnoses: Alzheimer's, atrial fib., URI, CHF     Date of Hospice Admission: 23  Hospice Attending Elected by Patient:   Primary Care Physician: Marya Jones    Admitting RN: LUIGI  Nurse CM: Jo-Ann Virgen Worker:  Arsen Walters:     declined       Durable DNR:  Yes   signed on admission, take signed copy to the home    Home: did not discuss     Direct Observation: Admission occurred with daughter Danial Fontanez and her three sisters and Peter Garcia. Sammie reports pt has been having escalating behaviors, some hallucinations, day night reversal. She is total care, poor po intake and goals of care are on comfort. Recent DX of colon cancer and any further treatment was declined. No reports of nausea or vomiting. PPS 40, PAINAD zero. Pt is alert, engaging, answers some basic questions. Pt can be impulsive and resists some care per her daughters. Denies pain. No obvious distress, RR even and non-labored, vitals WNL. Skin intact, except a \"pea\" sized area in gluteal gold, barrier paste applied. She is incontinent of bowel and bladder. Plan- lengthy discussion of family goals, hospice benefit, scope of service. All daughters share decision making, consents were signed by KHANH Cochran. All sisters present and concur on admission to hospice. Symptom meds planned for delivery tomorrow. DME requested will be ordered tomorrow as well. No urgent needs. Pt is well cared for by large and supportive family. KHANH Cochran is exhausted. Discussed respite option (family thinks pt will not tolerate a new location for care) and also provided resource guide for options of hiring extra help in.      ER Visits/ Hospitalizations in past year: 2 admissions in 2023   Onset Date of Hospice Diagnosis:

## 2023-07-18 ENCOUNTER — HOME CARE VISIT (OUTPATIENT)
Age: 88
End: 2023-07-18
Payer: MEDICARE

## 2023-07-18 ENCOUNTER — HOME CARE VISIT (OUTPATIENT)
Facility: HOME HEALTH | Age: 88
End: 2023-07-18
Payer: MEDICARE

## 2023-07-18 PROCEDURE — G0299 HHS/HOSPICE OF RN EA 15 MIN: HCPCS

## 2023-07-18 PROCEDURE — 0651 HSPC ROUTINE HOME CARE

## 2023-07-19 PROCEDURE — 0651 HSPC ROUTINE HOME CARE

## 2023-07-20 ENCOUNTER — HOME CARE VISIT (OUTPATIENT)
Facility: HOME HEALTH | Age: 88
End: 2023-07-20
Payer: MEDICARE

## 2023-07-20 ENCOUNTER — HOME CARE VISIT (OUTPATIENT)
Age: 88
End: 2023-07-20
Payer: MEDICARE

## 2023-07-20 VITALS
OXYGEN SATURATION: 99 % | TEMPERATURE: 97.8 F | SYSTOLIC BLOOD PRESSURE: 164 MMHG | DIASTOLIC BLOOD PRESSURE: 92 MMHG | RESPIRATION RATE: 20 BRPM | HEART RATE: 98 BPM

## 2023-07-20 VITALS
HEART RATE: 98 BPM | OXYGEN SATURATION: 98 % | SYSTOLIC BLOOD PRESSURE: 110 MMHG | DIASTOLIC BLOOD PRESSURE: 50 MMHG | RESPIRATION RATE: 16 BRPM

## 2023-07-20 PROCEDURE — G0299 HHS/HOSPICE OF RN EA 15 MIN: HCPCS

## 2023-07-20 PROCEDURE — 2500000001 HSPC NON INJECTABLE MED

## 2023-07-20 PROCEDURE — G0155 HHCP-SVS OF CSW,EA 15 MIN: HCPCS

## 2023-07-20 PROCEDURE — 0651 HSPC ROUTINE HOME CARE

## 2023-07-20 ASSESSMENT — ENCOUNTER SYMPTOMS
NAUSEA: 1
CONSTIPATION: 1
CONSTIPATION: 1
VOMITING: 1

## 2023-07-20 NOTE — HOSPICE
Patient rec'd sitting at table eating breakfast. Daughters present  She is polite, pleasant and cooperative. Medications reviewed, including symptom relief kit with indications and intructions. Family is in agreement to reduce lasix to 80 mg daily  Patient has not had a bowel movement and daughter reports that she feels the need to go. Bisacodyl suppository administered.    Oxygen concentrator set up for use at 2 lpm prn and daughters verbalized understanding  bisacodyl suppositories, lorazepam ordered

## 2023-07-20 NOTE — HOSPICE
PRN visit made to patient after family called and reported dark stook like emesis. On arrival patient sitting on BSC in the bathroom does not appear to be in any distress. Family sowed this RN the emesis which appears to be brownish/greyish does not smell like stool and does not appear to be old blood. Per family patient has been constipated and was given a suppository by CM during visit this AM, but patient has not been able to go. Patient assisted to back to bed and found to have large amount of stool in rectal vault. Digital disimpaction attempted with some success but patient could not tolerate well, as she was attempting to move bowels noted to have liquid stool oozing out. Lorazepam administered to help calm patient. Patient disimpacted as much as possible and able to get some stool out on her own. Per family patient not eating and drinking well and they were giving MiraLAX which is not on patient's med list. Encouraged to stop MiraLAX and push fluids. On assessment abdomen is soft, nontender, with hyperactive bowel sounds. Spoke with Jami Parkinson NP and reviewed patient symptoms and current assessment, plan to reassess patient tomorrow. Patient denies any abdominal discomfort and nausea at end of visit and resting comfortably. Family voiced understanding of all information reviewed encouraged to call hospice with any questions or concerns.

## 2023-07-21 ENCOUNTER — HOME CARE VISIT (OUTPATIENT)
Facility: HOME HEALTH | Age: 88
End: 2023-07-21
Payer: MEDICARE

## 2023-07-21 PROCEDURE — 0651 HSPC ROUTINE HOME CARE

## 2023-07-21 PROCEDURE — G0299 HHS/HOSPICE OF RN EA 15 MIN: HCPCS

## 2023-07-21 NOTE — HOSPICE
LMSW arrived at the patient's home to complete an Initial Psychosocial Assessment for [facility]. The LMSW was greeted at the front door by the patient's DTR Sammie, and another DTR Jose Ivy, and a [facility] RN were present upon arrival. The home was lived in and clutter free. The patient is a 79 y/o [place] American female [facility] Adenocarcinoma of sigmoid colon. The patient was in her room receiving patient care from the RN due to being impacted. Patient was alert and orient to self and family. Sammie and Jose Ivy provided information for this assessment. The patient worked at the [employer] and [employer] before she retired. Patient worked in her [employer]. Patient was  to Theodore Rivera for 71 years who had passed in 2009. They have four DTRS; Ibeth Benitez, and Geovanny colby. Patient does not have any living siblings. Patient belongs to the [employer] and has excellent support. The DTRS appear to be coping one day at a time as Severa Anthony is the primary caregiver and her other siblings will take turns assisting with caregiving. LMSW provided emotional support and supportive counseling related to the decline in health.

## 2023-07-22 PROCEDURE — 0651 HSPC ROUTINE HOME CARE

## 2023-07-23 PROCEDURE — 0651 HSPC ROUTINE HOME CARE

## 2023-07-24 PROCEDURE — 0651 HSPC ROUTINE HOME CARE

## 2023-07-25 ENCOUNTER — HOME CARE VISIT (OUTPATIENT)
Facility: HOME HEALTH | Age: 88
End: 2023-07-25
Payer: MEDICARE

## 2023-07-25 PROCEDURE — 0651 HSPC ROUTINE HOME CARE

## 2023-07-25 PROCEDURE — G0156 HHCP-SVS OF AIDE,EA 15 MIN: HCPCS

## 2023-07-25 PROCEDURE — G0299 HHS/HOSPICE OF RN EA 15 MIN: HCPCS

## 2023-07-26 PROCEDURE — 0651 HSPC ROUTINE HOME CARE

## 2023-07-27 ENCOUNTER — HOME CARE VISIT (OUTPATIENT)
Facility: HOME HEALTH | Age: 88
End: 2023-07-27
Payer: MEDICARE

## 2023-07-27 PROCEDURE — G0299 HHS/HOSPICE OF RN EA 15 MIN: HCPCS

## 2023-07-27 PROCEDURE — 0651 HSPC ROUTINE HOME CARE

## 2023-07-27 PROCEDURE — G0156 HHCP-SVS OF AIDE,EA 15 MIN: HCPCS

## 2023-07-28 PROCEDURE — 0651 HSPC ROUTINE HOME CARE

## 2023-07-28 RX ORDER — LANOLIN ALCOHOL/MO/W.PET/CERES
CREAM (GRAM) TOPICAL
Qty: 90 TABLET | Refills: 0 | Status: SHIPPED | OUTPATIENT
Start: 2023-07-28

## 2023-07-28 NOTE — TELEPHONE ENCOUNTER
Requested Prescriptions     Pending Prescriptions Disp Refills    magnesium oxide (MAG-OX) 400 (240 Mg) MG tablet [Pharmacy Med Name: Magnesium Oxide 400mg Tablet] 90 tablet 0     Sig: Take 1 tablet by mouth daily.        [facility] by [address], [address] [facility] [phone] - F [phone]  [address] [place] [address]  [address]  [facility] [address]  Phone: [phone] Fax: [phone]       Last appt 7/12/2023      Future Appointments   Date Time Provider [address]   7/28/2023 10:30 AM Rolando Sanz MD [facility]   8/1/2023 To Be Determined [facility]   8/3/2023 To Be Determined Susana [facility]   8/8/2023 [facility] Determ[facility]   8/10/2023 To Be Determined [facility]   8/15/2023 To Be Determined Susana [facility]   8/17/2023 To Be Determined Susana [facility]   8/22/2023 To Be [address] [facility]   8/24/2023 To Be [address] [facility]   8/29/2023 To Be Determined Susana C[facility]   8/31/2023 To Be Determined [facility]   9/5/2023 To Be Determined Susana C[facility]   9/7/2023 To Be Determined Susana [facility]   9/12/2023 To Be Determined Susana [facility]   9/14/2023 To Be Determined Susana [facility]   9/19/2023 To Be Determined Susana C[facility]   9/21/2023 To Be Determined Susana [facility]   9/26/2023 To Be Determined Susana Cower [facility]   9/28/2023 To Be Determined [facility]   10/3/2023 To Be Determined Susana [facility]   10/5/2023 To Be Determined [facility]   10/10/2023 [facility]   10/12/2023 To Be Determined Sue Sorensen

## 2023-07-29 PROCEDURE — 0651 HSPC ROUTINE HOME CARE

## 2023-07-30 VITALS
SYSTOLIC BLOOD PRESSURE: 112 MMHG | DIASTOLIC BLOOD PRESSURE: 68 MMHG | HEART RATE: 88 BPM | RESPIRATION RATE: 18 BRPM | TEMPERATURE: 97.8 F

## 2023-07-30 PROCEDURE — 0651 HSPC ROUTINE HOME CARE

## 2023-07-30 ASSESSMENT — ENCOUNTER SYMPTOMS: DYSPNEA ACTIVITY LEVEL: AFTER AMBULATING LESS THAN 10 FT

## 2023-07-30 NOTE — HOSPICE
Patient sitting in chair in bedroom upon arrival for visit; patient's daughter Danial Fontanez and other family members present. Patient is alert; oriented to person. Patient denies pain. Daughter reports patient has been anxious during the evening and night time hours, getting out of bed, packing to leave, etc.  Deyvi Vargas NP notified; new order received to increase nightly Seroquel from 50 mg to 75 mg. Daughter aware of dose increase and verbalizes understanding. Family reports that patient is dyspneic with minimal exertion and have been utilizing the wheelchair more instead of the walker. Stable appetite. Two pinpoint size open areas noted to sacrum. Triad cream and wound cleanser to be ordered. Reinforced hospice 24/7 for any concerns or change in patient's condition.

## 2023-07-31 ENCOUNTER — HOME CARE VISIT (OUTPATIENT)
Facility: HOME HEALTH | Age: 88
End: 2023-07-31
Payer: MEDICARE

## 2023-07-31 PROCEDURE — 2500000001 HSPC NON INJECTABLE MED

## 2023-07-31 PROCEDURE — 0651 HSPC ROUTINE HOME CARE

## 2023-07-31 PROCEDURE — G0299 HHS/HOSPICE OF RN EA 15 MIN: HCPCS

## 2023-08-01 ENCOUNTER — HOME CARE VISIT (OUTPATIENT)
Facility: HOME HEALTH | Age: 88
End: 2023-08-01
Payer: MEDICARE

## 2023-08-01 VITALS
RESPIRATION RATE: 16 BRPM | SYSTOLIC BLOOD PRESSURE: 118 MMHG | TEMPERATURE: 97.7 F | HEART RATE: 76 BPM | DIASTOLIC BLOOD PRESSURE: 66 MMHG

## 2023-08-01 VITALS — RESPIRATION RATE: 18 BRPM | TEMPERATURE: 97.6 F | HEART RATE: 76 BPM

## 2023-08-01 VITALS
SYSTOLIC BLOOD PRESSURE: 124 MMHG | TEMPERATURE: 97.6 F | RESPIRATION RATE: 18 BRPM | DIASTOLIC BLOOD PRESSURE: 68 MMHG | HEART RATE: 82 BPM

## 2023-08-01 VITALS
HEART RATE: 86 BPM | TEMPERATURE: 98.4 F | RESPIRATION RATE: 16 BRPM | SYSTOLIC BLOOD PRESSURE: 126 MMHG | DIASTOLIC BLOOD PRESSURE: 72 MMHG

## 2023-08-01 PROCEDURE — 0651 HSPC ROUTINE HOME CARE

## 2023-08-01 PROCEDURE — G0156 HHCP-SVS OF AIDE,EA 15 MIN: HCPCS

## 2023-08-01 ASSESSMENT — ENCOUNTER SYMPTOMS
BOWEL INCONTINENCE: 1
DYSPNEA ACTIVITY LEVEL: AFTER AMBULATING LESS THAN 10 FT
BOWEL INCONTINENCE: 1
CONSTIPATION: 1
DYSPNEA ACTIVITY LEVEL: AFTER AMBULATING LESS THAN 10 FT
BOWEL INCONTINENCE: 1
DYSPNEA ACTIVITY LEVEL: AFTER AMBULATING LESS THAN 10 FT
DYSPNEA ACTIVITY LEVEL: AFTER AMBULATING LESS THAN 10 FT
BOWEL INCONTINENCE: 1

## 2023-08-01 NOTE — HOSPICE
Patient lying in bed in bedroom upon arrival for visit; patient's grand daughter present. Patient is alert; oriented to person. Patient denies pain. Patient reports that she feels like her lips have been swollen recently. No edema noted on inspection. Patient is dyspneic with minimal exertion; oxygen in the home for PRN use. Respirations even and unlabored at rest.  Appetite stable. Last bowel movement yesterday. Triad cream applied to sacrum/buttocks; areas healing. Reinforced hospice 24/7 for any concerns or change in patient's condition.

## 2023-08-01 NOTE — HOSPICE
Patient sitting in wheelchair at table in bedroom upon arrival for visit; patient's daughter present. Patient is alert; oriented to person. Patient denies pain. Daughter reports that about every other night this week, patient has been very anxious during the evening and night hours, getting out of bed multiple times, packing to leave, etc.  Patricia Camp NP notified; new order obtained to increase patient's nightly Seroquel from 75 mg to 100 mg. Order also received to increase patient's Lorazepam from 0.5 mg to 1 mg Q6H PRN. Advised daughter to give Lorazepam around 1400 and at bedtime and monitor for effectiveness. Patient is dyspneic with minimal exertion; oxygen in the home for PRN use. Respirations even and unlabored at rest.  Appetite stable. Last bowel movement today. Triad cream applied to sacrum/buttocks; areas healing. Reinforced hospice 24/7 for any concerns or change in patient's condition.

## 2023-08-01 NOTE — HOSPICE
Patient sitting in chair in bedroom upon arrival for visit; patient's daughter Alonzo Oseguera present. Patient is alert; oriented to person. Patient denies pain. Family reports that patient is dyspneic with minimal exertion; oxygen in the home for PRN use. Stable appetite. Patient was disimpacted of large amount of stool yesterday during hospice nurse visit. Triad cream applied to sacrum/buttocks; areas healing. Patient sustained a fall yesterday, as patient got out of bed unassisted. No reported nor observed injuries noted. Supplies ordered for delivery via [employer]: Chux, gloves. Daughter is requesting for hospice aide visits to be on Mondays, Tuesdays or Thursdays. Hospice scheduling notified. Reinforced hospice 24/7 for any concerns or change in patient's condition.

## 2023-08-01 NOTE — HOSPICE
Patient sitting in wheelchair at table in bedroom upon arrival for visit; patient's daughter and grand daughter present. Patient is alert; oriented to person. Patient denies pain. Daughter reports that patient still gets up out of bed multiple times per night. Sudha Sands NP notified; no new orders at this time due to recently increasing Seroquel and Lorazepam.  Family to monitor behaviors this week and will reassess during nursing visits. Patient is dyspneic with minimal exertion; oxygen in the home for PRN use. Respirations even and unlabored at rest.  Appetite stable. Last bowel movement yesterday. Triad cream applied to sacrum/buttocks; areas healing. Discussed benefits of ordering a hospital bed; daughter will let hospice know when she is ready for this to be delivered. Medication refilled for delivery via [facility]: Lorazepam.  Reinforced hospice 24/7 for any concerns or change in patient's condition.

## 2023-08-02 PROCEDURE — 0651 HSPC ROUTINE HOME CARE

## 2023-08-03 ENCOUNTER — HOME CARE VISIT (OUTPATIENT)
Facility: HOME HEALTH | Age: 88
End: 2023-08-03
Payer: MEDICARE

## 2023-08-03 PROCEDURE — 0651 HSPC ROUTINE HOME CARE

## 2023-08-03 PROCEDURE — G0156 HHCP-SVS OF AIDE,EA 15 MIN: HCPCS

## 2023-08-04 ENCOUNTER — HOME CARE VISIT (OUTPATIENT)
Facility: HOME HEALTH | Age: 88
End: 2023-08-04
Payer: MEDICARE

## 2023-08-04 PROCEDURE — 0651 HSPC ROUTINE HOME CARE

## 2023-08-04 PROCEDURE — G0299 HHS/HOSPICE OF RN EA 15 MIN: HCPCS

## 2023-08-05 PROCEDURE — 0651 HSPC ROUTINE HOME CARE

## 2023-08-06 ENCOUNTER — HOME CARE VISIT (OUTPATIENT)
Age: 88
End: 2023-08-06
Payer: MEDICARE

## 2023-08-06 PROCEDURE — 0651 HSPC ROUTINE HOME CARE

## 2023-08-07 ENCOUNTER — HOME CARE VISIT (OUTPATIENT)
Facility: HOME HEALTH | Age: 88
End: 2023-08-07
Payer: MEDICARE

## 2023-08-07 PROCEDURE — G0299 HHS/HOSPICE OF RN EA 15 MIN: HCPCS

## 2023-08-08 ENCOUNTER — HOME CARE VISIT (OUTPATIENT)
Facility: HOME HEALTH | Age: 88
End: 2023-08-08
Payer: MEDICARE

## 2023-08-08 PROCEDURE — G0156 HHCP-SVS OF AIDE,EA 15 MIN: HCPCS

## 2023-08-09 VITALS
RESPIRATION RATE: 16 BRPM | SYSTOLIC BLOOD PRESSURE: 122 MMHG | TEMPERATURE: 97.7 F | HEART RATE: 86 BPM | DIASTOLIC BLOOD PRESSURE: 68 MMHG

## 2023-08-09 ASSESSMENT — ENCOUNTER SYMPTOMS
DYSPNEA ACTIVITY LEVEL: AFTER AMBULATING LESS THAN 10 FT
BOWEL INCONTINENCE: 1

## 2023-08-09 NOTE — HOSPICE
Patient sitting in wheelchair at table in bedroom upon arrival for visit; patient's daughter and grand daughter present. Patient is alert; oriented to person. Patient denies pain. Patient's daughter reports patient slid out of the bed this morning; no reported or observed injuries noted. [employer] just delivered patient's hospital bed, bedside table and tam lift just prior to this RN's arrival.  Reviewed equipment use with family. Patient is dyspneic with minimal exertion; oxygen in the home for PRN use. Respirations even and unlabored at rest.  Appetite stable. Last bowel movement yesterday. RNCM and NP to see patient this Friday; daughter aware. Reinforced hospice 24/7 for any concerns or change in patient's condition.

## 2023-08-11 ENCOUNTER — HOME CARE VISIT (OUTPATIENT)
Age: 88
End: 2023-08-11
Payer: MEDICARE

## 2023-08-11 ENCOUNTER — HOME HEALTH/ HOSPICE FACE TO FACE (OUTPATIENT)
Facility: HOSPITAL | Age: 88
End: 2023-08-11

## 2023-08-11 ENCOUNTER — HOME CARE VISIT (OUTPATIENT)
Facility: HOME HEALTH | Age: 88
End: 2023-08-11
Payer: MEDICARE

## 2023-08-11 VITALS
OXYGEN SATURATION: 100 % | SYSTOLIC BLOOD PRESSURE: 110 MMHG | HEART RATE: 86 BPM | DIASTOLIC BLOOD PRESSURE: 50 MMHG | RESPIRATION RATE: 16 BRPM

## 2023-08-11 PROCEDURE — G0299 HHS/HOSPICE OF RN EA 15 MIN: HCPCS

## 2023-08-11 ASSESSMENT — ENCOUNTER SYMPTOMS: DYSPNEA ACTIVITY LEVEL: AFTER AMBULATING LESS THAN 10 FT

## 2023-08-12 ENCOUNTER — HOME CARE VISIT (OUTPATIENT)
Age: 88
End: 2023-08-12
Payer: MEDICARE

## 2023-08-12 VITALS — RESPIRATION RATE: 18 BRPM

## 2023-08-12 PROCEDURE — G0299 HHS/HOSPICE OF RN EA 15 MIN: HCPCS

## 2023-08-12 NOTE — HOSPICE
Continue hospice care. SW to reach out about hiring more CG's. Daughter is exhausted. May need respite for patient and she is aware of this. Recommended not getting patient up as much and especially not to toilet as she can't support her weight. CM taught family how to change patient in bed and ordered diapers. Insomnia and sundowning at night. Increase restoril to 30 mg po at hs and continue seroquel as ordered.

## 2023-08-12 NOTE — HOSPICE
Joint visit made with Mirella Mitchell NP  Daughter and caregiver present  Patient rec'd in wheelchair eating breakfast. She is feeding herself, but not very efficiantly  Daughter states she eats a good breakfast, but the rest of the day varies  Patient had a fall yesterday and this morning. No injury noted. Both falls were while trying to go to the bathroom. Educated daughter and caregiver on not taking patient in to the bathroom anymore as it is not safe. Educated them on how to change the patient in bed. Daughter reports patient is up 3-4 times per night. Had started Restoril 15 mg last week. Order rec'd from [facility] to increase Restoril to 30 mg nightly. Daughter verbalizes understanding  She is having loose, black stool  They never rec'd the supplies that were ordered last week.  Reordered

## 2023-08-12 NOTE — HOSPICE
PRN visit made per request of pt's daughter Caio Cisneros. Pt has been agitated since 2pm today, per dtr, refusing to take medication and repeatedly attempting to walk to bathroom without assistance. She has had two falls without injury in past 24 hrs, per dtr. PCG Liliane's shift has ended and she needs to leave, but is staying until hospice RN arrives because dtr does not feel able to care for pt by herself due to pt's behavior. Upon arrival, RN greeted by dtr. Pt sitting on bedside commode in her bedroom. Pleasantly confused and cooperative, alert to self. No signs of agitation observed during visit. RN and PCG performed incontinence care for pt and changed her into pajamas. RN administered pt's nighttime medications, including 30mg dose of tenazepam and 1mg haloperidol. Pt swallowed multiple tablets with water with no difficulty. Pt moved from [facility] to bed via  with max 2-assist. Pt settled into bed easily and appeared to doze off quickly. RN then discussed [facility] respite option with dtr, who said she will keep this option in mind, though she'd prefer to keep her mother at home. Provided education about ongoing use of haldol q4h prn for agitation and restlessness, explaining that the medication can be sedating, but it will keep her mother calm and help reduce risk of falls. This was pt's first dose of haldol tonight. Reminded dtr of hospice availabiity 24/7 for questions/concerns. Dtr verbalized understanding.

## 2023-08-13 ENCOUNTER — HOME CARE VISIT (OUTPATIENT)
Age: 88
End: 2023-08-13
Payer: MEDICARE

## 2023-08-13 VITALS
SYSTOLIC BLOOD PRESSURE: 150 MMHG | HEART RATE: 95 BPM | RESPIRATION RATE: 20 BRPM | TEMPERATURE: 99.2 F | DIASTOLIC BLOOD PRESSURE: 66 MMHG | OXYGEN SATURATION: 96 %

## 2023-08-13 PROCEDURE — G0299 HHS/HOSPICE OF RN EA 15 MIN: HCPCS

## 2023-08-13 ASSESSMENT — ENCOUNTER SYMPTOMS: DIARRHEA: 1

## 2023-08-14 NOTE — HOSPICE
Patient rec'd in bed, asleep. Daughter Deyanira Ramírez present  Patient awakened. Very lethargic. Flat affect. Voice is weak. Daughter states she had to feed her yesterday  Patient with no command following. Last had morphine at 630 pm last night. Slept all night.   This is a big change from when this RN saw her on Friday

## 2023-08-15 NOTE — HOSPICE
reassess for decline. On arrival patient lying in bed, opens eyes to touch. Patient noted to be in pain when repositioned. Caregiver Ar Joyner present during visit, caregiver Sammie arrived during end of visit. Per Ar Joyner, patient has notable decline. No longer getting out of bed, not even with assistance. She has decreased intake of food and liquids and increase in sleepng. She has been receiving lorazepam for restlessness and morphine for pain. Ar Joyner does not feel that swallowing has weakened. Skin noted without breakdown; however, bilateral heels soft, boggy. Feet placed on pillow to float heels. Suggested to medicate patient prior to repositioning and reposition every 4 hours. Per Sammie's request, Casper catheter attempeted without success due to pain and stiffness. Patient given both lorazepam and morphine at 1400 before second attempt which was also unsuccessful due to patient stiffening legs. Discussed disease progression, EOL care with both Maria Dolores and Sammie. Reviewed medications for comfort and will not promote death; verbalized understanding. All questions answered. Patient placed on 0-7 for decline. Yael Parikh updated via call.

## 2023-08-15 NOTE — HOSPICE
PRN visit made to insert sosa for comfort at end of life. Patient lethargic, minimally responsive, daughters at the bedside. 16Fr. Sosa inserted without difficulty with assistance from daughter for positioning. Chyna, clear urine draining, tubing secured to leg, and bag placed below bladder. At end of visit daughter checked patient's oxygen level and was 86% but patient did not appear to be in any distress placed on 2L of oxygen for comfort with saturation going up to 98%. Reviewed EOL symptoms and to visualize patient for comfort. Family voiced understanding, encouraged to call hospice with any concerns or change in patient condition.

## 2023-08-15 NOTE — HOSPICE
LMSW arrived at the patient's home to complete a routine visit for [facility]. The LMSW was greeted at the front door by the patient's SHADE, and the patient's DTR Sammie was present. The home was clean and clutter free. The patient is a 79 y/o [place] American female with a BSC Dx. Adenocarcinoma of sigmoid colon. The patient was in her room receiving patient care from Sammie due to having a BM. Patient was alert and orient to self and family. Sammie provided information for this visit. Sammie reported that the patient's health has declined significantly and had two soft falls. Patient has a swollen knee that is warm to the touch and painful to move. Sammie stated that her regular HHA is quitting because the level of care is too much. Sammie stated that her [facility] but had not heard from Mrs. Natasha Mccloud. LMSW stated that he will contact Mrs. Natasha Mccloud during this visit and received her VM. LMSW informed Sammie that Mrs. Natasha Mccloud will call this LMSW back today with an update. Mrs. Natasha Mccloud contacted Gabriel Vazquez and this LMSW to inform us that she has a HHA that will work the five days that they are requesting. LMSW provided emotional support and supportive counseling related to EOL caregiving and family dynamics. Sammie reported that the DTRS are coping one day at a time as Bennie Kelley is the primary caregiver and her other siblings will take turns assisting with caregiving when they can. LMSW encourage selfcare and reminded Sammie that hospice was available 24/7 for any questions or concerns.

## 2023-08-17 NOTE — HOSPICE
Patient lying in bed upon arrival for visit; patient's daughters and other family members present in the home. Patient with eyes closed; neutral facial expression. No objective signs of pain or anxiety observed. Family has been medicating patient with Lorazepam and Morphine PRN; X2 doses today. Family reports patient was feverish yesterday and family administered a Tylenol suppository, which they report patient did not tolerate well. Recommended using cool wash cloths and removing blankets first.  Respirations even and unlabored on 2L O2 via NC. Patient is not eating food or taking liquids; mouth care only at this time. Capser patent and draining pop urine. Mottling noted to bilateral feet; left foot cool to the touch. End of life signs/symptoms and comfort measures reviewed with patient's family. Medications refilled for delivery via [facility]: Tylenol suppositories. Reinforced hospice 24/7 for any concerns or change in patient's condition.

## 2023-08-17 NOTE — HOSPICE
Patient lying in bed upon arrival for visit; patient's daughter, son in law and other family members present in the home. Patient with eyes closed; neutral facial expression. No objective signs of pain or anxiety at rest, however brief facial grimacing noted with turning/repositioning. Patient is only taking SRK medications at this time. Family has been medicating patient with Lorazepam and Morphine PRN; X4 doses yesterday, X2 doses today. Symptom relief medications reviewed with family. Patient is no longer eating food or taking liquids; mouth care only at this time. Discussed not feeding patient if patient is not alert enough to swallow safely. Casper patent and draining pop urine. Casper bag emptied of 200 ml urine. Assisted daughters with repositioning patient and floating heels for comfort. End of life signs/symptoms and comfort measures reviewed with patient's family. [address] contacted to cancel future medication orders per daughter's request.  Medications refilled for delivery via [facility]: Morphine, Haldol. Reinforced hospice 24/7 for any concerns or change in patient's condition.

## 2023-08-19 NOTE — HOSPICE
Patient rec'd in bed. Daughter present. Patient's right foot and leg is cool with some mottling. Her respiratory rate is 32. Her heart rate is 117  Excess secretions are audible without stethoscope. Levsin given  Administered 5mg morphine. Called [facility] and rec'd new orders for morphine 5 mg q1h prn and lorazepam 0.5 mg q1h prn. Written instructions left with family. Daughter verbalizes understanding. Assisted caregiver with changing patient. Instructed on elevating HOB and repositioning for secretions. Instructed daughter to call [address] [place] [address] at time of death.

## 2023-08-19 NOTE — HOSPICE
Pronouncement of death completed by: Christine Marsh RN (first/last name, title). Agency staff was not present at the time of death. At the time of death the patient was documented as [facility] death pronouncement apneic, pulseless, blood pressure absent. The pt  within his residence(location). The following were notified of the patient's death: MD [place], Hospice staff.   Medications were disposed of per hospice protocol

## 2023-08-21 ENCOUNTER — HOME CARE VISIT (OUTPATIENT)
Age: 88
End: 2023-08-21
Payer: MEDICARE

## 2023-09-06 ENCOUNTER — HOME CARE VISIT (OUTPATIENT)
Age: 88
End: 2023-09-06
Payer: MEDICARE

## 2023-09-23 DIAGNOSIS — G30.1 ALZHEIMER'S DISEASE WITH LATE ONSET (CODE) (HCC): ICD-10-CM

## 2023-09-23 DIAGNOSIS — R45.1 AGITATION: ICD-10-CM

## 2023-09-23 RX ORDER — QUETIAPINE FUMARATE 25 MG/1
TABLET, FILM COATED ORAL
Qty: 180 TABLET | Refills: 0 | OUTPATIENT
Start: 2023-09-23

## 2023-11-12 NOTE — PROGRESS NOTES
Patient progressing towards goals. Patient is a 71y old  Female who presents with a chief complaint of 70 y/o F with PMHx of T2DM, HTN, HLD, anemia, hypothyroidism, RA, fibromyalgia, remote cerebral aneurysm repair, acute hypercapnic respiratory failure now with tracheostomy, PEG tube, and bedbound (trach change 8/18, PEG change 8/14), sacral pressure ulcer, BIBEMS from home for 6 day hx of bleeding from the tracheostomy and PEG tube with associated abdominal pain, recent history of auditory and visual hallucinations, and with chronic sacral decubitus ulcer.    (29 Oct 2023 14:01)      Interval Events:    REVIEW OF SYSTEMS:  [ ] Positive for   [ ] All other systems negative  [ ] Unable to assess ROS because ________    Vital Signs Last 24 Hrs  T(C): 38 (11-12-23 @ 05:12), Max: 38 (11-12-23 @ 05:12)  T(F): 100.4 (11-12-23 @ 05:12), Max: 100.4 (11-12-23 @ 05:12)  HR: 83 (11-12-23 @ 05:12) (70 - 85)  BP: 172/82 (11-12-23 @ 05:12) (143/67 - 172/82)  RR: 17 (11-11-23 @ 20:39) (17 - 18)  SpO2: 100% (11-12-23 @ 05:12) (96% - 100%)          PHYSICAL EXAM:  HEENT:   [X] Tracheostomy:  #8 Cuffed Portex  [X] 4mm PERRL B/L  [X] No oral lesions  [ ] Abnormal    SKIN  [ ] No Rash  [ ]  Abnormal  [X] pressure: Sacral wound    CARDIAC  [X] Regular  [ ] Abnormal    PULMONARY  [X] Bilateral Clear Breath Sounds  [ ] Normal Excursion  [ ] Abnormal    GI  [X] PEG      [X] +BS		              [X] Soft, nondistended, nontender	  [ ] Abnormal    MUSCULOSKELETAL                                   [  ] Bedbound                 [X] Abnormal:  Deconditioned but can partially move all limbs   [ ] Ambulatory/OOB to chair                           EXTREMITIES                                         [X] Normal  [ ]Edema                           NEUROLOGIC  [ ] Normal, non focal  [X] Focal findings:  Alert and Oriented x2; responds appropriately top questions by mouthing and able to phonate while on vent; B/L foot drop with partial ability to dorsiflex; moves both arms with minimal hand dexterity B/L    PSYCHIATRIC  [X] Alert; somewhat anxious at times but mostly appropriate  [ ] Sedated	 [ ]Agitated    :  Mcbride: [ ] Yes, if yes: Date of Placement:                   [X] No    LINES: Central Lines [ ] Yes, if yes: Date of Placement                                     [X] No                              HOSPITAL MEDICATIONS:  MEDICATIONS  (STANDING):  artificial  tears Solution 2 Drop(s) Both EYES every 6 hours  ascorbic acid 500 milliGRAM(s) Oral daily  calcium carbonate    500 mG (Tums) Chewable 1 Tablet(s) Chew every 12 hours  cefepime   IVPB      cefepime   IVPB 2000 milliGRAM(s) IV Intermittent every 8 hours  chlorhexidine 0.12% Liquid 15 milliLiter(s) Oral Mucosa every 12 hours  chlorhexidine 4% Liquid 1 Application(s) Topical <User Schedule>  dextrose 50% Injectable 50 milliLiter(s) IV Push once  enoxaparin Injectable 40 milliGRAM(s) SubCutaneous every 24 hours  escitalopram 10 milliGRAM(s) Oral daily  fentaNYL   Patch  25 MICROgram(s)/Hr 1 Patch Transdermal every 72 hours  gabapentin Solution 100 milliGRAM(s) Enteral Tube at bedtime  insulin glargine Injectable (LANTUS) 14 Unit(s) SubCutaneous every morning  insulin lispro (ADMELOG) corrective regimen sliding scale   SubCutaneous every 6 hours  lactobacillus acidophilus 1 Tablet(s) Oral daily  levothyroxine 125 MICROGram(s) Oral daily  lidocaine   4% Patch 1 Patch Transdermal daily  multivitamin/minerals/iron Oral Solution (CENTRUM) 15 milliLiter(s) Oral daily  nystatin Powder 1 Application(s) Topical every 8 hours  oxybutynin 5 milliGRAM(s) Oral daily  pantoprazole   Suspension 40 milliGRAM(s) Enteral Tube daily  polyethylene glycol 3350 17 Gram(s) Oral two times a day  predniSONE   Tablet 5 milliGRAM(s) Oral daily  QUEtiapine 12.5 milliGRAM(s) Oral <User Schedule>  simethicone 80 milliGRAM(s) Chew four times a day  zinc sulfate 220 milliGRAM(s) Oral daily    MEDICATIONS  (PRN):  acetaminophen   Oral Liquid .. 1000 milliGRAM(s) Enteral Tube every 6 hours PRN Temp greater or equal to 38C (100.4F), Mild Pain (1 - 3)  albuterol/ipratropium for Nebulization 3 milliLiter(s) Nebulizer every 6 hours PRN Bronchospasm  diphenhydrAMINE Elixir 25 milliGRAM(s) Oral every 6 hours PRN Rash and/or Itching  oxyCODONE    Solution 2.5 milliGRAM(s) Oral every 6 hours PRN Moderate Pain (4 - 6)  sodium chloride 0.65% Nasal 1 Spray(s) Both Nostrils every 6 hours PRN Nasal Congestion      LABS:                        9.2    5.84  )-----------( 131      ( 11 Nov 2023 06:35 )             31.3     11-11    137  |  102  |  27<H>  ----------------------------<  135<H>  4.1   |  26  |  <0.30<L>    Ca    9.2      11 Nov 2023 06:37  Phos  4.0     11-11  Mg     1.8     11-11    TPro  7.0  /  Alb  3.1<L>  /  TBili  0.4  /  DBili  x   /  AST  27  /  ALT  27  /  AlkPhos  47  11-11      Urinalysis Basic - ( 11 Nov 2023 06:37 )    Color: x / Appearance: x / SG: x / pH: x  Gluc: 135 mg/dL / Ketone: x  / Bili: x / Urobili: x   Blood: x / Protein: x / Nitrite: x   Leuk Esterase: x / RBC: x / WBC x   Sq Epi: x / Non Sq Epi: x / Bacteria: x          CAPILLARY BLOOD GLUCOSE    MICROBIOLOGY:     RADIOLOGY:  [ ] Reviewed and interpreted by me    Mode: AC/ CMV (Assist Control/ Continuous Mandatory Ventilation)  RR (machine): 12  TV (machine): 300  FiO2: 30  PEEP: 5  ITime: 1  MAP: 8  PIP: 27   Patient is a 71y old  Female who presents with a chief complaint of 72 y/o F with PMHx of T2DM, HTN, HLD, anemia, hypothyroidism, RA, fibromyalgia, remote cerebral aneurysm repair, acute hypercapnic respiratory failure now with tracheostomy, PEG tube, and bedbound (trach change 8/18, PEG change 8/14), sacral pressure ulcer, BIBEMS from home for 6 day hx of bleeding from the tracheostomy and PEG tube with associated abdominal pain, recent history of auditory and visual hallucinations, and with chronic sacral decubitus ulcer.    (29 Oct 2023 14:01)      Interval Events: fever 100.4 5am     REVIEW OF SYSTEMS:  [ ] Positive for   [ ] All other systems negative  [x ] Unable to assess ROS because mental status     Vital Signs Last 24 Hrs  T(C): 38 (11-12-23 @ 05:12), Max: 38 (11-12-23 @ 05:12)  T(F): 100.4 (11-12-23 @ 05:12), Max: 100.4 (11-12-23 @ 05:12)  HR: 83 (11-12-23 @ 05:12) (70 - 85)  BP: 172/82 (11-12-23 @ 05:12) (143/67 - 172/82)  RR: 17 (11-11-23 @ 20:39) (17 - 18)  SpO2: 100% (11-12-23 @ 05:12) (96% - 100%)      PHYSICAL EXAM:  HEENT:   [X] Tracheostomy:  #9 Cuffed Portex  [X] 4mm PERRL B/L  [X] No oral lesions  [ ] Abnormal    SKIN  [ ] No Rash  [ ]  Abnormal  [X] pressure: Sacral wound    CARDIAC  [X] Regular  [ ] Abnormal    PULMONARY  [X] Bilateral Clear Breath Sounds, diminish bases   [ ] Normal Excursion  [ ] Abnormal    GI  [X] PEG      [X] +BS		              [X] Soft, nondistended, nontender	  [ ] Abnormal    MUSCULOSKELETAL                                   [  ] Bedbound                 [X] Abnormal:  Deconditioned but can partially move all limbs   [ ] Ambulatory/OOB to chair                           EXTREMITIES                                         [X] Normal  [ ]Edema                         edema on hands     NEUROLOGIC  [ ] Normal, non focal  [X] Focal findings:  ?asleep this am; B/L foot drop with partial ability to dorsiflex; moves both arms with minimal hand dexterity B/L    PSYCHIATRIC  [X] ?asleep   [ ] Sedated	 [ ]Agitated    :  Mcbride: [ ] Yes, if yes: Date of Placement:                   [X] No    LINES: Central Lines [ ] Yes, if yes: Date of Placement                                     [X] No                              HOSPITAL MEDICATIONS:  MEDICATIONS  (STANDING):  artificial  tears Solution 2 Drop(s) Both EYES every 6 hours  ascorbic acid 500 milliGRAM(s) Oral daily  calcium carbonate    500 mG (Tums) Chewable 1 Tablet(s) Chew every 12 hours  cefepime   IVPB      cefepime   IVPB 2000 milliGRAM(s) IV Intermittent every 8 hours  chlorhexidine 0.12% Liquid 15 milliLiter(s) Oral Mucosa every 12 hours  chlorhexidine 4% Liquid 1 Application(s) Topical <User Schedule>  dextrose 50% Injectable 50 milliLiter(s) IV Push once  enoxaparin Injectable 40 milliGRAM(s) SubCutaneous every 24 hours  escitalopram 10 milliGRAM(s) Oral daily  fentaNYL   Patch  25 MICROgram(s)/Hr 1 Patch Transdermal every 72 hours  gabapentin Solution 100 milliGRAM(s) Enteral Tube at bedtime  insulin glargine Injectable (LANTUS) 14 Unit(s) SubCutaneous every morning  insulin lispro (ADMELOG) corrective regimen sliding scale   SubCutaneous every 6 hours  lactobacillus acidophilus 1 Tablet(s) Oral daily  levothyroxine 125 MICROGram(s) Oral daily  lidocaine   4% Patch 1 Patch Transdermal daily  multivitamin/minerals/iron Oral Solution (CENTRUM) 15 milliLiter(s) Oral daily  nystatin Powder 1 Application(s) Topical every 8 hours  oxybutynin 5 milliGRAM(s) Oral daily  pantoprazole   Suspension 40 milliGRAM(s) Enteral Tube daily  polyethylene glycol 3350 17 Gram(s) Oral two times a day  predniSONE   Tablet 5 milliGRAM(s) Oral daily  QUEtiapine 12.5 milliGRAM(s) Oral <User Schedule>  simethicone 80 milliGRAM(s) Chew four times a day  zinc sulfate 220 milliGRAM(s) Oral daily    MEDICATIONS  (PRN):  acetaminophen   Oral Liquid .. 1000 milliGRAM(s) Enteral Tube every 6 hours PRN Temp greater or equal to 38C (100.4F), Mild Pain (1 - 3)  albuterol/ipratropium for Nebulization 3 milliLiter(s) Nebulizer every 6 hours PRN Bronchospasm  diphenhydrAMINE Elixir 25 milliGRAM(s) Oral every 6 hours PRN Rash and/or Itching  oxyCODONE    Solution 2.5 milliGRAM(s) Oral every 6 hours PRN Moderate Pain (4 - 6)  sodium chloride 0.65% Nasal 1 Spray(s) Both Nostrils every 6 hours PRN Nasal Congestion      LABS:                        9.2    5.84  )-----------( 131      ( 11 Nov 2023 06:35 )             31.3     11-11    137  |  102  |  27<H>  ----------------------------<  135<H>  4.1   |  26  |  <0.30<L>    Ca    9.2      11 Nov 2023 06:37  Phos  4.0     11-11  Mg     1.8     11-11    TPro  7.0  /  Alb  3.1<L>  /  TBili  0.4  /  DBili  x   /  AST  27  /  ALT  27  /  AlkPhos  47  11-11      Urinalysis Basic - ( 11 Nov 2023 06:37 )    Color: x / Appearance: x / SG: x / pH: x  Gluc: 135 mg/dL / Ketone: x  / Bili: x / Urobili: x   Blood: x / Protein: x / Nitrite: x   Leuk Esterase: x / RBC: x / WBC x   Sq Epi: x / Non Sq Epi: x / Bacteria: x        CAPILLARY BLOOD GLUCOSE    MICROBIOLOGY:     Culture - Sputum . (11.06.23 @ 06:30)    -  Piperacillin/Tazobactam: S <=8   -  Piperacillin/Tazobactam: R 32   -  Tobramycin: I 4   -  Trimethoprim/Sulfamethoxazole: S <=0.5/9.5   Gram Stain:   Numerous polymorphonuclear leukocytes per low power field  No Squamous epithelial cells per low power field  Numerous Gram Negative Rods per oil power field   -  Amoxicillin/Clavulanic Acid: R >16/8   -  Ampicillin: R >16 These ampicillin results predict results for amoxicillin   -  Ampicillin/Sulbactam: R >16/8   -  Aztreonam: S 8   -  Aztreonam: S <=4   -  Cefazolin: R >16   -  Cefepime: S <=2   -  Cefepime: S <=2   -  Cefoxitin: R 16   -  Ceftazidime: S <=1   -  Ceftriaxone: I 2   -  Ciprofloxacin: S <=0.25   -  Ciprofloxacin: R >2   -  Ertapenem: S <=0.5   -  Gentamicin: S <=2   -  Imipenem: S 2   -  Levofloxacin: R >4   -  Levofloxacin: S <=0.5   -  Meropenem: S <=1   -  Meropenem: S <=1   Specimen Source: .Sputum Sputum   Culture Results:   Numerous Pseudomonas aeruginosa  Numerous Serratia marcescens   Organism Identification: Pseudomonas aeruginosa  Serratia marcescens   Organism: Pseudomonas aeruginosa   Organism: Serratia marcescens   Method Type: LENIN   Method Type: LENIN      Culture - Blood (11.05.23 @ 22:19)    Specimen Source: .Blood Blood-Peripheral   Culture Results:   No growth at 5 days        RADIOLOGY:  [x Reviewed and interpreted by me    < from: Xray Chest 1 View- PORTABLE-Urgent (Xray Chest 1 View- PORTABLE-Urgent .) (11.12.23 @ 07:13) >  IMPRESSION:  Bibasilar atelectasis.  Small left pleural effusion    Mode: AC/ CMV (Assist Control/ Continuous Mandatory Ventilation)  RR (machine): 12  TV (machine): 300  FiO2: 30  PEEP: 5  ITime: 1  MAP: 8  PIP: 27   Patient is a 71y old  Female who presents with a chief complaint of 72 y/o F with PMHx of T2DM, HTN, HLD, anemia, hypothyroidism, RA, fibromyalgia, remote cerebral aneurysm repair, acute hypercapnic respiratory failure now with tracheostomy, PEG tube, and bedbound (trach change 8/18, PEG change 8/14), sacral pressure ulcer, BIBEMS from home for 6 day hx of bleeding from the tracheostomy and PEG tube with associated abdominal pain, recent history of auditory and visual hallucinations, and with chronic sacral decubitus ulcer.    (29 Oct 2023 14:01)      Interval Events: fever 100.4 5am     REVIEW OF SYSTEMS:  [x ] Positive for eye redness and vaginal itch per family   [ ] All other systems negative  [ ] Unable to assess ROS because    Vital Signs Last 24 Hrs  T(C): 38 (11-12-23 @ 05:12), Max: 38 (11-12-23 @ 05:12)  T(F): 100.4 (11-12-23 @ 05:12), Max: 100.4 (11-12-23 @ 05:12)  HR: 83 (11-12-23 @ 05:12) (70 - 85)  BP: 172/82 (11-12-23 @ 05:12) (143/67 - 172/82)  RR: 17 (11-11-23 @ 20:39) (17 - 18)  SpO2: 100% (11-12-23 @ 05:12) (96% - 100%)      PHYSICAL EXAM:  HEENT:   [X] Tracheostomy:  #9 Cuffed Portex  [X] 4mm PERRL B/L  [X] No oral lesions  [ ] Abnormal  Conjunctival hemorrhages R>L, no discharge     SKIN  [ ] No Rash  [ ]  Abnormal  [X] pressure: Sacral wound    CARDIAC  [X] Regular  [ ] Abnormal    PULMONARY  [X] Bilateral Clear Breath Sounds, diminish bases   [ ] Normal Excursion  [ ] Abnormal    GI  [X] PEG      [X] +BS		              [X] Soft, nondistended, nontender	  [ ] Abnormal    MUSCULOSKELETAL                                   [  ] Bedbound                 [X] Abnormal:  Deconditioned but can partially move all limbs   [ ] Ambulatory/OOB to chair                           EXTREMITIES                                         [X] Normal  [ ]Edema                         edema on hands     NEUROLOGIC  [ ] Normal, non focal  [X] Focal findings: eyes open spontaneously follows commands, moves extremities     PSYCHIATRIC  [X] ?asleep   [ ] Sedated	 [ ]Agitated    :  Mcbride: [ ] Yes, if yes: Date of Placement:                   [X] No    LINES: Central Lines [ ] Yes, if yes: Date of Placement                                     [X] No                              HOSPITAL MEDICATIONS:  MEDICATIONS  (STANDING):  artificial  tears Solution 2 Drop(s) Both EYES every 6 hours  ascorbic acid 500 milliGRAM(s) Oral daily  calcium carbonate    500 mG (Tums) Chewable 1 Tablet(s) Chew every 12 hours  cefepime   IVPB      cefepime   IVPB 2000 milliGRAM(s) IV Intermittent every 8 hours  chlorhexidine 0.12% Liquid 15 milliLiter(s) Oral Mucosa every 12 hours  chlorhexidine 4% Liquid 1 Application(s) Topical <User Schedule>  dextrose 50% Injectable 50 milliLiter(s) IV Push once  enoxaparin Injectable 40 milliGRAM(s) SubCutaneous every 24 hours  escitalopram 10 milliGRAM(s) Oral daily  fentaNYL   Patch  25 MICROgram(s)/Hr 1 Patch Transdermal every 72 hours  gabapentin Solution 100 milliGRAM(s) Enteral Tube at bedtime  insulin glargine Injectable (LANTUS) 14 Unit(s) SubCutaneous every morning  insulin lispro (ADMELOG) corrective regimen sliding scale   SubCutaneous every 6 hours  lactobacillus acidophilus 1 Tablet(s) Oral daily  levothyroxine 125 MICROGram(s) Oral daily  lidocaine   4% Patch 1 Patch Transdermal daily  multivitamin/minerals/iron Oral Solution (CENTRUM) 15 milliLiter(s) Oral daily  nystatin Powder 1 Application(s) Topical every 8 hours  oxybutynin 5 milliGRAM(s) Oral daily  pantoprazole   Suspension 40 milliGRAM(s) Enteral Tube daily  polyethylene glycol 3350 17 Gram(s) Oral two times a day  predniSONE   Tablet 5 milliGRAM(s) Oral daily  QUEtiapine 12.5 milliGRAM(s) Oral <User Schedule>  simethicone 80 milliGRAM(s) Chew four times a day  zinc sulfate 220 milliGRAM(s) Oral daily    MEDICATIONS  (PRN):  acetaminophen   Oral Liquid .. 1000 milliGRAM(s) Enteral Tube every 6 hours PRN Temp greater or equal to 38C (100.4F), Mild Pain (1 - 3)  albuterol/ipratropium for Nebulization 3 milliLiter(s) Nebulizer every 6 hours PRN Bronchospasm  diphenhydrAMINE Elixir 25 milliGRAM(s) Oral every 6 hours PRN Rash and/or Itching  oxyCODONE    Solution 2.5 milliGRAM(s) Oral every 6 hours PRN Moderate Pain (4 - 6)  sodium chloride 0.65% Nasal 1 Spray(s) Both Nostrils every 6 hours PRN Nasal Congestion      LABS:                        9.2    5.84  )-----------( 131      ( 11 Nov 2023 06:35 )             31.3     11-11    137  |  102  |  27<H>  ----------------------------<  135<H>  4.1   |  26  |  <0.30<L>    Ca    9.2      11 Nov 2023 06:37  Phos  4.0     11-11  Mg     1.8     11-11    TPro  7.0  /  Alb  3.1<L>  /  TBili  0.4  /  DBili  x   /  AST  27  /  ALT  27  /  AlkPhos  47  11-11    Urinalysis (11.12.23 @ 10:34)    pH Urine: 7.0   Glucose Qualitative, Urine: Negative mg/dL   Blood, Urine: Negative   Color: Yellow   Urine Appearance: Clear   Bilirubin: Negative   Ketone - Urine: Negative mg/dL   Specific Gravity: 1.018   Protein, Urine: 30 mg/dL   Urobilinogen: 0.2 mg/dL   Nitrite: Negative   Leukocyte Esterase Concentration: Negative          CAPILLARY BLOOD GLUCOSE    MICROBIOLOGY:     Culture - Sputum . (11.06.23 @ 06:30)    -  Piperacillin/Tazobactam: S <=8   -  Piperacillin/Tazobactam: R 32   -  Tobramycin: I 4   -  Trimethoprim/Sulfamethoxazole: S <=0.5/9.5   Gram Stain:   Numerous polymorphonuclear leukocytes per low power field  No Squamous epithelial cells per low power field  Numerous Gram Negative Rods per oil power field   -  Amoxicillin/Clavulanic Acid: R >16/8   -  Ampicillin: R >16 These ampicillin results predict results for amoxicillin   -  Ampicillin/Sulbactam: R >16/8   -  Aztreonam: S 8   -  Aztreonam: S <=4   -  Cefazolin: R >16   -  Cefepime: S <=2   -  Cefepime: S <=2   -  Cefoxitin: R 16   -  Ceftazidime: S <=1   -  Ceftriaxone: I 2   -  Ciprofloxacin: S <=0.25   -  Ciprofloxacin: R >2   -  Ertapenem: S <=0.5   -  Gentamicin: S <=2   -  Imipenem: S 2   -  Levofloxacin: R >4   -  Levofloxacin: S <=0.5   -  Meropenem: S <=1   -  Meropenem: S <=1   Specimen Source: .Sputum Sputum   Culture Results:   Numerous Pseudomonas aeruginosa  Numerous Serratia marcescens   Organism Identification: Pseudomonas aeruginosa  Serratia marcescens   Organism: Pseudomonas aeruginosa   Organism: Serratia marcescens   Method Type: LENIN   Method Type: LENIN      Culture - Blood (11.05.23 @ 22:19)    Specimen Source: .Blood Blood-Peripheral   Culture Results:   No growth at 5 days        RADIOLOGY:  [x Reviewed and interpreted by me    < from: Xray Chest 1 View- PORTABLE-Urgent (Xray Chest 1 View- PORTABLE-Urgent .) (11.12.23 @ 07:13) >  IMPRESSION:  Bibasilar atelectasis.  Small left pleural effusion    Mode: AC/ CMV (Assist Control/ Continuous Mandatory Ventilation)  RR (machine): 12  TV (machine): 300  FiO2: 30  PEEP: 5  ITime: 1  MAP: 8  PIP: 27

## 2024-03-27 NOTE — PROGRESS NOTES
Begin using pressure relief cushion at all times. Avoid lying directly on wound when in bed. Patient scheduled please enter lab orders.

## 2025-04-28 NOTE — PROGRESS NOTES
HISTORY OF PRESENT ILLNESS  Migel Romano is a 80 y.o. female. Pt. comes in with her daughter for f/u. Has multiple medical problems. Reports having a chronic nonproductive cough for many months. Worse at night. Occasional mucus. No history of smoking or asthma. Mucinex DM is not helping much. Poor air helps some. Prednisone helped some. History of PE and A. fib. On Xarelto. She is on BP medications including losartan. Reports compliance with medications and diet. Med list and most recent labs/studies reviewed with pt. Trying to be active physically as tolerated. Has appointment with pulmonologist in September. . Reports no other new c/o. Cough   Pertinent negatives include no chest pain, no headaches and no shortness of breath. Medication Refill   Pertinent negatives include no chest pain, no headaches and no shortness of breath. Follow Up Chronic Condition   Pertinent negatives include no chest pain, no headaches and no shortness of breath. Review of Systems   Constitutional: Negative. HENT: Negative. Eyes: Negative. Respiratory: Positive for cough. Negative for hemoptysis, sputum production, shortness of breath and wheezing. Cardiovascular: Negative. Negative for chest pain and leg swelling. Gastrointestinal: Negative. Genitourinary: Negative. Musculoskeletal: Positive for joint pain. Negative for falls. Skin: Negative. Neurological: Negative for dizziness, sensory change, focal weakness and headaches. Psychiatric/Behavioral: Positive for memory loss. Physical Exam   Constitutional: She is oriented to person, place, and time. She appears well-developed and well-nourished. No distress. Pleasant elderly lady in wheelchair   HENT:   Head: Normocephalic and atraumatic. Nose: Nose normal.   Mouth/Throat: Oropharynx is clear and moist. No oropharyngeal exudate. PND without erythema   Eyes: Conjunctivae are normal.   Neck: Normal range of motion.  Neck supple. No JVD present. No thyromegaly present. Cardiovascular: Normal rate, regular rhythm, normal heart sounds and intact distal pulses. No murmur heard. Pulmonary/Chest: Effort normal and breath sounds normal. No respiratory distress. She has no wheezes. She has no rales. Abdominal: Soft. Bowel sounds are normal. She exhibits no distension. Musculoskeletal: She exhibits no edema or tenderness. Neurological: She is alert and oriented to person, place, and time. Coordination abnormal.   DJD   Skin: Skin is warm and dry. No rash noted. Psychiatric: She has a normal mood and affect. Her behavior is normal.   Nursing note and vitals reviewed. ASSESSMENT and PLAN  Diagnoses and all orders for this visit:    1. Chronic cough  -     predniSONE (DELTASONE) 10 mg tablet; Take 0.5 Tabs by mouth daily for 7 days.  -     guaiFENesin-codeine (ROBITUSSIN AC) 100-10 mg/5 mL solution; Take 5 mL by mouth three (3) times daily as needed for Cough. Max Daily Amount: 15 mL. 2. Allergic rhinitis due to pollen, unspecified seasonality  -     predniSONE (DELTASONE) 10 mg tablet; Take 0.5 Tabs by mouth daily for 7 days. 3. Essential hypertension    4. Chronic atrial fibrillation (HCC)    5. Personal history of pulmonary embolism    Other orders  -     albuterol (PROAIR HFA) 90 mcg/actuation inhaler; Take 2 Puffs by inhalation every six (6) hours as needed for Wheezing.  -     Start amLODIPine (NORVASC) 5 mg tablet; Take 1 Tab by mouth daily. Stop losartan    Follow-up Disposition:  Return in about 2 weeks (around 8/28/2018).    lab results and schedule of future lab studies reviewed with patient  reviewed diet, exercise and weight control  reviewed medications and side effects in detail  F/u with other MD's as scheduled  Discussed possible causes of a chronic dry cough including PND, asthma, GERD, medications  Advised patient to raise head of bed to 30°  Advised patient to avoid lying down for 2-3 hours after eating  We will stop losartan to see if it helps with her cough 4 = No assist / stand by assistance

## (undated) DEVICE — SYSTEM REPROC CBL 3 LD DISPOSABLE

## (undated) DEVICE — ENDOSCOPIC KIT COMPLIANCE ENDOKIT

## (undated) DEVICE — MARKER ENDOSCOPIC 10ML INDIA INK STERILE

## (undated) DEVICE — SINGLE-USE BIOPSY FORCEPS: Brand: RADIAL JAW 4

## (undated) DEVICE — CUFF BLD PRSS AD CLTH SGL TB W/ BAYNT CONN ROUNDED CORNER

## (undated) DEVICE — CONTAINER SPEC 20 ML LID NEUT BUFF FORMALIN 10 % POLYPR STS

## (undated) DEVICE — IV START KIT: Brand: MEDLINE

## (undated) DEVICE — CATHETER IV 20GA L1.16IN OD1.0414-1.1176MM ID0.762-0.8382MM

## (undated) DEVICE — INJECTION THERAPY NEEDLE CATHETER: Brand: INTERJECT

## (undated) DEVICE — ELECTRODE PT RET AD L9FT HI MOIST COND ADH HYDRGEL CORDED